# Patient Record
Sex: FEMALE | Race: WHITE | NOT HISPANIC OR LATINO | URBAN - METROPOLITAN AREA
[De-identification: names, ages, dates, MRNs, and addresses within clinical notes are randomized per-mention and may not be internally consistent; named-entity substitution may affect disease eponyms.]

---

## 2019-02-27 ENCOUNTER — APPOINTMENT (OUTPATIENT)
Dept: PREADMISSION TESTING | Facility: HOSPITAL | Age: 29
End: 2019-02-27
Attending: ORTHOPAEDIC SURGERY
Payer: OTHER MISCELLANEOUS

## 2019-02-27 ENCOUNTER — APPOINTMENT (OUTPATIENT)
Dept: LAB | Facility: HOSPITAL | Age: 29
End: 2019-02-27
Attending: ORTHOPAEDIC SURGERY
Payer: OTHER MISCELLANEOUS

## 2019-02-27 ENCOUNTER — TRANSCRIBE ORDERS (OUTPATIENT)
Dept: LAB | Facility: HOSPITAL | Age: 29
End: 2019-02-27

## 2019-02-27 VITALS
SYSTOLIC BLOOD PRESSURE: 99 MMHG | RESPIRATION RATE: 16 BRPM | HEART RATE: 60 BPM | DIASTOLIC BLOOD PRESSURE: 65 MMHG | WEIGHT: 174.7 LBS | HEIGHT: 65 IN | OXYGEN SATURATION: 98 % | BODY MASS INDEX: 29.11 KG/M2 | TEMPERATURE: 97.5 F

## 2019-02-27 DIAGNOSIS — M67.52 PLICA SYNDROME OF LEFT KNEE: ICD-10-CM

## 2019-02-27 DIAGNOSIS — M65.162 OTHER INFECTIVE (TENO)SYNOVITIS, LEFT KNEE: Primary | ICD-10-CM

## 2019-02-27 DIAGNOSIS — M65.162 OTHER INFECTIVE (TENO)SYNOVITIS, LEFT KNEE: ICD-10-CM

## 2019-02-27 DIAGNOSIS — Z01.818 ENCOUNTER FOR PREADMISSION TESTING: Primary | ICD-10-CM

## 2019-02-27 PROBLEM — I26.99 PULMONARY EMBOLISM (CMS/HCC): Status: ACTIVE | Noted: 2017-01-01

## 2019-02-27 LAB
ANION GAP SERPL CALC-SCNC: 10 MEQ/L (ref 3–15)
ATRIAL RATE: 62
BUN SERPL-MCNC: 5 MG/DL (ref 8–20)
CALCIUM SERPL-MCNC: 9.9 MG/DL (ref 8.9–10.3)
CHLORIDE SERPL-SCNC: 107 MEQ/L (ref 98–109)
CO2 SERPL-SCNC: 24 MEQ/L (ref 22–32)
CREAT SERPL-MCNC: 0.8 MG/DL
ERYTHROCYTE [DISTWIDTH] IN BLOOD BY AUTOMATED COUNT: 12.2 % (ref 11.7–14.4)
GFR SERPL CREATININE-BSD FRML MDRD: >60 ML/MIN/1.73M*2
GLUCOSE SERPL-MCNC: 84 MG/DL (ref 70–99)
HCG UR QL: NEGATIVE
HCT VFR BLDCO AUTO: 40.2 %
HGB BLD-MCNC: 13.2 G/DL
MCH RBC QN AUTO: 29.1 PG (ref 28–33.2)
MCHC RBC AUTO-ENTMCNC: 32.8 G/DL (ref 32.2–35.5)
MCV RBC AUTO: 88.7 FL (ref 83–98)
P AXIS: 75
PDW BLD AUTO: 10.3 FL (ref 9.4–12.3)
PLATELET # BLD AUTO: 258 K/UL
POTASSIUM SERPL-SCNC: 4 MEQ/L (ref 3.6–5.1)
PR INTERVAL: 140
QRS DURATION: 88
QT INTERVAL: 410
QTC CALCULATION(BAZETT): 416
R AXIS: 73
RBC # BLD AUTO: 4.53 M/UL (ref 3.93–5.22)
SODIUM SERPL-SCNC: 141 MEQ/L (ref 136–144)
T WAVE AXIS: 40
VENTRICULAR RATE: 62
WBC # BLD AUTO: 6.55 K/UL

## 2019-02-27 PROCEDURE — 93005 ELECTROCARDIOGRAM TRACING: CPT

## 2019-02-27 PROCEDURE — 85027 COMPLETE CBC AUTOMATED: CPT

## 2019-02-27 PROCEDURE — 36415 COLL VENOUS BLD VENIPUNCTURE: CPT

## 2019-02-27 PROCEDURE — 84703 CHORIONIC GONADOTROPIN ASSAY: CPT

## 2019-02-27 PROCEDURE — 80048 BASIC METABOLIC PNL TOTAL CA: CPT

## 2019-02-27 PROCEDURE — 93010 ELECTROCARDIOGRAM REPORT: CPT | Performed by: INTERNAL MEDICINE

## 2019-02-27 RX ORDER — PAROXETINE 10 MG/1
TABLET, FILM COATED ORAL
Refills: 2 | COMMUNITY
Start: 2019-01-29

## 2019-02-27 RX ORDER — ASPIRIN 81 MG/1
81 TABLET ORAL DAILY
COMMUNITY

## 2019-02-27 ASSESSMENT — ENCOUNTER SYMPTOMS
JOINT SWELLING: 1
CARDIOVASCULAR NEGATIVE: 1
NERVOUS/ANXIOUS: 1
RESPIRATORY NEGATIVE: 1
ARTHRALGIAS: 1

## 2019-02-27 NOTE — PRE-PROCEDURE INSTRUCTIONS
1. We will call you between 3 pm and 7 pm on March 5, 2019 to determine that arrival time for your procedure. If you do not hear by 4:30 PM. Please call [phone] for arrival time.    2. Please report to [place] in [place] / good, walk into [place] and report to the admission desk on first floor on the day of your procedure.   3. Please follow the following fasting guidelines:   Nothing to eat or drink after midnight unless otherwise instructed by  your physician. No gum mints candy. Brush teeth/Rinse Mouth   4.    5. Other Instructions: No aspirin aleve ibuprofen-stop supplements. Tylenol OK   6. If you develop a cold, cough, fever, rash, or other symptom prior to the data of the procedure, please report it to your physician immediately.   7. If you need to cancel the procedure for any reason, please contact your physician or call the unit listed above.   8. Make arrangements to have someone drive you home from the procedure. If you have not arranged for transportation home, your surgery may be cancelled.    9. You may not take public transportation unless accompanied by a responsible person.   10. You may not drive a car or operate complex or potentially dangerous machinery for 24 hours following anesthesia and/or sedation.   11. If it is medically necessary for you to have a longer stay, you will be informed as soon as the decision is made.   12. Do not wear or bring anything of value to the hospital including jewelry of any kind. Do not wear make-up or contact lenses. DO bring your glasses and hearing aid.   13. No lotion, creams, powders, or oils on skin the morning of procedure    14. Dress in comfortable clothes.   15.  If instructed, please bring a copy of your Advanced Directive (Living Will/Durable Power of ) on the day of your procedure.      Pre operative instructions given as per protocol.  Form explained by: ANDRA Martin     I have read and understand the above information. I have  had sufficient opportunity to ask questions I might have and they have been answered to my satisfaction. I agree to comply with the Patient Responsibilities listed above and have received a copy of this form.

## 2019-02-27 NOTE — H&P
History and Physical  Pre-admission testing         HISTORY OF PRESENT ILLNESS      Nikki Winston is an 29 y.o. female who presents with complaints or worsening left knee pain with some swelling over the past 2 years, despite medical therapy and PT. She presented in 2017 with a left leg DVT and subsequent pulmonary embolism which was treated in 2017 with Eliquis x 6 months, currently on aspirin. She is scheduled for  Left Knee Arthroscopic [65590 (CPT®)]  PAST MEDICAL AND SURGICAL HISTORY      Past Medical History:   Diagnosis Date   • Abnormal Pap smear of cervix    • Anxiety    • Asthma     exercise induced   • Carpal tunnel syndrome, bilateral    • Deep vein thrombosis (CMS/HCC) ([facility]) 2017    left leg   • Depression    • Eczema    • Lung bullae (CMS/HCC) (HCC)    • Neuropathy     left leg   • Pulmonary embolism (CMS/HCC) (HCC) 2017    derived from left leg DVT-Eliquis x 6months   • Spasm of thoracic back muscle     occas   • Synovitis of knee        Past Surgical History:   Procedure Laterality Date   • THYROIDECTOMY, PARTIAL     • WISDOM TOOTH EXTRACTION         PROBLEM LIST     Patient Active Problem List   Diagnosis   • Pulmonary embolism (CMS/HCC) ([facility])   • Synovitis of knee       MEDICATIONS        Current Outpatient Prescriptions:   •  aspirin 81 mg enteric coated tablet, Take 81 mg by mouth daily., Disp: , Rfl:   •  cannabidiol (CBD) oil, 1 each See admin instr., Disp: , Rfl:   •  PARoxetine (PAXIL) 10 mg tablet, TAKE 1 TABLET EVERY DAY IN THE MORNING, Disp: , Rfl: 2    ALLERGIES      No Known Allergies    FAMILY HISTORY      No family history on file.    SOCIAL HISTORY      Social History     Social History   • Marital status: Single     Spouse name: N/A   • Number of children: N/A   • Years of education: N/A     Occupational History   • Not on file.     Social History Main Topics   • Smoking status: Former Smoker     Types: Cigarettes     Quit date: 2015   • Smokeless tobacco: Never Used   • Alcohol  "use Yes      Comment: occas wine   • Drug use: No   • Sexual activity: Not on file     Other Topics Concern   • Not on file     Social History Narrative   • No narrative on file       REVIEW OF SYSTEMS      Review of Systems   Respiratory: Negative.    Cardiovascular: Negative.    Musculoskeletal: Positive for arthralgias and joint swelling.        Left knee   Psychiatric/Behavioral: The patient is nervous/anxious.        PHYSICAL EXAMINATION      BP 99/65 (BP Location: Left upper arm, Patient Position: Sitting)   Pulse 60   Temp 36.4 °C (97.5 °F)   Resp 16   Ht 1.651 m (5' 5\")   Wt 79.2 kg (174 lb 11.2 oz)   LMP 02/23/2019 (Approximate)   SpO2 98%   Breastfeeding? No   BMI 29.07 kg/m²   Body mass index is 29.07 kg/m².    Physical Exam   Constitutional: She is oriented to person, place, and time. She appears well-developed and well-nourished.   HENT:   Head: Normocephalic and atraumatic.   Right Ear: External ear normal.   Left Ear: External ear normal.   Mouth/Throat: Oropharynx is clear and moist.   Eyes: Conjunctivae and EOM are normal. Pupils are equal, round, and reactive to light.   Neck: Normal range of motion. Neck supple.   Cardiovascular: Normal rate, regular rhythm, normal heart sounds and intact distal pulses.    Pulses:       Dorsalis pedis pulses are 1+ on the right side, and 1+ on the left side.        Posterior tibial pulses are 1+ on the right side, and 1+ on the left side.   Pulmonary/Chest: Effort normal and breath sounds normal.   Abdominal: Soft. Bowel sounds are normal.   Genitourinary:   Genitourinary Comments: deferred   Musculoskeletal: She exhibits tenderness.   Left knee   Neurological: She is alert and oriented to person, place, and time.   Skin: Skin is warm and dry.   Psychiatric: Her speech is normal and behavior is normal. Judgment and thought content normal. Her mood appears anxious.   Nursing note and vitals reviewed.         ANDRA Martin  2/27/2019     "

## 2019-03-04 RX ORDER — OXYCODONE HYDROCHLORIDE 5 MG/1
5-10 TABLET ORAL
Status: CANCELLED | OUTPATIENT
Start: 2019-03-04

## 2019-03-04 RX ORDER — OXYCODONE AND ACETAMINOPHEN 5; 325 MG/1; MG/1
1 TABLET ORAL EVERY 4 HOURS PRN
Qty: 30 TABLET | Refills: 0 | Status: CANCELLED | OUTPATIENT
Start: 2019-03-04 | End: 2019-03-09

## 2019-03-04 RX ORDER — IBUPROFEN 200 MG
16-32 TABLET ORAL AS NEEDED
Status: CANCELLED | OUTPATIENT
Start: 2019-03-04

## 2019-03-04 RX ORDER — ONDANSETRON 4 MG/1
4 TABLET, ORALLY DISINTEGRATING ORAL EVERY 8 HOURS PRN
Status: CANCELLED | OUTPATIENT
Start: 2019-03-04

## 2019-03-04 RX ORDER — DEXTROSE 40 %
15-30 GEL (GRAM) ORAL AS NEEDED
Status: CANCELLED | OUTPATIENT
Start: 2019-03-04

## 2019-03-04 RX ORDER — DEXTROSE 50 % IN WATER (D50W) INTRAVENOUS SYRINGE
25 AS NEEDED
Status: CANCELLED | OUTPATIENT
Start: 2019-03-04

## 2019-03-04 RX ORDER — ACETAMINOPHEN 325 MG/1
650 TABLET ORAL EVERY 4 HOURS PRN
Status: CANCELLED | OUTPATIENT
Start: 2019-03-04

## 2019-03-04 NOTE — DISCHARGE INSTRUCTIONS
[facility] Post Operative Surgical Instructions    Surgeon: Perez Feng, DO          You had the following procedure performed at [facility] on 3/6/19    Procedure: Left Knee Arthroscopy    Weight Bearing:  -If you had surgery on your leg, use crutches as needed.    Dressing/Wound Care  -Change dressings in 24-48 hours after surgery. Place band-aids over incisions.  You may shower after 48 hours.  -Keep dressings clean and dry.  -Once dressing is removed, DO NOT soak incision, NO baths. After you shower, pat incision dry lightly.  -Some drainage from incisions is to be expected.  However, if significant drainage occurs, call the office or go to emergency room immediately.  -If you experience fevers and chills with increase in pain, redness and/or drainage from incisions, call the office or go to emergency room immediately.  -   If provided, please wear your brace/splint until seen by your surgeon.    Office Follow Up  -If you do not already have a follow up appointment, please call the following office as soon as you get home to schedule an appointment.  -Your surgeon would like to see you in the office next week       [place] Office        [phone]     [facility] Office        [phone]     [facility] Office            [phone]     [place] Office            [phone]    Medications  Please take your pain medication with food.  You may use Tylenol or Motrin for pain instead of the narcotics if your pain is not significant.  Narcotic pain medication can be constipating.  If you experience constipation, try an over the counter stool softener.  -  Percocet 5/325.  Take 1 to 2 tablets by mouth every 4 to 6 hours as needed for pain.  -  Ambien 10mg.  Take 1 tablet by mouth at night as needed to sleep.  Only use this medication if you are having difficulty sleeping.      Information  DO NOT drive until seen by your surgeon.  You may resume your previous diet.  Always  keep your dressings clean and dry.  DO NOT return to work until seen by your surgeon.  If you have any questions or concerns, please do not hesitate to call the office.        [place] [facility]  [address]  [address]  [place] [address]  [phone]  Fax: [phone]    [facility] Office  [address]  [address] [place] [address]  [phone]  Fax: [phone]    [facility] Office  [facility], Ground Floor  [address] [place] [address]  [phone]  Fax: [phone]    [place] Office  [facility]  [address]  [place] [address]  [phone]  Fax: [phone]

## 2019-03-05 ENCOUNTER — ANESTHESIA EVENT (OUTPATIENT)
Dept: SURGERY | Facility: HOSPITAL | Age: 29
Setting detail: HOSPITAL OUTPATIENT SURGERY
End: 2019-03-05
Payer: OTHER MISCELLANEOUS

## 2019-03-06 ENCOUNTER — HOSPITAL ENCOUNTER (OUTPATIENT)
Facility: HOSPITAL | Age: 29
Setting detail: HOSPITAL OUTPATIENT SURGERY
Discharge: HOME | End: 2019-03-06
Attending: ORTHOPAEDIC SURGERY | Admitting: ORTHOPAEDIC SURGERY
Payer: OTHER MISCELLANEOUS

## 2019-03-06 ENCOUNTER — ANESTHESIA (OUTPATIENT)
Dept: SURGERY | Facility: HOSPITAL | Age: 29
Setting detail: HOSPITAL OUTPATIENT SURGERY
End: 2019-03-06
Payer: OTHER MISCELLANEOUS

## 2019-03-06 VITALS
WEIGHT: 174 LBS | RESPIRATION RATE: 16 BRPM | DIASTOLIC BLOOD PRESSURE: 63 MMHG | SYSTOLIC BLOOD PRESSURE: 112 MMHG | TEMPERATURE: 97.7 F | OXYGEN SATURATION: 100 % | HEART RATE: 78 BPM | BODY MASS INDEX: 28.99 KG/M2 | HEIGHT: 65 IN

## 2019-03-06 PROCEDURE — 71000002 HC PACU PHASE 2 INITIAL 30MIN: Performed by: ORTHOPAEDIC SURGERY

## 2019-03-06 PROCEDURE — 63700000 HC SELF-ADMINISTRABLE DRUG: Performed by: STUDENT IN AN ORGANIZED HEALTH CARE EDUCATION/TRAINING PROGRAM

## 2019-03-06 PROCEDURE — 97161 PT EVAL LOW COMPLEX 20 MIN: CPT | Mod: GP

## 2019-03-06 PROCEDURE — 36000014 HC OR LEVEL 4 EA ADDL MIN: Performed by: ORTHOPAEDIC SURGERY

## 2019-03-06 PROCEDURE — 25800000 HC PHARMACY IV SOLUTIONS: Performed by: ORTHOPAEDIC SURGERY

## 2019-03-06 PROCEDURE — 36000004 HC OR LEVEL 4 INITIAL 30MIN: Performed by: ORTHOPAEDIC SURGERY

## 2019-03-06 PROCEDURE — 0SBD4ZZ EXCISION OF LEFT KNEE JOINT, PERCUTANEOUS ENDOSCOPIC APPROACH: ICD-10-PCS | Performed by: ORTHOPAEDIC SURGERY

## 2019-03-06 PROCEDURE — 27200000 HC STERILE SUPPLY: Performed by: ORTHOPAEDIC SURGERY

## 2019-03-06 PROCEDURE — 71000011 HC PACU PHASE 1 EA ADDL MIN: Performed by: ORTHOPAEDIC SURGERY

## 2019-03-06 PROCEDURE — 71000001 HC PACU PHASE 1 INITIAL 30MIN: Performed by: ORTHOPAEDIC SURGERY

## 2019-03-06 PROCEDURE — 25000000 HC PHARMACY GENERAL: Performed by: STUDENT IN AN ORGANIZED HEALTH CARE EDUCATION/TRAINING PROGRAM

## 2019-03-06 PROCEDURE — 63600000 HC DRUGS/DETAIL CODE: Performed by: NURSE ANESTHETIST, CERTIFIED REGISTERED

## 2019-03-06 PROCEDURE — 25000000 HC PHARMACY GENERAL: Performed by: NURSE ANESTHETIST, CERTIFIED REGISTERED

## 2019-03-06 PROCEDURE — 37000001 HC ANESTHESIA GENERAL: Performed by: ORTHOPAEDIC SURGERY

## 2019-03-06 PROCEDURE — 71000012 HC PACU PHASE 2 EA ADDL MIN: Performed by: ORTHOPAEDIC SURGERY

## 2019-03-06 PROCEDURE — 63600000 HC DRUGS/DETAIL CODE: Performed by: ANESTHESIOLOGY

## 2019-03-06 RX ORDER — CEFAZOLIN SODIUM 1 G/50ML
SOLUTION INTRAVENOUS AS NEEDED
Status: DISCONTINUED | OUTPATIENT
Start: 2019-03-06 | End: 2019-03-06 | Stop reason: SURG

## 2019-03-06 RX ORDER — DEXAMETHASONE SODIUM PHOSPHATE 4 MG/ML
INJECTION, SOLUTION INTRA-ARTICULAR; INTRALESIONAL; INTRAMUSCULAR; INTRAVENOUS; SOFT TISSUE AS NEEDED
Status: DISCONTINUED | OUTPATIENT
Start: 2019-03-06 | End: 2019-03-06 | Stop reason: SURG

## 2019-03-06 RX ORDER — DEXTROSE 50 % IN WATER (D50W) INTRAVENOUS SYRINGE
25 AS NEEDED
Status: DISCONTINUED | OUTPATIENT
Start: 2019-03-06 | End: 2019-03-06 | Stop reason: HOSPADM

## 2019-03-06 RX ORDER — ACETAMINOPHEN AND CODEINE PHOSPHATE 300; 30 MG/1; MG/1
TABLET ORAL
Qty: 1 TABLET | Refills: 0 | Status: SHIPPED | OUTPATIENT
Start: 2019-03-06

## 2019-03-06 RX ORDER — ZOLPIDEM TARTRATE 10 MG/1
10 TABLET ORAL NIGHTLY PRN
Qty: 7 TABLET | Refills: 0 | Status: SHIPPED | OUTPATIENT
Start: 2019-03-06 | End: 2019-03-11

## 2019-03-06 RX ORDER — ONDANSETRON HYDROCHLORIDE 2 MG/ML
4 INJECTION, SOLUTION INTRAVENOUS
Status: DISCONTINUED | OUTPATIENT
Start: 2019-03-06 | End: 2019-03-06

## 2019-03-06 RX ORDER — HYDROMORPHONE HYDROCHLORIDE 2 MG/ML
0.5 INJECTION, SOLUTION INTRAMUSCULAR; INTRAVENOUS; SUBCUTANEOUS
Status: DISCONTINUED | OUTPATIENT
Start: 2019-03-06 | End: 2019-03-06 | Stop reason: HOSPADM

## 2019-03-06 RX ORDER — DEXTROSE 40 %
15-30 GEL (GRAM) ORAL AS NEEDED
Status: DISCONTINUED | OUTPATIENT
Start: 2019-03-06 | End: 2019-03-06 | Stop reason: HOSPADM

## 2019-03-06 RX ORDER — PHENYLEPHRINE HCL IN 0.9% NACL 1 MG/10 ML
SYRINGE (ML) INTRAVENOUS AS NEEDED
Status: DISCONTINUED | OUTPATIENT
Start: 2019-03-06 | End: 2019-03-06 | Stop reason: SURG

## 2019-03-06 RX ORDER — ACETAMINOPHEN 325 MG/1
650 TABLET ORAL EVERY 4 HOURS PRN
Status: DISCONTINUED | OUTPATIENT
Start: 2019-03-06 | End: 2019-03-06

## 2019-03-06 RX ORDER — FENTANYL CITRATE 50 UG/ML
50 INJECTION, SOLUTION INTRAMUSCULAR; INTRAVENOUS
Status: DISCONTINUED | OUTPATIENT
Start: 2019-03-06 | End: 2019-03-06 | Stop reason: HOSPADM

## 2019-03-06 RX ORDER — IBUPROFEN 200 MG
16-32 TABLET ORAL AS NEEDED
Status: DISCONTINUED | OUTPATIENT
Start: 2019-03-06 | End: 2019-03-06 | Stop reason: HOSPADM

## 2019-03-06 RX ORDER — MIDAZOLAM HYDROCHLORIDE 2 MG/2ML
INJECTION, SOLUTION INTRAMUSCULAR; INTRAVENOUS AS NEEDED
Status: DISCONTINUED | OUTPATIENT
Start: 2019-03-06 | End: 2019-03-06 | Stop reason: SURG

## 2019-03-06 RX ORDER — ACETAMINOPHEN AND CODEINE PHOSPHATE 120; 12 MG/5ML; MG/5ML
5 SOLUTION ORAL EVERY 6 HOURS PRN
Status: DISCONTINUED | OUTPATIENT
Start: 2019-03-06 | End: 2019-03-06 | Stop reason: HOSPADM

## 2019-03-06 RX ORDER — ACETAMINOPHEN AND CODEINE PHOSPHATE 120; 12 MG/5ML; MG/5ML
5 SOLUTION ORAL EVERY 6 HOURS PRN
Status: DISCONTINUED | OUTPATIENT
Start: 2019-03-06 | End: 2019-03-06

## 2019-03-06 RX ORDER — OXYCODONE HYDROCHLORIDE 5 MG/1
5-10 TABLET ORAL
Status: DISCONTINUED | OUTPATIENT
Start: 2019-03-06 | End: 2019-03-06 | Stop reason: HOSPADM

## 2019-03-06 RX ORDER — KETOROLAC TROMETHAMINE 30 MG/ML
INJECTION, SOLUTION INTRAMUSCULAR; INTRAVENOUS AS NEEDED
Status: DISCONTINUED | OUTPATIENT
Start: 2019-03-06 | End: 2019-03-06 | Stop reason: SURG

## 2019-03-06 RX ORDER — BUPIVACAINE HYDROCHLORIDE AND EPINEPHRINE 5; 5 MG/ML; UG/ML
INJECTION, SOLUTION EPIDURAL; INTRACAUDAL; PERINEURAL AS NEEDED
Status: DISCONTINUED | OUTPATIENT
Start: 2019-03-06 | End: 2019-03-06 | Stop reason: HOSPADM

## 2019-03-06 RX ORDER — LIDOCAINE HYDROCHLORIDE 10 MG/ML
INJECTION, SOLUTION EPIDURAL; INFILTRATION; INTRACAUDAL; PERINEURAL AS NEEDED
Status: DISCONTINUED | OUTPATIENT
Start: 2019-03-06 | End: 2019-03-06 | Stop reason: SURG

## 2019-03-06 RX ORDER — SODIUM CHLORIDE 9 MG/ML
INJECTION, SOLUTION INTRAVENOUS CONTINUOUS
Status: DISCONTINUED | OUTPATIENT
Start: 2019-03-06 | End: 2019-03-06 | Stop reason: HOSPADM

## 2019-03-06 RX ORDER — ONDANSETRON 4 MG/1
4 TABLET, ORALLY DISINTEGRATING ORAL EVERY 8 HOURS PRN
Status: DISCONTINUED | OUTPATIENT
Start: 2019-03-06 | End: 2019-03-06 | Stop reason: HOSPADM

## 2019-03-06 RX ORDER — PROPOFOL 10 MG/ML
INJECTION, EMULSION INTRAVENOUS AS NEEDED
Status: DISCONTINUED | OUTPATIENT
Start: 2019-03-06 | End: 2019-03-06 | Stop reason: SURG

## 2019-03-06 RX ORDER — MIDAZOLAM HYDROCHLORIDE 2 MG/2ML
2 INJECTION, SOLUTION INTRAMUSCULAR; INTRAVENOUS ONCE
Status: DISCONTINUED | OUTPATIENT
Start: 2019-03-06 | End: 2019-03-06 | Stop reason: HOSPADM

## 2019-03-06 RX ORDER — ONDANSETRON HYDROCHLORIDE 2 MG/ML
INJECTION, SOLUTION INTRAVENOUS AS NEEDED
Status: DISCONTINUED | OUTPATIENT
Start: 2019-03-06 | End: 2019-03-06 | Stop reason: SURG

## 2019-03-06 RX ORDER — FENTANYL CITRATE 50 UG/ML
INJECTION, SOLUTION INTRAMUSCULAR; INTRAVENOUS AS NEEDED
Status: DISCONTINUED | OUTPATIENT
Start: 2019-03-06 | End: 2019-03-06 | Stop reason: SURG

## 2019-03-06 RX ADMIN — LIDOCAINE HYDROCHLORIDE 5 ML: 10 INJECTION, SOLUTION EPIDURAL; INFILTRATION; INTRACAUDAL; PERINEURAL at 09:46

## 2019-03-06 RX ADMIN — Medication 50 MCG: at 09:53

## 2019-03-06 RX ADMIN — Medication 25 MCG: at 10:15

## 2019-03-06 RX ADMIN — Medication 25 MCG: at 10:20

## 2019-03-06 RX ADMIN — MIDAZOLAM HYDROCHLORIDE 2 MG: 1 INJECTION, SOLUTION INTRAMUSCULAR; INTRAVENOUS at 09:43

## 2019-03-06 RX ADMIN — CEFAZOLIN SODIUM 2 G: 1 SOLUTION INTRAVENOUS at 09:43

## 2019-03-06 RX ADMIN — FENTANYL CITRATE 50 MCG: 50 INJECTION, SOLUTION INTRAMUSCULAR; INTRAVENOUS at 09:43

## 2019-03-06 RX ADMIN — DEXAMETHASONE SODIUM PHOSPHATE 4 MG: 4 INJECTION, SOLUTION INTRA-ARTICULAR; INTRALESIONAL; INTRAMUSCULAR; INTRAVENOUS; SOFT TISSUE at 09:53

## 2019-03-06 RX ADMIN — PROPOFOL 200 MG: 10 INJECTION, EMULSION INTRAVENOUS at 09:46

## 2019-03-06 RX ADMIN — Medication 50 MCG: at 10:02

## 2019-03-06 RX ADMIN — FENTANYL CITRATE 50 MCG: 50 INJECTION, SOLUTION INTRAMUSCULAR; INTRAVENOUS at 11:11

## 2019-03-06 RX ADMIN — FENTANYL CITRATE 50 MCG: 50 INJECTION, SOLUTION INTRAMUSCULAR; INTRAVENOUS at 09:46

## 2019-03-06 RX ADMIN — ACETAMINOPHEN AND CODEINE PHOSPHATE 5 ML: 120; 12 SOLUTION ORAL at 13:19

## 2019-03-06 RX ADMIN — ONDANSETRON 4 MG: 2 INJECTION INTRAMUSCULAR; INTRAVENOUS at 09:53

## 2019-03-06 RX ADMIN — FENTANYL CITRATE 50 MCG: 50 INJECTION, SOLUTION INTRAMUSCULAR; INTRAVENOUS at 11:31

## 2019-03-06 RX ADMIN — KETOROLAC TROMETHAMINE 30 MG: 30 INJECTION, SOLUTION INTRAMUSCULAR at 10:19

## 2019-03-06 RX ADMIN — SODIUM CHLORIDE: 9 INJECTION, SOLUTION INTRAVENOUS at 08:58

## 2019-03-06 ASSESSMENT — PAIN - FUNCTIONAL ASSESSMENT: PAIN_FUNCTIONAL_ASSESSMENT: 0-10

## 2019-03-06 NOTE — PROGRESS NOTES
Patient: Nikki Winston  Location: [facility] PACU [facility]  MRN: [id number]  Today's date: 3/6/2019    Pt. Seated in chair.  Nurse at bedside.  Instruction in HEP and issued crutches for safe d/c home.      Prior Living Environment      Most Recent Value   Living Environment Comment  lives in multi-level home w/ LAMIN   Equipment Currently Used at Home  none          Prior Level of Function      Most Recent Value   Ambulation  independent   Transferring  independent   Toileting  independent   Bathing  independent   Dressing  independent   Eating  independent   Equipment Currently Used at Home  none                PT Evaluation - 03/06/19 1235        Session Details    Document Type initial evaluation    Mode of Treatment physical therapy       Time Calculation    Start Time 1234    Stop Time 1248    Time Calculation (min) 14 min       General Information    Patient Profile Reviewed? yes    Pertinent History of Current Functional Problem s/p L knee arthroscopy    Existing Precautions/Restrictions weight bearing       Weight Bearing Status    Left LE Weight Bearing Status weight bearing as tolerated       Orientation Log    Comment AAO x 3       Cognition/Psychosocial    Safety Awareness intact       Sensory    Sensory General Assessment no sensation deficits identified       Range of Motion (ROM)    General Range of Motion no range of motion deficits identified       Manual Muscle Testing (MMT)    General MMT Assessment no strength deficits identified       Sit to Stand Transfer    [place], Sit to Stand Transfer independent       Stand to Sit Transfer    [place], Stand to Sit Transfer independent       Gait Training    [place], Gait modified independence    Assistive Device crutches, axillary    Distance in Feet 35 feet   amb. additional 35 ft. w/ crutches mod I    Gait Pattern Utilized step-through    Gait Deviations Identified decreased meagan    Maintains Weight Bearing  Status able to maintain weight bearing status    Comment demonstrates safe gait pattern with crutches        Stairs Training    [place], Stairs not tested    Comment verbal and written instruction        PT Clinical Impression    Patient's Goals For Discharge return home;return to all previous roles/activities    Anticipated Equipment Needs at Discharge crutches   issued crutches for home                        Education provided this session. See the Patient Education summary report for full details.

## 2019-03-06 NOTE — BRIEF OP NOTE
Left Knee Arthroscopy, partial synovectomy, resection of plica. (L) Procedure Note    Procedure:    Left Knee Arthroscopy, partial synovectomy, resection of plica.  CPT(R) Code:  68931 - [place] KNEE SCOPE,PART SYNOVECT      Pre-op Diagnosis     * Other infective (teno)synovitis, left knee [M65.162]     * Plica of knee, left [M67.52]       Post-op Diagnosis     * Other infective (teno)synovitis, left knee [M65.162]     * Plica of knee, left [M67.52]    Surgeon(s) and Role:     * Perez Feng,  - Primary     * Dominik Perdue DO - Resident - Assisting     * Momo Echevarria DO - Resident - Observing     * Haroon Paula DO - Resident - Observing    Anesthesia: Choice    Staff:   Circulator: Josefa Stout RN; Meg Astorga RN  Scrub Person: Lesli Hancock RN    Procedure Details   See operative dictation    Estimated Blood Loss: No blood loss documented.    Specimens:                No specimens collected during this procedure.      Drains:      Implants: * No implants in log *           Complications:  None; patient tolerated the procedure well.           Disposition: PACU - hemodynamically stable.           Condition: stable    Perez Feng DO  Phone Number: [phone]

## 2019-03-06 NOTE — OP NOTE
REPORT TYPE:  Operative Note    DATE OF OPERATION:  03/06/2019      SURGEON:  Perez Feng DO    ASSISTANT:  Dr. Dominik Perdue and Dr. John Curiel.    PREOPERATIVE DIAGNOSES:  Synovitis and synovial plica, left knee.    POSTOPERATIVE DIAGNOSES:  Synovitis and synovial plica, left knee.    OPERATION:  1.  Diagnostic arthroscopy, left knee.  2.  Arthroscopic partial synovectomy with resection of plica.  3.  Postoperative injection Marcaine, left knee.    ANESTHESIA:  General.    ESTIMATED BLOOD LOSS:  Minimal.    DESCRIPTION OF PROCEDURE:  The patient brought to the operating room, administered general anesthesia and tourniquet was placed in the upper right thigh, but not inflated.  The right leg was prepped with ChloraPrep and double layer draping above and   below with a stockinette was accomplished sterilely.    A timeout was taken verifying left knee for arthroscopic surgery and confirmed with the anesthesia, nursing, and surgical personnel.    After a successful timeout involving the left knee, diagnostic arthroscopy was carried out through anteromedial and anterolateral portals with the following findings.  The patellofemoral joint showed hypertrophic synovitis with synovial impinging on the   medial patellofemoral joint with a plica.  Partial synovectomy was completed with resection of plica with the VAPR ablation unit.  There was no malrotation or subluxing of the patella after resection of the plica.  There was no chondral defects of the   patellar or trochlea seen.  There were no loose or foreign bodies.    The medial joint was visualized.  There was no tear of the medial meniscus or chondral defect or degenerative disease of the medial, tibial or femoral condyles.    The ACL was seen and appeared to be intact in the notch.    The lateral joint was visualized.  There was no tear of the lateral meniscus and there was no chondral lesion of the lateral femoral or tibial condyles.    The knee was thoroughly  irrigated with 3000 mL of normal saline.  Two single arthroscopic portals were closed using single sutures of 3-0 nylon in a vertical mattress type manner.  The knee was then injected with 15 mL of 0.25% Marcaine with epinephrine.    A sterile compression dressing was applied.  The patient was awoken from anesthesia and taken from the operating room to the recovery room with stable vital signs.      KAREN CASTRO DO        CC: NICHO MALDONADO DOPhilip SANTIAGO DO    DD: 03/06/2019 10:22  DT: 03/06/2019 10:48  Voice ID: [id number]/Report ID: [id number]  [email or web address]

## 2019-03-06 NOTE — BRIEF OP NOTE
Left Knee Arthroscopy, partial synovectomy, resection of plica. (L) Procedure Note    Procedure:    Left Knee Arthroscopy, partial synovectomy, resection of plica.  CPT(R) Code:  15382 - [place] KNEE SCOPE,PART SYNOVECT      Pre-op Diagnosis     * Other infective (teno)synovitis, left knee [M65.162]     * Plica of knee, left [M67.52]       Post-op Diagnosis     * Other infective (teno)synovitis, left knee [M65.162]     * Plica of knee, left [M67.52]    Surgeon(s) and Role:     * Perez Feng,  - Primary     * Dominik Perdue DO - Resident - Assisting     * Momo Echevarria DO - Resident - Observing     * Haroon Paula DO - Resident - Observing    Anesthesia: Choice    Staff:   Circulator: Josefa Stout RN; Meg Astorga RN  Scrub Person: Lesli Hancock RN    Procedure Details   Left knee arthroscopy, partial synovectomy, resection of plica.    Estimated Blood Loss: No blood loss documented.    Specimens:                No specimens collected during this procedure.      Drains:      Implants: * No implants in log *           Complications:  None; patient tolerated the procedure well.           Disposition: PACU - hemodynamically stable.           Condition: stable    Perez Feng DO  Phone Number: [phone]

## 2019-03-06 NOTE — ANESTHESIA PROCEDURE NOTES
Airway  Urgency: elective    Start Time: 3/6/2019 9:50 AM  Airway not difficult    General Information and Staff    Patient location during procedure: OR  Resident/CRNA: RENE PARKER    Indications and Patient Condition  Indications for airway management: anesthesia  Sedation level: general  Preoxygenated: yes  Patient position: sniffing  MILS maintained throughout  Mask difficulty assessment: 1 - vent by mask    Final Airway Details  Final airway type: supraglottic airway (LMA)      Successful airway: iGel  Size 4    Number of attempts at approach: 1  Number of other approaches attempted: 0  Atraumatic airway insertion

## 2019-03-06 NOTE — ANESTHESIA PREPROCEDURE EVALUATION
Anesthesia ROS/MED HX    Anesthesia History - neg  Pulmonary    asthma (stable)  Cardiovascular- neg  GI/Hepatic- neg  Musculoskeletal- neg  Renal Disease- neg  Endo/Other  Body Habitus: Normal  ROS/MED HX Comments:    Endo: Thyroid nodules s/p partial thyroidectomy      Past Surgical History:   Procedure Laterality Date   • THYROIDECTOMY, PARTIAL     • WISDOM TOOTH EXTRACTION         Physical Exam    Airway   Mallampati: II   TM distance: >3 FB   Neck ROM: full  Cardiovascular - normal   Rhythm: regular   Rate: normal  Pulmonary - normal   clear to auscultation  Dental - normal        Anesthesia Plan    Plan: general    Technique: general LMA   ASA 2  Anesthetic plan and risks discussed with: patient

## 2019-03-06 NOTE — ANESTHESIA POSTPROCEDURE EVALUATION
Patient: Nikki Winston    Procedure Summary     Date:  03/06/19 Room / Location:  [facility]    Anesthesia Start:  0942 Anesthesia Stop:  1052    Procedure:  Left Knee Arthroscopy, partial synovectomy, resection of plica. (Left ) Diagnosis:       Other infective (teno)synovitis, left knee      Plica of knee, left      (M65.162   M67.52)    Surgeon:  Perez Feng DO Responsible Provider:  Theresa Jay MD    Anesthesia Type:  general ASA Status:  2          Anesthesia Type: general  PACU Vitals     No data found.            Anesthesia Post Evaluation    Pain management: satisfactory to patient  Mode of pain management: IV medication  Patient location during evaluation: PACU  Patient participation: complete - patient participated  Level of consciousness: awake and alert  Cardiovascular status: acceptable  Airway Patency: adequate  Respiratory status: acceptable  Hydration status: stable  Anesthetic complications: no

## 2019-03-06 NOTE — H&P (VIEW-ONLY)
History and Physical  Pre-admission testing         HISTORY OF PRESENT ILLNESS      Nikki Winston is an 29 y.o. female who presents with complaints or worsening left knee pain with some swelling over the past 2 years, despite medical therapy and PT. She presented in 2017 with a left leg DVT and subsequent pulmonary embolism which was treated in 2017 with Eliquis x 6 months, currently on aspirin. She is scheduled for  Left Knee Arthroscopic [73962 (CPT®)]  PAST MEDICAL AND SURGICAL HISTORY      Past Medical History:   Diagnosis Date   • Abnormal Pap smear of cervix    • Anxiety    • Asthma     exercise induced   • Carpal tunnel syndrome, bilateral    • Deep vein thrombosis (CMS/HCC) ([facility]) 2017    left leg   • Depression    • Eczema    • Lung bullae (CMS/HCC) (HCC)    • Neuropathy     left leg   • Pulmonary embolism (CMS/HCC) (HCC) 2017    derived from left leg DVT-Eliquis x 6months   • Spasm of thoracic back muscle     occas   • Synovitis of knee        Past Surgical History:   Procedure Laterality Date   • THYROIDECTOMY, PARTIAL     • WISDOM TOOTH EXTRACTION         PROBLEM LIST     Patient Active Problem List   Diagnosis   • Pulmonary embolism (CMS/HCC) ([facility])   • Synovitis of knee       MEDICATIONS        Current Outpatient Prescriptions:   •  aspirin 81 mg enteric coated tablet, Take 81 mg by mouth daily., Disp: , Rfl:   •  cannabidiol (CBD) oil, 1 each See admin instr., Disp: , Rfl:   •  PARoxetine (PAXIL) 10 mg tablet, TAKE 1 TABLET EVERY DAY IN THE MORNING, Disp: , Rfl: 2    ALLERGIES      No Known Allergies    FAMILY HISTORY      No family history on file.    SOCIAL HISTORY      Social History     Social History   • Marital status: Single     Spouse name: N/A   • Number of children: N/A   • Years of education: N/A     Occupational History   • Not on file.     Social History Main Topics   • Smoking status: Former Smoker     Types: Cigarettes     Quit date: 2015   • Smokeless tobacco: Never Used   • Alcohol  "use Yes      Comment: occas wine   • Drug use: No   • Sexual activity: Not on file     Other Topics Concern   • Not on file     Social History Narrative   • No narrative on file       REVIEW OF SYSTEMS      Review of Systems   Respiratory: Negative.    Cardiovascular: Negative.    Musculoskeletal: Positive for arthralgias and joint swelling.        Left knee   Psychiatric/Behavioral: The patient is nervous/anxious.        PHYSICAL EXAMINATION      BP 99/65 (BP Location: Left upper arm, Patient Position: Sitting)   Pulse 60   Temp 36.4 °C (97.5 °F)   Resp 16   Ht 1.651 m (5' 5\")   Wt 79.2 kg (174 lb 11.2 oz)   LMP 02/23/2019 (Approximate)   SpO2 98%   Breastfeeding? No   BMI 29.07 kg/m²   Body mass index is 29.07 kg/m².    Physical Exam   Constitutional: She is oriented to person, place, and time. She appears well-developed and well-nourished.   HENT:   Head: Normocephalic and atraumatic.   Right Ear: External ear normal.   Left Ear: External ear normal.   Mouth/Throat: Oropharynx is clear and moist.   Eyes: Conjunctivae and EOM are normal. Pupils are equal, round, and reactive to light.   Neck: Normal range of motion. Neck supple.   Cardiovascular: Normal rate, regular rhythm, normal heart sounds and intact distal pulses.    Pulses:       Dorsalis pedis pulses are 1+ on the right side, and 1+ on the left side.        Posterior tibial pulses are 1+ on the right side, and 1+ on the left side.   Pulmonary/Chest: Effort normal and breath sounds normal.   Abdominal: Soft. Bowel sounds are normal.   Genitourinary:   Genitourinary Comments: deferred   Musculoskeletal: She exhibits tenderness.   Left knee   Neurological: She is alert and oriented to person, place, and time.   Skin: Skin is warm and dry.   Psychiatric: Her speech is normal and behavior is normal. Judgment and thought content normal. Her mood appears anxious.   Nursing note and vitals reviewed.         ANDRA Martin  2/27/2019     "

## 2019-08-12 ENCOUNTER — HOSPITAL ENCOUNTER (EMERGENCY)
Facility: HOSPITAL | Age: 29
Discharge: HOME | End: 2019-08-12
Attending: EMERGENCY MEDICINE | Admitting: EMERGENCY MEDICINE
Payer: COMMERCIAL

## 2019-08-12 VITALS
BODY MASS INDEX: 28.32 KG/M2 | RESPIRATION RATE: 20 BRPM | WEIGHT: 170 LBS | TEMPERATURE: 97 F | HEIGHT: 65 IN | OXYGEN SATURATION: 98 % | DIASTOLIC BLOOD PRESSURE: 68 MMHG | HEART RATE: 68 BPM | SYSTOLIC BLOOD PRESSURE: 114 MMHG

## 2019-08-12 DIAGNOSIS — R49.0 HOARSENESS: Primary | ICD-10-CM

## 2019-08-12 LAB — DEPRECATED D DIMER PPP: 317 NG/ML (FEU)

## 2019-08-12 PROCEDURE — 85379 FIBRIN DEGRADATION QUANT: CPT | Performed by: PHYSICIAN ASSISTANT

## 2019-08-12 PROCEDURE — 36415 COLL VENOUS BLD VENIPUNCTURE: CPT | Performed by: PHYSICIAN ASSISTANT

## 2019-08-12 PROCEDURE — 99283 EMERGENCY DEPT VISIT LOW MDM: CPT

## 2019-08-12 PROCEDURE — 99282 EMERGENCY DEPT VISIT SF MDM: CPT | Performed by: PHYSICIAN ASSISTANT

## 2019-08-12 NOTE — ED PROVIDER NOTES
History  Chief Complaint   Patient presents with    Sore Throat     Patient states she has had a sore throat, hoarse voice and cough for a  couplf of days  Patient seen by Urgent care yesterday and exam was normal        33 yo female w/ hx of provoked PE and anxiety presents to the Emergency Department for evaluation of hoarseness and dry cough x 4 days  She also notes having "severe shortness of breath" yesterday but this has resolved  She is highly concerned for the possibility of PE given her history  States that in 2017 she had a LLE brace in place for several weeks, then developed chest pain and was found to have a PE  Was on [facility] x 6 months, now baby aspirin  Denies surgery in the past 6 weeks, however did just return from a 6+ hour car trip to [place]  No leg swelling or pain  She does note that she has a hx of thyroid nodules, recent US revealed numerous enlarging nodules  Prior to Admission Medications   Prescriptions Last Dose Informant Patient Reported? Taking? DULOXETINE HCL PO   Yes Yes   Sig: Take by mouth   aspirin 81 MG tablet   Yes No   Sig: Take 81 mg by mouth      Facility-Administered Medications: None       Past Medical History:   Diagnosis Date    Disease of thyroid gland     Psychiatric disorder     Pulmonary emboli (HCC)        Past Surgical History:   Procedure Laterality Date    THYROIDECTOMY, PARTIAL         History reviewed  No pertinent family history  I have reviewed and agree with the history as documented  Social History     Tobacco Use    Smoking status: Current Every Day Smoker    Smokeless tobacco: Never Used   Substance Use Topics    Alcohol use: Yes    Drug use: Yes     Types: Marijuana        Review of Systems   Constitutional: Negative for chills, diaphoresis and fever  HENT: Positive for sore throat and voice change  Eyes: Negative for visual disturbance  Respiratory: Positive for cough and shortness of breath (yesterday, now resolved)  Cardiovascular: Negative for chest pain and palpitations  Gastrointestinal: Negative for abdominal pain, diarrhea, nausea and vomiting  Genitourinary: Negative for dysuria, flank pain and frequency  Musculoskeletal: Negative for arthralgias and myalgias  Skin: Negative for color change, rash and wound  Allergic/Immunologic: Negative for immunocompromised state  Neurological: Negative for dizziness and light-headedness  Hematological: Does not bruise/bleed easily  Psychiatric/Behavioral: Negative for confusion  The patient is not nervous/anxious  Physical Exam  Physical Exam   Constitutional: She is oriented to person, place, and time  She appears well-developed and well-nourished  No distress  HENT:   Head: Normocephalic and atraumatic  Mouth/Throat: Oropharynx is clear and moist and mucous membranes are normal    Eyes: Pupils are equal, round, and reactive to light  No scleral icterus  Neck: No JVD present  Thyromegaly present  Cardiovascular: Normal rate and regular rhythm  Exam reveals no gallop and no friction rub  No murmur heard  Pulmonary/Chest: No respiratory distress  She has no wheezes  She has no rales  Abdominal: Soft  Bowel sounds are normal  She exhibits no distension and no mass  There is no tenderness  There is no rebound and no guarding  Musculoskeletal: She exhibits no edema  Neurological: She is alert and oriented to person, place, and time  Skin: Skin is warm and dry  Capillary refill takes less than 2 seconds  She is not diaphoretic  No pallor  Psychiatric: She has a normal mood and affect  Her behavior is normal    Vitals reviewed        Vital Signs  ED Triage Vitals [08/12/19 1019]   Temperature Pulse Respirations Blood Pressure SpO2   (!) 97 °F (36 1 °C) 68 20 114/68 98 %      Temp src Heart Rate Source Patient Position - Orthostatic VS BP Location FiO2 (%)   -- -- -- -- --      Pain Score       4           Vitals:    08/12/19 1019   BP: 114/68 Pulse: 68         Visual Acuity  Visual Acuity      Most Recent Value   L Pupil Size (mm)  3   R Pupil Size (mm)  3          ED Medications  Medications - No data to display    Diagnostic Studies  Results Reviewed     Procedure Component Value Units Date/Time    D-Dimer [[id number]]  (Normal) Collected:  08/12/19 1117    Lab Status:  Final result Specimen:  Blood from Arm, Right Updated:  08/12/19 1205     D-Dimer, Quant 317 ng/ml (FEU)                  No orders to display              Procedures  Procedures       ED Course               PERC Rule for PE      Most Recent Value   PERC Rule for PE   Age >=50  0 Filed at: 08/12/2019 1100   HR >=100  0 Filed at: 08/12/2019 1100   O2 Sat on room air < 95%  0 Filed at: 08/12/2019 1100   History of PE or DVT  1 Filed at: 08/12/2019 1100   Recent trauma or surgery  0 Filed at: 08/12/2019 1100   Hemoptysis  0 Filed at: 08/12/2019 1100   Exogenous estrogen  0 Filed at: 08/12/2019 1100   Unilateral leg swelling  0 Filed at: 08/12/2019 1100   PERC Rule for PE Results  1 Filed at: 08/12/2019 1100                Wells' Criteria for PE      Most Recent Value   Wells' Criteria for PE   Clinical signs and symptoms of DVT  0 Filed at: 08/12/2019 1101   PE is primary diagnosis or equally likely  0 Filed at: 08/12/2019 1101   HR >100  0 Filed at: 08/12/2019 1101   Immobilization at least 3 days or Surgery in the previous 4 weeks  0 Filed at: 08/12/2019 1101   Previous, objectively diagnosed PE or DVT  0 Filed at: 08/12/2019 1101   Hemoptysis  0 Filed at: 08/12/2019 1101   Malignancy with treatment within 6 months or palliative  0 Filed at: 08/12/2019 1101   Wells' Criteria Total  0 Filed at: 08/12/2019 1101            MDM  Number of Diagnoses or Management Options  Hoarseness: new and requires workup  Diagnosis management comments: D Dimer obtained due to pt concern for PE, particularly in the presence of prior PE  Fortunately this was negative   I suspect her symptoms are secondary to thyroid nodular enlargement  Has ENT f/u later this month       Amount and/or Complexity of Data Reviewed  Clinical lab tests: ordered and reviewed  Tests in the medicine section of CPT®: ordered and reviewed  Review and summarize past medical records: yes        Disposition  Final diagnoses:   Hoarseness     Time reflects when diagnosis was documented in both MDM as applicable and the Disposition within this note     Time User Action Codes Description Comment    8/12/2019 12:11 PM Pippa Ramos Add [R49 0] Hoarseness       ED Disposition     ED Disposition Condition Date/Time Comment    Discharge Stable Mon Aug 12, 2019 12:11 PM Sherry Cervantes discharge to home/self care  Follow-up Information     Follow up With Specialties Details Why Contact Info    Lori Joe, DO Family Medicine  If symptoms worsen [address] [facility]  [address] [address]  [phone]            Discharge Medication List as of 8/12/2019 12:12 PM      CONTINUE these medications which have NOT CHANGED    Details   DULOXETINE HCL PO Take by mouth, Historical Med      aspirin 81 MG tablet Take 81 mg by mouth, Historical Med           No discharge procedures on file      ED Provider  Electronically Signed by           Zoltan Alva PA-C  08/12/19 7673

## 2019-08-30 PROBLEM — R49.0 CHRONIC HOARSENESS: Chronic | Status: ACTIVE | Noted: 2019-08-30

## 2019-08-30 PROBLEM — E04.2 MULTIPLE THYROID NODULES: Chronic | Status: ACTIVE | Noted: 2019-08-30

## 2019-08-30 PROBLEM — J38.01 PARALYSIS OF LEFT VOCAL CORD: Chronic | Status: ACTIVE | Noted: 2019-08-30

## 2020-02-19 ENCOUNTER — HOSPITAL ENCOUNTER (OUTPATIENT)
Dept: ULTRASOUND IMAGING | Facility: HOSPITAL | Age: 30
Discharge: HOME | End: 2020-02-19
Payer: COMMERCIAL

## 2020-02-19 DIAGNOSIS — E04.2 MULTIPLE THYROID NODULES: ICD-10-CM

## 2020-02-19 PROCEDURE — 76536 US EXAM OF HEAD AND NECK: CPT

## 2020-02-20 RX ORDER — CRISABOROLE 20 MG/G
1 OINTMENT TOPICAL 2 TIMES DAILY PRN
COMMUNITY
Start: 2019-11-14

## 2020-02-20 RX ORDER — SULFAMETHOXAZOLE AND TRIMETHOPRIM 200; 40 MG/5ML; MG/5ML
SUSPENSION ORAL
COMMUNITY
Start: 2020-01-02 | End: 2020-02-21 | Stop reason: ALTCHOICE

## 2020-02-20 RX ORDER — CLOBETASOL PROPIONATE 0.5 MG/G
CREAM TOPICAL AS NEEDED
COMMUNITY
Start: 2019-11-14

## 2020-02-21 ENCOUNTER — OFFICE VISIT (OUTPATIENT)
Dept: FAMILY MEDICINE CLINIC | Facility: HOSPITAL | Age: 30
End: 2020-02-21
Payer: COMMERCIAL

## 2020-02-21 VITALS
SYSTOLIC BLOOD PRESSURE: 106 MMHG | HEART RATE: 64 BPM | BODY MASS INDEX: 30.26 KG/M2 | WEIGHT: 181.6 LBS | HEIGHT: 65 IN | DIASTOLIC BLOOD PRESSURE: 70 MMHG

## 2020-02-21 DIAGNOSIS — J38.01 PARALYSIS OF LEFT VOCAL CORD: Chronic | ICD-10-CM

## 2020-02-21 DIAGNOSIS — E04.2 MULTIPLE THYROID NODULES: Chronic | ICD-10-CM

## 2020-02-21 DIAGNOSIS — R79.89 LOW VITAMIN D LEVEL: ICD-10-CM

## 2020-02-21 DIAGNOSIS — R10.12 LEFT UPPER QUADRANT PAIN: ICD-10-CM

## 2020-02-21 DIAGNOSIS — R13.10 DYSPHAGIA, UNSPECIFIED TYPE: ICD-10-CM

## 2020-02-21 DIAGNOSIS — G89.29 CHRONIC MIDLINE THORACIC BACK PAIN: ICD-10-CM

## 2020-02-21 DIAGNOSIS — E03.9 HYPOTHYROIDISM, UNSPECIFIED TYPE: ICD-10-CM

## 2020-02-21 DIAGNOSIS — R49.0 CHRONIC HOARSENESS: Chronic | ICD-10-CM

## 2020-02-21 DIAGNOSIS — F33.9 RECURRENT DEPRESSION (HCC): Primary | ICD-10-CM

## 2020-02-21 DIAGNOSIS — M54.6 CHRONIC MIDLINE THORACIC BACK PAIN: ICD-10-CM

## 2020-02-21 PROCEDURE — 99203 OFFICE O/P NEW LOW 30 MIN: CPT | Performed by: PHYSICIAN ASSISTANT

## 2020-02-21 PROCEDURE — 3008F BODY MASS INDEX DOCD: CPT | Performed by: PHYSICIAN ASSISTANT

## 2020-02-21 RX ORDER — CITALOPRAM 10 MG/1
10 TABLET ORAL DAILY
Qty: 30 TABLET | Refills: 5 | Status: SHIPPED | OUTPATIENT
Start: 2020-02-21 | End: 2020-08-21

## 2020-02-21 NOTE — PATIENT INSTRUCTIONS
Recommend getting complete blood test, and taking vitamin D 4000 IU daily    Recommend following up with GI

## 2020-02-21 NOTE — PROGRESS NOTES
Assessment/Plan:         Diagnoses and all orders for this visit:    Recurrent depression ([facility] [address] )  -     Ambulatory referral to Psychiatry; Future  -     citalopram (CeleXA) 10 mg tablet; Take 1 tablet (10 mg total) by mouth daily    Multiple thyroid nodules  -     CBC and differential  -     Comprehensive metabolic panel  -     Lipid panel  -     Vitamin D 25 hydroxy      Chronic hoarseness  -   -     CBC and differential  -     Comprehensive metabolic panel    Chronic midline thoracic back pain  -     CBC and differential  -     Comprehensive metabolic panel  -     Lipid panel    Hypothyroidism, unspecified type  -    CBC and differential  -     Comprehensive metabolic panel  -     Lipid panel    Low vitamin D level  -     Vitamin D 25 hydroxy; Future  -     Vitamin D 25 hydroxy    Left upper quadrant pain  -     Ambulatory referral to Gastroenterology; Future    Dysphagia, unspecified type  -     Ambulatory referral to Gastroenterology; Future          Subjective:      Patient ID: Lizett Braun is a 27 y o  female  27year old white female presents to get established  Has trouble swallowing, has enlarged thyroid, and lymph nodes  Sees ENT, Dr Lucas Alexander, had biopsy of lymph nodes, and had partial thyroidectomy  Had [address] recently of thyroid, and within past 6 months of lymph nodes  Currently on work  Comp-  2 years in April 2020;  Plans to go to court, unable to do current job, due to ongoing knee pains  Still having severe left knee pain  Has hx  Of depression and anxiety  Has copper IUD, menses heavy-  May last 9 days;  LNMP -  2/15/2020  Plans to get endoscopy  Breakfast- keto coffee; may eat around 12 -  Protein smoothy;  Dinner- chicken and veggies; Snacks-  Not really; drinks water- 3 glasses daily  Does network sales, internet sales  Review of Systems   Constitutional: Positive for appetite change and fatigue  Negative for chills, diaphoresis and fever     HENT: Positive for trouble swallowing  Negative for congestion, ear pain, rhinorrhea and sore throat  Eyes: Positive for visual disturbance  Negative for pain and discharge  Wears glasses-  Last eye exam- 2 years ago  Respiratory: Negative for cough, chest tightness and shortness of breath  Gastrointestinal: Positive for abdominal pain  Negative for constipation, diarrhea, nausea and vomiting  Musculoskeletal: Positive for arthralgias, back pain, myalgias, neck pain and neck stiffness  Has hx  Of scoliosis  Neurological: Positive for weakness  Negative for dizziness, tremors, light-headedness, numbness and headaches  Psychiatric/Behavioral: Positive for agitation, decreased concentration, dysphoric mood, sleep disturbance and suicidal ideas  Negative for hallucinations and self-injury  The patient is nervous/anxious and is hyperactive  Use to be on Paxil, was off for a while, then placed on Cymbalta  Objective:      /70   Pulse 64   Ht 5' 4 75" (1 645 m)   Wt 82 4 kg (181 lb 9 6 oz)   BMI 30 45 kg/m²          Physical Exam   Constitutional: She is oriented to person, place, and time  She appears well-developed and well-nourished  No distress  HENT:   Head: Normocephalic and atraumatic  Eyes: Conjunctivae and EOM are normal  Right eye exhibits no discharge  Left eye exhibits no discharge  No scleral icterus  Neck: Neck supple  Cardiovascular: Normal rate, regular rhythm and normal heart sounds  Pulmonary/Chest: Effort normal and breath sounds normal  No stridor  No respiratory distress  She has no wheezes  She has no rales  Musculoskeletal: Normal range of motion  She exhibits no edema, tenderness or deformity  Neurological: She is alert and oriented to person, place, and time  No cranial nerve deficit  Coordination normal    Skin: She is not diaphoretic  Psychiatric: She has a normal mood and affect   Her behavior is normal  Judgment and thought content normal  Nursing note and vitals reviewed

## 2020-02-22 LAB
25(OH)D3+25(OH)D2 SERPL-MCNC: 26.9 NG/ML (ref 30–100)
ALBUMIN SERPL-MCNC: 4.5 G/DL (ref 3.9–5)
ALBUMIN/GLOB SERPL: 1.4 {RATIO} (ref 1.2–2.2)
ALP SERPL-CCNC: 51 IU/L (ref 39–117)
ALT SERPL-CCNC: 16 IU/L (ref 0–32)
AST SERPL-CCNC: 15 IU/L (ref 0–40)
BASOPHILS # BLD AUTO: 0 X10E3/UL (ref 0–0.2)
BASOPHILS NFR BLD AUTO: 0 %
BILIRUB SERPL-MCNC: 0.4 MG/DL (ref 0–1.2)
BUN SERPL-MCNC: 15 MG/DL (ref 6–20)
BUN/CREAT SERPL: 18 (ref 9–23)
CALCIUM SERPL-MCNC: 9.7 MG/DL (ref 8.7–10.2)
CHLORIDE SERPL-SCNC: 100 MMOL/L (ref 96–106)
CHOLEST SERPL-MCNC: 160 MG/DL (ref 100–199)
CHOLEST/HDLC SERPL: 2.4 RATIO (ref 0–4.4)
CO2 SERPL-SCNC: 24 MMOL/L (ref 20–29)
CREAT SERPL-MCNC: 0.82 MG/DL (ref 0.57–1)
EOSINOPHIL # BLD AUTO: 0.2 X10E3/UL (ref 0–0.4)
EOSINOPHIL NFR BLD AUTO: 4 %
ERYTHROCYTE [DISTWIDTH] IN BLOOD BY AUTOMATED COUNT: 13.5 % (ref 11.7–15.4)
GLOBULIN SER-MCNC: 3.2 G/DL (ref 1.5–4.5)
GLUCOSE SERPL-MCNC: 90 MG/DL (ref 65–99)
HCT VFR BLD AUTO: 38 % (ref 34–46.6)
HDLC SERPL-MCNC: 68 MG/DL
HGB BLD-MCNC: 12.8 G/DL (ref 11.1–15.9)
IMM GRANULOCYTES # BLD: 0 X10E3/UL (ref 0–0.1)
IMM GRANULOCYTES NFR BLD: 0 %
LDLC SERPL CALC-MCNC: 82 MG/DL (ref 0–99)
LYMPHOCYTES # BLD AUTO: 2.5 X10E3/UL (ref 0.7–3.1)
LYMPHOCYTES NFR BLD AUTO: 46 %
MCH RBC QN AUTO: 29.1 PG (ref 26.6–33)
MCHC RBC AUTO-ENTMCNC: 33.7 G/DL (ref 31.5–35.7)
MCV RBC AUTO: 86 FL (ref 79–97)
MONOCYTES # BLD AUTO: 0.5 X10E3/UL (ref 0.1–0.9)
MONOCYTES NFR BLD AUTO: 10 %
NEUTROPHILS # BLD AUTO: 2.2 X10E3/UL (ref 1.4–7)
NEUTROPHILS NFR BLD AUTO: 40 %
PLATELET # BLD AUTO: 275 X10E3/UL (ref 150–450)
POTASSIUM SERPL-SCNC: 4.5 MMOL/L (ref 3.5–5.2)
PROT SERPL-MCNC: 7.7 G/DL (ref 6–8.5)
RBC # BLD AUTO: 4.4 X10E6/UL (ref 3.77–5.28)
SL AMB EGFR AFRICAN AMERICAN: 111 ML/MIN/1.73
SL AMB EGFR NON AFRICAN AMERICAN: 96 ML/MIN/1.73
SL AMB VLDL CHOLESTEROL CALC: 10 MG/DL (ref 5–40)
SODIUM SERPL-SCNC: 139 MMOL/L (ref 134–144)
TRIGL SERPL-MCNC: 51 MG/DL (ref 0–149)
WBC # BLD AUTO: 5.5 X10E3/UL (ref 3.4–10.8)

## 2020-03-02 ENCOUNTER — OFFICE VISIT (OUTPATIENT)
Dept: GASTROENTEROLOGY | Facility: CLINIC | Age: 30
End: 2020-03-02
Payer: COMMERCIAL

## 2020-03-02 VITALS
SYSTOLIC BLOOD PRESSURE: 112 MMHG | BODY MASS INDEX: 29.99 KG/M2 | HEART RATE: 78 BPM | DIASTOLIC BLOOD PRESSURE: 68 MMHG | HEIGHT: 65 IN | WEIGHT: 180 LBS

## 2020-03-02 DIAGNOSIS — M25.562 CHRONIC PAIN OF LEFT KNEE: ICD-10-CM

## 2020-03-02 DIAGNOSIS — E04.2 MULTIPLE THYROID NODULES: Chronic | ICD-10-CM

## 2020-03-02 DIAGNOSIS — R49.0 CHRONIC HOARSENESS: Chronic | ICD-10-CM

## 2020-03-02 DIAGNOSIS — K21.9 GASTROESOPHAGEAL REFLUX DISEASE, ESOPHAGITIS PRESENCE NOT SPECIFIED: Primary | ICD-10-CM

## 2020-03-02 DIAGNOSIS — G89.29 CHRONIC PAIN OF LEFT KNEE: ICD-10-CM

## 2020-03-02 DIAGNOSIS — R13.10 DYSPHAGIA, UNSPECIFIED TYPE: ICD-10-CM

## 2020-03-02 DIAGNOSIS — R10.12 LEFT UPPER QUADRANT PAIN: ICD-10-CM

## 2020-03-02 DIAGNOSIS — J38.01 PARALYSIS OF LEFT VOCAL CORD: Chronic | ICD-10-CM

## 2020-03-02 PROCEDURE — 99244 OFF/OP CNSLTJ NEW/EST MOD 40: CPT | Performed by: NURSE PRACTITIONER

## 2020-03-02 RX ORDER — PANTOPRAZOLE SODIUM 40 MG/1
40 TABLET, DELAYED RELEASE ORAL DAILY
Qty: 30 TABLET | Refills: 2 | Status: SHIPPED | OUTPATIENT
Start: 2020-03-02 | End: 2020-07-23 | Stop reason: ALTCHOICE

## 2020-03-02 RX ORDER — TRAMADOL HYDROCHLORIDE 50 MG/1
50 TABLET ORAL EVERY 6 HOURS PRN
COMMUNITY
End: 2021-10-27

## 2020-03-02 NOTE — PROGRESS NOTES
[facility] - Outpatient Consultation  Radha Conley 27 y o  female MRN: [id number]  Encounter: [id number]    ASSESSMENT AND PLAN:      1  Gastroesophageal reflux disease, esophagitis presence not specified  2  Left upper quadrant pain  Reports increasing heartburn symptoms with LUQ discomfort over the past 2 years described as pressure in her chest if she eats anything    LUQ pain is worse after meals  Recent LFTs were normal making biliary disease less likely  Considerations include PUD, gastritis, H pylori, esophagitis, Springer's, malabsorption with FH celiac in maternal grandmother     -reviewed reflux diet and lifestyle modifications  GERD information was provided to the patient   -will start pantoprazole 40 mg daily  -schedule EGD-BM EC  -will check celiac panel  - pantoprazole (PROTONIX) 40 mg tablet; Take 1 tablet (40 mg total) by mouth daily  Dispense: 30 tablet; Refill: 2  - Celiac Disease Antibody Profile; Future    3  Dysphagia, unspecified type  Patient actually describes more of a globus sensation which may be external compression from thyroid nodules  Differential consideration includes esophagitis, less likely stricture, ring     -schedule EGD-BM EC  - pantoprazole (PROTONIX) 40 mg tablet; Take 1 tablet (40 mg total) by mouth daily  Dispense: 30 tablet; Refill: 2    4  Chronic hoarseness  5  Paralysis of left vocal cord  6  Multiple thyroid nodules  Partial thyroidectomy resultant with partial vocal cord paralysis  Continues with multiple thyroid nodules  Most recent imaging 2/19 showed 2 additional nodules (making total of 6 per patient)  7  Chronic pain of left knee  Follows with PCP  Just started on Celexa  Uses tramadol rarely        Followup Appointment:  4 weeks  ______________________________________________________________________    Chief Complaint   Patient presents with    LUQ pain right under ribs       HPI:   Radha Cnoley is a 27 y o   female who presents with heartburn LUQ pain  The LUQ discomfort is described as dull, continuous ache which worsens postprandial   Heartburn is caused by anything whether she eats or not  She cannot describe specific food triggers except spicy, coffee  She describes a pressure feeling in her chest relieved with Tums about 3 days per week  She does not use any NSAIDs, only Tylenol  She does drink 1 super caffeinated cup of coffee daily with 225 mg of caffeine but no other  She does binge drink about once per month 6+ EtOH beverages  Denies any other EtOH use  Has chronic knee pain, using tramadol rarely  She also describes a persistent globus sensation but denies any trouble swallowing foods for liquids  She has always attributed this sensation to her thyroid nodules  She has a persistently hoarse voice owing to prior vocal cord paralysis post partial thyroidectomy  There has been no change with this  Denies cough, nausea or vomiting  No fevers or chills  Reports 1 soft formed stool daily  She does get diarrhea after eating rice at times  Denies melena, hematochezia  She reports fatigue but denies chest pain, early satiety  Appetite is normal   Weight is stable      Historical Information   Past Medical History:   Diagnosis Date    Asthma     Chronic knee pain     Depression     Disease of thyroid gland     Menorrhagia     Multiple thyroid nodules     Psychiatric disorder     Pulmonary emboli (HCC)     Saphenous nerve neuropathy, 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    Vitamin D deficiency     Vocal cord paralysis     Following partial thyroidectomy     Past Surgical History:   Procedure Laterality Date    DILATION AND CURETTAGE OF UTERUS      THYROIDECTOMY, PARTIAL      UPPER GASTROINTESTINAL ENDOSCOPY      WISDOM TOOTH EXTRACTION       Social History     Substance and Sexual Activity   Alcohol Use Yes    Frequency: 2-4 times a month    Drinks per session: 5 or 6    Binge frequency: Monthly    Comment: socially     Social History     Substance and Sexual Activity   Drug Use Not Currently    Types: Marijuana    Comment:  no longer using marijuana     Social History     Tobacco Use   Smoking Status Current Some Day Smoker    Types: Cigarettes   Smokeless Tobacco Never Used   Tobacco Comment     seasonal, only when it's warm out, social     Family History   Problem Relation Age of Onset    Thyroid disease Mother     Ovarian cancer Sister     Thyroid disease Sister     No Known Problems Father     No Known Problems Brother     Anorexia nervosa Sister     Mental illness Sister     No Known Problems Sister     Celiac disease Maternal Grandmother     Colon cancer Neg Hx     Colon polyps Neg Hx        Meds/Allergies     Current Outpatient Medications:     aspirin 81 MG tablet    citalopram (CeleXA) 10 mg tablet    clobetasol (TEMOVATE) 0 05 % cream    EUCRISA 2 % OINT    traMADol (ULTRAM) 50 mg tablet    pantoprazole (PROTONIX) 40 mg tablet    No Known Allergies    PHYSICAL EXAM:    Blood pressure 112/68, pulse 78, height 5' 4 75" (1 645 m), weight 81 6 kg (180 lb)  Body mass index is 30 19 kg/m²  General Appearance: NAD, cooperative, alert  Head:  Normocephalic, atraumatic  Eyes: Anicteric, PERRLA, EOMI  ENT:  Normal external appearance, normal mucosa  Neck:  Supple, symmetrical, trachea midline,   Resp:  Clear to auscultation bilaterally; no rales, rhonchi or wheezing; respirations unlabored   CV:  S1 S2, Regular rate and rhythm; no murmur, rub, or gallop  GI:  Soft, epigastric and LUQ tenderness with palpation but without rebound or guarding, abdomen otherwise nontender, non-distended; normal bowel sounds; no masses, no organomegaly   Rectal: Deferred  Musculoskeletal: No cyanosis, clubbing or edema  Normal ROM    Skin:  No jaundice, rashes, or lesions   Heme/Lymph: No palpable cervical lymphadenopathy  Psych: Normal affect, good eye contact  Neuro: No gross deficits, AAOx3    Lab Results:  Reviewed recent lab results from February, 2020  Lab Results   Component Value Date    WBC 5 5 02/21/2020    HGB 12 8 02/21/2020    HCT 38 0 02/21/2020    MCV 86 02/21/2020     02/21/2020     Lab Results   Component Value Date    K 4 5 02/21/2020     02/21/2020    CO2 24 02/21/2020    BUN 15 02/21/2020    CREATININE 0 82 02/21/2020    AST 15 02/21/2020    ALT 16 02/21/2020     No results found for: IRON, TIBC, FERRITIN  No results found for: LIPASE    Radiology Results:   Us Thyroid    Result Date: 2/25/2020  Narrative: THYROID ULTRASOUND INDICATION:    E04 2: Nontoxic multinodular goiter  COMPARISON:  Thyroid ultrasound 7/29/2005 TECHNIQUE:   Ultrasound of the thyroid was performed with a high frequency linear transducer in transverse and sagittal planes including volumetric imaging sweeps as well as traditional still imaging technique  FINDINGS:  Thyroid parenchyma is diffusely heterogeneous in echotexture  Right lobe:  5 4 x 2 1 x 1 8 cm  Left lobe:  2 9 x 0 8 x 0 6 cm  Post partial surgical resection  Isthmus:  Postsurgical resection  Nodule #1  Image 30  RIGHT upper pole nodule measuring 1 2 x 1 x 1 cm  This nodule is new  COMPOSITION:  2 points, solid or almost completely solid   ECHOGENICITY:  2 points, hypoechoic  SHAPE:  0 points, wider-than-tall  MARGIN: 0 points, smooth  ECHOGENIC FOCI:  0 points, none or large comet-tail artifacts  TI-RADS Classification: TR 4 (4-6 points), Moderately suspicious  FNA if > 1 5 cm  Follow if > 1cm  Nodule #2  Image 35  RIGHT upper pole nodule measuring 1 x 0 8 x 1 cm  This nodule is new  COMPOSITION:  2 points, solid or almost completely solid   ECHOGENICITY:  2 points, hypoechoic  SHAPE:  0 points, wider-than-tall  MARGIN: 0 points, smooth  ECHOGENIC FOCI:  3 points, punctate echogenic foci  TI-RADS Classification: TR 5 (7 or > points), Highly suspicious  FNA if > 1 cm  Follow if > 0 5 cm   There are additional nodules of lesser size and/or TI-RADS score  These do not necessitate additional evaluation based on ACR criteria  Impression: No nodule meets current ACR criteria for requiring biopsy but followup ultrasound is recommended in 1 year  However, nodule #2 is at the upper limits for requiring biopsy and as such follow-up with biopsy would also be a reasonable approach Reference: ACR Thyroid Imaging, Reporting and Data System (TI-RADS): White Paper of the [employer]  J AM Kenia Radiol [id number];93:[id number]  (additional recommendations based on American Thyroid Association 2015 guidelines ) This study demonstrates a significant  finding and was documented as such in [facility] for liaison and referring practitioner notification  This study demonstrates a finding requiring imaging follow-up and was logged as such in [address] [place] [address] Rd  Workstation performed: [id number]       REVIEW OF SYSTEMS:    CONSTITUTIONAL: Denies any fever, chills, rigors, and weight loss  Reports fatigue  HEENT: No earache or tinnitus  Denies hearing loss or visual disturbances  Reports hoarseness  CARDIOVASCULAR: No chest pain or palpitations  RESPIRATORY: Denies any cough, hemoptysis, shortness of breath or dyspnea on exertion  GASTROINTESTINAL: As noted in the History of Present Illness  Reports heartburn and difficulty swallowing  GENITOURINARY: No problems with urination  Denies any hematuria or dysuria  NEUROLOGIC: No dizziness or vertigo, denies headaches  MUSCULOSKELETAL: Denies any muscle or joint pain  Reports chronic pain  Reports involuntary movements  SKIN: Denies skin rashes but reports itching  ENDOCRINE: Denies excessive thirst  Denies intolerance to heat or cold  PSYCHOSOCIAL: Denies anxiety but reports depression  Denies any recent memory loss

## 2020-03-02 NOTE — PATIENT INSTRUCTIONS
Consider elevating the head of your bed at night with blocks or a mattress wedge  Recommend smaller more frequent meals and avoid late-night eating ( within 2-3 hours of bed)  Continue to minimize fatty/fried foods, chocolate, caffeine, alcohol, NSAIDs ( ibuprofen /Motrin / Advil, Aleve /naproxen, full-dose aspirin)  You will be scheduled for upper scope here at our endoscopy center    Gastroesophageal Reflux Disease   WHAT YOU NEED TO KNOW:   What is gastroesophageal reflux disease? Gastroesophageal reflux occurs when acid and food in the stomach back up into the esophagus  Gastroesophageal reflux disease (GERD) is reflux that occurs more than twice a week for a few weeks  It usually causes heartburn and other symptoms  GERD can cause other health problems over time if it is not treated  What causes GERD? GERD often occurs when the lower muscle (sphincter) of the esophagus does not close properly  The sphincter normally opens to let food into the stomach  It then closes to keep food and stomach acid in the stomach  If the sphincter does not close properly, stomach acid and food back up (reflux) into the esophagus  The following may increase your risk for GERD:  · Certain foods such as spicy foods, chocolate, foods that contain caffeine, peppermint, and fried foods    · Hiatal hernia    · Certain medicines such as calcium channel blockers (used to treat high blood pressure), allergy medicines, sedatives, or antidepressants    · Pregnancy or obesity    · Lying down after a meal    · Drinking alcohol or smoking  What are the signs and symptoms of GERD? Heartburn is the most common symptom of GERD  You may feel burning pain in your chest or below the breast bone  This usually occurs after meals and spreads to your neck, jaw, or shoulder  The pain gets better when you change positions   You may also have any of the following:  · Bitter or acid taste in your mouth    · Dry cough    · Trouble swallowing or pain with swallowing    · Hoarseness or sore throat    · Frequent burping or hiccups    · Feeling of fullness soon after you start eating  How is GERD diagnosed? Your healthcare provider will ask about your symptoms and when they started  Tell him or her about other medical conditions you have, your eating habits, and your activities  You may also need any of the following:  · Esophageal pH monitoring  is used to place a small probe inside your esophagus and stomach to check the amount of acid  · An endoscopy  is a procedure used to look at the inside of your esophagus and stomach  An endoscope is a bendable tube with a light and camera on the end  Your healthcare provider may remove a small sample of tissue and send it to a lab for tests  · Upper GI x-rays  are done to take pictures of your stomach and intestines (bowel)  You may be given a chalky liquid to drink before the pictures are taken  This liquid helps your stomach and intestines show up better on the x-rays  · Esophageal manometry  is a test that shows how your esophagus pushes food and fluid to your stomach  It also shows the pressures in your esophagus and stomach  It may show a hiatal hernia  How is GERD treated? · Medicines  are used to decrease stomach acid  Medicine may also be used to help your lower esophageal sphincter and stomach contract (tighten) more  · Surgery  is done to wrap the upper part of the stomach around the esophageal sphincter  This will strengthen the sphincter and prevent reflux  How can I help manage GERD? · Do not have foods or drinks that may increase heartburn  These include chocolate, peppermint, fried or fatty foods, drinks that contain caffeine, or carbonated drinks (soda)  Other foods include spicy foods, onions, tomatoes, and tomato-based foods  Do not have foods or drinks that can irritate your esophagus, such as citrus fruits, juices, and alcohol  · Do not eat large meals    When you eat a lot of food at one time, your stomach needs more acid to digest it  Eat 6 small meals each day instead of 3 large ones, and eat slowly  Do not eat meals 2 to 3 hours before bedtime  · Elevate the head of your bed  Place 6-inch blocks under the head of your bed frame  You may also use more than one pillow under your head and shoulders while you sleep  · Maintain a healthy weight  If you are overweight, weight loss may help relieve symptoms of GERD  · Do not smoke  Smoking weakens the lower esophageal sphincter and increases the risk of GERD  Ask your healthcare provider for information if you currently smoke and need help to quit  E-cigarettes or smokeless tobacco still contain nicotine  Talk to your healthcare provider before you use these products  · Do not wear clothing that is tight around your waist   Tight clothing can put pressure on your stomach and cause or worsen GERD symptoms  When should I seek immediate care? · You feel full and cannot burp or vomit  · You have severe chest pain and sudden trouble breathing  · Your bowel movements are black, bloody, or tarry-looking  · Your vomit looks like coffee grounds or has blood in it  When should I contact my healthcare provider? · You vomit large amounts, or you vomit often  · You have trouble breathing after you vomit  · You have trouble swallowing, or pain with swallowing  · You are losing weight without trying  · Your symptoms get worse or do not improve with treatment  · You have questions or concerns about your condition or care  CARE AGREEMENT:   You have the right to help plan your care  Learn about your health condition and how it may be treated  Discuss treatment options with your caregivers to decide what care you want to receive  You always have the right to refuse treatment  The above information is an  only  It is not intended as medical advice for individual conditions or treatments   Talk to your doctor, nurse or pharmacist before following any medical regimen to see if it is safe and effective for you  © 2017 [employer] Information is for End User's use only and may not be sold, redistributed or otherwise used for commercial purposes  All illustrations and images included in CareNotes® are the copyrighted property of [employer]  or [employer]

## 2020-03-11 ENCOUNTER — HOSPITAL ENCOUNTER (EMERGENCY)
Facility: HOSPITAL | Age: 30
Discharge: HOME | End: 2020-03-11
Attending: EMERGENCY MEDICINE | Admitting: EMERGENCY MEDICINE
Payer: COMMERCIAL

## 2020-03-11 ENCOUNTER — APPOINTMENT (EMERGENCY)
Dept: RADIOLOGY | Facility: HOSPITAL | Age: 30
End: 2020-03-11
Payer: COMMERCIAL

## 2020-03-11 VITALS
HEIGHT: 64 IN | RESPIRATION RATE: 18 BRPM | HEART RATE: 75 BPM | WEIGHT: 178 LBS | OXYGEN SATURATION: 98 % | DIASTOLIC BLOOD PRESSURE: 74 MMHG | SYSTOLIC BLOOD PRESSURE: 131 MMHG | BODY MASS INDEX: 30.39 KG/M2

## 2020-03-11 DIAGNOSIS — R07.89 ATYPICAL CHEST PAIN: Primary | ICD-10-CM

## 2020-03-11 LAB
ALBUMIN SERPL BCP-MCNC: 3.7 G/DL (ref 3.5–5)
ALP SERPL-CCNC: 41 U/L (ref 46–116)
ALT SERPL W P-5'-P-CCNC: 24 U/L (ref 12–78)
ANION GAP SERPL CALCULATED.3IONS-SCNC: 9 MMOL/L (ref 4–13)
AST SERPL W P-5'-P-CCNC: 19 U/L (ref 5–45)
BASOPHILS # BLD AUTO: 0.06 THOUSANDS/ΜL (ref 0–0.1)
BASOPHILS NFR BLD AUTO: 1 % (ref 0–1)
BILIRUB SERPL-MCNC: 0.2 MG/DL (ref 0.2–1)
BUN SERPL-MCNC: 17 MG/DL (ref 5–25)
CALCIUM SERPL-MCNC: 8.9 MG/DL (ref 8.3–10.1)
CHLORIDE SERPL-SCNC: 101 MMOL/L (ref 100–108)
CO2 SERPL-SCNC: 27 MMOL/L (ref 21–32)
CREAT SERPL-MCNC: 1.04 MG/DL (ref 0.6–1.3)
D DIMER PPP FEU-MCNC: 0.45 UG/ML FEU
EOSINOPHIL # BLD AUTO: 0.43 THOUSAND/ΜL (ref 0–0.61)
EOSINOPHIL NFR BLD AUTO: 4 % (ref 0–6)
ERYTHROCYTE [DISTWIDTH] IN BLOOD BY AUTOMATED COUNT: 12.6 % (ref 11.6–15.1)
EXT PREG TEST URINE: NEGATIVE
EXT. CONTROL ED NAV: NORMAL
GFR SERPL CREATININE-BSD FRML MDRD: 72 ML/MIN/1.73SQ M
GLUCOSE SERPL-MCNC: 86 MG/DL (ref 65–140)
HCT VFR BLD AUTO: 38.6 % (ref 34.8–46.1)
HGB BLD-MCNC: 12.7 G/DL (ref 11.5–15.4)
IMM GRANULOCYTES # BLD AUTO: 0.03 THOUSAND/UL (ref 0–0.2)
IMM GRANULOCYTES NFR BLD AUTO: 0 % (ref 0–2)
LYMPHOCYTES # BLD AUTO: 4.9 THOUSANDS/ΜL (ref 0.6–4.47)
LYMPHOCYTES NFR BLD AUTO: 48 % (ref 14–44)
MCH RBC QN AUTO: 29.1 PG (ref 26.8–34.3)
MCHC RBC AUTO-ENTMCNC: 32.9 G/DL (ref 31.4–37.4)
MCV RBC AUTO: 89 FL (ref 82–98)
MONOCYTES # BLD AUTO: 0.89 THOUSAND/ΜL (ref 0.17–1.22)
MONOCYTES NFR BLD AUTO: 9 % (ref 4–12)
NEUTROPHILS # BLD AUTO: 3.87 THOUSANDS/ΜL (ref 1.85–7.62)
NEUTS SEG NFR BLD AUTO: 38 % (ref 43–75)
NRBC BLD AUTO-RTO: 0 /100 WBCS
PLATELET # BLD AUTO: 238 THOUSANDS/UL (ref 149–390)
PMV BLD AUTO: 9.9 FL (ref 8.9–12.7)
POTASSIUM SERPL-SCNC: 3.9 MMOL/L (ref 3.5–5.3)
PROT SERPL-MCNC: 7.7 G/DL (ref 6.4–8.2)
RBC # BLD AUTO: 4.36 MILLION/UL (ref 3.81–5.12)
SODIUM SERPL-SCNC: 137 MMOL/L (ref 136–145)
TROPONIN I SERPL-MCNC: <0.02 NG/ML
WBC # BLD AUTO: 10.18 THOUSAND/UL (ref 4.31–10.16)

## 2020-03-11 PROCEDURE — 99285 EMERGENCY DEPT VISIT HI MDM: CPT

## 2020-03-11 PROCEDURE — 80053 COMPREHEN METABOLIC PANEL: CPT | Performed by: EMERGENCY MEDICINE

## 2020-03-11 PROCEDURE — 84484 ASSAY OF TROPONIN QUANT: CPT | Performed by: EMERGENCY MEDICINE

## 2020-03-11 PROCEDURE — 85379 FIBRIN DEGRADATION QUANT: CPT | Performed by: EMERGENCY MEDICINE

## 2020-03-11 PROCEDURE — 93005 ELECTROCARDIOGRAM TRACING: CPT

## 2020-03-11 PROCEDURE — 81025 URINE PREGNANCY TEST: CPT | Performed by: EMERGENCY MEDICINE

## 2020-03-11 PROCEDURE — 85025 COMPLETE CBC W/AUTO DIFF WBC: CPT | Performed by: EMERGENCY MEDICINE

## 2020-03-11 PROCEDURE — 99285 EMERGENCY DEPT VISIT HI MDM: CPT | Performed by: EMERGENCY MEDICINE

## 2020-03-11 PROCEDURE — 71046 X-RAY EXAM CHEST 2 VIEWS: CPT

## 2020-03-11 PROCEDURE — 36415 COLL VENOUS BLD VENIPUNCTURE: CPT | Performed by: EMERGENCY MEDICINE

## 2020-03-11 RX ORDER — ACETAMINOPHEN 325 MG/1
975 TABLET ORAL ONCE
Status: DISCONTINUED | OUTPATIENT
Start: 2020-03-11 | End: 2020-03-12 | Stop reason: HOSPADM

## 2020-03-12 LAB
ATRIAL RATE: 76 BPM
P AXIS: 79 DEGREES
PR INTERVAL: 140 MS
QRS AXIS: 77 DEGREES
QRSD INTERVAL: 84 MS
QT INTERVAL: 398 MS
QTC INTERVAL: 447 MS
T WAVE AXIS: 49 DEGREES
VENTRICULAR RATE: 76 BPM

## 2020-03-12 PROCEDURE — 93010 ELECTROCARDIOGRAM REPORT: CPT | Performed by: INTERNAL MEDICINE

## 2020-03-12 NOTE — DISCHARGE INSTRUCTIONS
Chest Pain   WHAT YOU NEED TO KNOW:   Chest pain can be caused by a range of conditions, from not serious to life-threatening  Chest pain can be a symptom of a digestive problem, such as acid reflux or a stomach ulcer  An anxiety attack or a strong emotion, such as anger, can also cause chest pain  Infection, inflammation, or a fracture in the bones or cartilage in your chest can cause pain or discomfort  Sometimes chest pain or pressure is caused by poor blood flow to your heart (angina)  Chest pain may also be caused by life-threatening conditions such as a heart attack or blood clot in your lungs  DISCHARGE INSTRUCTIONS:   Call 911 if:   · You have any of the following signs of a heart attack:      ¨ Squeezing, pressure, or pain in your chest that lasts longer than 5 minutes or returns    ¨ Discomfort or pain in your back, neck, jaw, stomach, or arm     ¨ Trouble breathing    ¨ Nausea or vomiting    ¨ Lightheadedness or a sudden cold sweat, especially with chest pain or trouble breathing    Return to the emergency department if:   · You have chest discomfort that gets worse, even with medicine  · You cough or vomit blood  · Your bowel movements are black or bloody  · You cannot stop vomiting, or it hurts to swallow  Contact your healthcare provider if:   · You have questions or concerns about your condition or care  Medicines:   · Medicines  may be given to treat the cause of your chest pain  Examples include pain medicine, anxiety medicine, or medicines to increase blood flow to your heart  · Do not take certain medicines without asking your healthcare provider first   These include NSAIDs, herbal or vitamin supplements, or hormones (estrogen or progestin)  · Take your medicine as directed  Contact your healthcare provider if you think your medicine is not helping or if you have side effects  Tell him or her if you are allergic to any medicine   Keep a list of the medicines, vitamins, and herbs you take  Include the amounts, and when and why you take them  Bring the list or the pill bottles to follow-up visits  Carry your medicine list with you in case of an emergency  Follow up with your healthcare provider within 72 hours, or as directed: You may need to return for more tests to find the cause of your chest pain  You may be referred to a specialist, such as a cardiologist or gastroenterologist  Write down your questions so you remember to ask them during your visits  Healthy living tips: The following are general healthy guidelines  If your chest pain is caused by a heart problem, your healthcare provider will give you specific guidelines to follow  · Do not smoke  Nicotine and other chemicals in cigarettes and cigars can cause lung and heart damage  Ask your healthcare provider for information if you currently smoke and need help to quit  E-cigarettes or smokeless tobacco still contain nicotine  Talk to your healthcare provider before you use these products  · Eat a variety of healthy, low-fat foods  Healthy foods include fruits, vegetables, whole-grain breads, low-fat dairy products, beans, lean meats, and fish  Ask for more information about a heart healthy diet  · Ask about activity  Your healthcare provider will tell you which activities to limit or avoid  Ask when you can drive, return to work, and have sex  Ask about the best exercise plan for you  · Maintain a healthy weight  Ask your healthcare provider how much you should weigh  Ask him or her to help you create a weight loss plan if you are overweight  · Get the flu and pneumonia vaccines  All adults should get the influenza (flu) vaccine  Get it every year as soon as it becomes available  The pneumococcal vaccine is given to adults aged 72 years or older  The vaccine is given every 5 years to prevent pneumococcal disease, such as pneumonia    © 2017 [employer] Information is for End User's use only and may not be sold, redistributed or otherwise used for commercial purposes  All illustrations and images included in CareNotes® are the copyrighted property of [employer]  or [employer]  The above information is an  only  It is not intended as medical advice for individual conditions or treatments  Talk to your doctor, nurse or pharmacist before following any medical regimen to see if it is safe and effective for you

## 2020-03-12 NOTE — ED PROVIDER NOTES
History  Chief Complaint   Patient presents with    Chest Pain     Pt c/o of chest pain since monday  27year old female presents for evaluation of left-sided chest pain described as a tightness with intermittent stabbing pains for the past 3 days  Symptoms have waxed an waned in intensity since onset and seem worse in the morning and in the evenings  She does not note any change with position  No cough, congestion, fevers or chills  Mild nausea  No episodes of emesis  Patient traveled to [place] last month by air  She did not get up to walk during the flight  She has chronic pain of the anterior left thigh radiating to the medial left knee which has worsened since yesterday  No swelling noted  Patient has a copper IUD for contraception  Patient started taking Celexa 2 weeks ago  No other medication changes  She has not taken anything for symptoms prior to arrival       History provided by:  Patient  Chest Pain   Pain location:  L chest  Pain quality: stabbing and tightness    Pain radiates to:  Does not radiate  Pain radiates to the back: no    Pain severity:  Moderate  Onset quality:  Gradual  Duration:  3 days  Timing:  Constant  Progression:  Waxing and waning  Relieved by:  Nothing  Worsened by:  Deep breathing  Ineffective treatments:  None tried  Associated symptoms: no abdominal pain, no cough, no fever, no headache, no nausea, no palpitations, no shortness of breath, not vomiting and no weakness    Risk factors: 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 Taking?    EUCRISA 2 % OINT  Self Yes No   Sig: Apply 1 application topically 2 (two) times a day as needed    aspirin 81 MG tablet  Self Yes No   Sig: Take 81 mg by mouth daily    citalopram (CeleXA) 10 mg tablet  Self No No   Sig: Take 1 tablet (10 mg total) by mouth daily   clobetasol (TEMOVATE) 0 05 % cream  Self Yes No   Sig: as needed    pantoprazole (PROTONIX) 40 mg tablet   No No   Sig: Take 1 tablet (40 mg total) by mouth daily   traMADol (ULTRAM) 50 mg tablet  Self Yes No   Sig: Take 50 mg by mouth every 6 (six) hours as needed for moderate pain      Facility-Administered Medications: None       Past Medical History:   Diagnosis Date    Asthma     Chronic knee pain     Depression     Disease of thyroid gland     Menorrhagia     Multiple thyroid nodules     Psychiatric disorder     Pulmonary emboli (HCC)     Saphenous nerve neuropathy, 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    Vitamin D deficiency     Vocal cord paralysis     Following partial thyroidectomy       Past Surgical History:   Procedure Laterality Date    DILATION AND CURETTAGE OF UTERUS      THYROIDECTOMY, PARTIAL      UPPER GASTROINTESTINAL ENDOSCOPY      WISDOM TOOTH EXTRACTION         Family History   Problem Relation Age of Onset    Thyroid disease Mother     Ovarian cancer Sister     Thyroid disease Sister     No Known Problems Father     No Known Problems Brother     Anorexia nervosa Sister     Mental illness Sister     No Known Problems Sister     Celiac disease Maternal Grandmother     Colon cancer Neg Hx     Colon polyps Neg Hx      I have reviewed and agree with the history as documented      E-Cigarette/Vaping    E-Cigarette Use Never User      E-Cigarette/Vaping Substances    Nicotine No     THC No     CBD No     Flavoring No     Other No     Unknown No      Social History     Tobacco Use    Smoking status: Current Some Day Smoker     Types: Cigarettes    Smokeless tobacco: Never Used    Tobacco comment:  seasonal, only when it's warm out, social   Substance Use Topics    Alcohol use: Yes     Frequency: 2-4 times a month     Drinks per session: 5 or 6     Binge frequency: Monthly     Comment: socially    Drug use: Not Currently     Types: Marijuana     Comment:  no longer using marijuana       Review of Systems   Constitutional: Negative for appetite change, chills and fever  HENT: Negative for congestion, rhinorrhea and sore throat  Respiratory: Positive for chest tightness  Negative for cough and shortness of breath  Cardiovascular: Positive for chest pain  Negative for palpitations and leg swelling  Gastrointestinal: Negative for abdominal pain, constipation, diarrhea, nausea and vomiting  Genitourinary: Negative for dysuria, frequency and hematuria  Musculoskeletal: Negative for myalgias, neck pain and neck stiffness  Skin: Negative for pallor  Neurological: Negative for syncope, weakness and headaches  All other systems reviewed and are negative  Physical Exam  Physical Exam   Constitutional: She is oriented to person, place, and time  She appears well-developed and well-nourished  Non-toxic appearance  No distress  HENT:   Head: Normocephalic and atraumatic  Eyes: Pupils are equal, round, and reactive to light  Conjunctivae and EOM are normal    Neck: Normal range of motion  Neck supple  No tracheal deviation present  No thyromegaly present  Cardiovascular: Normal rate, regular rhythm, normal heart sounds and intact distal pulses  Pulmonary/Chest: Effort normal and breath sounds normal    Abdominal: Soft  Bowel sounds are normal  She exhibits no distension  There is no tenderness  Lymphadenopathy:     She has no cervical adenopathy  Neurological: She is alert and oriented to person, place, and time  Skin: Skin is warm and dry  She is not diaphoretic  Nursing note and vitals reviewed        Vital Signs  ED Triage Vitals [03/11/20 2103]   Temp Pulse Respirations Blood Pressure SpO2   -- 75 18 131/74 98 %      Temp src Heart Rate Source Patient Position - Orthostatic VS BP Location FiO2 (%)   -- -- Lying Right arm --      Pain Score       --           Vitals:    03/11/20 2103   BP: 131/74   Pulse: 75   Patient Position - Orthostatic VS: Lying         Visual Acuity      ED Medications  Medications   acetaminophen (TYLENOL) tablet 975 mg (975 mg Oral Not Given 3/11/20 2136)       Diagnostic Studies  Results Reviewed     Procedure Component Value Units Date/Time    POCT pregnancy, urine [[id number]]  (Normal) Resulted:  03/11/20 2233    Lab Status:  Final result Updated:  03/11/20 2233     EXT PREG TEST UR (Ref: Negative) negative     Control valid    Troponin I [[id number]]  (Normal) Collected:  03/11/20 2116    Lab Status:  Final result Specimen:  Blood from Arm, Right Updated:  03/11/20 2145     Troponin I <0 02 ng/mL     Comprehensive metabolic panel [[id number]]  (Abnormal) Collected:  03/11/20 2116    Lab Status:  Final result Specimen:  Blood from Arm, Right Updated:  03/11/20 2144     Sodium 137 mmol/L      Potassium 3 9 mmol/L      Chloride 101 mmol/L      CO2 27 mmol/L      ANION GAP 9 mmol/L      BUN 17 mg/dL      Creatinine 1 04 mg/dL      Glucose 86 mg/dL      Calcium 8 9 mg/dL      AST 19 U/L      ALT 24 U/L      Alkaline Phosphatase 41 U/L      Total Protein 7 7 g/dL      Albumin 3 7 g/dL      Total Bilirubin 0 20 mg/dL      eGFR 72 ml/min/1 73sq m     Narrative:       Aric guidelines for Chronic Kidney Disease (CKD):     Stage 1 with normal or high GFR (GFR > 90 mL/min/1 73 square meters)    Stage 2 Mild CKD (GFR = 60-89 mL/min/1 73 square meters)    Stage 3A Moderate CKD (GFR = 45-59 mL/min/1 73 square meters)    Stage 3B Moderate CKD (GFR = 30-44 mL/min/1 73 square meters)    Stage 4 Severe CKD (GFR = 15-29 mL/min/1 73 square meters)    Stage 5 End Stage CKD (GFR <15 mL/min/1 73 square meters)  Note: GFR calculation is accurate only with a steady state creatinine    D-Dimer [[id number]]  (Normal) Collected:  03/11/20 2116    Lab Status:  Final result Specimen:  Blood from Arm, Right Updated:  03/11/20 2140     D-Dimer, Quant 0 45 ug/ml FEU     CBC and differential [[id number]]  (Abnormal) Collected: 03/11/20 2116    Lab Status:  Final result Specimen:  Blood from Arm, Right Updated:  03/11/20 2125     WBC 10 18 Thousand/uL      RBC 4 36 Million/uL      Hemoglobin 12 7 g/dL      Hematocrit 38 6 %      MCV 89 fL      MCH 29 1 pg      MCHC 32 9 g/dL      RDW 12 6 %      MPV 9 9 fL      Platelets 264 Thousands/uL      nRBC 0 /100 WBCs      Neutrophils Relative 38 %      Immat GRANS % 0 %      Lymphocytes Relative 48 %      Monocytes Relative 9 %      Eosinophils Relative 4 %      Basophils Relative 1 %      Neutrophils Absolute 3 87 Thousands/µL      Immature Grans Absolute 0 03 Thousand/uL      Lymphocytes Absolute 4 90 Thousands/µL      Monocytes Absolute 0 89 Thousand/µL      Eosinophils Absolute 0 43 Thousand/µL      Basophils Absolute 0 06 Thousands/µL                  XR chest 2 views   ED Interpretation by Vanessa Wesley MD (03/11 2229)   No acute pulmonary pathology                 Procedures  ECG 12 Lead Documentation Only  Date/Time: 3/11/2020 9:14 PM  Performed by: Vanessa Wesley MD  Authorized by: Vanessa Wesley MD     Indications / Diagnosis:  Chest tightness  ECG reviewed by me, the ED Provider: yes    Patient location:  ED  Previous ECG:     Previous ECG:  Unavailable  Interpretation:     Interpretation: normal    Rate:     ECG rate:  76    ECG rate assessment: normal    Rhythm:     Rhythm: sinus rhythm    Ectopy:     Ectopy: none    QRS:     QRS axis:  Normal    QRS intervals:  Normal  Conduction:     Conduction: normal    ST segments:     ST segments:  Normal  T waves:     T waves: normal               ED Course         HEART Risk Score      Most Recent Value   Heart Score Risk Calculator   History  0 Filed at: 03/11/2020 2159   ECG  0 Filed at: 03/11/2020 2159   Age  0 Filed at: 03/11/2020 2159   Risk Factors  0 Filed at: 03/11/2020 2159   Troponin  0 Filed at: 03/11/2020 2159   HEART Score  0 Filed at: 03/11/2020 2159              PERC Rule for PE      Most Recent Value   PERC Rule for PE Age >=50  0 Filed at: 03/11/2020 2201   HR >=100  0 Filed at: 03/11/2020 2201   O2 Sat on room air < 95%  0 Filed at: 03/11/2020 2201   History of PE or DVT  1 Filed at: 03/11/2020 2201   Recent trauma or surgery  0 Filed at: 03/11/2020 2201   Hemoptysis  0 Filed at: 03/11/2020 2201   Exogenous estrogen  0 Filed at: 03/11/2020 2201   Unilateral leg swelling  0 Filed at: 03/11/2020 2201   PERC Rule for PE Results  1 Filed at: 03/11/2020 2201                Wells' Criteria for PE      Most Recent Value   Wells' Criteria for PE   Clinical signs and symptoms of DVT  0 Filed at: 03/11/2020 2201   PE is primary diagnosis or equally likely  0 Filed at: 03/11/2020 2201   HR >100  0 Filed at: 03/11/2020 2201   Immobilization at least 3 days or Surgery in the previous 4 weeks  0 Filed at: 03/11/2020 2201   Previous, objectively diagnosed PE or DVT  1 5 Filed at: 03/11/2020 2201   Hemoptysis  0 Filed at: 03/11/2020 2201   Malignancy with treatment within 6 months or palliative  0 Filed at: 03/11/2020 2201   Wells' Criteria Total  1 5 Filed at: 03/11/2020 2201            MDM  Number of Diagnoses or Management Options  Atypical chest pain: new and requires workup  Diagnosis management comments: 27year old female with history of prior DVT and recent travel presents for chest pain/tightness  HEART score 0  Pain has been present for 3 days  D-dimer negative  Labs unremarkable  CXR unremarkable  PCP follow up  Discussed return precautions with patient         Amount and/or Complexity of Data Reviewed  Clinical lab tests: reviewed and ordered  Tests in the radiology section of CPT®: ordered  Independent visualization of images, tracings, or specimens: yes    Patient Progress  Patient progress: stable        Disposition  Final diagnoses:   Atypical chest pain     Time reflects when diagnosis was documented in both MDM as applicable and the Disposition within this note     Time User Action Codes Description Comment    3/11/2020 10:29 PM Maggie Clayton Add [R07 [id number]] Atypical chest pain       ED Disposition     ED Disposition Condition Date/Time Comment    Discharge Stable Wed Mar 11, 2020 10:29 PM Sherry Cervantes discharge to home/self care  Follow-up Information     Follow up With Specialties Details Why Contact Info Additional Information    Jac More PA-C Internal Medicine, Physician Assistant Schedule an appointment as soon as possible for a visit in 2 days for re-evaluation [facility] [address] Emergency Department Emergency Medicine Go to  If symptoms worsen [address] [place], [address] [phone]  ED, [address] [place] [address], [facility] [facility]          Patient's Medications   Discharge Prescriptions    No medications on file     No discharge procedures on file      PDMP Review     None          ED Provider  Electronically Signed by           Malik Red MD  03/11/20 9963

## 2020-03-16 ENCOUNTER — OFFICE VISIT (OUTPATIENT)
Dept: FAMILY MEDICINE CLINIC | Facility: HOSPITAL | Age: 30
End: 2020-03-16
Payer: COMMERCIAL

## 2020-03-16 VITALS
HEART RATE: 73 BPM | SYSTOLIC BLOOD PRESSURE: 112 MMHG | DIASTOLIC BLOOD PRESSURE: 64 MMHG | WEIGHT: 184.6 LBS | TEMPERATURE: 98.9 F | OXYGEN SATURATION: 99 % | BODY MASS INDEX: 31.51 KG/M2 | HEIGHT: 64 IN

## 2020-03-16 DIAGNOSIS — G89.29 CHRONIC PAIN OF LEFT KNEE: Primary | ICD-10-CM

## 2020-03-16 DIAGNOSIS — S89.92XS KNEE INJURY, LEFT, SEQUELA: ICD-10-CM

## 2020-03-16 DIAGNOSIS — F41.9 ANXIETY: ICD-10-CM

## 2020-03-16 DIAGNOSIS — M25.562 CHRONIC PAIN OF LEFT KNEE: Primary | ICD-10-CM

## 2020-03-16 PROCEDURE — 99214 OFFICE O/P EST MOD 30 MIN: CPT | Performed by: PHYSICIAN ASSISTANT

## 2020-03-16 PROCEDURE — 3008F BODY MASS INDEX DOCD: CPT | Performed by: PHYSICIAN ASSISTANT

## 2020-03-16 RX ORDER — CLONAZEPAM 0.5 MG/1
0.5 TABLET ORAL 2 TIMES DAILY PRN
Qty: 60 TABLET | Refills: 2 | Status: SHIPPED | OUTPATIENT
Start: 2020-03-16 | End: 2021-08-02 | Stop reason: SDUPTHER

## 2020-03-16 NOTE — PROGRESS NOTES
Assessment/Plan:       Diagnoses and all orders for this visit:    Chronic pain of left knee  -     Ambulatory referral to Physical Medicine Rehab; Future    Knee injury, left, sequela  -     Ambulatory referral to Physical Medicine Rehab; Future    Anxiety  -     clonazePAM (KlonoPIN) 0 5 mg tablet; Take 1 tablet (0 5 mg total) by mouth 2 (two) times a day as needed for anxiety        Subjective:      Patient ID: Gaby Kaufman is a 27 y o  female  27year old white female presents for  follow up from ER, had chest pain  Has ongoing left knee pain, with numbness  Has pain when moving, standing, walking, moving in bed  Saw several specialists, and had several tests  Admits to having anxiety and some pain meds  Has side effects taking tramadol, and celexa  Had nerve block injection, and was recommended to have another nerve block  Seeing ortho specialist:    Dr Devora Stewart, from [facility]  Has court case 4/16/2020 to determine work comp case and chronic knee pain, then they can settle case  Has sharp chest pain, mostly left side, behind breast, sometimes in right chest area, feels like a squeezing sensation  Lasts all day, every day, since past Monday; keeping patient up at night, not radiating  Saw GI specialist, and scheduled to have upper endoscopy on 3/23/2020  Admits to having panic attacks  Has hx  Of PE, in the past, had a knee brace which made patient at risk  Taking melatonin which helps with sleep  Was seeing counselor/therapist, but stopped getting paid, due to work injury  Review of Systems   Respiratory: Positive for chest tightness and shortness of breath  Negative for cough  Cardiovascular: Positive for chest pain  Sometimes has nausea, and has GERD sx  Gastrointestinal: Positive for abdominal pain and nausea  Negative for constipation, diarrhea and vomiting     Musculoskeletal: Positive for arthralgias, back pain, myalgias, neck pain and neck stiffness  Has left knee pain, s/p injury left knee 8/2016  Neurological: Positive for numbness  Psychiatric/Behavioral: Positive for agitation, decreased concentration, dysphoric mood and sleep disturbance  Negative for self-injury and suicidal ideas  The patient is nervous/anxious  Objective:      /64   Pulse 73   Temp 98 9 °F (37 2 °C) (Tympanic)   Ht 5' 4" (1 626 m)   Wt 83 7 kg (184 lb 9 6 oz)   LMP 02/15/2020 (Exact Date)   SpO2 99%   BMI 31 69 kg/m²          Physical Exam   Constitutional: She is oriented to person, place, and time  She appears well-developed and well-nourished  No distress  HENT:   Head: Normocephalic and atraumatic  Eyes: EOM are normal    Cardiovascular: Normal rate, regular rhythm and normal heart sounds  Pulmonary/Chest: Effort normal and breath sounds normal  No stridor  No respiratory distress  She has no wheezes  She has no rales  Musculoskeletal: Normal range of motion  She exhibits no edema, tenderness or deformity  Neurological: She is alert and oriented to person, place, and time  No cranial nerve deficit  Coordination normal    Skin: She is not diaphoretic  Psychiatric: She has a normal mood and affect  Her behavior is normal  Judgment and thought content normal    Nursing note and vitals reviewed

## 2020-03-16 NOTE — PATIENT INSTRUCTIONS
Patient to start Klonopin as needed for sleep and stress  Recommend heating pad or pain patches in area of pain on chest   Recommend continued therapy/counselor

## 2020-03-31 ENCOUNTER — TELEPHONE (OUTPATIENT)
Dept: GASTROENTEROLOGY | Facility: CLINIC | Age: 30
End: 2020-03-31

## 2020-03-31 DIAGNOSIS — K21.9 GASTROESOPHAGEAL REFLUX DISEASE, ESOPHAGITIS PRESENCE NOT SPECIFIED: Primary | ICD-10-CM

## 2020-03-31 NOTE — TELEPHONE ENCOUNTER
Pt states she cannot swallow the Pantoprazole; read on bottle it cannot be crushed or chewed  She is practicing/learning how to swallow pills - her Celexa is 1/4 the size and she can't get it down all the time   CB# [phone]

## 2020-04-01 ENCOUNTER — TELEPHONE (OUTPATIENT)
Dept: GASTROENTEROLOGY | Facility: CLINIC | Age: 30
End: 2020-04-01

## 2020-04-29 ENCOUNTER — TELEPHONE (OUTPATIENT)
Dept: GASTROENTEROLOGY | Facility: CLINIC | Age: 30
End: 2020-04-29

## 2020-07-23 ENCOUNTER — OFFICE VISIT (OUTPATIENT)
Dept: FAMILY MEDICINE CLINIC | Facility: HOSPITAL | Age: 30
End: 2020-07-23
Payer: COMMERCIAL

## 2020-07-23 VITALS
RESPIRATION RATE: 16 BRPM | SYSTOLIC BLOOD PRESSURE: 116 MMHG | HEART RATE: 60 BPM | WEIGHT: 183 LBS | DIASTOLIC BLOOD PRESSURE: 64 MMHG | HEIGHT: 64 IN | BODY MASS INDEX: 31.24 KG/M2 | TEMPERATURE: 97.4 F

## 2020-07-23 DIAGNOSIS — M25.532 LEFT WRIST PAIN: Primary | ICD-10-CM

## 2020-07-23 PROCEDURE — 99214 OFFICE O/P EST MOD 30 MIN: CPT | Performed by: INTERNAL MEDICINE

## 2020-07-23 PROCEDURE — 3008F BODY MASS INDEX DOCD: CPT | Performed by: INTERNAL MEDICINE

## 2020-07-23 NOTE — PROGRESS NOTES
Assessment/Plan:    No problem-specific Assessment & Plan notes found for this encounter  Diagnoses and all orders for this visit:    Left wrist pain  -     Ambulatory referral to Orthopedic Surgery; Future  -     Cock Up Wrist Splint          Subjective:      Patient ID: Mt Monae is a 27 y o  female  Wrist Pain    The pain is present in the left hand and left wrist  This is a recurrent problem  Episode onset: 2 wks ago  The problem occurs daily  The problem has been unchanged  The quality of the pain is described as aching  The pain is moderate  Pertinent negatives include no fever, joint swelling or stiffness  Exacerbated by: pt uses cell phone for work, using it makes it worse  She has tried rest (wrist brace) for the symptoms  The treatment provided no relief  The following portions of the patient's history were reviewed and updated as appropriate: current medications, past family history, past medical history, past social history, past surgical history and problem list     Review of Systems   Constitutional: Negative for fever  Musculoskeletal: Negative for stiffness  Objective:    /64   Pulse 60   Temp (!) 97 4 °F (36 3 °C) (Tympanic)   Resp 16   Ht 5' 4" (1 626 m)   Wt 83 kg (183 lb)   BMI 31 41 kg/m²      Physical Exam   Constitutional: She is oriented to person, place, and time  She appears well-developed  HENT:   Head: Normocephalic  Pulmonary/Chest: Effort normal  No respiratory distress  Musculoskeletal: She exhibits tenderness (left wrist tenderness, no erythema or warmth)  Neurological: She is alert and oriented to person, place, and time  I saw no atrophy, pt's sensation to light touch was normal   Skin: Skin is warm and dry  No erythema  Heather Gurrola MD  BMI Counseling: Body mass index is 31 41 kg/m²  The BMI is above normal  Patient referred to nutritionist due to patient being obese   Patient referred to weight management due to patient being obese

## 2020-08-21 DIAGNOSIS — F33.9 RECURRENT DEPRESSION (HCC): ICD-10-CM

## 2020-08-21 RX ORDER — CITALOPRAM 10 MG/1
TABLET ORAL
Qty: 30 TABLET | Refills: 5 | Status: SHIPPED | OUTPATIENT
Start: 2020-08-21 | End: 2021-03-29 | Stop reason: ALTCHOICE

## 2020-08-24 ENCOUNTER — OFFICE VISIT (OUTPATIENT)
Dept: OBGYN CLINIC | Facility: CLINIC | Age: 30
End: 2020-08-24
Payer: COMMERCIAL

## 2020-08-24 VITALS
DIASTOLIC BLOOD PRESSURE: 72 MMHG | BODY MASS INDEX: 31.92 KG/M2 | SYSTOLIC BLOOD PRESSURE: 108 MMHG | WEIGHT: 187 LBS | HEIGHT: 64 IN

## 2020-08-24 DIAGNOSIS — M25.532 LEFT WRIST PAIN: ICD-10-CM

## 2020-08-24 DIAGNOSIS — G56.22 CUBITAL TUNNEL SYNDROME ON LEFT: ICD-10-CM

## 2020-08-24 DIAGNOSIS — M77.12 LATERAL EPICONDYLITIS OF LEFT ELBOW: Primary | ICD-10-CM

## 2020-08-24 PROCEDURE — 99243 OFF/OP CNSLTJ NEW/EST LOW 30: CPT | Performed by: ORTHOPAEDIC SURGERY

## 2020-08-24 PROCEDURE — 3008F BODY MASS INDEX DOCD: CPT | Performed by: ORTHOPAEDIC SURGERY

## 2020-08-24 NOTE — PROGRESS NOTES
ASSESSMENT/PLAN:    Assessment:   Left cubital tunnel syndrome   Left lateral epicondylitis     Plan:   Begin towel splinting and wrist bracing at night  Begin hand therapy for ulnar nerve glides, and stretching exercises for her lateral epicondylitis as well as ergonomic adjustments  Follow Up:  6  week(s)    To Do Next Visit:    re-evaluation of current condition  General Discussions:     Cubital Tunnel Syndrome: The anatomy and physiology of cubital tunnel syndrome were discussed with the patient today in the office  Typically, increased elbow flexion activities decrease blood flow within the intraneural spaces, resulting in a feeling of numbness, tingling, weakness, or clumsiness within the hand and fingers  Occasionally, anatomic structures such as medial elbow osteophytes, the medial head of the triceps, were subluxing ulnar nerve may result in increased pressure or aggravation at the cubital tunnel  Typical signs and symptoms usually include numbness and tingling within the ring and small finger, weakness with , and weakness with pinch  Conservative treatment and includes nocturnal bracing to keep the elbow in a semi-extended position, activity modification, therapy, and avoiding excessive elbow flexion activities  A majority of patients typically respond to conservative treatment over a period of approximately 3-6 months  EMG/NCV testing of the ulnar nerve at the elbow is not as reliable as carpal tunnel syndrome  Surgical intervention in the form of in situ release of the ulnar nerve at the elbow or ulnar nerve transposition may be required in up to 20% of patients  Lateral Epicondylitis: The anatomy and physiology of lateral epicondylitis was discussed with the patient today  Typically, a traumatic injury or repetitive use may cause a partial or complete tear of the extensor carpi radialis brevis muscle  This creates pain over the lateral epicondyle    This pain typically is made worse with palm down lifting activities as well as anything that involves strength and stability of the wrist   The pain may radiate from the wrist up to the elbow  At times, the shoulder may be weak as well which can predispose or cause continuation of the problem  Conservative treatment usually cures a majority of patients; however, this may take up to 6-9 months  Conservative treatment options typically include activity modification, therapy for strengthening of the shoulder and elbow, nocturnal wrist support splints, tennis elbow straps, and possible corticosteroid injections  Corticosteroid injections do not change the natural history of this process, may decrease the pain temporarily, and may increase the risk of recurrence  Surgery is required in fewer than 10% of patients  Operative Discussions:       _____________________________________________________  CHIEF COMPLAINT:  Chief Complaint   Patient presents with    Left Wrist - Pain         SUBJECTIVE:  Eddie Alvarez is a left-hand dominant 27 y o  female who presents with left wrist and elbow pain  I am seeing her in consultation at the request of Zander Vasquez MD   Patient states she started to have pain in her left elbow and wrist about 1 month ago  She denies any incident of injury that time  Patient notes pain to the lateral side of her elbow after typing for prolonged periods of time  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  She also occasionally notes intermittent numbness to her small finger especially when she is leaning on the medial aspect of the elbow  She has tried wrist bracing at night for a few weeks now with mild relief  She does occasionally take Children's Tylenol as she is unable to swallow pills  She also occasionally takes tramadol which she has for knee pain    Previous Treatments: bracing with only partial relief  Associated symptoms: Numbness  Handedness: left  Work status: social media     PAST MEDICAL HISTORY:  Past Medical History:   Diagnosis Date    Asthma     Chronic knee pain     Depression     Disease of thyroid gland     Menorrhagia     Multiple thyroid nodules     Psychiatric disorder     Pulmonary emboli (HCC)     Saphenous nerve neuropathy, 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    Vitamin D deficiency     Vocal cord paralysis     Following partial thyroidectomy       PAST SURGICAL HISTORY:  Past Surgical History:   Procedure Laterality Date    DILATION AND CURETTAGE OF UTERUS      THYROIDECTOMY, PARTIAL      UPPER GASTROINTESTINAL ENDOSCOPY      WISDOM TOOTH EXTRACTION         FAMILY HISTORY:  Family History   Problem Relation Age of Onset    Thyroid disease Mother     Ovarian cancer Sister     Thyroid disease Sister     No Known Problems Father     No Known Problems Brother     Anorexia nervosa Sister     Mental illness Sister     No Known Problems Sister     Celiac disease Maternal Grandmother     Colon cancer Neg Hx     Colon polyps Neg Hx     Substance Abuse Neg Hx        SOCIAL HISTORY:  Social History     Tobacco Use    Smoking status: Current Some Day Smoker     Types: Cigarettes    Smokeless tobacco: Never Used    Tobacco comment:  seasonal, only when it's warm out, social   Substance Use Topics    Alcohol use: Yes     Frequency: 2-4 times a month     Drinks per session: 5 or 6     Binge frequency: Monthly     Comment: socially    Drug use: Not Currently     Types: Marijuana     Comment:  no longer using marijuana       MEDICATIONS:    Current Outpatient Medications:     aspirin 81 MG tablet, Take 81 mg by mouth daily , Disp: , Rfl:     citalopram (CeleXA) 10 mg tablet, TAKE 1 TABLET BY MOUTH EVERY DAY, Disp: 30 tablet, Rfl: 5    clobetasol (TEMOVATE) 0 05 % cream, as needed , Disp: , Rfl:     clonazePAM (KlonoPIN) 0 5 mg tablet, Take 1 tablet (0 5 mg total) by mouth 2 (two) times a day as needed for anxiety, Disp: 60 tablet, Rfl: 2   EUCRISA 2 % OINT, Apply 1 application topically 2 (two) times a day as needed , Disp: , Rfl:     traMADol (ULTRAM) 50 mg tablet, Take 50 mg by mouth every 6 (six) hours as needed for moderate pain, Disp: , Rfl:     ALLERGIES:  No Known Allergies    REVIEW OF SYSTEMS:  Pertinent items are noted in HPI  LABS:  HgA1c: No results found for: HGBA1C  BMP:   Lab Results   Component Value Date    CALCIUM 8 9 03/11/2020    K 3 9 03/11/2020    CO2 27 03/11/2020     03/11/2020    BUN 17 03/11/2020    CREATININE 1 04 03/11/2020         _____________________________________________________  PHYSICAL EXAMINATION:  Vital signs: /72   Ht 5' 4" (1 626 m)   Wt 84 8 kg (187 lb)   BMI 32 10 kg/m²   General: well developed and well nourished, alert, oriented times 3 and appears comfortable  Psychiatric: Normal  HEENT: Trachea Midline, No torticollis  Cardiovascular: No discernable arrhythmia  Pulmonary: No wheezing or stridor  Skin: No masses, erythema, lacerations, fluctation, ulcerations  Neurovascular: Sensation Intact to the Median, Ulnar, Radial Nerve, Motor Intact to the Median, Ulnar, Radial Nerve and Pulses Intact    MUSCULOSKELETAL EXAMINATION:    Left upper extremity:  Skin intact  No erythema or ecchymosis noted  Full range of motion of elbow, wrist, and hand in all planes  DPC 0  Tender to palpate lateral epicondyle  Mild tenderness at radial tunnel and supinator  Pain with resisted wrist extension  Ulnar nerve does not subluxate  Positive Tinel's at cubital tunnel  Positive elbow flexion compression test   Negative Tinel's at carpal tunnel and Guyon's canal   Intrinsic strength 4+/5  AIN 5/5, FDP 5/5    Brisk capillary refill noted to all digits        _____________________________________________________  STUDIES REVIEWED:  No Studies to review      PROCEDURES PERFORMED:  Procedures  No Procedures performed today   Scribe Attestation    I,:   Socoby Ramos MA am acting as a scribe while in the presence of the attending physician :        I,:   Jesse Thacker MD personally performed the services described in this documentation    as scribed in my presence :

## 2020-08-24 NOTE — PATIENT INSTRUCTIONS
Cubital Tunnel Syndrome  What many people call the funny bone really is a nerve  This ulnar nerve runs behind a bone in the elbow through a space called the cubital tunnel (Figure 1)  Although banging the funny bone usually causes temporary symptoms, chronic pressure on or stretching of the nerve can affect the blood supply to the ulnar nerve, causing numbness or tingling in the ring and small fingers, pain in the forearm, and/or weakness in the hand  This is called cubital tunnel syndrome      Causes  There are a few causes of this ulnar nerve problem  These include:  Pressure  Because the nerve runs through that funny bone groove and has little padding over it, direct pressure (like leaning your arm on an arm rest) can compress the nerve, causing your arm and hand--especially the ring and small fingers--to fall asleep      Stretch  Keeping the elbow bent for a long time can stretchthe nerve behind the elbow  This usually happens during sleep  Anatomy  Sometimes, the ulnar nerve does not stay in its place and snaps back and forth over a bony bump as the elbow is moved  Repetitive snapping can irritate the nerve  Sometimes, the soft tissues over the nerve become thicker or there is an extra muscle over the nerve that can keep the nerve from working correctly  Signs and Symptoms  Cubital tunnel syndrome can cause pain, loss of sensation, and/or tingling  Pins and needles usually are felt in the ring and small fingers  These symptoms are often felt when the elbow is kept bent for a long time, such as while holding a phone or while sleeping  Some people feel weak or clumsy  Loss of sensation and loss of strength or muscle in the hand is serious  Diagnosis  Your doctor will be able to tell a lot by asking you about your symptoms and examining you  S/he might test you for other medical problems like diabetes or thyroid disease   A test called electromyography (EMG) and/or nerve conduction study (NCS) might be needed to see how much the nerve and muscle are being affected  This test also checks for other problems like a pinched nerve in the neck, which can cause similar symptoms  Treatment  The first treatment is to avoid actions that cause symptoms  Wrapping a pillow or towel around the elbow or wearing a splint at night to keep the elbow from bending during sleep can help  Avoiding leaning on the funny bone part of the elbow can help also  A hand therapist can help you learn ways to avoid pressure on the nerve  When symptoms are severe or not getting better, surgery may be needed to relieve the pressure on the nerve  This can involve releasing the nerve, moving the nerve to the front of the elbow, and/or removing a part of the bone  Your surgeon will talk to you about what is the right option for you and guide your care  Therapy sometimes is needed after surgery, and the time it takes to recover varies  Numbness and tingling may improve quickly or slowly, and it may take many months for the strength in your hand to improve  Cubital tunnel symptoms may not totally go away after surgery, especially if symptoms are severe  © 2012 [employer]  www handcare  org

## 2020-08-24 NOTE — LETTER
August 25, 2020     Alexis Zhou, [address] [address]    Patient: Daisha Wright   YOB: 1990   Date of Visit: 8/24/2020       Dear Dr Warden Sinha: Thank you for referring Daisha Wright to me for evaluation  Below are my notes for this consultation  If you have questions, please do not hesitate to call me  I look forward to following your patient along with you  Sincerely,        Isa Pinzon MD        CC: No Recipients  Isa Pinzon MD  8/24/2020  3:01 PM  Signed  ASSESSMENT/PLAN:    Assessment:   Left cubital tunnel syndrome   Left lateral epicondylitis     Plan:   Begin towel splinting and wrist bracing at night  Begin hand therapy for ulnar nerve glides, and stretching exercises for her lateral epicondylitis as well as ergonomic adjustments  Follow Up:  6  week(s)    To Do Next Visit:    re-evaluation of current condition  General Discussions:     Cubital Tunnel Syndrome: The anatomy and physiology of cubital tunnel syndrome were discussed with the patient today in the office  Typically, increased elbow flexion activities decrease blood flow within the intraneural spaces, resulting in a feeling of numbness, tingling, weakness, or clumsiness within the hand and fingers  Occasionally, anatomic structures such as medial elbow osteophytes, the medial head of the triceps, were subluxing ulnar nerve may result in increased pressure or aggravation at the cubital tunnel  Typical signs and symptoms usually include numbness and tingling within the ring and small finger, weakness with , and weakness with pinch  Conservative treatment and includes nocturnal bracing to keep the elbow in a semi-extended position, activity modification, therapy, and avoiding excessive elbow flexion activities  A majority of patients typically respond to conservative treatment over a period of approximately 3-6 months    EMG/NCV testing of the ulnar nerve at the elbow is not as reliable as carpal tunnel syndrome  Surgical intervention in the form of in situ release of the ulnar nerve at the elbow or ulnar nerve transposition may be required in up to 20% of patients  Lateral Epicondylitis: The anatomy and physiology of lateral epicondylitis was discussed with the patient today  Typically, a traumatic injury or repetitive use may cause a partial or complete tear of the extensor carpi radialis brevis muscle  This creates pain over the lateral epicondyle  This pain typically is made worse with palm down lifting activities as well as anything that involves strength and stability of the wrist   The pain may radiate from the wrist up to the elbow  At times, the shoulder may be weak as well which can predispose or cause continuation of the problem  Conservative treatment usually cures a majority of patients; however, this may take up to 6-9 months  Conservative treatment options typically include activity modification, therapy for strengthening of the shoulder and elbow, nocturnal wrist support splints, tennis elbow straps, and possible corticosteroid injections  Corticosteroid injections do not change the natural history of this process, may decrease the pain temporarily, and may increase the risk of recurrence  Surgery is required in fewer than 10% of patients  Operative Discussions:       _____________________________________________________  CHIEF COMPLAINT:  Chief Complaint   Patient presents with    Left Wrist - Pain         SUBJECTIVE:  Cristy Juárez is a left-hand dominant 27 y o  female who presents with left wrist and elbow pain  I am seeing her in consultation at the request of Eliana Israel MD   Patient states she started to have pain in her left elbow and wrist about 1 month ago  She denies any incident of injury that time  Patient notes pain to the lateral side of her elbow after typing for prolonged periods of time    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  She also occasionally notes intermittent numbness to her small finger especially when she is leaning on the medial aspect of the elbow  She has tried wrist bracing at night for a few weeks now with mild relief  She does occasionally take Children's Tylenol as she is unable to swallow pills  She also occasionally takes tramadol which she has for knee pain    Previous Treatments: bracing with only partial relief  Associated symptoms: Numbness  Handedness: left  Work status: social media     PAST MEDICAL HISTORY:  Past Medical History:   Diagnosis Date    Asthma     Chronic knee pain     Depression     Disease of thyroid gland     Menorrhagia     Multiple thyroid nodules     Psychiatric disorder     Pulmonary emboli (HCC)     Saphenous nerve neuropathy, 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    Vitamin D deficiency     Vocal cord paralysis     Following partial thyroidectomy       PAST SURGICAL HISTORY:  Past Surgical History:   Procedure Laterality Date    DILATION AND CURETTAGE OF UTERUS      THYROIDECTOMY, PARTIAL      UPPER GASTROINTESTINAL ENDOSCOPY      WISDOM TOOTH EXTRACTION         FAMILY HISTORY:  Family History   Problem Relation Age of Onset    Thyroid disease Mother     Ovarian cancer Sister     Thyroid disease Sister     No Known Problems Father     No Known Problems Brother     Anorexia nervosa Sister     Mental illness Sister     No Known Problems Sister     Celiac disease Maternal Grandmother     Colon cancer Neg Hx     Colon polyps Neg Hx     Substance Abuse Neg Hx        SOCIAL HISTORY:  Social History     Tobacco Use    Smoking status: Current Some Day Smoker     Types: Cigarettes    Smokeless tobacco: Never Used    Tobacco comment:  seasonal, only when it's warm out, social   Substance Use Topics    Alcohol use: Yes     Frequency: 2-4 times a month     Drinks per session: 5 or 6     Binge frequency: Monthly Comment: socially    Drug use: Not Currently     Types: Marijuana     Comment:  no longer using marijuana       MEDICATIONS:    Current Outpatient Medications:     aspirin 81 MG tablet, Take 81 mg by mouth daily , Disp: , Rfl:     citalopram (CeleXA) 10 mg tablet, TAKE 1 TABLET BY MOUTH EVERY DAY, Disp: 30 tablet, Rfl: 5    clobetasol (TEMOVATE) 0 05 % cream, as needed , Disp: , Rfl:     clonazePAM (KlonoPIN) 0 5 mg tablet, Take 1 tablet (0 5 mg total) by mouth 2 (two) times a day as needed for anxiety, Disp: 60 tablet, Rfl: 2    EUCRISA 2 % OINT, Apply 1 application topically 2 (two) times a day as needed , Disp: , Rfl:     traMADol (ULTRAM) 50 mg tablet, Take 50 mg by mouth every 6 (six) hours as needed for moderate pain, Disp: , Rfl:     ALLERGIES:  No Known Allergies    REVIEW OF SYSTEMS:  Pertinent items are noted in HPI  LABS:  HgA1c: No results found for: HGBA1C  BMP:   Lab Results   Component Value Date    CALCIUM 8 9 03/11/2020    K 3 9 03/11/2020    CO2 27 03/11/2020     03/11/2020    BUN 17 03/11/2020    CREATININE 1 04 03/11/2020         _____________________________________________________  PHYSICAL EXAMINATION:  Vital signs: /72   Ht 5' 4" (1 626 m)   Wt 84 8 kg (187 lb)   BMI 32 10 kg/m²   General: well developed and well nourished, alert, oriented times 3 and appears comfortable  Psychiatric: Normal  HEENT: Trachea Midline, No torticollis  Cardiovascular: No discernable arrhythmia  Pulmonary: No wheezing or stridor  Skin: No masses, erythema, lacerations, fluctation, ulcerations  Neurovascular: Sensation Intact to the Median, Ulnar, Radial Nerve, Motor Intact to the Median, Ulnar, Radial Nerve and Pulses Intact    MUSCULOSKELETAL EXAMINATION:    Left upper extremity:  Skin intact  No erythema or ecchymosis noted  Full range of motion of elbow, wrist, and hand in all planes  DPC 0  Tender to palpate lateral epicondyle  Mild tenderness at radial tunnel and supinator    Pain with resisted wrist extension  Ulnar nerve does not subluxate  Positive Tinel's at cubital tunnel  Positive elbow flexion compression test   Negative Tinel's at carpal tunnel and Guyon's canal   Intrinsic strength 4+/5  AIN 5/5, FDP 5/5    Brisk capillary refill noted to all digits        _____________________________________________________  STUDIES REVIEWED:  No Studies to review      PROCEDURES PERFORMED:  Procedures  No Procedures performed today   Scribe Attestation    I,:   Ángel Monreal MA am acting as a scribe while in the presence of the attending physician :        I,:   Jess Jj MD personally performed the services described in this documentation    as scribed in my presence :

## 2020-08-24 NOTE — LETTER
August 25, 2020     Chuckie Taylor, [address] Yoel Trotter  [address] [place] [id number]    Patient: Gurpreet Tolliver   YOB: 1990   Date of Visit: 8/24/2020       Dear Dr Nikita Son: Thank you for referring Gurpreet Tolliver to me for evaluation  Below are my notes for this consultation  If you have questions, please do not hesitate to call me  I look forward to following your patient along with you  Sincerely,        Rosa M Plascencia MD        CC: No Recipients  Rosa M Plascencia MD  8/24/2020  3:01 PM  Signed  ASSESSMENT/PLAN:    Assessment:   Left cubital tunnel syndrome   Left lateral epicondylitis     Plan:   Begin towel splinting and wrist bracing at night  Begin hand therapy for ulnar nerve glides, and stretching exercises for her lateral epicondylitis as well as ergonomic adjustments  Follow Up:  6  week(s)    To Do Next Visit:    re-evaluation of current condition  General Discussions:     Cubital Tunnel Syndrome: The anatomy and physiology of cubital tunnel syndrome were discussed with the patient today in the office  Typically, increased elbow flexion activities decrease blood flow within the intraneural spaces, resulting in a feeling of numbness, tingling, weakness, or clumsiness within the hand and fingers  Occasionally, anatomic structures such as medial elbow osteophytes, the medial head of the triceps, were subluxing ulnar nerve may result in increased pressure or aggravation at the cubital tunnel  Typical signs and symptoms usually include numbness and tingling within the ring and small finger, weakness with , and weakness with pinch  Conservative treatment and includes nocturnal bracing to keep the elbow in a semi-extended position, activity modification, therapy, and avoiding excessive elbow flexion activities  A majority of patients typically respond to conservative treatment over a period of approximately 3-6 months    EMG/NCV testing of the ulnar nerve at the elbow is not as reliable as carpal tunnel syndrome  Surgical intervention in the form of in situ release of the ulnar nerve at the elbow or ulnar nerve transposition may be required in up to 20% of patients  Lateral Epicondylitis: The anatomy and physiology of lateral epicondylitis was discussed with the patient today  Typically, a traumatic injury or repetitive use may cause a partial or complete tear of the extensor carpi radialis brevis muscle  This creates pain over the lateral epicondyle  This pain typically is made worse with palm down lifting activities as well as anything that involves strength and stability of the wrist   The pain may radiate from the wrist up to the elbow  At times, the shoulder may be weak as well which can predispose or cause continuation of the problem  Conservative treatment usually cures a majority of patients; however, this may take up to 6-9 months  Conservative treatment options typically include activity modification, therapy for strengthening of the shoulder and elbow, nocturnal wrist support splints, tennis elbow straps, and possible corticosteroid injections  Corticosteroid injections do not change the natural history of this process, may decrease the pain temporarily, and may increase the risk of recurrence  Surgery is required in fewer than 10% of patients  Operative Discussions:       _____________________________________________________  CHIEF COMPLAINT:  Chief Complaint   Patient presents with    Left Wrist - Pain         SUBJECTIVE:  Jonas Little is a left-hand dominant 27 y o  female who presents with left wrist and elbow pain  I am seeing her in consultation at the request of Heidi Agarwal MD   Patient states she started to have pain in her left elbow and wrist about 1 month ago  She denies any incident of injury that time  Patient notes pain to the lateral side of her elbow after typing for prolonged periods of time    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  She also occasionally notes intermittent numbness to her small finger especially when she is leaning on the medial aspect of the elbow  She has tried wrist bracing at night for a few weeks now with mild relief  She does occasionally take Children's Tylenol as she is unable to swallow pills  She also occasionally takes tramadol which she has for knee pain    Previous Treatments: bracing with only partial relief  Associated symptoms: Numbness  Handedness: left  Work status: social media     PAST MEDICAL HISTORY:  Past Medical History:   Diagnosis Date    Asthma     Chronic knee pain     Depression     Disease of thyroid gland     Menorrhagia     Multiple thyroid nodules     Psychiatric disorder     Pulmonary emboli (HCC)     Saphenous nerve neuropathy, 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    Vitamin D deficiency     Vocal cord paralysis     Following partial thyroidectomy       PAST SURGICAL HISTORY:  Past Surgical History:   Procedure Laterality Date    DILATION AND CURETTAGE OF UTERUS      THYROIDECTOMY, PARTIAL      UPPER GASTROINTESTINAL ENDOSCOPY      WISDOM TOOTH EXTRACTION         FAMILY HISTORY:  Family History   Problem Relation Age of Onset    Thyroid disease Mother     Ovarian cancer Sister     Thyroid disease Sister     No Known Problems Father     No Known Problems Brother     Anorexia nervosa Sister     Mental illness Sister     No Known Problems Sister     Celiac disease Maternal Grandmother     Colon cancer Neg Hx     Colon polyps Neg Hx     Substance Abuse Neg Hx        SOCIAL HISTORY:  Social History     Tobacco Use    Smoking status: Current Some Day Smoker     Types: Cigarettes    Smokeless tobacco: Never Used    Tobacco comment:  seasonal, only when it's warm out, social   Substance Use Topics    Alcohol use: Yes     Frequency: 2-4 times a month     Drinks per session: 5 or 6     Binge frequency: Monthly Comment: socially    Drug use: Not Currently     Types: Marijuana     Comment:  no longer using marijuana       MEDICATIONS:    Current Outpatient Medications:     aspirin 81 MG tablet, Take 81 mg by mouth daily , Disp: , Rfl:     citalopram (CeleXA) 10 mg tablet, TAKE 1 TABLET BY MOUTH EVERY DAY, Disp: 30 tablet, Rfl: 5    clobetasol (TEMOVATE) 0 05 % cream, as needed , Disp: , Rfl:     clonazePAM (KlonoPIN) 0 5 mg tablet, Take 1 tablet (0 5 mg total) by mouth 2 (two) times a day as needed for anxiety, Disp: 60 tablet, Rfl: 2    EUCRISA 2 % OINT, Apply 1 application topically 2 (two) times a day as needed , Disp: , Rfl:     traMADol (ULTRAM) 50 mg tablet, Take 50 mg by mouth every 6 (six) hours as needed for moderate pain, Disp: , Rfl:     ALLERGIES:  No Known Allergies    REVIEW OF SYSTEMS:  Pertinent items are noted in HPI  LABS:  HgA1c: No results found for: HGBA1C  BMP:   Lab Results   Component Value Date    CALCIUM 8 9 03/11/2020    K 3 9 03/11/2020    CO2 27 03/11/2020     03/11/2020    BUN 17 03/11/2020    CREATININE 1 04 03/11/2020         _____________________________________________________  PHYSICAL EXAMINATION:  Vital signs: /72   Ht 5' 4" (1 626 m)   Wt 84 8 kg (187 lb)   BMI 32 10 kg/m²   General: well developed and well nourished, alert, oriented times 3 and appears comfortable  Psychiatric: Normal  HEENT: Trachea Midline, No torticollis  Cardiovascular: No discernable arrhythmia  Pulmonary: No wheezing or stridor  Skin: No masses, erythema, lacerations, fluctation, ulcerations  Neurovascular: Sensation Intact to the Median, Ulnar, Radial Nerve, Motor Intact to the Median, Ulnar, Radial Nerve and Pulses Intact    MUSCULOSKELETAL EXAMINATION:    Left upper extremity:  Skin intact  No erythema or ecchymosis noted  Full range of motion of elbow, wrist, and hand in all planes  DPC 0  Tender to palpate lateral epicondyle  Mild tenderness at radial tunnel and supinator    Pain with resisted wrist extension  Ulnar nerve does not subluxate  Positive Tinel's at cubital tunnel  Positive elbow flexion compression test   Negative Tinel's at carpal tunnel and Guyon's canal   Intrinsic strength 4+/5  AIN 5/5, FDP 5/5    Brisk capillary refill noted to all digits        _____________________________________________________  STUDIES REVIEWED:  No Studies to review      PROCEDURES PERFORMED:  Procedures  No Procedures performed today   Scribe Attestation    I,:   Neville Soria MA am acting as a scribe while in the presence of the attending physician :        I,:   Leopold Mitts, MD personally performed the services described in this documentation    as scribed in my presence :

## 2020-08-28 ENCOUNTER — TELEPHONE (OUTPATIENT)
Dept: OBGYN CLINIC | Facility: CLINIC | Age: 30
End: 2020-08-28

## 2020-08-28 NOTE — TELEPHONE ENCOUNTER
Patient stopped by office, said that she was scheduled for physical therapy today, to find out that it is $60 everytime she would go, patient said that she couldn't afford it, wanted to know if there was anything else that she could do

## 2020-08-28 NOTE — TELEPHONE ENCOUNTER
She could try to do a home exercise program but it likely will not be as effective as quickly as formal therapy  She would still need to meet up with a therapist to learn what to do  However, we have had some patients who maybe go for 1-3 sessions to learn what to do and then find a way to do it at home  I've also had patients who go, learn what to do and then go again in 3-4 weeks just to update and go over more exercises

## 2020-08-28 NOTE — TELEPHONE ENCOUNTER
Pt  Advised and stated she is not going to pay $60 for a session just to getkiknstructions on exercises she has to do at home  Pt  Sated her work comp ortho Dr  She sees for her knees specializes in other body parts as well and he is willing to show her the exercises she needs to do   Pt  Stated she will just the information from him

## 2020-10-26 ENCOUNTER — TELEMEDICINE (OUTPATIENT)
Dept: GASTROENTEROLOGY | Facility: CLINIC | Age: 30
End: 2020-10-26

## 2020-10-26 VITALS — BODY MASS INDEX: 31.41 KG/M2 | WEIGHT: 184 LBS | HEIGHT: 64 IN

## 2020-10-26 DIAGNOSIS — K21.9 GASTROESOPHAGEAL REFLUX DISEASE, UNSPECIFIED WHETHER ESOPHAGITIS PRESENT: Primary | ICD-10-CM

## 2020-10-26 RX ORDER — GABAPENTIN 250 MG/5ML
2 SOLUTION ORAL 2 TIMES DAILY
COMMUNITY
End: 2021-03-29 | Stop reason: SDUPTHER

## 2020-10-26 RX ORDER — COPPER 313.4 MG/1
1 INTRAUTERINE DEVICE INTRAUTERINE ONCE
COMMUNITY
End: 2022-02-14

## 2020-11-02 ENCOUNTER — ANESTHESIA (OUTPATIENT)
Dept: GASTROENTEROLOGY | Facility: AMBULATORY SURGERY CENTER | Age: 30
End: 2020-11-02

## 2020-11-02 ENCOUNTER — HOSPITAL ENCOUNTER (OUTPATIENT)
Dept: GASTROENTEROLOGY | Facility: AMBULATORY SURGERY CENTER | Age: 30
Discharge: HOME | End: 2020-11-02
Payer: COMMERCIAL

## 2020-11-02 ENCOUNTER — ANESTHESIA EVENT (OUTPATIENT)
Dept: GASTROENTEROLOGY | Facility: AMBULATORY SURGERY CENTER | Age: 30
End: 2020-11-02

## 2020-11-02 VITALS
DIASTOLIC BLOOD PRESSURE: 59 MMHG | SYSTOLIC BLOOD PRESSURE: 98 MMHG | OXYGEN SATURATION: 100 % | HEART RATE: 78 BPM | TEMPERATURE: 98 F | RESPIRATION RATE: 22 BRPM

## 2020-11-02 VITALS — HEART RATE: 73 BPM

## 2020-11-02 DIAGNOSIS — R49.0 CHRONIC HOARSENESS: ICD-10-CM

## 2020-11-02 DIAGNOSIS — R13.10 DYSPHAGIA, UNSPECIFIED TYPE: ICD-10-CM

## 2020-11-02 DIAGNOSIS — R10.12 LEFT UPPER QUADRANT PAIN: ICD-10-CM

## 2020-11-02 DIAGNOSIS — K21.9 GASTROESOPHAGEAL REFLUX DISEASE: ICD-10-CM

## 2020-11-02 PROCEDURE — 43239 EGD BIOPSY SINGLE/MULTIPLE: CPT | Performed by: INTERNAL MEDICINE

## 2020-11-02 RX ORDER — PROPOFOL 10 MG/ML
INJECTION, EMULSION INTRAVENOUS AS NEEDED
Status: DISCONTINUED | OUTPATIENT
Start: 2020-11-02 | End: 2020-11-02

## 2020-11-02 RX ORDER — SODIUM CHLORIDE 9 MG/ML
50 INJECTION, SOLUTION INTRAVENOUS CONTINUOUS
Status: DISCONTINUED | OUTPATIENT
Start: 2020-11-02 | End: 2020-11-06 | Stop reason: HOSPADM

## 2020-11-02 RX ADMIN — PROPOFOL 300 MG: 10 INJECTION, EMULSION INTRAVENOUS at 14:08

## 2020-11-02 RX ADMIN — SODIUM CHLORIDE 50 ML/HR: 9 INJECTION, SOLUTION INTRAVENOUS at 13:31

## 2020-11-02 RX ADMIN — SODIUM CHLORIDE: 9 INJECTION, SOLUTION INTRAVENOUS at 13:55

## 2020-11-03 DIAGNOSIS — K21.9 GASTROESOPHAGEAL REFLUX DISEASE, UNSPECIFIED WHETHER ESOPHAGITIS PRESENT: ICD-10-CM

## 2020-11-03 DIAGNOSIS — R13.10 DYSPHAGIA, UNSPECIFIED TYPE: Primary | ICD-10-CM

## 2020-11-04 RX ORDER — LANSOPRAZOLE 30 MG/1
30 CAPSULE, DELAYED RELEASE ORAL DAILY
Qty: 30 CAPSULE | Refills: 3 | OUTPATIENT
Start: 2020-11-04 | End: 2020-11-12 | Stop reason: SDUPTHER

## 2020-11-12 DIAGNOSIS — R13.10 DYSPHAGIA, UNSPECIFIED TYPE: ICD-10-CM

## 2020-11-12 DIAGNOSIS — K21.9 GASTROESOPHAGEAL REFLUX DISEASE, UNSPECIFIED WHETHER ESOPHAGITIS PRESENT: ICD-10-CM

## 2020-11-12 RX ORDER — LANSOPRAZOLE 30 MG/1
30 CAPSULE, DELAYED RELEASE ORAL DAILY
Qty: 30 CAPSULE | Refills: 3 | Status: SHIPPED | OUTPATIENT
Start: 2020-11-12 | End: 2021-08-06

## 2020-11-19 ENCOUNTER — TELEPHONE (OUTPATIENT)
Dept: GASTROENTEROLOGY | Facility: CLINIC | Age: 30
End: 2020-11-19

## 2020-11-28 ENCOUNTER — TELEPHONE (OUTPATIENT)
Dept: OTHER | Facility: OTHER | Age: 30
End: 2020-11-28

## 2020-11-28 ENCOUNTER — DOCUMENTATION (OUTPATIENT)
Dept: FAMILY MEDICINE CLINIC | Facility: HOSPITAL | Age: 30
End: 2020-11-28

## 2020-11-28 DIAGNOSIS — J06.9 VIRAL UPPER RESPIRATORY TRACT INFECTION: Primary | ICD-10-CM

## 2021-02-11 DIAGNOSIS — E04.2 MULTIPLE THYROID NODULES: Primary | Chronic | ICD-10-CM

## 2021-03-29 ENCOUNTER — OFFICE VISIT (OUTPATIENT)
Dept: FAMILY MEDICINE CLINIC | Facility: HOSPITAL | Age: 31
End: 2021-03-29
Payer: COMMERCIAL

## 2021-03-29 VITALS
HEIGHT: 64 IN | SYSTOLIC BLOOD PRESSURE: 116 MMHG | WEIGHT: 189 LBS | DIASTOLIC BLOOD PRESSURE: 78 MMHG | HEART RATE: 78 BPM | TEMPERATURE: 98.2 F | BODY MASS INDEX: 32.27 KG/M2 | RESPIRATION RATE: 16 BRPM

## 2021-03-29 DIAGNOSIS — R63.5 WEIGHT GAIN: ICD-10-CM

## 2021-03-29 DIAGNOSIS — G89.29 CHRONIC PAIN OF LEFT KNEE: ICD-10-CM

## 2021-03-29 DIAGNOSIS — M25.562 CHRONIC PAIN OF LEFT KNEE: ICD-10-CM

## 2021-03-29 DIAGNOSIS — E04.2 MULTIPLE THYROID NODULES: Chronic | ICD-10-CM

## 2021-03-29 DIAGNOSIS — N76.1 CHRONIC VAGINITIS: ICD-10-CM

## 2021-03-29 DIAGNOSIS — B35.4 TINEA CORPORIS: Primary | ICD-10-CM

## 2021-03-29 DIAGNOSIS — F33.9 RECURRENT DEPRESSION (HCC): ICD-10-CM

## 2021-03-29 DIAGNOSIS — S89.92XS KNEE INJURY, LEFT, SEQUELA: ICD-10-CM

## 2021-03-29 DIAGNOSIS — F41.9 ANXIETY: ICD-10-CM

## 2021-03-29 PROCEDURE — 3725F SCREEN DEPRESSION PERFORMED: CPT | Performed by: PHYSICIAN ASSISTANT

## 2021-03-29 PROCEDURE — 99214 OFFICE O/P EST MOD 30 MIN: CPT | Performed by: PHYSICIAN ASSISTANT

## 2021-03-29 RX ORDER — KETOCONAZOLE 200 MG/1
200 TABLET ORAL DAILY
Qty: 30 TABLET | Refills: 1 | Status: SHIPPED | OUTPATIENT
Start: 2021-03-29 | End: 2021-04-28

## 2021-03-29 RX ORDER — GABAPENTIN 250 MG/5ML
2 SOLUTION ORAL 2 TIMES DAILY
Qty: 140 ML | Refills: 3 | Status: SHIPPED | OUTPATIENT
Start: 2021-03-29 | End: 2022-02-14

## 2021-03-29 RX ORDER — FLUTICASONE PROPIONATE 50 MCG
2 SPRAY, SUSPENSION (ML) NASAL DAILY
COMMUNITY
Start: 2021-03-12 | End: 2021-08-06

## 2021-03-29 RX ORDER — CITALOPRAM 10 MG/1
10 TABLET ORAL DAILY
Qty: 30 TABLET | Refills: 5 | Status: SHIPPED | OUTPATIENT
Start: 2021-03-29 | End: 2021-05-19

## 2021-03-29 NOTE — PROGRESS NOTES
Assessment/Plan:       Problem List Items Addressed This Visit        Endocrine    Vaginitis- suspect candida    Relevant Medications    ketoconazole (NIZORAL) 200 mg tablet    Other Relevant Orders    T4, free    TSH, 3rd generation    CBC and differential    Comprehensive metabolic panel    Lipid panel    UA/M w/rflx Culture, Routine       Other    Chronic pain of left knee- history of right   Knee pain and left knee pain-      Relevant Medications    Gabapentin - liquid- continue    Other Relevant Orders    T4, free    TSH, 3rd generation    CBC and differential    Comprehensive metabolic panel    Lipid panel    UA/M w/rflx Culture, Routine      Other Visit Diagnoses     Weight gain    -  Primary    Relevant Medications    Referred to [employer] weight management  Other Relevant Orders    T4, free    TSH, 3rd generation    CBC and differential    Comprehensive metabolic panel    Lipid panel    UA/M w/rflx Culture, Routine    Ambulatory referral to Weight Management    Recurrent depression (HCC)        Relevant Medications    No suicidal thoughts currently    citalopram (CeleXA) 10 mg tablet    Other Relevant Orders    T4, free    TSH, 3rd generation    CBC and differential    Comprehensive metabolic panel    Lipid panel    UA/M w/rflx Culture, Routine    Anxiety        Relevant Medications        citalopram (CeleXA) 10 mg tablet    Other Relevant Orders    T4, free    TSH, 3rd generation    CBC and differential    Comprehensive metabolic panel    Lipid panel    UA/M w/rflx Culture, Routine    Tinea corporis        Relevant Medications    ketoconazole (NIZORAL) 200 mg tablet    Chronic vaginitis        Relevant Medications    ketoconazole (NIZORAL) 200 mg tablet            Subjective:      Patient ID: Sheree Dejesus is a 32 y o  female  C/o rash past few months  Did rescue cat past December 2020  Rash not pruritic  Rash circular on right thigh  Stopped celexa due to restless leg syndrome  Rash  This is a new problem  The current episode started more than 1 month ago  The problem is unchanged  The affected locations include the right upper leg  The rash is characterized by dryness and redness  Associated symptoms include fatigue and rhinorrhea  Pertinent negatives include no congestion, cough, diarrhea, fever, shortness of breath or vomiting  Review of Systems   Constitutional: Positive for fatigue  Negative for chills, diaphoresis and fever  HENT: Positive for ear pain, rhinorrhea, tinnitus and voice change  Negative for congestion  Sounds hoarse due to vocal cord dysfunction after thyroid surgery  Respiratory: Negative for cough, chest tightness and shortness of breath  Gastrointestinal: Negative for abdominal pain, constipation, diarrhea, nausea and vomiting  Genitourinary: Positive for vaginal discharge  Musculoskeletal: Negative for arthralgias, back pain, myalgias, neck pain and neck stiffness  Skin: Positive for rash  Psychiatric/Behavioral: Positive for agitation and decreased concentration  Negative for dysphoric mood, self-injury, sleep disturbance and suicidal ideas  The patient is nervous/anxious  Objective:      /78   Pulse 78   Temp 98 2 °F (36 8 °C) (Tympanic)   Resp 16   Ht 5' 4" (1 626 m)   Wt 85 7 kg (189 lb)   BMI 32 44 kg/m²          Physical Exam  Vitals signs and nursing note reviewed  Constitutional:       General: She is not in acute distress  Appearance: Normal appearance  She is not ill-appearing, toxic-appearing or diaphoretic  HENT:      Head: Normocephalic and atraumatic  Right Ear: Tympanic membrane, ear canal and external ear normal  There is no impacted cerumen  Left Ear: Tympanic membrane, ear canal and external ear normal  There is no impacted cerumen  Cardiovascular:      Rate and Rhythm: Normal rate and regular rhythm  Pulses: Normal pulses  Heart sounds: Normal heart sounds   No murmur  No friction rub  No gallop  Pulmonary:      Effort: Pulmonary effort is normal  No respiratory distress  Breath sounds: Normal breath sounds  No stridor  No wheezing, rhonchi or rales  Chest:      Chest wall: No tenderness  Musculoskeletal: Normal range of motion  General: No swelling, tenderness, deformity or signs of injury  Right lower leg: No edema  Left lower leg: No edema  Skin:     General: Skin is warm  Coloration: Skin is not jaundiced or pale  Findings: Erythema and rash present  No bruising or lesion  Comments: Circular, erythematous rash noted, elevated, upper right thigh area, diameter, 1-2 inches  Neurological:      General: No focal deficit present  Mental Status: She is alert and oriented to person, place, and time  Psychiatric:         Mood and Affect: Mood normal          Behavior: Behavior normal          Thought Content:  Thought content normal          Judgment: Judgment normal

## 2021-03-30 NOTE — PROGRESS NOTES
Weight Management Medical Nutrition Assessment  [facility] presented for a meal planning session  Today's weight is 191 4#   Per dietary recall patient consumes excess calories from larger portions at dinner meal and snacking on sweets at night  She often skips meals/snacks during her day and then becomes overly hungry for the dinner meal   Her physical activity level has been low  Developed and reviewed a low calorie meal plan  Patient seen by Medical Provider in past 6 months:  no  Requested to schedule appointment with Medical Provider: no - discussed visit      Anthropometric Measurements  Start Weight (#): 191 4#  Current Weight (#): n/a  TBW % Change from start weight:n/a  Ideal Body Weight (#):120#  Goal Weight (#)ST# LT#    Weight Loss History  Previous weight loss attempts: Commercial Programs ([employer]/Favorite Words, Tiffany Urrutia, etc )  Self Created Diets (Portion Control, Healthy Food Choices, etc )    Food and Nutrition Related History      Work from [place] [id number] Coffee   1/2 [place] Toast & grape tomato/ 647 2xpieces   2 eggs / grapes   Snack:skip  Lunch:skip or destiney lettuce with chicken - walnuts or balasimc vinegar   Snack:skip   Dinner:lean protein/ veggie/ carb   Snack:ice cream      Beverages: water/ collagen, lemon water, diet coke   Volume of beverage intake: 40oz    Weekends: Same  Cravings: sweet  Trouble area of day:dinner meal and night snack    Frequency of Eating out: biweekly  Food restrictions:  Cooking: self   Food Shopping: self    Physical Activity Intake  Activity:Pound ( Pilates/ with drumsticks- home)   Walk   Frequency:infrequently  Physical limitations/barriers to exercise: none reported    Estimated Needs  Energy  Bear Jackelin Energy Needs:  BMR : 1557 calories    1-2# loss weekly sedentary:  869-1369 calories              1-2# loss weekly lightly active:0029-8699 calories  Maintenance calories for sedentary activity level: 1869 calories Protein:65-82gm      (1 2-1 5g/kg IBW)  Fluid: 64oz     (35mL/kg IBW)    Nutrition Diagnosis  Yes; Overweight/obesity  related to Excess energy intake as evidenced by  BMI more than normative standard for age and sex (obesity-grade I 26-30  9)       Nutrition Intervention    Nutrition Prescription  Calories:1200 calories and flex to 1400 calories on cardio days  Protein:65-82gm  Fluid:64oz    Meal Plan (To/Pro/Carb)  Breakfast:300/30/30-45  Snack:  Lunch:300/30/30-45  Snack:150  Dinner:300/30/30-45  Snack:150     Nutrition Education:    Calorie controlled menu  Lean protein food choices  Healthy snack options  Food journaling tips      Nutrition Counseling:  Strategies: meal planning, portion sizes, healthy snack choices, hydration, fiber intake, protein intake, exercise, food journal      Monitoring and Evaluation:  Evaluation criteria:  Energy Intake  Meet protein needs  Maintain adequate hydration  Monitor weekly weight  Meal planning/preparation  Food journal   Decreased portions at mealtimes and snacks  Physical activity     Barriers to learning:none  Readiness to change: Preparation:  (Getting ready to change)   Comprehension: good  Expected Compliance: good

## 2021-03-31 ENCOUNTER — OFFICE VISIT (OUTPATIENT)
Dept: BARIATRICS | Facility: CLINIC | Age: 31
End: 2021-03-31

## 2021-03-31 VITALS — BODY MASS INDEX: 32.68 KG/M2 | WEIGHT: 191.4 LBS | HEIGHT: 64 IN

## 2021-03-31 DIAGNOSIS — R63.5 ABNORMAL WEIGHT GAIN: ICD-10-CM

## 2021-03-31 DIAGNOSIS — Z23 ENCOUNTER FOR IMMUNIZATION: ICD-10-CM

## 2021-03-31 DIAGNOSIS — R63.5 WEIGHT GAIN: ICD-10-CM

## 2021-03-31 LAB
ALBUMIN SERPL-MCNC: 4.3 G/DL (ref 3.8–4.8)
ALBUMIN/GLOB SERPL: 1.3 {RATIO} (ref 1.2–2.2)
ALP SERPL-CCNC: 52 IU/L (ref 39–117)
ALT SERPL-CCNC: 30 IU/L (ref 0–32)
AST SERPL-CCNC: 21 IU/L (ref 0–40)
BASOPHILS # BLD AUTO: 0.1 X10E3/UL (ref 0–0.2)
BASOPHILS NFR BLD AUTO: 1 %
BILIRUB SERPL-MCNC: 0.3 MG/DL (ref 0–1.2)
BUN SERPL-MCNC: 13 MG/DL (ref 6–20)
BUN/CREAT SERPL: 16 (ref 9–23)
CALCIUM SERPL-MCNC: 10.1 MG/DL (ref 8.7–10.2)
CHLORIDE SERPL-SCNC: 101 MMOL/L (ref 96–106)
CHOLEST SERPL-MCNC: 162 MG/DL (ref 100–199)
CHOLEST/HDLC SERPL: 2.3 RATIO (ref 0–4.4)
CO2 SERPL-SCNC: 24 MMOL/L (ref 20–29)
CREAT SERPL-MCNC: 0.79 MG/DL (ref 0.57–1)
EOSINOPHIL # BLD AUTO: 0.2 X10E3/UL (ref 0–0.4)
EOSINOPHIL NFR BLD AUTO: 3 %
ERYTHROCYTE [DISTWIDTH] IN BLOOD BY AUTOMATED COUNT: 12.7 % (ref 11.7–15.4)
GLOBULIN SER-MCNC: 3.3 G/DL (ref 1.5–4.5)
GLUCOSE SERPL-MCNC: 91 MG/DL (ref 65–99)
HCT VFR BLD AUTO: 42.1 % (ref 34–46.6)
HDLC SERPL-MCNC: 70 MG/DL
HGB BLD-MCNC: 13.3 G/DL (ref 11.1–15.9)
IMM GRANULOCYTES # BLD: 0 X10E3/UL (ref 0–0.1)
IMM GRANULOCYTES NFR BLD: 0 %
LDLC SERPL CALC-MCNC: 76 MG/DL (ref 0–99)
LYMPHOCYTES # BLD AUTO: 3.6 X10E3/UL (ref 0.7–3.1)
LYMPHOCYTES NFR BLD AUTO: 46 %
MCH RBC QN AUTO: 28 PG (ref 26.6–33)
MCHC RBC AUTO-ENTMCNC: 31.6 G/DL (ref 31.5–35.7)
MCV RBC AUTO: 89 FL (ref 79–97)
MONOCYTES # BLD AUTO: 0.6 X10E3/UL (ref 0.1–0.9)
MONOCYTES NFR BLD AUTO: 8 %
NEUTROPHILS # BLD AUTO: 3.2 X10E3/UL (ref 1.4–7)
NEUTROPHILS NFR BLD AUTO: 42 %
PLATELET # BLD AUTO: 252 X10E3/UL (ref 150–450)
POTASSIUM SERPL-SCNC: 4.5 MMOL/L (ref 3.5–5.2)
PROT SERPL-MCNC: 7.6 G/DL (ref 6–8.5)
RBC # BLD AUTO: 4.75 X10E6/UL (ref 3.77–5.28)
SL AMB EGFR AFRICAN AMERICAN: 115 ML/MIN/1.73
SL AMB EGFR NON AFRICAN AMERICAN: 100 ML/MIN/1.73
SL AMB VLDL CHOLESTEROL CALC: 16 MG/DL (ref 5–40)
SODIUM SERPL-SCNC: 138 MMOL/L (ref 134–144)
T4 FREE SERPL-MCNC: 0.96 NG/DL (ref 0.82–1.77)
TRIGL SERPL-MCNC: 85 MG/DL (ref 0–149)
TSH SERPL DL<=0.005 MIU/L-ACNC: 2.52 UIU/ML (ref 0.45–4.5)
WBC # BLD AUTO: 7.7 X10E3/UL (ref 3.4–10.8)

## 2021-03-31 PROCEDURE — RECHECK: Performed by: DIETITIAN, REGISTERED

## 2021-03-31 PROCEDURE — 3008F BODY MASS INDEX DOCD: CPT | Performed by: PHYSICIAN ASSISTANT

## 2021-03-31 PROCEDURE — WMDI30: Performed by: DIETITIAN, REGISTERED

## 2021-04-12 ENCOUNTER — CLINICAL SUPPORT (OUTPATIENT)
Dept: FAMILY MEDICINE CLINIC | Facility: HOSPITAL | Age: 31
End: 2021-04-12
Payer: COMMERCIAL

## 2021-04-12 ENCOUNTER — OFFICE VISIT (OUTPATIENT)
Dept: FAMILY MEDICINE CLINIC | Facility: HOSPITAL | Age: 31
End: 2021-04-12
Payer: COMMERCIAL

## 2021-04-12 VITALS
SYSTOLIC BLOOD PRESSURE: 98 MMHG | HEART RATE: 61 BPM | OXYGEN SATURATION: 98 % | DIASTOLIC BLOOD PRESSURE: 66 MMHG | WEIGHT: 191 LBS | BODY MASS INDEX: 32.61 KG/M2 | HEIGHT: 64 IN

## 2021-04-12 DIAGNOSIS — Z00.00 HEALTH CARE MAINTENANCE: Primary | ICD-10-CM

## 2021-04-12 DIAGNOSIS — Z11.1 PPD SCREENING TEST: Primary | ICD-10-CM

## 2021-04-12 PROCEDURE — 3008F BODY MASS INDEX DOCD: CPT | Performed by: PHYSICIAN ASSISTANT

## 2021-04-12 PROCEDURE — 99395 PREV VISIT EST AGE 18-39: CPT | Performed by: PHYSICIAN ASSISTANT

## 2021-04-12 PROCEDURE — 86580 TB INTRADERMAL TEST: CPT

## 2021-04-14 ENCOUNTER — CLINICAL SUPPORT (OUTPATIENT)
Dept: FAMILY MEDICINE CLINIC | Facility: HOSPITAL | Age: 31
End: 2021-04-14

## 2021-04-14 DIAGNOSIS — Z11.1 ENCOUNTER FOR PPD SKIN TEST READING: Primary | ICD-10-CM

## 2021-04-14 LAB
INDURATION: 0 MM
TB SKIN TEST: NEGATIVE

## 2021-05-10 ENCOUNTER — OFFICE VISIT (OUTPATIENT)
Dept: BARIATRICS | Facility: CLINIC | Age: 31
End: 2021-05-10

## 2021-05-10 VITALS — BODY MASS INDEX: 33.38 KG/M2 | WEIGHT: 195.5 LBS | HEIGHT: 64 IN

## 2021-05-10 DIAGNOSIS — R63.5 ABNORMAL WEIGHT GAIN: ICD-10-CM

## 2021-05-10 PROCEDURE — RECHECK: Performed by: DIETITIAN, REGISTERED

## 2021-05-10 PROCEDURE — WEIGHT: Performed by: DIETITIAN, REGISTERED

## 2021-05-17 ENCOUNTER — CONSULT (OUTPATIENT)
Dept: BARIATRICS | Facility: CLINIC | Age: 31
End: 2021-05-17
Payer: COMMERCIAL

## 2021-05-17 VITALS
WEIGHT: 195 LBS | TEMPERATURE: 97.3 F | HEIGHT: 66 IN | DIASTOLIC BLOOD PRESSURE: 80 MMHG | HEART RATE: 89 BPM | BODY MASS INDEX: 31.34 KG/M2 | SYSTOLIC BLOOD PRESSURE: 110 MMHG

## 2021-05-17 DIAGNOSIS — K21.9 GASTROESOPHAGEAL REFLUX DISEASE, UNSPECIFIED WHETHER ESOPHAGITIS PRESENT: ICD-10-CM

## 2021-05-17 DIAGNOSIS — E66.9 OBESITY, CLASS I, BMI 30-34.9: Primary | ICD-10-CM

## 2021-05-17 PROBLEM — M65.969 SYNOVITIS OF KNEE: Status: ACTIVE | Noted: 2021-05-17

## 2021-05-17 PROBLEM — E66.811 OBESITY, CLASS I, BMI 30-34.9: Status: ACTIVE | Noted: 2021-05-17

## 2021-05-17 PROBLEM — M65.9 SYNOVITIS OF KNEE: Status: ACTIVE | Noted: 2021-05-17

## 2021-05-17 PROBLEM — I26.99 PULMONARY EMBOLISM (HCC): Status: ACTIVE | Noted: 2017-01-01

## 2021-05-17 PROCEDURE — 99243 OFF/OP CNSLTJ NEW/EST LOW 30: CPT | Performed by: FAMILY MEDICINE

## 2021-05-17 RX ORDER — TOPIRAMATE 50 MG/1
50 TABLET, FILM COATED ORAL
Qty: 30 TABLET | Refills: 1 | Status: SHIPPED | OUTPATIENT
Start: 2021-05-17 | End: 2021-06-09

## 2021-05-17 NOTE — PROGRESS NOTES
Assessment/Plan:        Diagnoses and all orders for this visit:    Obesity, Class I, BMI 30-34 9  -     topiramate (TOPAMAX) 50 MG tablet; Take 1 tablet (50 mg total) by mouth daily at bedtime    Gastroesophageal reflux disease, unspecified whether esophagitis present  -     topiramate (TOPAMAX) 50 MG tablet; Take 1 tablet (50 mg total) by mouth daily at bedtime      -Discussed options of Healthy[facility]-Intensive Lifestyle Intervention Program, Very Low Calorie Diet-VLCD and Conservative Program and the role of weight loss medications   -Initial weight loss goal of 5-10% weight loss for improved health  -Screening labs  -Patient is interested in pursuing Conservative Program  - discussed topamax to help with her cravings and increased eating at dinner and some of her binging behaviors  Contraception- IUD    Goals:  Food log (ie ) www myfitnesspal com,sparkpeople  com,loseit com,calorieking  com,etc  baritastic  No sugary beverages  At least 64oz of water daily  Increase physical activity by 10 minutes daily  Gradually increase physical activity to a goal of 5 days per week for 30 minutes of MODERATE intensity PLUS 2 days per week of FULL BODY resistance training  Nutrition Prescription  Calories:1200 calories and flex to 1400 calories on cardio days  Protein:65-82gm  Fluid:64oz    45 minute visit, >50% face-to-face time spent counseling patient on surgical and nonsurgical interventions for the treatment of excess weight  Discussed the advantages and long-term outcomes with regards to bariatric surgery  Discussed in detail nonsurgical options including intensive lifestyle intervention program, very low-calorie diet program and conservative program   Discussed the role of weight loss medications  Counseled patient on diet behavior and exercise modification for weight loss  Follow up in approximately 2 months with Non-Surgical Physician/Advanced Practitioner      Subjective:   Chief Complaint   Patient presents with    Consult     mwm consult       Patient ID: Peterson Pnea  is a 32 y o  female with excess weight/obesity here to pursue weight management  Past Medical History:   Diagnosis Date    Asthma     Chronic knee pain     Depression     Disease of thyroid gland     Menorrhagia     Multiple thyroid nodules     Psychiatric disorder     Pulmonary emboli (HCC)     Saphenous nerve neuropathy, 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    Vitamin D deficiency     Vocal cord paralysis     Following partial thyroidectomy       HPI:  Obesity/Excess Weight:  Severity: class 1  Onset:  years    Modifiers: Diet and Exercise  Contributing factors: Poor Food Choices and Stress/Emotional Eating  Associated symptoms: fatigue, body image issues, increased shortness of breath and clothes do not fit    Met with our RD for meal planning     Struggles with eating high portion of high density calorie foods with her boyfriend  She states is not hungry but cant stop herself from eating  She also used to binge drink describes 1 bottle of wine a day  She has cut down to drinking only on the weekend and avoids beer  Goals:ST# LT#  Hydration:  Alcohol: 3 times a week - wine or seltzer  Smoking: no, vape 0%  Exercise: not structured   Sleep: 8-9hr  STOP ban/8    Social:  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  Part time-  once a week  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  Lives with boyfriend    The following portions of the patient's history were reviewed and updated as appropriate: allergies, current medications, past family history, past medical history, past social history, past surgical history and problem list     Review of Systems   Constitutional: Negative for activity change and appetite change  Respiratory: Negative  Cardiovascular: Negative  Gastrointestinal: Negative  Genitourinary: Negative  Musculoskeletal: Negative for arthralgias  Skin: Negative for rash  Psychiatric/Behavioral: Negative  Objective:    /80 (BP Location: Left arm, Patient Position: Sitting, Cuff Size: Large)   Pulse 89   Temp (!) 97 3 °F (36 3 °C)   Ht 5' 5 5" (1 664 m)   Wt 88 5 kg (195 lb)   BMI 31 96 kg/m²     Physical Exam     Constitutional   General appearance: Abnormal   well developed and obese  Eyes No conjunctival pallor     Ears, Nose, Mouth, and Throat Oral mucosa moist    Musculoskeletal   Gait and station: Normal     Psychiatric   Orientation to person, place and time: Normal     Affect: appropriate

## 2021-05-18 DIAGNOSIS — F41.9 ANXIETY: ICD-10-CM

## 2021-05-19 RX ORDER — CITALOPRAM 10 MG/1
TABLET ORAL
Qty: 30 TABLET | Refills: 5 | Status: SHIPPED | OUTPATIENT
Start: 2021-05-19 | End: 2021-06-02 | Stop reason: SINTOL

## 2021-06-02 ENCOUNTER — OFFICE VISIT (OUTPATIENT)
Dept: FAMILY MEDICINE CLINIC | Facility: HOSPITAL | Age: 31
End: 2021-06-02
Payer: COMMERCIAL

## 2021-06-02 VITALS
DIASTOLIC BLOOD PRESSURE: 60 MMHG | WEIGHT: 191.2 LBS | SYSTOLIC BLOOD PRESSURE: 100 MMHG | BODY MASS INDEX: 30.73 KG/M2 | HEIGHT: 66 IN | HEART RATE: 62 BPM | OXYGEN SATURATION: 99 %

## 2021-06-02 DIAGNOSIS — E66.9 OBESITY, CLASS I, BMI 30-34.9: Primary | ICD-10-CM

## 2021-06-02 PROCEDURE — 99213 OFFICE O/P EST LOW 20 MIN: CPT | Performed by: PHYSICIAN ASSISTANT

## 2021-06-02 RX ORDER — BUPROPION HYDROCHLORIDE 75 MG/1
75 TABLET ORAL 2 TIMES DAILY
Qty: 60 TABLET | Refills: 3 | Status: SHIPPED | OUTPATIENT
Start: 2021-06-02 | End: 2021-08-02

## 2021-06-02 NOTE — PROGRESS NOTES
Assessment/Plan:    Depression-  Will switch Celexa to Wellbutrin 75 mg  Bid  Recommend Topamax every other day  Problem List Items Addressed This Visit        Other    Obesity, Class I, BMI 30-34 9 - Primary    Relevant Medications    buPROPion (WELLBUTRIN) 75 mg tablet            Subjective:      Patient ID: Johnnie Lauren is a 32 y o  female  Started on Topamax 3 weeks ago,  for help with weight loss, yesterday felt very depressed, and crying more than usual    Trembling inside, and feels numb from knees down  Going to [facility] weight loss program, noticed there is an interaction with celexa and topamax  Review of Systems   Constitutional: Negative for chills, diaphoresis, fatigue and fever  Respiratory: Negative for cough, chest tightness and shortness of breath  Cardiovascular: Positive for palpitations  Negative for chest pain  Gastrointestinal: Negative for abdominal pain, constipation, diarrhea, nausea and vomiting  Endocrine: Positive for polydipsia and polyuria  Negative for polyphagia  Musculoskeletal: Positive for back pain, myalgias, neck pain and neck stiffness  Neurological: Positive for tremors and numbness  Negative for weakness  Psychiatric/Behavioral: Positive for dysphoric mood and sleep disturbance  Negative for self-injury and suicidal ideas  The patient is not nervous/anxious  Has restless legs, from celexa, on occasion  Objective:      /60   Pulse 62   Ht 5' 5 5" (1 664 m)   Wt 86 7 kg (191 lb 3 2 oz)   SpO2 99%   BMI 31 33 kg/m²          Physical Exam  Constitutional:       General: She is not in acute distress  Appearance: Normal appearance  She is not ill-appearing, toxic-appearing or diaphoretic  Cardiovascular:      Rate and Rhythm: Normal rate and regular rhythm  Pulses: Normal pulses  Heart sounds: Normal heart sounds  Pulmonary:      Effort: Pulmonary effort is normal  No respiratory distress  Breath sounds: Normal breath sounds  No stridor  No wheezing, rhonchi or rales  Chest:      Chest wall: No tenderness  Musculoskeletal: Normal range of motion  General: No swelling, tenderness, deformity or signs of injury  Right lower leg: No edema  Left lower leg: No edema  Neurological:      General: No focal deficit present  Mental Status: She is alert and oriented to person, place, and time  Cranial Nerves: No cranial nerve deficit  Sensory: No sensory deficit  Motor: No weakness  Coordination: Coordination normal    Psychiatric:         Behavior: Behavior normal          Thought Content: Thought content normal          Judgment: Judgment normal       Comments: Low mood, flat affect

## 2021-06-07 ENCOUNTER — OFFICE VISIT (OUTPATIENT)
Dept: BARIATRICS | Facility: CLINIC | Age: 31
End: 2021-06-07

## 2021-06-07 VITALS — BODY MASS INDEX: 32.58 KG/M2 | HEIGHT: 64 IN | WEIGHT: 190.81 LBS

## 2021-06-07 DIAGNOSIS — R63.5 ABNORMAL WEIGHT GAIN: ICD-10-CM

## 2021-06-07 PROCEDURE — WEIGHT: Performed by: DIETITIAN, REGISTERED

## 2021-06-07 PROCEDURE — RECHECK: Performed by: DIETITIAN, REGISTERED

## 2021-06-07 PROCEDURE — 3008F BODY MASS INDEX DOCD: CPT | Performed by: PHYSICIAN ASSISTANT

## 2021-06-09 DIAGNOSIS — E66.9 OBESITY, CLASS I, BMI 30-34.9: ICD-10-CM

## 2021-06-09 DIAGNOSIS — K21.9 GASTROESOPHAGEAL REFLUX DISEASE, UNSPECIFIED WHETHER ESOPHAGITIS PRESENT: ICD-10-CM

## 2021-06-09 RX ORDER — TOPIRAMATE 50 MG/1
TABLET, FILM COATED ORAL
Qty: 30 TABLET | Refills: 1 | Status: SHIPPED | OUTPATIENT
Start: 2021-06-09 | End: 2021-06-23

## 2021-06-23 DIAGNOSIS — K21.9 GASTROESOPHAGEAL REFLUX DISEASE, UNSPECIFIED WHETHER ESOPHAGITIS PRESENT: ICD-10-CM

## 2021-06-23 DIAGNOSIS — E66.9 OBESITY, CLASS I, BMI 30-34.9: ICD-10-CM

## 2021-06-23 RX ORDER — TOPIRAMATE 50 MG/1
TABLET, FILM COATED ORAL
Qty: 90 TABLET | Refills: 1 | Status: SHIPPED | OUTPATIENT
Start: 2021-06-23 | End: 2021-08-06 | Stop reason: SDUPTHER

## 2021-06-29 ENCOUNTER — OFFICE VISIT (OUTPATIENT)
Dept: BARIATRICS | Facility: CLINIC | Age: 31
End: 2021-06-29
Payer: COMMERCIAL

## 2021-06-29 VITALS
BODY MASS INDEX: 30.55 KG/M2 | HEIGHT: 66 IN | HEART RATE: 86 BPM | DIASTOLIC BLOOD PRESSURE: 60 MMHG | TEMPERATURE: 98 F | WEIGHT: 190.1 LBS | SYSTOLIC BLOOD PRESSURE: 116 MMHG

## 2021-06-29 DIAGNOSIS — E66.9 OBESITY, CLASS I, BMI 30-34.9: Primary | ICD-10-CM

## 2021-06-29 DIAGNOSIS — K21.9 GASTROESOPHAGEAL REFLUX DISEASE, UNSPECIFIED WHETHER ESOPHAGITIS PRESENT: ICD-10-CM

## 2021-06-29 DIAGNOSIS — F32.A DEPRESSION: ICD-10-CM

## 2021-06-29 PROCEDURE — 99214 OFFICE O/P EST MOD 30 MIN: CPT | Performed by: FAMILY MEDICINE

## 2021-06-29 NOTE — PROGRESS NOTES
Assessment/Plan:    No problem-specific Assessment & Plan notes found for this encounter  Diagnoses and all orders for this visit:    Obesity, Class I, BMI 30-34 9    Gastroesophageal reflux disease, unspecified whether esophagitis present    Depression        -Patient is pursuing Conservative Program  -Initial weight loss goal of 5-10% weight loss for improved health  -Screening labs  -Patient is interested in pursuing Conservative Program  -  Will continue topamax 50 mg daily to help with her cravings and increased eating/ snacking at dinner   - taking wellbutrin 75mg bid for depression, per pt stable  Contraception- IUD     Initial:195 5lbs  Current:190 1lbs  Change: -5 4lbs  Goal: ST# LT#    Goals:  Food log (ie ) www myfitnesspal com,sparkpeople  com,loseit com,calorieking  com,etc  baritastic  No sugary beverages  At least 64oz of water daily  Increase physical activity by 10 minutes daily  Gradually increase physical activity to a goal of 5 days per week for 30 minutes of MODERATE intensity PLUS 2 days per week of FULL BODY resistance training  Nutrition Prescription  Calories:1200 calories and flex to 1400 calories on cardio days  Protein:65-82gm  Fluid:64oz    Follow up in approximately 2 months with Non-Surgical Physician/Advanced Practitioner  Subjective:   Chief Complaint   Patient presents with    Follow-up     mwm 6-8 wk follow up       Patient ID: Eddie Alvarez  is a 32 y o  female with excess weight/obesity here to pursue weight management  Patient is pursuing Conservative Program      HPI     Patient presents for one-month follow-up    last visit she was started on Topamax to see if he would help with her bingeing episodes  She developed tingling while taking Topamax with Celexa, her PCP changed her Celexa to Wellbutrin on 21 and the tingling has resolved     It has helped decrease her alcohol intake,   soda intake,  She still feels like she is eating and snacking a lot, She does admit not being compliant and takes it about every other day,  She forgets to take mainly because she has had many recent work schedule changes  lost 5 4 lb since last visit    nutrition: not login bc she is really bad login  exercise: 8,000-10,000 steps   water: this has decreased to 3 bottles a day  sleep: 8-9hr  Alcohol: 3 times a week - wine or seltzer---> this has decreased to once a week    The following portions of the patient's history were reviewed and updated as appropriate: allergies, current medications, past family history, past medical history, past social history, past surgical history and problem list     Review of Systems   Constitutional: Negative for activity change and appetite change  Respiratory: Negative  Cardiovascular: Negative  Gastrointestinal: Negative  Genitourinary: Negative  Musculoskeletal: Negative for arthralgias  Skin: Negative for rash  Psychiatric/Behavioral: Negative  Objective:    /60 (BP Location: Left arm, Patient Position: Sitting, Cuff Size: Large)   Pulse 86   Temp 98 °F (36 7 °C)   Ht 5' 5 5" (1 664 m)   Wt 86 2 kg (190 lb 1 6 oz)   BMI 31 15 kg/m²      Physical Exam  Constitutional   General appearance: Abnormal   well developed and obese  Eyes No conjunctival pallor     Musculoskeletal   Gait and station: Normal     Psychiatric   Orientation to person, place and time: Normal     Affect:  Flat

## 2021-07-12 ENCOUNTER — HOSPITAL ENCOUNTER (OUTPATIENT)
Dept: ULTRASOUND IMAGING | Facility: HOSPITAL | Age: 31
Discharge: HOME | End: 2021-07-12
Attending: OTOLARYNGOLOGY
Payer: COMMERCIAL

## 2021-07-12 DIAGNOSIS — E04.2 MULTIPLE THYROID NODULES: ICD-10-CM

## 2021-07-12 PROCEDURE — 76536 US EXAM OF HEAD AND NECK: CPT

## 2021-07-19 ENCOUNTER — OFFICE VISIT (OUTPATIENT)
Dept: BARIATRICS | Facility: CLINIC | Age: 31
End: 2021-07-19

## 2021-07-19 VITALS — WEIGHT: 188.27 LBS | HEIGHT: 64 IN | BODY MASS INDEX: 32.14 KG/M2

## 2021-07-19 DIAGNOSIS — R63.5 ABNORMAL WEIGHT GAIN: ICD-10-CM

## 2021-07-19 PROCEDURE — RECHECK

## 2021-07-19 PROCEDURE — WEIGHT

## 2021-08-02 ENCOUNTER — OFFICE VISIT (OUTPATIENT)
Dept: FAMILY MEDICINE CLINIC | Facility: HOSPITAL | Age: 31
End: 2021-08-02
Payer: COMMERCIAL

## 2021-08-02 VITALS
DIASTOLIC BLOOD PRESSURE: 68 MMHG | OXYGEN SATURATION: 98 % | SYSTOLIC BLOOD PRESSURE: 118 MMHG | HEART RATE: 74 BPM | RESPIRATION RATE: 16 BRPM | WEIGHT: 188 LBS | HEIGHT: 64 IN | BODY MASS INDEX: 32.1 KG/M2

## 2021-08-02 DIAGNOSIS — F33.9 RECURRENT DEPRESSION (HCC): ICD-10-CM

## 2021-08-02 DIAGNOSIS — E66.9 OBESITY, CLASS I, BMI 30-34.9: ICD-10-CM

## 2021-08-02 DIAGNOSIS — J45.909 MILD ASTHMA WITHOUT COMPLICATION, UNSPECIFIED WHETHER PERSISTENT: ICD-10-CM

## 2021-08-02 DIAGNOSIS — L30.9 DERMATITIS: ICD-10-CM

## 2021-08-02 DIAGNOSIS — F41.9 ANXIETY: ICD-10-CM

## 2021-08-02 DIAGNOSIS — N92.0 MENORRHAGIA WITH REGULAR CYCLE: ICD-10-CM

## 2021-08-02 DIAGNOSIS — R63.5 WEIGHT GAIN: ICD-10-CM

## 2021-08-02 DIAGNOSIS — F33.9 DEPRESSION, RECURRENT (HCC): Primary | ICD-10-CM

## 2021-08-02 PROCEDURE — 99213 OFFICE O/P EST LOW 20 MIN: CPT | Performed by: PHYSICIAN ASSISTANT

## 2021-08-02 RX ORDER — PREDNISONE 10 MG/1
10 TABLET ORAL DAILY
Start: 2021-08-02 | End: 2021-10-27

## 2021-08-02 RX ORDER — CLONAZEPAM 0.5 MG/1
TABLET ORAL
Qty: 90 TABLET | Refills: 2 | Status: SHIPPED | OUTPATIENT
Start: 2021-08-02 | End: 2021-08-30

## 2021-08-02 RX ORDER — BUPROPION HYDROCHLORIDE 100 MG/1
100 TABLET ORAL 2 TIMES DAILY
Qty: 60 TABLET | Refills: 3 | Status: SHIPPED | OUTPATIENT
Start: 2021-08-02 | End: 2021-08-30 | Stop reason: SINTOL

## 2021-08-02 NOTE — PROGRESS NOTES
Assessment/Plan:           Problem List Items Addressed This Visit        Respiratory    Mild asthma without complication, unspecified whether persistent       Other    Obesity, Class I, BMI 30-34 9    Relevant Medications    buPROPion (WELLBUTRIN) 100 mg tablet      Other Visit Diagnoses     Dermatitis    -  Primary    Relevant Medications    predniSONE 10 mg tablet    Depression, recurrent (HCC)        Relevant Medications    buPROPion (WELLBUTRIN) 100 mg tablet bid  Increased from 50 mg  Anxiety        Relevant Medications    clonazePAM (KlonoPIN) 0 5 mg tablet    Menorrhagia with regular cycle        Relevant Orders    TSH, 3rd generation    T4, free    Estrogens, total    Progesterone    FSH and LH    Weight gain        Relevant Orders    T4, free    Estrogens, total    Progesterone    FSH and LH    Recurrent depression ([address] )        Relevant Medications    buPROPion (WELLBUTRIN) 100 mg tablet    Other Relevant Orders    TSH, 3rd generation    T4, free    Estrogens, total    Progesterone    FSH and LH            Subjective:      Patient ID: Ivan Siegel is a 32 y o  female  Feeling more anxious, having panic attacks, once a month  Does not feel like Wellbutrin is working for patient  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; [employer] job, , and teacher's aid  Last vacation was October 2020, "loves boyfriend", good relationship  Has copper  IUD, LNMP-  July 1st, and very heavy, lasts 4-5 days  Went to urgent care for papular dermatitis, and given prednisone  Review of Systems   Constitutional: Positive for fatigue  Negative for chills, diaphoresis and fever  Respiratory: Negative for cough, chest tightness and shortness of breath  Gastrointestinal: Negative for abdominal pain, constipation, diarrhea, nausea and vomiting  Musculoskeletal: Positive for arthralgias  Negative for back pain, myalgias, neck pain and neck stiffness     Psychiatric/Behavioral: Positive for agitation, decreased concentration, dysphoric mood, sleep disturbance and suicidal ideas  Negative for self-injury  The patient is nervous/anxious  Has occasional ideas of suicide  Cannot stay asleep all night, no nightmares  Objective:      /68   Pulse 74   Resp 16   Ht 5' 4" (1 626 m)   Wt 85 3 kg (188 lb)   SpO2 98%   BMI 32 27 kg/m²          Physical Exam  Vitals and nursing note reviewed  Constitutional:       General: She is not in acute distress  Appearance: Normal appearance  She is not ill-appearing, toxic-appearing or diaphoretic  Cardiovascular:      Rate and Rhythm: Normal rate and regular rhythm  Pulses: Normal pulses  Heart sounds: Normal heart sounds  Pulmonary:      Effort: Pulmonary effort is normal  No respiratory distress  Breath sounds: Normal breath sounds  No stridor  No wheezing or rhonchi  Musculoskeletal:         General: No swelling, tenderness, deformity or signs of injury  Normal range of motion  Right lower leg: No edema  Left lower leg: No edema  Skin:     Findings: Erythema and rash present  Comments: Papular rash noted on arms- went to urgent care and given oral prednisone  Neurological:      General: No focal deficit present  Mental Status: She is alert and oriented to person, place, and time

## 2021-08-02 NOTE — PATIENT INSTRUCTIONS
Recommend vitamin D 5000 IU daily, and probiotics, otc, ex  Align or digestive health  Increase klonopin to 2 tabs  At bedtime  Increase wellbutrin to 100 mg  Twice a day

## 2021-08-06 ENCOUNTER — OFFICE VISIT (OUTPATIENT)
Dept: BARIATRICS | Facility: CLINIC | Age: 31
End: 2021-08-06
Payer: COMMERCIAL

## 2021-08-06 VITALS
HEART RATE: 77 BPM | DIASTOLIC BLOOD PRESSURE: 66 MMHG | SYSTOLIC BLOOD PRESSURE: 104 MMHG | WEIGHT: 184.9 LBS | BODY MASS INDEX: 29.72 KG/M2 | RESPIRATION RATE: 14 BRPM | TEMPERATURE: 97.2 F | HEIGHT: 66 IN

## 2021-08-06 DIAGNOSIS — K21.9 GASTROESOPHAGEAL REFLUX DISEASE, UNSPECIFIED WHETHER ESOPHAGITIS PRESENT: ICD-10-CM

## 2021-08-06 DIAGNOSIS — J45.909 MILD ASTHMA WITHOUT COMPLICATION, UNSPECIFIED WHETHER PERSISTENT: ICD-10-CM

## 2021-08-06 DIAGNOSIS — E66.9 OBESITY, CLASS I, BMI 30-34.9: Primary | ICD-10-CM

## 2021-08-06 DIAGNOSIS — F32.A DEPRESSION: ICD-10-CM

## 2021-08-06 PROCEDURE — 3008F BODY MASS INDEX DOCD: CPT | Performed by: FAMILY MEDICINE

## 2021-08-06 PROCEDURE — 99214 OFFICE O/P EST MOD 30 MIN: CPT | Performed by: FAMILY MEDICINE

## 2021-08-06 RX ORDER — TOPIRAMATE 50 MG/1
50 TABLET, FILM COATED ORAL
Qty: 90 TABLET | Refills: 1 | Status: SHIPPED | OUTPATIENT
Start: 2021-08-06 | End: 2021-10-27 | Stop reason: SDUPTHER

## 2021-08-06 NOTE — PROGRESS NOTES
Assessment/Plan:    No problem-specific Assessment & Plan notes found for this encounter  Diagnoses and all orders for this visit:    Obesity, Class I, BMI 30-34 9  -     topiramate (TOPAMAX) 50 MG tablet; Take 1 tablet (50 mg total) by mouth daily at bedtime    Gastroesophageal reflux disease, unspecified whether esophagitis present  -     topiramate (TOPAMAX) 50 MG tablet; Take 1 tablet (50 mg total) by mouth daily at bedtime    Mild asthma without complication, unspecified whether persistent    Depression        -Patient is pursuing Conservative Program  -Initial weight loss goal of 5-10% weight loss for improved health  -Screening labs-3/30/21  -Patient is interested in pursuing Conservative Program  -  Will continue topamax 50 mg daily to help with her cravings and increased eating/ snacking at dinner  No dosing changes made until she gets her depression under control    - taking wellbutrin 100mg bid for depression, recently dosing changed bc depression/anxiety not controlled  Also on klonopin  Managed by her PCP  -Contraception- IUD  - has upcoming vacation to [place] and feeling hopefull this will help  If depression persist will refer to psychiatrist next visit       Initial:195 5lbs  Current:184 9lbs  Change: -10 1lbs  Goal: ST# LT#     Goals:  Food log (ie ) www myfitnesspal com,sparkpeople  com,loseit com,calorieking  com,etc  baritastic  No sugary beverages  At least 64oz of water daily  Increase physical activity by 10 minutes daily  Gradually increase physical activity to a goal of 5 days per week for 30 minutes of MODERATE intensity PLUS 2 days per week of FULL BODY resistance training  Nutrition Prescription  Calories:1200 calories and flex to 1400 calories on cardio days  Protein:65-82gm  Fluid:64oz    Follow up in approximately 3 months with Non-Surgical Physician/Advanced Practitioner      Subjective:   Chief Complaint   Patient presents with    Follow-up     mwm 6 wk follow up Patient ID: Jacqueline Garcia  is a 32 y o  female with excess weight/obesity here to pursue weight management  Patient is pursuing Conservative Program      HPI    Patient presents for 6 weeks follow-up     last visit she was started on Topamax to see if he would help with her binging episodes  She developed tingling while taking Topamax with Celexa, her PCP changed her Celexa to Wellbutrin on 6/2/21 and the tingling resolved  It has helped decrease her alcohol intake, soda intake  Her depression is not under control  Her PCP has increased her Wellbutrin and her klonopin       lost -10 1 lb      nutrition: not login bc she is really bad login  exercise: 8,000-10,000 steps---> this has decreased   water: this has decreased to 3 bottles a day  sleep: 8-9hr--> 12hrs  Alcohol: 3 times a week - wine or seltzer---> this has decreased to once a week       The following portions of the patient's history were reviewed and updated as appropriate: allergies, current medications, past family history, past medical history, past social history, past surgical history and problem list     Review of Systems   Constitutional: Negative for activity change and appetite change  Respiratory: Negative  Cardiovascular: Negative  Gastrointestinal: Negative  Genitourinary: Negative  Musculoskeletal: Negative for arthralgias  Skin: Negative for rash  Psychiatric/Behavioral: Negative  Objective:    /66 (BP Location: Left arm, Patient Position: Sitting, Cuff Size: Adult)   Pulse 77   Temp (!) 97 2 °F (36 2 °C) (Tympanic)   Resp 14   Ht 5' 5 5" (1 664 m)   Wt 83 9 kg (184 lb 14 4 oz)   BMI 30 30 kg/m²      Physical Exam    Constitutional   General appearance: Abnormal   well developed and obese  Eyes No conjunctival pallor     Musculoskeletal   Gait and station: Normal     Psychiatric   Orientation to person, place and time: Normal     Affect: appropriate

## 2021-08-12 ENCOUNTER — TELEPHONE (OUTPATIENT)
Dept: FAMILY MEDICINE CLINIC | Facility: HOSPITAL | Age: 31
End: 2021-08-12

## 2021-08-12 NOTE — TELEPHONE ENCOUNTER
Patient called states that Dr Swapnil Bolden had requested for patient to call her to discuss medication and some issues she has been having  Patient is asking for Dr Swapnil Bolden to call her at her convenience       [phone]

## 2021-08-13 NOTE — TELEPHONE ENCOUNTER
I left a message on her answering machine to call me -she has not been tolerating the Wellbutrin so we will have her taper off that- may wish to consider venlexafine   if she is not controlled with p r n  clonazepam alone

## 2021-08-30 ENCOUNTER — OFFICE VISIT (OUTPATIENT)
Dept: FAMILY MEDICINE CLINIC | Facility: HOSPITAL | Age: 31
End: 2021-08-30
Payer: COMMERCIAL

## 2021-08-30 VITALS
SYSTOLIC BLOOD PRESSURE: 118 MMHG | BODY MASS INDEX: 30.63 KG/M2 | HEART RATE: 67 BPM | WEIGHT: 190.6 LBS | DIASTOLIC BLOOD PRESSURE: 72 MMHG | OXYGEN SATURATION: 98 % | HEIGHT: 66 IN

## 2021-08-30 DIAGNOSIS — E04.2 MULTIPLE THYROID NODULES: Chronic | ICD-10-CM

## 2021-08-30 DIAGNOSIS — J38.01 PARALYSIS OF LEFT VOCAL CORD: Chronic | ICD-10-CM

## 2021-08-30 DIAGNOSIS — F41.9 ANXIETY: Primary | ICD-10-CM

## 2021-08-30 DIAGNOSIS — R13.10 DYSPHAGIA, UNSPECIFIED TYPE: ICD-10-CM

## 2021-08-30 DIAGNOSIS — F41.1 GENERALIZED ANXIETY DISORDER: ICD-10-CM

## 2021-08-30 DIAGNOSIS — K21.9 GASTROESOPHAGEAL REFLUX DISEASE, UNSPECIFIED WHETHER ESOPHAGITIS PRESENT: ICD-10-CM

## 2021-08-30 DIAGNOSIS — J45.909 MILD ASTHMA WITHOUT COMPLICATION, UNSPECIFIED WHETHER PERSISTENT: ICD-10-CM

## 2021-08-30 PROCEDURE — 99214 OFFICE O/P EST MOD 30 MIN: CPT | Performed by: PHYSICIAN ASSISTANT

## 2021-08-30 PROCEDURE — 3008F BODY MASS INDEX DOCD: CPT | Performed by: PHYSICIAN ASSISTANT

## 2021-08-30 RX ORDER — CLONAZEPAM 0.5 MG/1
TABLET ORAL
Qty: 120 TABLET | Refills: 2 | Status: SHIPPED | OUTPATIENT
Start: 2021-08-30 | End: 2022-08-05

## 2021-08-30 RX ORDER — FAMOTIDINE 20 MG/1
20 TABLET, FILM COATED ORAL
Qty: 90 TABLET | Refills: 6 | Status: SHIPPED | OUTPATIENT
Start: 2021-08-30 | End: 2022-08-05

## 2021-08-30 NOTE — PROGRESS NOTES
Assessment/Plan:       Problem List Items Addressed This Visit        Digestive    Gastroesophageal reflux disease    Relevant Medications    famotidine (PEPCID) 20 mg tablet    Dysphagia    Relevant Medications    famotidine (PEPCID) 20 mg tablet          Respiratory    Mild asthma without complication, unspecified whether persistent - Primary       Other    Generalized anxiety disorder-   Continue therapy  Other Visit Diagnoses     Anxiety        Relevant Medications    clonazePAM (KlonoPIN) 0 5 mg tablet- qid            Subjective:      Patient ID: Kari Dawn is a 32 y o  female  Patient presents for follow up  Became numb and suicidal on Wellbutrin, had to come off  Started counseling/therapy, now goes every Thursday at 5 pm, and started journaling, writing down all thoughts  Drove to [place], 17 hours; left [place] August 7th, and came back Tuesday, 8/17/2021  Visited some friends  [employer] [address], workplace  Has had IUD past 3 years  On Topomax for weight loss, seeing dietician  Review of Systems   Constitutional: Negative for chills, diaphoresis, fatigue and fever  Respiratory: Negative for cough, chest tightness and shortness of breath  Gastrointestinal: Negative for abdominal pain, constipation, diarrhea, nausea and vomiting  Musculoskeletal: Negative for arthralgias, back pain, myalgias, neck pain and neck stiffness  Neurological: Negative for dizziness, tremors, weakness, light-headedness, numbness and headaches  Psychiatric/Behavioral: Positive for dysphoric mood and sleep disturbance  Negative for self-injury and suicidal ideas  The patient is nervous/anxious  Taking Klonopin which helps with anxiety  Moods up and down  Objective:      /72   Pulse 67   Ht 5' 5 5" (1 664 m)   Wt 86 5 kg (190 lb 9 6 oz)   SpO2 98%   BMI 31 24 kg/m²          Physical Exam  Vitals and nursing note reviewed     Constitutional: General: She is not in acute distress  Appearance: Normal appearance  She is not ill-appearing, toxic-appearing or diaphoretic  Cardiovascular:      Rate and Rhythm: Normal rate and regular rhythm  Pulses: Normal pulses  Heart sounds: Normal heart sounds  Pulmonary:      Effort: Pulmonary effort is normal  No respiratory distress  Breath sounds: Normal breath sounds  No stridor  No wheezing, rhonchi or rales  Chest:      Chest wall: No tenderness  Musculoskeletal:         General: No swelling, tenderness, deformity or signs of injury  Normal range of motion  Right lower leg: No edema  Left lower leg: No edema  Neurological:      General: No focal deficit present  Mental Status: She is alert and oriented to person, place, and time  Cranial Nerves: No cranial nerve deficit  Sensory: No sensory deficit  Motor: No weakness  Coordination: Coordination normal    Psychiatric:         Mood and Affect: Mood normal          Behavior: Behavior normal          Thought Content:  Thought content normal          Judgment: Judgment normal

## 2021-10-24 ENCOUNTER — HOSPITAL ENCOUNTER (EMERGENCY)
Facility: HOSPITAL | Age: 31
Discharge: HOME | End: 2021-10-24
Attending: EMERGENCY MEDICINE | Admitting: EMERGENCY MEDICINE
Payer: COMMERCIAL

## 2021-10-24 ENCOUNTER — APPOINTMENT (EMERGENCY)
Dept: RADIOLOGY | Facility: HOSPITAL | Age: 31
End: 2021-10-24
Payer: COMMERCIAL

## 2021-10-24 VITALS
SYSTOLIC BLOOD PRESSURE: 112 MMHG | TEMPERATURE: 98.2 F | OXYGEN SATURATION: 99 % | BODY MASS INDEX: 31.65 KG/M2 | HEIGHT: 65 IN | WEIGHT: 190 LBS | RESPIRATION RATE: 18 BRPM | DIASTOLIC BLOOD PRESSURE: 82 MMHG | HEART RATE: 70 BPM

## 2021-10-24 DIAGNOSIS — R07.9 CHEST PAIN, UNSPECIFIED: Primary | ICD-10-CM

## 2021-10-24 LAB
ALBUMIN SERPL BCP-MCNC: 3.8 G/DL (ref 3.5–5)
ALP SERPL-CCNC: 44 U/L (ref 46–116)
ALT SERPL W P-5'-P-CCNC: 28 U/L (ref 12–78)
ANION GAP SERPL CALCULATED.3IONS-SCNC: 9 MMOL/L (ref 4–13)
AST SERPL W P-5'-P-CCNC: 10 U/L (ref 5–45)
BASOPHILS # BLD AUTO: 0.04 THOUSANDS/ΜL (ref 0–0.1)
BASOPHILS NFR BLD AUTO: 1 % (ref 0–1)
BILIRUB SERPL-MCNC: 0.3 MG/DL (ref 0.2–1)
BUN SERPL-MCNC: 15 MG/DL (ref 5–25)
CALCIUM SERPL-MCNC: 8.7 MG/DL (ref 8.3–10.1)
CHLORIDE SERPL-SCNC: 105 MMOL/L (ref 100–108)
CO2 SERPL-SCNC: 27 MMOL/L (ref 21–32)
CREAT SERPL-MCNC: 0.95 MG/DL (ref 0.6–1.3)
D DIMER PPP FEU-MCNC: <0.27 UG/ML FEU
EOSINOPHIL # BLD AUTO: 0.26 THOUSAND/ΜL (ref 0–0.61)
EOSINOPHIL NFR BLD AUTO: 3 % (ref 0–6)
ERYTHROCYTE [DISTWIDTH] IN BLOOD BY AUTOMATED COUNT: 12.5 % (ref 11.6–15.1)
GFR SERPL CREATININE-BSD FRML MDRD: 80 ML/MIN/1.73SQ M
GLUCOSE SERPL-MCNC: 83 MG/DL (ref 65–140)
HCT VFR BLD AUTO: 40.9 % (ref 34.8–46.1)
HGB BLD-MCNC: 13.2 G/DL (ref 11.5–15.4)
IMM GRANULOCYTES # BLD AUTO: 0.02 THOUSAND/UL (ref 0–0.2)
IMM GRANULOCYTES NFR BLD AUTO: 0 % (ref 0–2)
LYMPHOCYTES # BLD AUTO: 3.13 THOUSANDS/ΜL (ref 0.6–4.47)
LYMPHOCYTES NFR BLD AUTO: 37 % (ref 14–44)
MCH RBC QN AUTO: 28.9 PG (ref 26.8–34.3)
MCHC RBC AUTO-ENTMCNC: 32.3 G/DL (ref 31.4–37.4)
MCV RBC AUTO: 90 FL (ref 82–98)
MONOCYTES # BLD AUTO: 0.69 THOUSAND/ΜL (ref 0.17–1.22)
MONOCYTES NFR BLD AUTO: 8 % (ref 4–12)
NEUTROPHILS # BLD AUTO: 4.39 THOUSANDS/ΜL (ref 1.85–7.62)
NEUTS SEG NFR BLD AUTO: 51 % (ref 43–75)
NRBC BLD AUTO-RTO: 0 /100 WBCS
PLATELET # BLD AUTO: 268 THOUSANDS/UL (ref 149–390)
PMV BLD AUTO: 9.8 FL (ref 8.9–12.7)
POTASSIUM SERPL-SCNC: 3.5 MMOL/L (ref 3.5–5.3)
PROT SERPL-MCNC: 7.8 G/DL (ref 6.4–8.2)
RBC # BLD AUTO: 4.57 MILLION/UL (ref 3.81–5.12)
SODIUM SERPL-SCNC: 141 MMOL/L (ref 136–145)
TROPONIN I SERPL-MCNC: <0.02 NG/ML
WBC # BLD AUTO: 8.53 THOUSAND/UL (ref 4.31–10.16)

## 2021-10-24 PROCEDURE — 84484 ASSAY OF TROPONIN QUANT: CPT | Performed by: EMERGENCY MEDICINE

## 2021-10-24 PROCEDURE — 71045 X-RAY EXAM CHEST 1 VIEW: CPT

## 2021-10-24 PROCEDURE — 99285 EMERGENCY DEPT VISIT HI MDM: CPT | Performed by: EMERGENCY MEDICINE

## 2021-10-24 PROCEDURE — 36415 COLL VENOUS BLD VENIPUNCTURE: CPT | Performed by: EMERGENCY MEDICINE

## 2021-10-24 PROCEDURE — 85025 COMPLETE CBC W/AUTO DIFF WBC: CPT | Performed by: EMERGENCY MEDICINE

## 2021-10-24 PROCEDURE — 93005 ELECTROCARDIOGRAM TRACING: CPT

## 2021-10-24 PROCEDURE — 85379 FIBRIN DEGRADATION QUANT: CPT | Performed by: EMERGENCY MEDICINE

## 2021-10-24 PROCEDURE — 80053 COMPREHEN METABOLIC PANEL: CPT | Performed by: EMERGENCY MEDICINE

## 2021-10-24 PROCEDURE — 99285 EMERGENCY DEPT VISIT HI MDM: CPT

## 2021-10-25 LAB
ATRIAL RATE: 69 BPM
P AXIS: 61 DEGREES
PR INTERVAL: 142 MS
QRS AXIS: 90 DEGREES
QRSD INTERVAL: 90 MS
QT INTERVAL: 406 MS
QTC INTERVAL: 435 MS
T WAVE AXIS: 31 DEGREES
VENTRICULAR RATE: 69 BPM

## 2021-10-25 PROCEDURE — 93010 ELECTROCARDIOGRAM REPORT: CPT | Performed by: INTERNAL MEDICINE

## 2021-10-27 ENCOUNTER — OFFICE VISIT (OUTPATIENT)
Dept: BARIATRICS | Facility: CLINIC | Age: 31
End: 2021-10-27
Payer: COMMERCIAL

## 2021-10-27 VITALS
TEMPERATURE: 97 F | DIASTOLIC BLOOD PRESSURE: 70 MMHG | HEART RATE: 75 BPM | BODY MASS INDEX: 29.81 KG/M2 | HEIGHT: 66 IN | SYSTOLIC BLOOD PRESSURE: 108 MMHG | WEIGHT: 185.5 LBS

## 2021-10-27 DIAGNOSIS — E66.9 OBESITY, CLASS I, BMI 30-34.9: ICD-10-CM

## 2021-10-27 DIAGNOSIS — K21.9 GASTROESOPHAGEAL REFLUX DISEASE, UNSPECIFIED WHETHER ESOPHAGITIS PRESENT: ICD-10-CM

## 2021-10-27 PROCEDURE — 1036F TOBACCO NON-USER: CPT | Performed by: FAMILY MEDICINE

## 2021-10-27 PROCEDURE — 3008F BODY MASS INDEX DOCD: CPT | Performed by: FAMILY MEDICINE

## 2021-10-27 PROCEDURE — 99214 OFFICE O/P EST MOD 30 MIN: CPT | Performed by: FAMILY MEDICINE

## 2021-10-27 RX ORDER — TOPIRAMATE 100 MG/1
100 TABLET, FILM COATED ORAL
Qty: 30 TABLET | Refills: 2 | Status: SHIPPED | OUTPATIENT
Start: 2021-10-27 | End: 2021-12-20 | Stop reason: ALTCHOICE

## 2021-11-23 ENCOUNTER — TELEPHONE (OUTPATIENT)
Dept: BARIATRICS | Facility: CLINIC | Age: 31
End: 2021-11-23

## 2021-12-20 ENCOUNTER — OFFICE VISIT (OUTPATIENT)
Dept: FAMILY MEDICINE CLINIC | Facility: HOSPITAL | Age: 31
End: 2021-12-20
Payer: COMMERCIAL

## 2021-12-20 VITALS
TEMPERATURE: 97.7 F | WEIGHT: 178.4 LBS | BODY MASS INDEX: 28.67 KG/M2 | OXYGEN SATURATION: 99 % | DIASTOLIC BLOOD PRESSURE: 80 MMHG | HEART RATE: 75 BPM | SYSTOLIC BLOOD PRESSURE: 118 MMHG | HEIGHT: 66 IN

## 2021-12-20 DIAGNOSIS — Z86.39 HISTORY OF VITAMIN D DEFICIENCY: ICD-10-CM

## 2021-12-20 DIAGNOSIS — G62.9 NEUROPATHY: Primary | ICD-10-CM

## 2021-12-20 DIAGNOSIS — Z86.711 PERSONAL HISTORY OF PE (PULMONARY EMBOLISM): ICD-10-CM

## 2021-12-20 PROCEDURE — 3008F BODY MASS INDEX DOCD: CPT | Performed by: NURSE PRACTITIONER

## 2021-12-20 PROCEDURE — 99214 OFFICE O/P EST MOD 30 MIN: CPT | Performed by: NURSE PRACTITIONER

## 2021-12-20 PROCEDURE — 1036F TOBACCO NON-USER: CPT | Performed by: NURSE PRACTITIONER

## 2021-12-23 ENCOUNTER — TELEPHONE (OUTPATIENT)
Dept: FAMILY MEDICINE CLINIC | Facility: HOSPITAL | Age: 31
End: 2021-12-23

## 2021-12-24 LAB
25(OH)D3+25(OH)D2 SERPL-MCNC: 52.4 NG/ML (ref 30–100)
ALBUMIN SERPL-MCNC: 4.4 G/DL (ref 3.8–4.8)
ALBUMIN/GLOB SERPL: 1.6 {RATIO} (ref 1.2–2.2)
ALP SERPL-CCNC: 42 IU/L (ref 44–121)
ALT SERPL-CCNC: 17 IU/L (ref 0–32)
ANA SER QL: NEGATIVE
AST SERPL-CCNC: 18 IU/L (ref 0–40)
AT III AG ACT/NOR PPP IA: 76 % (ref 72–124)
BASOPHILS # BLD AUTO: 0 X10E3/UL (ref 0–0.2)
BASOPHILS NFR BLD AUTO: 1 %
BILIRUB SERPL-MCNC: 0.2 MG/DL (ref 0–1.2)
BUN SERPL-MCNC: 14 MG/DL (ref 6–20)
BUN/CREAT SERPL: 18 (ref 9–23)
CALCIUM SERPL-MCNC: 9.5 MG/DL (ref 8.7–10.2)
CHLORIDE SERPL-SCNC: 104 MMOL/L (ref 96–106)
CO2 SERPL-SCNC: 20 MMOL/L (ref 20–29)
CREAT SERPL-MCNC: 0.78 MG/DL (ref 0.57–1)
CRP SERPL-MCNC: 3 MG/L (ref 0–10)
EOSINOPHIL # BLD AUTO: 0.1 X10E3/UL (ref 0–0.4)
EOSINOPHIL NFR BLD AUTO: 2 %
ERYTHROCYTE [DISTWIDTH] IN BLOOD BY AUTOMATED COUNT: 12.7 % (ref 11.7–15.4)
ESTROGEN SERPL-MCNC: 307 PG/ML
FERRITIN SERPL-MCNC: 32 NG/ML (ref 15–150)
FSH SERPL-ACNC: 2.2 MIU/ML
GLOBULIN SER-MCNC: 2.7 G/DL (ref 1.5–4.5)
GLUCOSE SERPL-MCNC: 98 MG/DL (ref 65–99)
HCT VFR BLD AUTO: 39.3 % (ref 34–46.6)
HGB BLD-MCNC: 12.7 G/DL (ref 11.1–15.9)
IMM GRANULOCYTES # BLD: 0 X10E3/UL (ref 0–0.1)
IMM GRANULOCYTES NFR BLD: 0 %
LH SERPL-ACNC: 4 MIU/ML
LYMPHOCYTES # BLD AUTO: 2.4 X10E3/UL (ref 0.7–3.1)
LYMPHOCYTES NFR BLD AUTO: 45 %
MCH RBC QN AUTO: 28.3 PG (ref 26.6–33)
MCHC RBC AUTO-ENTMCNC: 32.3 G/DL (ref 31.5–35.7)
MCV RBC AUTO: 88 FL (ref 79–97)
MONOCYTES # BLD AUTO: 0.5 X10E3/UL (ref 0.1–0.9)
MONOCYTES NFR BLD AUTO: 9 %
NEUTROPHILS # BLD AUTO: 2.2 X10E3/UL (ref 1.4–7)
NEUTROPHILS NFR BLD AUTO: 43 %
PLATELET # BLD AUTO: 235 X10E3/UL (ref 150–450)
POTASSIUM SERPL-SCNC: 4.4 MMOL/L (ref 3.5–5.2)
PROGEST SERPL-MCNC: 13.3 NG/ML
PROT SERPL-MCNC: 7.1 G/DL (ref 6–8.5)
RBC # BLD AUTO: 4.48 X10E6/UL (ref 3.77–5.28)
SL AMB EGFR AFRICAN AMERICAN: 117 ML/MIN/1.73
SL AMB EGFR NON AFRICAN AMERICAN: 102 ML/MIN/1.73
SODIUM SERPL-SCNC: 139 MMOL/L (ref 134–144)
T4 FREE SERPL-MCNC: 1.16 NG/DL (ref 0.82–1.77)
TSH SERPL DL<=0.005 MIU/L-ACNC: 3.55 UIU/ML (ref 0.45–4.5)
TSH SERPL DL<=0.005 MIU/L-ACNC: 3.69 UIU/ML (ref 0.45–4.5)
VIT B12 SERPL-MCNC: 505 PG/ML (ref 232–1245)
WBC # BLD AUTO: 5.2 X10E3/UL (ref 3.4–10.8)

## 2022-02-14 ENCOUNTER — OFFICE VISIT (OUTPATIENT)
Dept: FAMILY MEDICINE CLINIC | Facility: HOSPITAL | Age: 32
End: 2022-02-14
Payer: COMMERCIAL

## 2022-02-14 VITALS
SYSTOLIC BLOOD PRESSURE: 122 MMHG | DIASTOLIC BLOOD PRESSURE: 84 MMHG | BODY MASS INDEX: 29.66 KG/M2 | HEIGHT: 65 IN | HEART RATE: 78 BPM | WEIGHT: 178 LBS | OXYGEN SATURATION: 98 %

## 2022-02-14 DIAGNOSIS — R50.9 FEVER, UNSPECIFIED FEVER CAUSE: ICD-10-CM

## 2022-02-14 DIAGNOSIS — H66.93 OTITIS OF BOTH EARS: Primary | ICD-10-CM

## 2022-02-14 DIAGNOSIS — S89.92XS KNEE INJURY, LEFT, SEQUELA: ICD-10-CM

## 2022-02-14 PROCEDURE — 1036F TOBACCO NON-USER: CPT | Performed by: INTERNAL MEDICINE

## 2022-02-14 PROCEDURE — 99214 OFFICE O/P EST MOD 30 MIN: CPT | Performed by: INTERNAL MEDICINE

## 2022-02-14 PROCEDURE — 3008F BODY MASS INDEX DOCD: CPT | Performed by: INTERNAL MEDICINE

## 2022-02-14 PROCEDURE — 87636 SARSCOV2 & INF A&B AMP PRB: CPT | Performed by: INTERNAL MEDICINE

## 2022-02-14 RX ORDER — TOPIRAMATE 100 MG/1
100 TABLET, FILM COATED ORAL DAILY
COMMUNITY
Start: 2021-12-24 | End: 2022-02-14

## 2022-02-14 RX ORDER — AMOXICILLIN AND CLAVULANATE POTASSIUM 200; 28.5 MG/5ML; MG/5ML
10 POWDER, FOR SUSPENSION ORAL 2 TIMES DAILY
Qty: 150 ML | Refills: 0 | Status: SHIPPED | OUTPATIENT
Start: 2022-02-14 | End: 2022-02-21

## 2022-02-14 RX ORDER — GABAPENTIN 250 MG/5ML
12 SOLUTION ORAL
Qty: 470 ML | Refills: 3 | Status: SHIPPED | OUTPATIENT
Start: 2022-02-14 | End: 2022-08-05

## 2022-02-14 NOTE — PROGRESS NOTES
Assessment/Plan:       Diagnoses and all orders for this visit:    Otitis of both ears  -     Covid/Flu- Office Collect  -     amoxicillin-clavulanate (AUGMENTIN) 200-28 5 mg/5 mL suspension; Take 10 mL by mouth 2 (two) times a day for 7 days    Fever, unspecified fever cause  -     Covid/Flu- Office Collect    Knee injury, left, sequela  -     Gabapentin (NEURONTIN) 300 mg/6mL solution; Take 12 mL (600 mg total) by mouth daily at bedtime    Other orders  -     Discontinue: topiramate (TOPAMAX) 100 mg tablet; Take 100 mg by mouth in the morning (Patient not taking: Reported on 2/14/2022 )          All of the above diagnoses have been assessed  Additional COMMENTS/PLAN: no work until swabs back  She should be seen back in the office with regards to her neuropathic pain by Anders Valdes in 2 months      Subjective:      Patient ID: Laurie Walker is a 28 y o  female  Fever - 9 weeks to 74 years   The current episode started in the past 7 days  The problem occurs constantly  The problem has been gradually improving  The maximum temperature noted was 101 to 101 9 F  The temperature was taken using an oral thermometer  Associated symptoms include congestion, ear pain, headaches, muscle aches, nausea, sleepiness and a sore throat  Has nausea about 4 days ago  Had aches the last 3 days  Yesterday still had fever  Has had 2 neg covid tests  No vaccines,either covid or flu  Patient has also been using gabapentin at bedtime for nerve pain in the right leg  She has escalated up to 600 mg  I renewed that prescription  The following portions of the patient's history were revised and updated as appropriate: Problem list, allergies, med list, FH, SH, Past medical and surgical histories  Current Outpatient Medications   Medication Sig Dispense Refill    clobetasol (TEMOVATE) 0 05 % cream as needed       clonazePAM (KlonoPIN) 0 5 mg tablet 1 tab  In the morning, 2 tabs   At bedtime; and 1 prn during the day extra for panic attacks  120 tablet 2    EUCRISA 2 % OINT Apply 1 application topically 2 (two) times a day as needed       famotidine (PEPCID) 20 mg tablet Take 1 tablet (20 mg total) by mouth daily at bedtime 90 tablet 6    Gabapentin (NEURONTIN) 300 mg/6mL solution Take 12 mL (600 mg total) by mouth daily at bedtime 470 mL 3    amoxicillin-clavulanate (AUGMENTIN) 200-28 5 mg/5 mL suspension Take 10 mL by mouth 2 (two) times a day for 7 days 150 mL 0     No current facility-administered medications for this visit  Review of Systems   Constitutional: Positive for fever  HENT: Positive for congestion, ear pain and sore throat  Gastrointestinal: Positive for nausea  Neurological: Positive for headaches  Objective:    /84 (BP Location: Right arm, Patient Position: Sitting, Cuff Size: Standard)   Pulse 78   Ht 5' 5" (1 651 m)   Wt 80 7 kg (178 lb)   SpO2 98%   BMI 29 62 kg/m²     BP Readings from Last 3 Encounters:   02/14/22 122/84   12/20/21 118/80   10/27/21 108/70                  Wt Readings from Last 3 Encounters:   02/14/22 80 7 kg (178 lb)   12/20/21 80 9 kg (178 lb 6 4 oz)   10/27/21 84 1 kg (185 lb 8 oz)         Physical Exam  Vitals reviewed  Constitutional:       Appearance: Normal appearance  HENT:      Ears:      Comments: Behind both ears with more erythema the right tympanic membrane  Nose: Congestion (With injection) present  Mouth/Throat:      Mouth: Mucous membranes are moist       Pharynx: Oropharynx is clear  Cardiovascular:      Rate and Rhythm: Normal rate and regular rhythm  Pulmonary:      Effort: Pulmonary effort is normal       Breath sounds: Normal breath sounds  Neurological:      Mental Status: She is alert  No visits with results within 2 Week(s) from this visit     Latest known visit with results is:   Office Visit on 12/20/2021   Component Date Value Ref Range Status    White Blood Cell Count 12/22/2021 5 2  3 4 - 10 8 x10E3/uL Final    Red Blood Cell Count 12/22/2021 4 48  3 77 - 5 28 x10E6/uL Final    Hemoglobin 12/22/2021 12 7  11 1 - 15 9 g/dL Final    HCT 12/22/2021 39 3  34 0 - 46 6 % Final    MCV 12/22/2021 88  79 - 97 fL Final    MCH 12/22/2021 28 3  26 6 - 33 0 pg Final    MCHC 12/22/2021 32 3  31 5 - 35 7 g/dL Final    RDW 12/22/2021 12 7  11 7 - 15 4 % Final    Platelet Count 77/55/8811 235  150 - 450 x10E3/uL Final    Neutrophils 12/22/2021 43  Not Estab  % Final    Lymphocytes 12/22/2021 45  Not Estab  % Final    Monocytes 12/22/2021 9  Not Estab  % Final    Eosinophils 12/22/2021 2  Not Estab  % Final    Basophils PCT 12/22/2021 1  Not Estab  % Final    Neutrophils (Absolute) 12/22/2021 2 2  1 4 - 7 0 x10E3/uL Final    Lymphocytes (Absolute) 12/22/2021 2 4  0 7 - 3 1 x10E3/uL Final    Monocytes (Absolute) 12/22/2021 0 5  0 1 - 0 9 x10E3/uL Final    Eosinophils (Absolute) 12/22/2021 0 1  0 0 - 0 4 x10E3/uL Final    Basophils ABS 12/22/2021 0 0  0 0 - 0 2 x10E3/uL Final    Immature Granulocytes 12/22/2021 0  Not Estab  % Final    Immature Granulocytes (Absolute) 12/22/2021 0 0  0 0 - 0 1 x10E3/uL Final    ANTITHROMBIN III AG 12/22/2021 76  72 - 124 % Final    Comment: This test was developed and its performance characteristics  determined by [facility]  It has not been cleared or approved  by the Food and Drug Administration   Glucose, Random 12/22/2021 98  65 - 99 mg/dL Final    BUN 12/22/2021 14  6 - 20 mg/dL Final    Creatinine 12/22/2021 0 78  0 57 - 1 00 mg/dL Final    eGFR Non  12/22/2021 102  >59 mL/min/1 73 Final    eGFR  12/22/2021 117  >59 mL/min/1 73 Final    Comment: **In accordance with recommendations from the NKF-ASN Task force,**    [facility] is in the process of updating its eGFR calculation to the    2021 CKD-EPI creatinine equation that estimates kidney function    without a race variable        SL AMB BUN/CREATININE RATIO 12/22/2021 18  9 - 23 Final  Sodium 12/22/2021 139  134 - 144 mmol/L Final    Potassium 12/22/2021 4 4  3 5 - 5 2 mmol/L Final    Chloride 12/22/2021 104  96 - 106 mmol/L Final    CO2 12/22/2021 20  20 - 29 mmol/L Final    CALCIUM 12/22/2021 9 5  8 7 - 10 2 mg/dL Final    Protein, Total 12/22/2021 7 1  6 0 - 8 5 g/dL Final    Albumin 12/22/2021 4 4  3 8 - 4 8 g/dL Final    Globulin, Total 12/22/2021 2 7  1 5 - 4 5 g/dL Final    Albumin/Globulin Ratio 12/22/2021 1 6  1 2 - 2 2 Final    TOTAL BILIRUBIN 12/22/2021 0 2  0 0 - 1 2 mg/dL Final    Alk Phos Isoenzymes 12/22/2021 42* 44 - 121 IU/L Final                  **Please note reference interval change**    AST 12/22/2021 18  0 - 40 IU/L Final    ALT 12/22/2021 17  0 - 32 IU/L Final    TSH 12/22/2021 3 550  0 450 - 4 500 uIU/mL Final    Ferritin 12/22/2021 32  15 - 150 ng/mL Final    25-HYDROXY VIT D 12/22/2021 52 4  30 0 - 100 0 ng/mL Final    Comment: Vitamin D deficiency has been defined by the [address] practice guideline as a  level of serum 25-OH vitamin D less than 20 ng/mL (1,2)  The Endocrine Society went on to further define vitamin D  insufficiency as a level between 21 and 29 ng/mL (2)  1  IOM ([place] of Medicine)  2010  Dietary reference     intakes for calcium and D  [facility]: The     [employer]  2  Gill MF, Martha NC, Mark FLORENCE, et al      Evaluation, treatment, and prevention of vitamin D     deficiency: an Endocrine Society clinical practice     guideline  JCEM  2011 Jul; 96(7):1911-30   Vitamin B-12 12/22/2021 505  232 - 1,245 pg/mL Final    C-Reactive Protein, Quant 12/22/2021 3  0 - 10 mg/L Final    NELSON 12/22/2021 Negative  Negative Final         Deanna Umana MD    Some or all of this note was generated with a voice recognition dictation system and therefore my contain grammatical or spelling errors

## 2022-02-15 LAB
FLUAV RNA RESP QL NAA+PROBE: NEGATIVE
FLUBV RNA RESP QL NAA+PROBE: NEGATIVE
SARS-COV-2 RNA RESP QL NAA+PROBE: POSITIVE

## 2022-03-09 ENCOUNTER — OFFICE VISIT (OUTPATIENT)
Dept: FAMILY MEDICINE CLINIC | Facility: HOSPITAL | Age: 32
End: 2022-03-09
Payer: COMMERCIAL

## 2022-03-09 VITALS
DIASTOLIC BLOOD PRESSURE: 72 MMHG | WEIGHT: 175 LBS | BODY MASS INDEX: 29.16 KG/M2 | SYSTOLIC BLOOD PRESSURE: 104 MMHG | TEMPERATURE: 97.4 F | RESPIRATION RATE: 16 BRPM | OXYGEN SATURATION: 99 % | HEIGHT: 65 IN | HEART RATE: 64 BPM

## 2022-03-09 DIAGNOSIS — H81.11 BENIGN PAROXYSMAL POSITIONAL VERTIGO OF RIGHT EAR: Primary | ICD-10-CM

## 2022-03-09 DIAGNOSIS — J45.909 MILD ASTHMA WITHOUT COMPLICATION, UNSPECIFIED WHETHER PERSISTENT: ICD-10-CM

## 2022-03-09 DIAGNOSIS — F33.9 DEPRESSION, RECURRENT (HCC): ICD-10-CM

## 2022-03-09 PROBLEM — M65.9 SYNOVITIS OF KNEE: Status: RESOLVED | Noted: 2021-05-17 | Resolved: 2022-03-09

## 2022-03-09 PROBLEM — R10.12 LEFT UPPER QUADRANT PAIN: Status: RESOLVED | Noted: 2020-03-02 | Resolved: 2022-03-09

## 2022-03-09 PROBLEM — R13.10 DYSPHAGIA: Status: RESOLVED | Noted: 2020-03-02 | Resolved: 2022-03-09

## 2022-03-09 PROBLEM — M65.969 SYNOVITIS OF KNEE: Status: RESOLVED | Noted: 2021-05-17 | Resolved: 2022-03-09

## 2022-03-09 PROCEDURE — 3008F BODY MASS INDEX DOCD: CPT | Performed by: INTERNAL MEDICINE

## 2022-03-09 PROCEDURE — 3725F SCREEN DEPRESSION PERFORMED: CPT | Performed by: INTERNAL MEDICINE

## 2022-03-09 PROCEDURE — 99214 OFFICE O/P EST MOD 30 MIN: CPT | Performed by: INTERNAL MEDICINE

## 2022-03-09 RX ORDER — MECLIZINE HCL 12.5 MG/1
12.5 TABLET ORAL EVERY 8 HOURS PRN
Qty: 6 TABLET | Refills: 0 | Status: SHIPPED | OUTPATIENT
Start: 2022-03-09 | End: 2022-04-29

## 2022-03-09 NOTE — ASSESSMENT & PLAN NOTE
Patient's history of depression  Currently she does not feel depressed  She does not take antidepressants    Will monitor closely

## 2022-03-09 NOTE — ASSESSMENT & PLAN NOTE
Patient has mild intermittent asthma  She does not use any inhalers  Denies shortness of breath or wheezing    Her lungs are clear to auscultation

## 2022-03-09 NOTE — PROGRESS NOTES
Assessment/Plan:    Mild asthma without complication, unspecified whether persistent  Patient has mild intermittent asthma  She does not use any inhalers  Denies shortness of breath or wheezing  Her lungs are clear to auscultation    Depression, recurrent ([facility] [address] )  Patient's history of depression  Currently she does not feel depressed  She does not take antidepressants  Will monitor closely       Diagnoses and all orders for this visit:    Benign paroxysmal positional vertigo of right ear  -     meclizine (ANTIVERT) 12 5 MG tablet; Take 1 tablet (12 5 mg total) by mouth every 8 (eight) hours as needed (vertigo)    Mild asthma without complication, unspecified whether persistent    Depression, recurrent ([facility] [address] )          Patient most likely has benign paroxysmal positional vertigo  I doubt otitis media  She was not orthostatic  I recommended referral to physical therapy for vestibular therapy but the co-pay is very high and on affordable  I prescribed meclizine should her symptoms become more severe and longer lasting  Subjective:      Patient ID: Giorgi Kapoor is a 28 y o  female  Patient presents with a chief complaint of vertigo  She 1st noted is vertigo Monday night when she lay down  She does spinning type of sensation the lasted for several seconds and got better  Last night, Tuesday night she had vertigo when she laid down or when she was sitting  Again the vertigo episodes lasted for a short period of time and got better  Today she had multiple recurrent episodes of vertigo sometimes in a sitting position  Today she felt 'weird' like being in a tunnel and had decreased hearing in the left ear  The vertiginous episodes I doubt lasting more than a minute  Denies associated with nausea vomiting  She had COVID in February and since then she has intermittent nausea but no dysphagia, abdominal pain or vomiting  She denies cough, nasal congestion, chest pain, shortness of breath    She denies migraine headaches  She has a cat at home that she could be allergic to  She tried Flonase before that did not work  Claritin works better for her allergies  The following portions of the patient's history were reviewed and updated as appropriate: current medications, past family history, past medical history, past social history, past surgical history and problem list     Review of Systems      Objective:    /72 (BP Location: Left arm, Patient Position: Standing)   Pulse 64   Temp (!) 97 4 °F (36 3 °C) (Tympanic)   Resp 16   Ht 5' 5" (1 651 m)   Wt 79 4 kg (175 lb)   SpO2 99%   BMI 29 12 kg/m²      Physical Exam  HENT:      Head: Normocephalic  Ears:      Comments: Patient had hair in the external canal that were obstructing the view, I did not see any obvious erythema of the tympanic membranes  Nose: No congestion or rhinorrhea  Mouth/Throat:      Mouth: Mucous membranes are moist       Pharynx: No oropharyngeal exudate or posterior oropharyngeal erythema  Eyes:      General:         Right eye: No discharge  Left eye: No discharge  Extraocular Movements: Extraocular movements intact  Conjunctiva/sclera: Conjunctivae normal    Cardiovascular:      Rate and Rhythm: Normal rate and regular rhythm  Heart sounds: No murmur heard  Pulmonary:      Effort: No respiratory distress  Breath sounds: No wheezing or rales  Abdominal:      Palpations: Abdomen is soft  Musculoskeletal:      Cervical back: Neck supple  Lymphadenopathy:      Cervical: Cervical adenopathy (She had 1 less than 1 cm freely movable soft lymph node posterior to the left sternocleido muscle) present  Skin:     General: Skin is warm and dry  Neurological:      Mental Status: She is alert and oriented to person, place, and time  Cranial Nerves: No cranial nerve deficit ( tongue was midline, she had no facial droop)        Motor: No weakness ( she had no pronator drift, strength was normal)  Coordination: Coordination normal ( finger-nose-finger test was negative  Romberg was negative, he to toe walking was normal)  Gait: Gait normal       Deep Tendon Reflexes: Abnormal reflex:  finger-nose-finger, Romberg were negative  Heel to toe walking was normal       Comments: Patient experienced vertigo when I had her lay down while looking to the right  Her symptoms were reproduced to a lesser degree when she lay down looking to the left  Psychiatric:         Mood and Affect: Mood normal          Thought Content:  Thought content normal          Judgment: Judgment normal              Bernie Lilly MD

## 2022-03-31 ENCOUNTER — TELEPHONE (OUTPATIENT)
Dept: PSYCHIATRY | Facility: CLINIC | Age: 32
End: 2022-03-31

## 2022-03-31 NOTE — TELEPHONE ENCOUNTER
Pt called looking to set up np appointment informed pt of the wait list and have added pt ti non referral wait list

## 2022-04-18 ENCOUNTER — OFFICE VISIT (OUTPATIENT)
Dept: FAMILY MEDICINE CLINIC | Facility: HOSPITAL | Age: 32
End: 2022-04-18
Payer: COMMERCIAL

## 2022-04-18 VITALS
HEART RATE: 65 BPM | DIASTOLIC BLOOD PRESSURE: 80 MMHG | WEIGHT: 186 LBS | HEIGHT: 65 IN | BODY MASS INDEX: 30.99 KG/M2 | SYSTOLIC BLOOD PRESSURE: 112 MMHG | OXYGEN SATURATION: 98 %

## 2022-04-18 DIAGNOSIS — F41.1 GENERALIZED ANXIETY DISORDER: ICD-10-CM

## 2022-04-18 DIAGNOSIS — R63.5 WEIGHT GAIN: ICD-10-CM

## 2022-04-18 DIAGNOSIS — F33.9 DEPRESSION, RECURRENT (HCC): Primary | ICD-10-CM

## 2022-04-18 PROCEDURE — 99214 OFFICE O/P EST MOD 30 MIN: CPT | Performed by: PHYSICIAN ASSISTANT

## 2022-04-18 RX ORDER — PAROXETINE HYDROCHLORIDE 12.5 MG/1
12.5 TABLET, FILM COATED, EXTENDED RELEASE ORAL EVERY MORNING
Qty: 30 TABLET | Refills: 3 | Status: SHIPPED | OUTPATIENT
Start: 2022-04-18 | End: 2022-04-21 | Stop reason: ALTCHOICE

## 2022-04-18 NOTE — PROGRESS NOTES
Assessment/Plan:       Problem List Items Addressed This Visit        Other    Depression, recurrent ([address] ) - Primary  Will re-start Paxil-  Patient has been on several   Anti depressants in the past, and prefers this   Particular one-       Relevant Medications    PARoxetine (PAXIL-CR) 12 5 mg 24 hr tablet qd          Generalized anxiety- prn klonopin  Weight gain-  Discussed recommendations for meal planning  Recommend 3 meals daily and regular exercise  Subjective:      Patient ID: Chevy Johnston is a 28 y o  female  Presents for follow up  C/o right hand pain/stiffness since yesterday  January had IUD removed, had side effects  Having trouble finding psychiatrist, through [address]  Seeing counselor, works with school district, 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  Usually eats 2 meals daily; lunch- leftovers, or cheese and meat; dinner- protein/starch/veggies; Snacks- nuts; fluids- water, occasional alcohol -  On weekends- 5-10 hard liquor, or splash H2O, or mger  Has been with current boyfriend about 4 years, doing well  Review of Systems   Constitutional: Negative for chills, diaphoresis, fatigue and fever  Respiratory: Negative for cough, chest tightness and shortness of breath  Gastrointestinal: Negative for abdominal pain, constipation, diarrhea and nausea  Genitourinary: Positive for menstrual problem  Musculoskeletal: Positive for arthralgias, back pain, myalgias, neck pain and neck stiffness  Right hand pain, and knee pains  Psychiatric/Behavioral: Positive for agitation and dysphoric mood  Negative for self-injury, sleep disturbance and suicidal ideas  The patient is nervous/anxious and is hyperactive            Objective:      /80 (BP Location: Right arm, Patient Position: Sitting, Cuff Size: Standard)   Pulse 65   Ht 5' 5" (1 651 m)   Wt 84 4 kg (186 lb)   SpO2 98%   BMI 30 95 kg/m²          Physical Exam  Constitutional:       General: She is not in acute distress  Appearance: Normal appearance  She is not ill-appearing, toxic-appearing or diaphoretic  HENT:      Head: Normocephalic and atraumatic  Cardiovascular:      Rate and Rhythm: Normal rate and regular rhythm  Pulses: Normal pulses  Heart sounds: Normal heart sounds  Pulmonary:      Effort: Pulmonary effort is normal       Breath sounds: Normal breath sounds  Musculoskeletal:         General: No swelling, tenderness, deformity or signs of injury  Normal range of motion  Right lower leg: No edema  Left lower leg: No edema  Neurological:      General: No focal deficit present  Mental Status: She is alert and oriented to person, place, and time  Cranial Nerves: No cranial nerve deficit  Sensory: No sensory deficit  Motor: No weakness  Coordination: Coordination normal    Psychiatric:         Behavior: Behavior normal          Thought Content: Thought content normal          Judgment: Judgment normal       Comments: Low mood, flat affect  BMI Counseling: Body mass index is 30 95 kg/m²  The BMI is above normal  Nutrition recommendations include 3-5 servings of fruits/vegetables daily

## 2022-04-18 NOTE — PATIENT INSTRUCTIONS
Weight Management   AMBULATORY CARE:   Why it is important to manage your weight:  Being overweight increases your risk of health conditions such as heart disease, high blood pressure, type 2 diabetes, and certain types of cancer  It can also increase your risk for osteoarthritis, sleep apnea, and other respiratory problems  Aim for a slow, steady weight loss  Even a small amount of weight loss can lower your risk of health problems  Risks of being overweight:  Extra weight can cause many health problems, including the following:  · Diabetes (high blood sugar level)    · High blood pressure or high cholesterol    · Heart disease    · Stroke    · Gallbladder or liver disease    · Cancer of the colon, breast, prostate, liver, or kidney    · Sleep apnea    · Arthritis or gout    Screening  is done to check for health conditions before you have signs or symptoms  If you are 28to 79years old, your blood sugar level may be checked every 3 years for signs of prediabetes or diabetes  Your healthcare provider will check your blood pressure at each visit  High blood pressure can lead to a stroke or other problems  Your provider may check for signs of heart disease, cancer, or other health problems  How to lose weight safely:  A safe and healthy way to lose weight is to eat fewer calories and get regular exercise  · You can lose up about 1 pound a week by decreasing the number of calories you eat by 500 calories each day  You can decrease calories by eating smaller portion sizes or by cutting out high-calorie foods  Read labels to find out how many calories are in the foods you eat  · You can also burn calories with exercise such as walking, swimming, or biking  You will be more likely to keep weight off if you make these changes part of your lifestyle  Exercise at least 30 minutes per day on most days of the week   You can also fit in more physical activity by taking the stairs instead of the elevator or parking farther away from stores  Ask your healthcare provider about the best exercise plan for you  Healthy meal plan for weight management:  A healthy meal plan includes a variety of foods, contains fewer calories, and helps you stay healthy  A healthy meal plan includes the following:     · Eat whole-grain foods more often  A healthy meal plan should contain fiber  Fiber is the part of grains, fruits, and vegetables that is not broken down by your body  Whole-grain foods are healthy and provide extra fiber in your diet  Some examples of whole-grain foods are whole-wheat breads and pastas, oatmeal, brown rice, and bulgur  · Eat a variety of vegetables every day  Include dark, leafy greens such as spinach, kale, eben greens, and mustard greens  Eat yellow and orange vegetables such as carrots, sweet potatoes, and winter squash  · Eat a variety of fruits every day  Choose fresh or canned fruit (canned in its own juice or light syrup) instead of juice  Fruit juice has very little or no fiber  · Eat low-fat dairy foods  Drink fat-free (skim) milk or 1% milk  Eat fat-free yogurt and low-fat cottage cheese  Try low-fat cheeses such as mozzarella and other reduced-fat cheeses  · Choose meat and other protein foods that are low in fat  Choose beans or other legumes such as split peas or lentils  Choose fish, skinless poultry (chicken or turkey), or lean cuts of red meat (beef or pork)  Before you cook meat or poultry, cut off any visible fat  · Use less fat and oil  Try baking foods instead of frying them  Add less fat, such as margarine, sour cream, regular salad dressing and mayonnaise to foods  Eat fewer high-fat foods  Some examples of high-fat foods include french fries, doughnuts, ice cream, and cakes  · Eat fewer sweets  Limit foods and drinks that are high in sugar  This includes candy, cookies, regular soda, and sweetened drinks  Ways to decrease calories:   · Eat smaller portions  ? Use a small plate with smaller servings  ? Do not eat second helpings  ? When you eat at a restaurant, ask for a box and place half of your meal in the box before you eat  ? Share an entrée with someone else  · Replace high-calorie snacks with healthy, low-calorie snacks  ? Choose fresh fruit, vegetables, fat-free rice cakes, or air-popped popcorn instead of potato chips, nuts, or chocolate  ? Choose water or calorie-free drinks instead of soda or sweetened drinks  · Do not shop for groceries when you are hungry  You may be more likely to make unhealthy food choices  Take a grocery list of healthy foods and shop after you have eaten  · Eat regular meals  Do not skip meals  Skipping meals can lead to overeating later in the day  This can make it harder for you to lose weight  Eat a healthy snack in place of a meal if you do not have time to eat a regular meal  Talk with a dietitian to help you create a meal plan and schedule that is right for you  Other things to consider as you try to lose weight:   · Be aware of situations that may give you the urge to overeat, such as eating while watching television  Find ways to avoid these situations  For example, read a book, go for a walk, or do crafts  · Meet with a weight loss support group or friends who are also trying to lose weight  This may help you stay motivated to continue working on your weight loss goals  © Copyright [employer] 2022 Information is for End User's use only and may not be sold, redistributed or otherwise used for commercial purposes  All illustrations and images included in CareNotes® are the copyrighted property of A D A M , Inc  or [facility] Alvina Pompa   The above information is an  only  It is not intended as medical advice for individual conditions or treatments  Talk to your doctor, nurse or pharmacist before following any medical regimen to see if it is safe and effective for you      Try Voltaren gel for right hand pain, re-start vitamin D supplements  Will re-start paxil

## 2022-04-21 ENCOUNTER — TELEPHONE (OUTPATIENT)
Dept: FAMILY MEDICINE CLINIC | Facility: HOSPITAL | Age: 32
End: 2022-04-21

## 2022-04-21 NOTE — TELEPHONE ENCOUNTER
Patient asking if refills of the paxil tablets can be cancelled and the paxil be sent as capsules going forward      Patient has difficulty swallowing tablets and would prefer capsules going forward

## 2022-04-21 NOTE — TELEPHONE ENCOUNTER
Please inform the patient, insurance might not cover medication, but I sent it to the pharmacy, and there is only one dose, cannot increase dose  Thanks

## 2022-04-29 ENCOUNTER — OFFICE VISIT (OUTPATIENT)
Dept: FAMILY MEDICINE CLINIC | Facility: HOSPITAL | Age: 32
End: 2022-04-29
Payer: COMMERCIAL

## 2022-04-29 VITALS
HEART RATE: 72 BPM | BODY MASS INDEX: 30.79 KG/M2 | DIASTOLIC BLOOD PRESSURE: 68 MMHG | SYSTOLIC BLOOD PRESSURE: 118 MMHG | OXYGEN SATURATION: 97 % | WEIGHT: 184.8 LBS | HEIGHT: 65 IN

## 2022-04-29 DIAGNOSIS — R63.5 WEIGHT GAIN: Primary | ICD-10-CM

## 2022-04-29 DIAGNOSIS — E88.9: ICD-10-CM

## 2022-04-29 PROCEDURE — 99395 PREV VISIT EST AGE 18-39: CPT | Performed by: STUDENT IN AN ORGANIZED HEALTH CARE EDUCATION/TRAINING PROGRAM

## 2022-04-29 PROCEDURE — 1036F TOBACCO NON-USER: CPT | Performed by: STUDENT IN AN ORGANIZED HEALTH CARE EDUCATION/TRAINING PROGRAM

## 2022-04-29 PROCEDURE — 3008F BODY MASS INDEX DOCD: CPT | Performed by: STUDENT IN AN ORGANIZED HEALTH CARE EDUCATION/TRAINING PROGRAM

## 2022-04-29 NOTE — PROGRESS NOTES
[facility] [address]    NAME: Augustine Pappas  AGE: 28 y o  SEX: female  : 1990   DATE: 2022     Assessment and Plan:      Diagnosis ICD-10-CM Associated Orders   1  Weight gain  R63 5 Ambulatory Referral to Endocrinology   2  Metabolic problem involving sugars  E88 9 Ambulatory Referral to Endocrinology   3  BMI 30 0-30 9,adult  Z68 30      Immunizations and preventive care screenings were discussed with patient today  Appropriate education was printed on patient's after visit summary  We discussed possibility of intermittent Fasting     Counseling:  Alcohol/drug use: discussed moderation in alcohol intake, the recommendations for healthy alcohol use, and avoidance of illicit drug use  Dental Health: discussed importance of regular tooth brushing, flossing, and dental visits  Injury prevention: discussed safety/seat belts, safety helmets, smoke detectors, carbon dioxide detectors, and smoking near bedding or upholstery  Sexual health: discussed sexually transmitted diseases, partner selection, use of condoms, avoidance of unintended pregnancy, and contraceptive alternatives  · Exercise: the importance of regular exercise/physical activity was discussed  Recommend exercise 3-5 times per week for at least 30 minutes  TB result reviewed, form completed  F/U after seeing endocrine or sooner if needed  Chief Complaint:     Chief Complaint   Patient presents with    Physical Exam     Work Physical       History of Present Illness:     Adult Annual Physical   Patient here for a comprehensive physical exam  The patient reports no problems  Diet and Physical Activity  · Diet/Nutrition: well balanced diet and consuming 3-5 servings of fruits/vegetables daily  · Trying to figure out what foods help her the most, goes between 170-190  Noom  [employer]     · Exercise: upping her steps, using noom, parking further, limited by left knee pain  · Drinking water only, occasional alcohol only on weekends  · Topamax in the past    · Did not tolerate the wellbutrin in the future, using paxil now x1 week     Depression Screening  No SI or HI - on paxil     General Health  · Sleep: sleeps well  · Hearing: normal - bilateral   · Vision: at night only  · Dental: no dental visits for >1 year, brushes teeth three times daily and flosses teeth occasionally  /GYN Health  · Last menstrual period: light periods, 3d, no cramps  · Contraceptive method: okay with child, longtime BF, just off paraguard due to SE's wants to be cleared for contraception with hormone after PE, seeing Pulm & Heme  · History of STDs?: no   · Brother/Former - now sister has PCOS - sister with ovarian cancer at 25     Review of Systems:     Review of Systems   Constitutional: Negative for chills and fever  HENT: Negative for congestion and sore throat  Eyes: Negative for pain and redness  Respiratory: Negative for cough and shortness of breath  Cardiovascular: Negative for chest pain and palpitations  Gastrointestinal: Negative for diarrhea and nausea  Genitourinary: Negative for dysuria and urgency  Musculoskeletal: Positive for arthralgias (left knee pain medially)  Negative for back pain  Skin: Negative for color change and pallor  Neurological: Negative for dizziness and headaches  Psychiatric/Behavioral: Positive for dysphoric mood (chronic)  The patient is nervous/anxious            Past Medical History:     Past Medical History:   Diagnosis Date    Chronic knee pain     Depression     Disease of thyroid gland     Dysphagia 3/2/2020    Left upper quadrant pain 3/2/2020    Menorrhagia     Multiple thyroid nodules     Psychiatric disorder     Saphenous nerve neuropathy, 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    Vitamin D deficiency     Vocal cord paralysis     Following partial thyroidectomy      Past Surgical History:     Past Surgical History:   Procedure Laterality Date    DILATION AND CURETTAGE OF UTERUS      THYROIDECTOMY, PARTIAL      UPPER GASTROINTESTINAL ENDOSCOPY      WISDOM TOOTH EXTRACTION        Social History:     Social History     Socioeconomic History    Marital status: Single     Spouse name: None    Number of children: None    Years of education: None    Highest education level: None   Occupational History    None   Tobacco Use    Smoking status: Never Smoker    Smokeless tobacco: Never Used    Tobacco comment:  seasonal, only when it's warm out, social   Vaping Use    Vaping Use: Never used   Substance and Sexual Activity    Alcohol use: Yes     Comment: socially    Drug use: Not Currently     Types: Marijuana     Comment:  no longer using marijuana    Sexual activity: Yes     Partners: Male     Birth control/protection: I U D  Other Topics Concern    None   Social History Narrative    Single    1 c/caffeine /day    Wears seatbelt    Regular exercise    No cigarette smoke exposure home/work    Employed    Lives with roommate    No living will in place     Social Determinants of Health     Financial Resource Strain: Not on file   Food Insecurity: Not on file   Transportation Needs: No Transportation Needs    Lack of Transportation (Medical): No    Lack of Transportation (Non-Medical):  No   Physical Activity: Sufficiently Active    Days of Exercise per Week: 4 days    Minutes of Exercise per Session: 60 min   Stress: Stress Concern Present    Feeling of Stress : Rather much   Social Connections: Not on file   Intimate Partner Violence: Not on file   Housing Stability: Not on file      Family History:     Family History   Problem Relation Age of Onset    Thyroid disease Mother     Ovarian cancer Sister     Thyroid disease Sister     No Known Problems Father     No Known Problems Brother     Anorexia nervosa Sister     Mental illness Sister     No Known Problems Sister     Celiac disease Maternal Grandmother     Colon cancer Neg Hx     Colon polyps Neg Hx     Substance Abuse Neg Hx       Current Medications:     Current Outpatient Medications   Medication Sig Dispense Refill    clobetasol (TEMOVATE) 0 05 % cream as needed       clonazePAM (KlonoPIN) 0 5 mg tablet 1 tab  In the morning, 2 tabs  At bedtime; and 1 prn during the day extra for panic attacks  120 tablet 2    EUCRISA 2 % OINT Apply 1 application topically 2 (two) times a day as needed       famotidine (PEPCID) 20 mg tablet Take 1 tablet (20 mg total) by mouth daily at bedtime 90 tablet 6    Gabapentin (NEURONTIN) 300 mg/6mL solution Take 12 mL (600 mg total) by mouth daily at bedtime 470 mL 3    PARoxetine Mesylate 7 5 MG CAPS Take 1 capsule (7 5 mg total) by mouth in the morning 30 capsule 3     No current facility-administered medications for this visit  Allergies:     No Known Allergies   Physical Exam:     /68   Pulse 72   Ht 5' 5" (1 651 m)   Wt 83 8 kg (184 lb 12 8 oz)   SpO2 97%   BMI 30 75 kg/m²     Physical Exam  Vitals reviewed  Constitutional:       General: She is not in acute distress  Appearance: Normal appearance  She is normal weight  HENT:      Head: Normocephalic and atraumatic  Cardiovascular:      Rate and Rhythm: Normal rate and regular rhythm  Pulses: Normal pulses  Heart sounds: Normal heart sounds  No murmur heard  Pulmonary:      Effort: Pulmonary effort is normal  No respiratory distress  Breath sounds: Normal breath sounds  No wheezing  Musculoskeletal:      Cervical back: Normal range of motion  No rigidity  Neurological:      Mental Status: She is alert and oriented to person, place, and time  Gait: Gait normal    Psychiatric:         Mood and Affect: Mood normal          Behavior: Behavior normal          Thought Content:  Thought content normal          Judgment: Judgment normal         DO Annie Hughes Terry

## 2022-06-14 ENCOUNTER — TELEPHONE (OUTPATIENT)
Dept: FAMILY MEDICINE CLINIC | Facility: HOSPITAL | Age: 32
End: 2022-06-14

## 2022-06-14 NOTE — TELEPHONE ENCOUNTER
Patient taking clonopin and paxil  Patient just found out she is pregnant  Patient asking if these medications are safe to take while pregnant?

## 2022-06-19 ENCOUNTER — APPOINTMENT (EMERGENCY)
Dept: ULTRASOUND IMAGING | Facility: HOSPITAL | Age: 32
End: 2022-06-19
Payer: COMMERCIAL

## 2022-06-19 ENCOUNTER — HOSPITAL ENCOUNTER (EMERGENCY)
Facility: HOSPITAL | Age: 32
Discharge: HOME | End: 2022-06-19
Attending: EMERGENCY MEDICINE | Admitting: EMERGENCY MEDICINE
Payer: COMMERCIAL

## 2022-06-19 VITALS
TEMPERATURE: 97.8 F | OXYGEN SATURATION: 100 % | BODY MASS INDEX: 30.82 KG/M2 | HEART RATE: 80 BPM | RESPIRATION RATE: 18 BRPM | DIASTOLIC BLOOD PRESSURE: 80 MMHG | HEIGHT: 65 IN | SYSTOLIC BLOOD PRESSURE: 124 MMHG | WEIGHT: 185 LBS

## 2022-06-19 DIAGNOSIS — R93.89 ABNORMAL PELVIC ULTRASOUND: ICD-10-CM

## 2022-06-19 DIAGNOSIS — R58 BLEEDING: ICD-10-CM

## 2022-06-19 DIAGNOSIS — O26.899 PELVIC PAIN IN PREGNANCY: ICD-10-CM

## 2022-06-19 DIAGNOSIS — R82.71 ASYMPTOMATIC BACTERIURIA IN PREGNANCY IN FIRST TRIMESTER: Primary | ICD-10-CM

## 2022-06-19 DIAGNOSIS — R10.2 PELVIC PAIN IN PREGNANCY: ICD-10-CM

## 2022-06-19 DIAGNOSIS — O99.891 ASYMPTOMATIC BACTERIURIA IN PREGNANCY IN FIRST TRIMESTER: Primary | ICD-10-CM

## 2022-06-19 LAB
ALBUMIN SERPL BCP-MCNC: 3.8 G/DL (ref 3.5–5)
ALP SERPL-CCNC: 54 U/L (ref 46–116)
ALT SERPL W P-5'-P-CCNC: 25 U/L (ref 12–78)
ANION GAP SERPL CALCULATED.3IONS-SCNC: 7 MMOL/L (ref 4–13)
AST SERPL W P-5'-P-CCNC: 23 U/L (ref 5–45)
B-HCG SERPL-ACNC: 295 MIU/ML
BACTERIA UR QL AUTO: NORMAL /HPF
BACTERIA UR QL AUTO: NORMAL /HPF
BASOPHILS # BLD AUTO: 0.03 THOUSANDS/ΜL (ref 0–0.1)
BASOPHILS NFR BLD AUTO: 0 % (ref 0–1)
BILIRUB SERPL-MCNC: 0.3 MG/DL (ref 0.2–1)
BILIRUB UR QL STRIP: NEGATIVE
BILIRUB UR QL STRIP: NEGATIVE
BUN SERPL-MCNC: 13 MG/DL (ref 5–25)
CALCIUM SERPL-MCNC: 9.1 MG/DL (ref 8.3–10.1)
CHLORIDE SERPL-SCNC: 100 MMOL/L (ref 100–108)
CLARITY UR: CLEAR
CLARITY UR: CLEAR
CO2 SERPL-SCNC: 30 MMOL/L (ref 21–32)
COLOR UR: YELLOW
COLOR UR: YELLOW
CREAT SERPL-MCNC: 0.82 MG/DL (ref 0.6–1.3)
EOSINOPHIL # BLD AUTO: 0.23 THOUSAND/ΜL (ref 0–0.61)
EOSINOPHIL NFR BLD AUTO: 2 % (ref 0–6)
ERYTHROCYTE [DISTWIDTH] IN BLOOD BY AUTOMATED COUNT: 12.6 % (ref 11.6–15.1)
GFR SERPL CREATININE-BSD FRML MDRD: 94 ML/MIN/1.73SQ M
GLUCOSE SERPL-MCNC: 87 MG/DL (ref 65–140)
GLUCOSE UR STRIP-MCNC: NEGATIVE MG/DL
GLUCOSE UR STRIP-MCNC: NEGATIVE MG/DL
HCT VFR BLD AUTO: 39.9 % (ref 34.8–46.1)
HGB BLD-MCNC: 12.7 G/DL (ref 11.5–15.4)
HGB UR QL STRIP.AUTO: ABNORMAL
HGB UR QL STRIP.AUTO: ABNORMAL
IMM GRANULOCYTES # BLD AUTO: 0.02 THOUSAND/UL (ref 0–0.2)
IMM GRANULOCYTES NFR BLD AUTO: 0 % (ref 0–2)
KETONES UR STRIP-MCNC: NEGATIVE MG/DL
KETONES UR STRIP-MCNC: NEGATIVE MG/DL
LEUKOCYTE ESTERASE UR QL STRIP: NEGATIVE
LEUKOCYTE ESTERASE UR QL STRIP: NEGATIVE
LYMPHOCYTES # BLD AUTO: 3.95 THOUSANDS/ΜL (ref 0.6–4.47)
LYMPHOCYTES NFR BLD AUTO: 39 % (ref 14–44)
MCH RBC QN AUTO: 29.3 PG (ref 26.8–34.3)
MCHC RBC AUTO-ENTMCNC: 31.8 G/DL (ref 31.4–37.4)
MCV RBC AUTO: 92 FL (ref 82–98)
MONOCYTES # BLD AUTO: 0.9 THOUSAND/ΜL (ref 0.17–1.22)
MONOCYTES NFR BLD AUTO: 9 % (ref 4–12)
NEUTROPHILS # BLD AUTO: 5.09 THOUSANDS/ΜL (ref 1.85–7.62)
NEUTS SEG NFR BLD AUTO: 50 % (ref 43–75)
NITRITE UR QL STRIP: NEGATIVE
NITRITE UR QL STRIP: NEGATIVE
NON-SQ EPI CELLS URNS QL MICRO: NORMAL /HPF
NON-SQ EPI CELLS URNS QL MICRO: NORMAL /HPF
NRBC BLD AUTO-RTO: 0 /100 WBCS
PH UR STRIP.AUTO: 6 [PH]
PH UR STRIP.AUTO: 6.5 [PH]
PLATELET # BLD AUTO: 231 THOUSANDS/UL (ref 149–390)
PMV BLD AUTO: 9.6 FL (ref 8.9–12.7)
POTASSIUM SERPL-SCNC: 3.7 MMOL/L (ref 3.5–5.3)
PROT SERPL-MCNC: 7.7 G/DL (ref 6.4–8.2)
PROT UR STRIP-MCNC: NEGATIVE MG/DL
PROT UR STRIP-MCNC: NEGATIVE MG/DL
RBC # BLD AUTO: 4.33 MILLION/UL (ref 3.81–5.12)
RBC #/AREA URNS AUTO: NORMAL /HPF
RBC #/AREA URNS AUTO: NORMAL /HPF
SODIUM SERPL-SCNC: 137 MMOL/L (ref 136–145)
SP GR UR STRIP.AUTO: 1.02 (ref 1–1.03)
SP GR UR STRIP.AUTO: 1.02 (ref 1–1.03)
UROBILINOGEN UR QL STRIP.AUTO: 0.2 E.U./DL
UROBILINOGEN UR QL STRIP.AUTO: 0.2 E.U./DL
WBC # BLD AUTO: 10.22 THOUSAND/UL (ref 4.31–10.16)
WBC #/AREA URNS AUTO: NORMAL /HPF
WBC #/AREA URNS AUTO: NORMAL /HPF

## 2022-06-19 PROCEDURE — 76815 OB US LIMITED FETUS(S): CPT

## 2022-06-19 PROCEDURE — 99284 EMERGENCY DEPT VISIT MOD MDM: CPT

## 2022-06-19 PROCEDURE — NC001 PR NO CHARGE: Performed by: OBSTETRICS & GYNECOLOGY

## 2022-06-19 PROCEDURE — 36415 COLL VENOUS BLD VENIPUNCTURE: CPT | Performed by: EMERGENCY MEDICINE

## 2022-06-19 PROCEDURE — 81001 URINALYSIS AUTO W/SCOPE: CPT | Performed by: EMERGENCY MEDICINE

## 2022-06-19 PROCEDURE — 99284 EMERGENCY DEPT VISIT MOD MDM: CPT | Performed by: EMERGENCY MEDICINE

## 2022-06-19 PROCEDURE — 87086 URINE CULTURE/COLONY COUNT: CPT | Performed by: EMERGENCY MEDICINE

## 2022-06-19 PROCEDURE — 80053 COMPREHEN METABOLIC PANEL: CPT | Performed by: EMERGENCY MEDICINE

## 2022-06-19 PROCEDURE — 84702 CHORIONIC GONADOTROPIN TEST: CPT | Performed by: EMERGENCY MEDICINE

## 2022-06-19 PROCEDURE — 85025 COMPLETE CBC W/AUTO DIFF WBC: CPT | Performed by: EMERGENCY MEDICINE

## 2022-06-19 RX ORDER — CEPHALEXIN 250 MG/5ML
500 POWDER, FOR SUSPENSION ORAL EVERY 6 HOURS SCHEDULED
Qty: 280 ML | Refills: 0 | Status: SHIPPED | OUTPATIENT
Start: 2022-06-19 | End: 2022-06-26

## 2022-06-19 RX ORDER — CEPHALEXIN 250 MG/1
500 CAPSULE ORAL ONCE
Status: DISCONTINUED | OUTPATIENT
Start: 2022-06-19 | End: 2022-06-19

## 2022-06-19 RX ORDER — CEPHALEXIN 250 MG/5ML
500 POWDER, FOR SUSPENSION ORAL ONCE
Status: COMPLETED | OUTPATIENT
Start: 2022-06-19 | End: 2022-06-19

## 2022-06-19 RX ORDER — CEPHALEXIN 500 MG/1
500 CAPSULE ORAL EVERY 6 HOURS SCHEDULED
Qty: 28 CAPSULE | Refills: 0 | Status: SHIPPED | OUTPATIENT
Start: 2022-06-19 | End: 2022-06-19 | Stop reason: SDUPTHER

## 2022-06-19 RX ORDER — ACETAMINOPHEN 160 MG/5ML
650 SUSPENSION, ORAL (FINAL DOSE FORM) ORAL ONCE
Status: COMPLETED | OUTPATIENT
Start: 2022-06-19 | End: 2022-06-19

## 2022-06-19 RX ADMIN — CEPHALEXIN 500 MG: 250 POWDER, FOR SUSPENSION ORAL at 22:44

## 2022-06-19 RX ADMIN — ACETAMINOPHEN 650 MG: 650 SUSPENSION ORAL at 18:33

## 2022-06-19 NOTE — Clinical Note
Marce Clark was seen and treated in our emergency department on 6/19/2022  Diagnosis:     Sherry    She may return on this date: 06/22/2022         If you have any questions or concerns, please don't hesitate to call        Vijay Nicholson MD    ______________________________           _______________          _______________  Hospital Representative                              Date                                Time

## 2022-06-19 NOTE — ED PROVIDER NOTES
History  Chief Complaint   Patient presents with    Abdominal Pain Pregnant     Pt states that her last period was in May  Pt states that she has been periodically spotting on and off  Pt states that she started today with lower pelvic pain  Pt states that she has a positive preg test last Monday      28year old  at 6 weeks 3 days by dates presents for evaluation of vaginal spotting and sharp right-sided pelvic pain which began 2 hours ago  Patient states she has had intermittent spotting since a positive home pregnancy test 1 week ago  She had stopped taking Klonopin and paroxetine after finding out she was pregnant and is currently taking no medications  No recent fevers or chills  Mild intermittent nausea for the past 3 days  No vomiting  No constipation, diarrhea or urinary symptoms  Prior pregnancy terminated by [facility]  many years ago  IUD removed 5 months ago  History provided by:  Patient  Abdominal Pain  Pain location:  RLQ  Pain quality: sharp    Pain radiates to:  Does not radiate  Onset quality:  Sudden  Duration:  2 hours  Timing:  Constant  Progression:  Waxing and waning  Chronicity:  New  Relieved by:  Nothing  Worsened by:  Nothing  Ineffective treatments:  None tried  Associated symptoms: nausea and vaginal bleeding (spotting)    Associated symptoms: no chest pain, no chills, no constipation, no cough, no diarrhea, no dysuria, no fever, no shortness of breath, no sore throat and no vomiting    Risk factors: pregnancy    Risk factors: has not had multiple surgeries        Prior to Admission Medications   Prescriptions Last Dose Informant Patient Reported? Taking?    EUCRISA 2 % OINT  Self Yes No   Sig: Apply 1 application topically 2 (two) times a day as needed    Gabapentin (NEURONTIN) 300 mg/6mL solution  Self No No   Sig: Take 12 mL (600 mg total) by mouth daily at bedtime   PARoxetine Mesylate 7 5 MG CAPS   No No   Sig: Take 1 capsule (7 5 mg total) by mouth in the morning   clobetasol (TEMOVATE) 0 05 % cream  Self Yes No   Sig: as needed    clonazePAM (KlonoPIN) 0 5 mg tablet  Self No No   Si tab  In the morning, 2 tabs  At bedtime; and 1 prn during the day extra for panic attacks  famotidine (PEPCID) 20 mg tablet  Self No No   Sig: Take 1 tablet (20 mg total) by mouth daily at bedtime      Facility-Administered Medications: None       Past Medical History:   Diagnosis Date    Chronic knee pain     Depression     Disease of thyroid gland     Dysphagia 3/2/2020    Left upper quadrant pain 3/2/2020    Menorrhagia     Multiple thyroid nodules     Psychiatric disorder     Saphenous nerve neuropathy, 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    Vitamin D deficiency     Vocal cord paralysis     Following partial thyroidectomy       Past Surgical History:   Procedure Laterality Date    DILATION AND CURETTAGE OF UTERUS      THYROIDECTOMY, PARTIAL      UPPER GASTROINTESTINAL ENDOSCOPY      WISDOM TOOTH EXTRACTION         Family History   Problem Relation Age of Onset    Thyroid disease Mother     Ovarian cancer Sister     Thyroid disease Sister     No Known Problems Father     No Known Problems Brother     Anorexia nervosa Sister     Mental illness Sister     No Known Problems Sister     Celiac disease Maternal Grandmother     Colon cancer Neg Hx     Colon polyps Neg Hx     Substance Abuse Neg Hx      I have reviewed and agree with the history as documented      E-Cigarette/Vaping    E-Cigarette Use Never User      E-Cigarette/Vaping Substances    Nicotine No     THC No     CBD No     Flavoring No     Other No     Unknown No      Social History     Tobacco Use    Smoking status: Never Smoker    Smokeless tobacco: Never Used    Tobacco comment:  seasonal, only when it's warm out, social   Vaping Use    Vaping Use: Never used   Substance Use Topics    Alcohol use: Yes     Comment: socially    Drug use: Not Currently     Types: Marijuana     Comment:  no longer using marijuana Review of Systems   Constitutional: Negative for appetite change, chills and fever  HENT: Negative for congestion and sore throat  Respiratory: Negative for cough, chest tightness and shortness of breath  Cardiovascular: Negative for chest pain and leg swelling  Gastrointestinal: Positive for abdominal pain and nausea  Negative for constipation, diarrhea and vomiting  Genitourinary: Positive for vaginal bleeding (spotting)  Negative for dysuria  Musculoskeletal: Negative for myalgias  Skin: Negative for rash and wound  Neurological: Negative for dizziness, syncope and headaches  All other systems reviewed and are negative  Physical Exam  Physical Exam  Vitals and nursing note reviewed  Constitutional:       General: She is not in acute distress  Appearance: She is well-developed  She is not toxic-appearing or diaphoretic  HENT:      Head: Normocephalic and atraumatic  Right Ear: External ear normal       Left Ear: External ear normal       Nose: Nose normal    Eyes:      General: No scleral icterus  Cardiovascular:      Rate and Rhythm: Normal rate and regular rhythm  Heart sounds: Normal heart sounds  Pulmonary:      Effort: Pulmonary effort is normal  No respiratory distress  Breath sounds: Normal breath sounds  Abdominal:      General: There is no distension  Palpations: Abdomen is soft  Tenderness: There is abdominal tenderness in the suprapubic area  There is no guarding or rebound  Musculoskeletal:         General: No deformity  Normal range of motion  Skin:     General: Skin is warm and dry  Findings: No rash  Neurological:      General: No focal deficit present  Mental Status: She is alert        Gait: Gait normal    Psychiatric:         Mood and Affect: Mood normal          Vital Signs  ED Triage Vitals   Temperature Pulse Respirations Blood Pressure SpO2   06/19/22 1802 06/19/22 1802 06/19/22 1802 06/19/22 1802 06/19/22 1802   97 8 °F (36 6 °C) 80 18 142/86 100 %      Temp Source Heart Rate Source Patient Position - Orthostatic VS BP Location FiO2 (%)   06/19/22 1802 -- -- -- --   Temporal          Pain Score       06/19/22 1833       5           Vitals:    06/19/22 1802 06/19/22 2015   BP: 142/86 124/80   Pulse: 80          Visual Acuity      ED Medications  Medications   cephalexin (KEFLEX) capsule 500 mg (has no administration in time range)   acetaminophen (TYLENOL) oral suspension 650 mg (650 mg Oral Given 6/19/22 1833)       Diagnostic Studies  Results Reviewed     Procedure Component Value Units Date/Time    Urine Microscopic [[id number]]  (Normal) Collected: 06/19/22 1927    Lab Status: Final result Specimen: Urine, Clean Catch Updated: 06/19/22 1950     RBC, UA None Seen /hpf      WBC, UA 0-1 /hpf      Epithelial Cells Occasional /hpf      Bacteria, UA Occasional /hpf     UA (URINE) with reflex to Scope [[id number]]  (Abnormal) Collected: 06/19/22 1927    Lab Status: Final result Specimen: Urine, Clean Catch Updated: 06/19/22 1934     Color, UA Yellow     Clarity, UA Clear     Specific [place], UA 1 020     pH, UA 6 0     Leukocytes, UA Negative     Nitrite, UA Negative     Protein, UA Negative mg/dl      Glucose, UA Negative mg/dl      Ketones, UA Negative mg/dl      Urobilinogen, UA 0 2 E U /dl      Bilirubin, UA Negative     Blood, UA Trace-lysed    Comprehensive metabolic panel [[id number]] Collected: 06/19/22 1822    Lab Status: Final result Specimen: Blood from Arm, Right Updated: 06/19/22 1851     Sodium 137 mmol/L      Potassium 3 7 mmol/L      Chloride 100 mmol/L      CO2 30 mmol/L      ANION GAP 7 mmol/L      BUN 13 mg/dL      Creatinine 0 82 mg/dL      Glucose 87 mg/dL      Calcium 9 1 mg/dL      AST 23 U/L      ALT 25 U/L      Alkaline Phosphatase 54 U/L      Total Protein 7 7 g/dL      Albumin 3 8 g/dL      Total Bilirubin 0 30 mg/dL      eGFR 94 ml/min/1 73sq m     Narrative:      National Kidney Disease Foundation guidelines for Chronic Kidney Disease (CKD):     Stage 1 with normal or high GFR (GFR > 90 mL/min/1 73 square meters)    Stage 2 Mild CKD (GFR = 60-89 mL/min/1 73 square meters)    Stage 3A Moderate CKD (GFR = 45-59 mL/min/1 73 square meters)    Stage 3B Moderate CKD (GFR = 30-44 mL/min/1 73 square meters)    Stage 4 Severe CKD (GFR = 15-29 mL/min/1 73 square meters)    Stage 5 End Stage CKD (GFR <15 mL/min/1 73 square meters)  Note: GFR calculation is accurate only with a steady state creatinine    hCG, quantitative [[id number]]  (Abnormal) Collected: 06/19/22 1822    Lab Status: Final result Specimen: Blood from Arm, Right Updated: 06/19/22 1851     HCG, Quant 295 mIU/mL     Narrative:       Expected Ranges:     Approximate               Approximate HCG  Gestation age          Concentration ( mIU/mL)  _____________          ______________________   [place] Bran[place]maryan                      HCG values  0 2-1                       5-50  1-2                           2-3                         100-5000  3-4                         500-91535  4-5                         1000-98011  5-6                         15085-241596  6-8                         60679-341778  8-12                        18309-869501      Urine Microscopic [[id number]] Collected: 06/19/22 1832    Lab Status: Final result Specimen: Urine, Clean Catch Updated: 06/19/22 1846     RBC, UA 0-1 /hpf      WBC, UA 0-1 /hpf      Epithelial Cells Occasional /hpf      Bacteria, UA Occasional /hpf      URINE COMMENT --    UA w Reflex to Microscopic w Reflex to Culture [[id number]]  (Abnormal) Collected: 06/19/22 1832    Lab Status: Final result Specimen: Urine, Clean Catch Updated: 06/19/22 1839     Color, UA Yellow     Clarity, UA Clear     Specific Gravity, UA 1 025     pH, UA 6 5     Leukocytes, UA Negative     Nitrite, UA Negative     Protein, UA Negative mg/dl      Glucose, UA Negative mg/dl      Ketones, UA Negative mg/dl      Urobilinogen, UA 0 2 E U /dl      Bilirubin, UA Negative     Blood, UA Small     URINE COMMENT --    Urine culture [[id number]] Collected: 06/19/22 1832    Lab Status: In process Specimen: Urine, Clean Catch Updated: 06/19/22 1839    CBC and differential [[id number]]  (Abnormal) Collected: 06/19/22 1822    Lab Status: Final result Specimen: Blood from Arm, Right Updated: 06/19/22 1828     WBC 10 22 Thousand/uL      RBC 4 33 Million/uL      Hemoglobin 12 7 g/dL      Hematocrit 39 9 %      MCV 92 fL      MCH 29 3 pg      MCHC 31 8 g/dL      RDW 12 6 %      MPV 9 6 fL      Platelets 888 Thousands/uL      nRBC 0 /100 WBCs      Neutrophils Relative 50 %      Immat GRANS % 0 %      Lymphocytes Relative 39 %      Monocytes Relative 9 %      Eosinophils Relative 2 %      Basophils Relative 0 %      Neutrophils Absolute 5 09 Thousands/µL      Immature Grans Absolute 0 02 Thousand/uL      Lymphocytes Absolute 3 95 Thousands/µL      Monocytes Absolute 0 90 Thousand/µL      Eosinophils Absolute 0 23 Thousand/µL      Basophils Absolute 0 03 Thousands/µL                  US OB < 14 weeks with transvaginal   Final Result by Goyo Toney MD (06/19 2135)      A 1 7 x 1 4 x 1 7 cm complex lesion is seen adjacent to the right ovary  While it does not completely separate from the ovary when pressure is applied, the 2 structures slide next to each other rather than deformity other  These findings are concerning    for an ectopic pregnancy  I personally discussed this study with Dr Joseluis Vance on 6/19/2022 at 9:35 PM                Workstation performed: [id number]                    Procedures  Procedures         ED Course  ED Course as of 06/19/22 2229   [place] Jun 19, 2022   1851 HCG QUANTITATIVE(!): 295   1906 US tech aware of study   2140 Patient's OB/GYN is Dr Beatriz Donald in [facility] out of [facility]                               SBIRT 22yo+    1024 [facility] (25 yo +)    In order to provide better care to our patients, we are screening all of our patients for alcohol and drug use  Would it be okay to ask you these screening questions? Yes Filed at: 2022   Initial Alcohol Screen: US AUDIT-C     1  How often do you have a drink containing alcohol? 0 Filed at: 2022   2  How many drinks containing alcohol do you have on a typical day you are drinking? 0 Filed at: 2022   3a  Male UNDER 65: How often do you have five or more drinks on one occasion? 0 Filed at: 2022   3b  FEMALE Any Age, or MALE 65+: How often do you have 4 or more drinks on one occassion? 0 Filed at: 2022   Audit-C Score 0 Filed at: 2022 2720   JERRY: How many times in the past year have you    Used an illegal drug or used a prescription medication for non-medical reasons? Never Filed at: 2022                    MDM  Number of Diagnoses or Management Options  Abnormal pelvic ultrasound: new and requires workup  Asymptomatic bacteriuria in pregnancy in first trimester: new and requires workup  Pelvic pain in pregnancy: new and requires workup  Diagnosis management comments: 28year old  at 6 weeks 3 days by dates presents for evaluation of abdominal pain and vaginal spotting  Unable to visualize IUP on bedside transabdominal ultrasound  No pelvic free fluid noted  UA contaminated with epithelials  Cannot exclude asymptomatic bacteruria  Patient started on Keflex  US concerning for ectopic  OB/GYN consulted who evaluated the patient in the ED and felt ectopic was less likely  Patient to have close follow up with her OB/GYN  Strict return precautions provided         Amount and/or Complexity of Data Reviewed  Clinical lab tests: ordered and reviewed  Tests in the radiology section of CPT®: ordered and reviewed  Discuss the patient with other providers: yes    Patient Progress  Patient progress: stable      Disposition  Final diagnoses:   Asymptomatic bacteriuria in pregnancy in first trimester   Abnormal pelvic ultrasound   Pelvic pain in pregnancy     Time reflects when diagnosis was documented in both MDM as applicable and the Disposition within this note     Time User Action Codes Description Comment    6/19/2022  7:59 PM Maira Schneider Add [J30 034,  R82 71] Asymptomatic bacteriuria in pregnancy in first trimester     6/19/2022 10:23 PM Maira Schneider Add [R93 89] Abnormal pelvic ultrasound     6/19/2022 10:23 PM Maira Schneider Add [O26 899,  R10 2] Pelvic pain in pregnancy       ED Disposition     ED Disposition   Discharge    Condition   Stable    Date/Time   Sun Jun 19, 2022 10:24 PM    Comment   Janieheriberto Found discharge to home/self care  Follow-up Information     Follow up With Specialties Details Why Contact Info Additional Information    Ann-Marie Harp MD Obstetrics and Gynecology, Obstetrics, Gynecology Call in 1 day to discuss your recent abnormal ultrasound and need for repeat beta hcg testing [address] [place] [address],[address] Emergency Department Emergency Medicine Go to  If symptoms worsen, severe abdominal pain, lightheadedness, fever >100 4F [address] [place], [id number]  [address] [facility] Emergency Department, [address] [place] [address], [facility], [facility] 10          Patient's Medications   Discharge Prescriptions    CEPHALEXIN (KEFLEX) 500 MG CAPSULE    Take 1 capsule (500 mg total) by mouth every 6 (six) hours for 7 days       Start Date: 6/19/2022 End Date: 6/26/2022       Order Dose: 500 mg       Quantity: 28 capsule    Refills: 0       No discharge procedures on file      PDMP Review     None          ED Provider  Electronically Signed by           Chana Valdes MD  06/19/22 0783

## 2022-06-20 NOTE — CONSULTS
GYN Consult Note    Patient Name: Mel Mitchell   Date: 2022   Time: 10:36 PM    Chief Complaint: pregnancy and bleeding  History of Present Illness:   Mel Mitchell is 28 y o   presents to the ED with complaints of vaginal bleeding in pregnancy  Pt reports LNMP was 22, placing her at 6 weeks by dates  Pt states she typically has 28 day cycles  Pt has been trying to get pregnant and closely tracking her period  Pt had some bleeding around 4 weeks, which she attributed to be her period  Pt then took a positive pregnancy test last week, which was positive  Pt presented to ED today due to further light vaginal bleeding and abdominal pain  PT states she had a sharp right sided pain that was 7/10, but now is 3/10 following tylenol  Pt states the pain is similar to a pain she has had for years  Most of the discomfort she is having is suprapubic, midline  Pt and partner have been trying for pregnancy, this is a desired pregnancy  Pt's primary GYN is at [facility], which she has an appointment on Wednesday scheduled        Past medical history:  Patient Active Problem List    Diagnosis Date Noted    Generalized anxiety disorder 2021    Mild asthma without complication, unspecified whether persistent 2021    Depression, recurrent (HCC)     Obesity, Class I, BMI 30-34 9 2021    Gastroesophageal reflux disease 2020    Chronic pain of left knee 2020    Multiple thyroid nodules 2019    Paralysis of left vocal cord 2019    Chronic hoarseness 2019       Obstetric History:       Home Medications:  (Not in a hospital admission)       Allergies:   NKDA    Past Surgical History:   D&C  Thyroidectomy, partial      Social History:   No current tobacco, ETOH, or drug use  History of social tobacco use, history of marijuana use, not currently    Family History:   Thyroid disease, ovarian cancer, celiac disease, mental illness    Review of Systems:  See HPI    Physical Exam:  /80   Pulse 80   Temp 97 8 °F (36 6 °C) (Temporal)   Resp 18   Ht 5' 5" (1 651 m)   Wt 83 9 kg (185 lb)   LMP 2022   SpO2 100%   BMI 30 79 kg/m²   General appearance: alert, no distress  Heart: regular rate and rhythm  Lungs: good respiratory effort   Abdomen: soft, non-tender, no masses, no rebound or guarding,   Extremities: No edema  Neurologic: alert and oriented x 3  Skin: Skin color, texture, turgor normal  No rashes or lesions  Pelvic:   deferred      Ultrasound:  IMPRESSION:     A 1 7 x 1 4 x 1 7 cm complex lesion is seen adjacent to the right ovary  While it does not completely separate from the ovary when pressure is applied, the 2 structures slide next to each other rather than deformity other  These findings are concerning   for an ectopic pregnancy      Labs    HCG Quant 295   Hgb 12 7  Hct 39 9  plts 231    Assessment/Plan:  Marce Clark is 28 y o   @ approximately 6 weeks by LNMP  With vaginal bleeding in early pregnancy  -reviewed ultrasound findings with patient  -discussed possible ectopic pregnancy vs early pregnancy vs incomplete miscarriage  -this is a desired pregnancy, pt does not want treatment unless absolutely sure of diagnosis  -pt is HD stable, no free fluid on ultrasound  -Hgb stable  -HCG is low at 295; would not typically see intrauterine gestational sac until quant is at least 1000  -recommend discharge home with close follow up  -recommend repeat HCG Tuesday with follow up at primary GYN this week  Pt instructed to call her GYN tomorrow to arrange labs and follow up appointment  -discussed ectopic precautions  Pt instructed to return to the ED in event of increased abdominal pain, light headedness or dizziness, or feeling unwell  Pt expresses an understanding of the importance of close follow up this week      -bacteruria on urinalysis, to be treated with Keflex by ED physician

## 2022-06-20 NOTE — DISCHARGE INSTRUCTIONS
Your beta hcg was 295 today  Your ultrasound showed:  No IUP  UTERUS:   The uterus is anteverted in position, measuring 5 8 x 3 3 x 4 0 cm  The uterus has a normal contour and echotexture  The cervix appears within normal limits  ENDOMETRIUM:     The endometrial echo complex has an AP caliber of 5 mm        OVARIES/ADNEXA:   Right ovary:  2 9 x 3 7 x 1 7 cm  9 5 mL   A 1 7 x 1 4 x 1 7 cm complex lesion is seen adjacent to the right ovary  While it does not completely separate from the ovary when pressure is applied, the 2 structures slide next to each other rather than deformity other  These findings are concerning   for an ectopic pregnancy  Doppler flow within normal limits  Left ovary:  3 8 x 2 7 x 2 4 cm  13 0 mL   No suspicious left ovarian abnormality  Doppler flow within normal limits  No suspicious adnexal mass or loculated collections  There is no free fluid  Impression:       A 1 7 x 1 4 x 1 7 cm complex lesion is seen adjacent to the right ovary  While it does not completely separate from the ovary when pressure is applied, the 2 structures slide next to each other rather than deformity other  These findings are concerning   for an ectopic pregnancy

## 2022-06-21 LAB — BACTERIA UR CULT: NORMAL

## 2022-06-30 ENCOUNTER — APPOINTMENT (EMERGENCY)
Dept: ULTRASOUND IMAGING | Facility: HOSPITAL | Age: 32
End: 2022-06-30
Payer: COMMERCIAL

## 2022-06-30 ENCOUNTER — HOSPITAL ENCOUNTER (EMERGENCY)
Facility: HOSPITAL | Age: 32
Discharge: HOME | End: 2022-07-01
Attending: EMERGENCY MEDICINE | Admitting: EMERGENCY MEDICINE
Payer: COMMERCIAL

## 2022-06-30 DIAGNOSIS — O36.80X0 PREGNANCY OF UNKNOWN ANATOMIC LOCATION: Primary | ICD-10-CM

## 2022-06-30 LAB
ABO GROUP BLD: NORMAL
ALBUMIN SERPL BCP-MCNC: 3.9 G/DL (ref 3.5–5)
ALP SERPL-CCNC: 46 U/L (ref 46–116)
ALT SERPL W P-5'-P-CCNC: 19 U/L (ref 12–78)
ANION GAP SERPL CALCULATED.3IONS-SCNC: 7 MMOL/L (ref 4–13)
AST SERPL W P-5'-P-CCNC: 30 U/L (ref 5–45)
B-HCG SERPL-ACNC: 246 MIU/ML
BACTERIA UR QL AUTO: NORMAL /HPF
BASOPHILS # BLD AUTO: 0.03 THOUSANDS/ΜL (ref 0–0.1)
BASOPHILS NFR BLD AUTO: 0 % (ref 0–1)
BILIRUB SERPL-MCNC: 0.3 MG/DL (ref 0.2–1)
BILIRUB UR QL STRIP: NEGATIVE
BUN SERPL-MCNC: 17 MG/DL (ref 5–25)
CALCIUM SERPL-MCNC: 9.1 MG/DL (ref 8.3–10.1)
CHLORIDE SERPL-SCNC: 101 MMOL/L (ref 100–108)
CLARITY UR: CLEAR
CO2 SERPL-SCNC: 25 MMOL/L (ref 21–32)
COLOR UR: YELLOW
CREAT SERPL-MCNC: 0.78 MG/DL (ref 0.6–1.3)
EOSINOPHIL # BLD AUTO: 0.2 THOUSAND/ΜL (ref 0–0.61)
EOSINOPHIL NFR BLD AUTO: 2 % (ref 0–6)
ERYTHROCYTE [DISTWIDTH] IN BLOOD BY AUTOMATED COUNT: 12.6 % (ref 11.6–15.1)
EXT PREG TEST URINE: POSITIVE
EXT. CONTROL ED NAV: ABNORMAL
GFR SERPL CREATININE-BSD FRML MDRD: 100 ML/MIN/1.73SQ M
GLUCOSE SERPL-MCNC: 86 MG/DL (ref 65–140)
GLUCOSE UR STRIP-MCNC: NEGATIVE MG/DL
HCT VFR BLD AUTO: 40.6 % (ref 34.8–46.1)
HGB BLD-MCNC: 12.9 G/DL (ref 11.5–15.4)
HGB UR QL STRIP.AUTO: ABNORMAL
IMM GRANULOCYTES # BLD AUTO: 0.03 THOUSAND/UL (ref 0–0.2)
IMM GRANULOCYTES NFR BLD AUTO: 0 % (ref 0–2)
KETONES UR STRIP-MCNC: NEGATIVE MG/DL
LEUKOCYTE ESTERASE UR QL STRIP: NEGATIVE
LYMPHOCYTES # BLD AUTO: 3.37 THOUSANDS/ΜL (ref 0.6–4.47)
LYMPHOCYTES NFR BLD AUTO: 38 % (ref 14–44)
MCH RBC QN AUTO: 28.9 PG (ref 26.8–34.3)
MCHC RBC AUTO-ENTMCNC: 31.8 G/DL (ref 31.4–37.4)
MCV RBC AUTO: 91 FL (ref 82–98)
MONOCYTES # BLD AUTO: 0.75 THOUSAND/ΜL (ref 0.17–1.22)
MONOCYTES NFR BLD AUTO: 9 % (ref 4–12)
NEUTROPHILS # BLD AUTO: 4.49 THOUSANDS/ΜL (ref 1.85–7.62)
NEUTS SEG NFR BLD AUTO: 51 % (ref 43–75)
NITRITE UR QL STRIP: NEGATIVE
NON-SQ EPI CELLS URNS QL MICRO: NORMAL /HPF
NRBC BLD AUTO-RTO: 0 /100 WBCS
PH UR STRIP.AUTO: 5.5 [PH]
PLATELET # BLD AUTO: 261 THOUSANDS/UL (ref 149–390)
PMV BLD AUTO: 9.5 FL (ref 8.9–12.7)
POTASSIUM SERPL-SCNC: 3.6 MMOL/L (ref 3.5–5.3)
PROT SERPL-MCNC: 8 G/DL (ref 6.4–8.2)
PROT UR STRIP-MCNC: NEGATIVE MG/DL
RBC # BLD AUTO: 4.47 MILLION/UL (ref 3.81–5.12)
RBC #/AREA URNS AUTO: NORMAL /HPF
RH BLD: POSITIVE
SODIUM SERPL-SCNC: 133 MMOL/L (ref 136–145)
SP GR UR STRIP.AUTO: 1.02 (ref 1–1.03)
UROBILINOGEN UR QL STRIP.AUTO: 0.2 E.U./DL
WBC # BLD AUTO: 8.87 THOUSAND/UL (ref 4.31–10.16)
WBC #/AREA URNS AUTO: NORMAL /HPF

## 2022-06-30 PROCEDURE — 36415 COLL VENOUS BLD VENIPUNCTURE: CPT

## 2022-06-30 PROCEDURE — 81001 URINALYSIS AUTO W/SCOPE: CPT | Performed by: EMERGENCY MEDICINE

## 2022-06-30 PROCEDURE — 85025 COMPLETE CBC W/AUTO DIFF WBC: CPT | Performed by: EMERGENCY MEDICINE

## 2022-06-30 PROCEDURE — 99284 EMERGENCY DEPT VISIT MOD MDM: CPT | Performed by: EMERGENCY MEDICINE

## 2022-06-30 PROCEDURE — 96372 THER/PROPH/DIAG INJ SC/IM: CPT

## 2022-06-30 PROCEDURE — NC001 PR NO CHARGE: Performed by: OBSTETRICS & GYNECOLOGY

## 2022-06-30 PROCEDURE — 99284 EMERGENCY DEPT VISIT MOD MDM: CPT

## 2022-06-30 PROCEDURE — 86901 BLOOD TYPING SEROLOGIC RH(D): CPT | Performed by: EMERGENCY MEDICINE

## 2022-06-30 PROCEDURE — 76815 OB US LIMITED FETUS(S): CPT

## 2022-06-30 PROCEDURE — 81025 URINE PREGNANCY TEST: CPT | Performed by: EMERGENCY MEDICINE

## 2022-06-30 PROCEDURE — 86900 BLOOD TYPING SEROLOGIC ABO: CPT | Performed by: EMERGENCY MEDICINE

## 2022-06-30 PROCEDURE — 80053 COMPREHEN METABOLIC PANEL: CPT | Performed by: EMERGENCY MEDICINE

## 2022-06-30 PROCEDURE — 84702 CHORIONIC GONADOTROPIN TEST: CPT | Performed by: EMERGENCY MEDICINE

## 2022-06-30 RX ORDER — METHOTREXATE 25 MG/ML
50 INJECTION INTRA-ARTERIAL; INTRAMUSCULAR; INTRATHECAL; INTRAVENOUS ONCE
Status: COMPLETED | OUTPATIENT
Start: 2022-06-30 | End: 2022-06-30

## 2022-06-30 RX ORDER — METHOTREXATE 25 MG/ML
50 INJECTION, SOLUTION INTRA-ARTERIAL; INTRAMUSCULAR; INTRAVENOUS ONCE
Status: DISCONTINUED | OUTPATIENT
Start: 2022-06-30 | End: 2022-06-30

## 2022-06-30 RX ADMIN — METHOTREXATE 95 MG: 25 SOLUTION INTRA-ARTERIAL; INTRAMUSCULAR; INTRATHECAL; INTRAVENOUS at 23:53

## 2022-06-30 NOTE — ED PROVIDER NOTES
History  Chief Complaint   Patient presents with    Pregnancy Problem     Pt reports being sent to er by OB to get ectopic pregnancy ruled out  States that her hcg was 295 on  - following Wednesday was 170, today it is 215  Reports starting with vaginal bleeding on the  but has since then subsided  72-year-old female past medical history depression presents with concern for ectopic pregnancy irregular vaginal bleeding and pelvic pain  Last normal menstrual period May 5, 2022  She had irregular vaginal bleeding  and   She had pelvic pain at the right side on  drinking to the ER  Ultrasound was nonspecific for possible ectopic or cyst   She had repeat hCG levels that vacillated and  Today she had an ultrasound at [facility] that showed possible ectopic  Prior to Admission Medications   Prescriptions Last Dose Informant Patient Reported? Taking? EUCRISA 2 % OINT  Self Yes No   Sig: Apply 1 application topically 2 (two) times a day as needed    Gabapentin (NEURONTIN) 300 mg/6mL solution  Self No No   Sig: Take 12 mL (600 mg total) by mouth daily at bedtime   PARoxetine Mesylate 7 5 MG CAPS   No No   Sig: Take 1 capsule (7 5 mg total) by mouth in the morning   clobetasol (TEMOVATE) 0 05 % cream  Self Yes No   Sig: as needed    clonazePAM (KlonoPIN) 0 5 mg tablet  Self No No   Si tab  In the morning, 2 tabs  At bedtime; and 1 prn during the day extra for panic attacks     famotidine (PEPCID) 20 mg tablet  Self No No   Sig: Take 1 tablet (20 mg total) by mouth daily at bedtime      Facility-Administered Medications: None       Past Medical History:   Diagnosis Date    Chronic knee pain     Depression     Disease of thyroid gland     Dysphagia 3/2/2020    Left upper quadrant pain 3/2/2020    Menorrhagia     Multiple thyroid nodules     Psychiatric disorder     Saphenous nerve neuropathy, 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    Vitamin D deficiency  Vocal cord paralysis     Following partial thyroidectomy       Past Surgical History:   Procedure Laterality Date    DILATION AND CURETTAGE OF UTERUS      THYROIDECTOMY, PARTIAL      UPPER GASTROINTESTINAL ENDOSCOPY      WISDOM TOOTH EXTRACTION         Family History   Problem Relation Age of Onset    Thyroid disease Mother     Ovarian cancer Sister     Thyroid disease Sister     No Known Problems Father     No Known Problems Brother     Anorexia nervosa Sister     Mental illness Sister     No Known Problems Sister     Celiac disease Maternal Grandmother     Colon cancer Neg Hx     Colon polyps Neg Hx     Substance Abuse Neg Hx      I have reviewed and agree with the history as documented  E-Cigarette/Vaping    E-Cigarette Use Never User      E-Cigarette/Vaping Substances    Nicotine No     THC No     CBD No     Flavoring No     Other No     Unknown No      Social History     Tobacco Use    Smoking status: Never Smoker    Smokeless tobacco: Never Used    Tobacco comment:  seasonal, only when it's warm out, social   Vaping Use    Vaping Use: Never used   Substance Use Topics    Alcohol use: Yes     Comment: socially    Drug use: Not Currently     Types: Marijuana     Comment:  no longer using marijuana       Review of Systems   Constitutional: Negative for chills and fever  HENT: Negative for rhinorrhea and sore throat  Respiratory: Negative for shortness of breath  Cardiovascular: Negative for chest pain  Gastrointestinal: Negative for constipation, diarrhea, nausea and vomiting  Genitourinary: Negative for dysuria and frequency  Skin: Negative for rash  All other systems reviewed and are negative  Physical Exam  Physical Exam  Vitals and nursing note reviewed  Constitutional:       Appearance: She is well-developed  HENT:      Head: Normocephalic and atraumatic        Right Ear: External ear normal       Left Ear: External ear normal       Nose: Nose normal    Eyes:      Conjunctiva/sclera: Conjunctivae normal       Pupils: Pupils are equal, round, and reactive to light  Cardiovascular:      Rate and Rhythm: Normal rate and regular rhythm  Heart sounds: Normal heart sounds  Pulmonary:      Effort: Pulmonary effort is normal  No respiratory distress  Breath sounds: Normal breath sounds  No wheezing  Abdominal:      General: Bowel sounds are normal  There is no distension  Palpations: Abdomen is soft  Tenderness: There is no abdominal tenderness  Musculoskeletal:         General: No deformity  Normal range of motion  Cervical back: Normal range of motion and neck supple  No spinous process tenderness  Skin:     General: Skin is warm and dry  Findings: No rash  Neurological:      General: No focal deficit present  Mental Status: She is alert  GCS: GCS eye subscore is 4  GCS verbal subscore is 5  GCS motor subscore is 6  Sensory: No sensory deficit     Psychiatric:         Mood and Affect: Mood normal          Vital Signs  ED Triage Vitals   Temperature Pulse Respirations Blood Pressure SpO2   06/30/22 1710 06/30/22 1710 06/30/22 1710 06/30/22 1710 06/30/22 1710   97 8 °F (36 6 °C) 72 18 124/76 98 %      Temp Source Heart Rate Source Patient Position - Orthostatic VS BP Location FiO2 (%)   06/30/22 1710 06/30/22 1710 06/30/22 1710 07/01/22 0000 --   Temporal Monitor Sitting Right arm       Pain Score       06/30/22 1710       No Pain           Vitals:    06/30/22 1710 07/01/22 0000   BP: 124/76 128/80   Pulse: 72 76   Patient Position - Orthostatic VS: Sitting          Visual Acuity      ED Medications  Medications   methotrexate (PF) injection 95 mg (95 mg Intramuscular Given 6/30/22 6182)       Diagnostic Studies  Results Reviewed     Procedure Component Value Units Date/Time    Urine Microscopic [[id number]]  (Normal) Collected: 06/30/22 1720    Lab Status: Final result Specimen: Urine, Clean Catch Updated: 06/30/22 1805     RBC, UA 0-1 /hpf      WBC, UA None Seen /hpf      Epithelial Cells Occasional /hpf      Bacteria, UA None Seen /hpf     Comprehensive metabolic panel [[id number]]  (Abnormal) Collected: 06/30/22 1714    Lab Status: Final result Specimen: Blood from Arm, Right Updated: 06/30/22 1744     Sodium 133 mmol/L      Potassium 3 6 mmol/L      Chloride 101 mmol/L      CO2 25 mmol/L      ANION GAP 7 mmol/L      BUN 17 mg/dL      Creatinine 0 78 mg/dL      Glucose 86 mg/dL      Calcium 9 1 mg/dL      AST 30 U/L      ALT 19 U/L      Alkaline Phosphatase 46 U/L      Total Protein 8 0 g/dL      Albumin 3 9 g/dL      Total Bilirubin 0 30 mg/dL      eGFR 100 ml/min/1 73sq m     Narrative:      National Kidney Disease Foundation guidelines for Chronic Kidney Disease (CKD):     Stage 1 with normal or high GFR (GFR > 90 mL/min/1 73 square meters)    Stage 2 Mild CKD (GFR = 60-89 mL/min/1 73 square meters)    Stage 3A Moderate CKD (GFR = 45-59 mL/min/1 73 square meters)    Stage 3B Moderate CKD (GFR = 30-44 mL/min/1 73 square meters)    Stage 4 Severe CKD (GFR = 15-29 mL/min/1 73 square meters)    Stage 5 End Stage CKD (GFR <15 mL/min/1 73 square meters)  Note: GFR calculation is accurate only with a steady state creatinine    hCG, quantitative [[id number]]  (Abnormal) Collected: 06/30/22 1714    Lab Status: Final result Specimen: Blood from Arm, Right Updated: 06/30/22 1744     HCG, Quant 246 mIU/mL     Narrative:       Expected Ranges:     Approximate               Approximate HCG  Gestation age          Concentration ( mIU/mL)  _____________          ______________________   [place]                      HCG values  0 2-1                       5-50  1-2                           2-3                         100-5000  3-4                         500-00047  4-5                         1000-16851  5-6                         [id number]  6-8                         [id number]  8-12 [id number]      UA (URINE) with reflex to Scope [[id number]]  (Abnormal) Collected: 06/30/22 1720    Lab Status: Final result Specimen: Urine, Clean Catch Updated: 06/30/22 1731     Color, UA Yellow     Clarity, UA Clear     Specific Gravity, UA 1 025     pH, UA 5 5     Leukocytes, UA Negative     Nitrite, UA Negative     Protein, UA Negative mg/dl      Glucose, UA Negative mg/dl      Ketones, UA Negative mg/dl      Urobilinogen, UA 0 2 E U /dl      Bilirubin, UA Negative     Occult Blood, UA Trace-Intact    POCT pregnancy, urine [[id number]]  (Abnormal) Resulted: 06/30/22 1722    Lab Status: Final result Specimen: Urine Updated: 06/30/22 1722     EXT PREG TEST UR (Ref: Negative) positive     Control valid    CBC and differential [[id number]] Collected: 06/30/22 1714    Lab Status: Final result Specimen: Blood from Arm, Right Updated: 06/30/22 1720     WBC 8 87 Thousand/uL      RBC 4 47 Million/uL      Hemoglobin 12 9 g/dL      Hematocrit 40 6 %      MCV 91 fL      MCH 28 9 pg      MCHC 31 8 g/dL      RDW 12 6 %      MPV 9 5 fL      Platelets 099 Thousands/uL      nRBC 0 /100 WBCs      Neutrophils Relative 51 %      Immat GRANS % 0 %      Lymphocytes Relative 38 %      Monocytes Relative 9 %      Eosinophils Relative 2 %      Basophils Relative 0 %      Neutrophils Absolute 4 49 Thousands/µL      Immature Grans Absolute 0 03 Thousand/uL      Lymphocytes Absolute 3 37 Thousands/µL      Monocytes Absolute 0 75 Thousand/µL      Eosinophils Absolute 0 20 Thousand/µL      Basophils Absolute 0 03 Thousands/µL                  US OB pregnancy limited with transvaginal   Final Result by Tessie Pack MD (06/30 2224)       No intrauterine gestational sac is seen  The differential diagnosis includes early intrauterine pregnancy, failed pregnancy, or ectopic pregnancy  Clinical correlation with serial beta HCG and followup ultrasound are recommended     Left adnexal round    echogenic lesion appears similar to prior examination and may represent an endometrioma versus ectopic pregnancy in the setting of persistent low-level beta hCG values  The study was marked in [facility] for immediate notification  Workstation performed: [id number]                    Procedures  Procedures         ED Course  ED Course as of 07/01/22 0139   Thu Jun 30, 2022 2256 Reviewed with ob Pawan Link - getting another ultrasound, will consider methotrexate for persistent elevated hcg                               SBIRT 20yo+    Flowsheet Row Most Recent Value   SBIRT (23 yo +)    In order to provide better care to our patients, we are screening all of our patients for alcohol and drug use  Would it be okay to ask you these screening questions? Yes Filed at: 06/30/2022 1810   Initial Alcohol Screen: US AUDIT-C     1  How often do you have a drink containing alcohol? 0 Filed at: 06/30/2022 1810   2  How many drinks containing alcohol do you have on a typical day you are drinking? 0 Filed at: 06/30/2022 1810   3a  Male UNDER 65: How often do you have five or more drinks on one occasion? 0 Filed at: 06/30/2022 1810   3b  FEMALE Any Age, or MALE 65+: How often do you have 4 or more drinks on one occassion? 0 Filed at: 06/30/2022 1810   Audit-C Score 0 Filed at: 06/30/2022 1810   JERRY: How many times in the past year have you    Used an illegal drug or used a prescription medication for non-medical reasons?  Never Filed at: 06/30/2022 1810                    MDM  Number of Diagnoses or Management Options  Pregnancy of unknown anatomic location: established and worsening     Amount and/or Complexity of Data Reviewed  Clinical lab tests: ordered and reviewed  Tests in the radiology section of CPT®: ordered and reviewed  Obtain history from someone other than the patient: yes  Discuss the patient with other providers: yes    Patient Progress  Patient progress: improved      Disposition  Final diagnoses:   Pregnancy of unknown anatomic location     Time reflects when diagnosis was documented in both MDM as applicable and the Disposition within this note     Time User Action Codes Description Comment    6/30/2022 11:07 PM Kaden, [address] Pregnancy of unknown anatomic location       ED Disposition     ED Disposition   Discharge    Condition   Stable    Date/Time   Thu Jun 30, 2022 11:08 PM    Comment   [place] discharge to home/self care  Follow-up Information     Follow up With Specialties Details Why Contact Info Additional Information    [address] OB/GYN [facility] Obstetrics and Gynecology Schedule an appointment as soon as possible for a visit   [address] [address] [phone]  [address] [place] [address], [address], [phone], [phone]          Discharge Medication List as of 6/30/2022 11:57 PM      CONTINUE these medications which have NOT CHANGED    Details   clobetasol (TEMOVATE) 0 05 % cream as needed , Starting Thu 11/14/2019, Historical Med      clonazePAM (KlonoPIN) 0 5 mg tablet 1 tab  In the morning, 2 tabs  At bedtime; and 1 prn during the day extra for panic attacks , Normal      EUCRISA 2 % OINT Apply 1 application topically 2 (two) times a day as needed , Starting Thu 11/14/2019, Historical Med      famotidine (PEPCID) 20 mg tablet Take 1 tablet (20 mg total) by mouth daily at bedtime, Starting Mon 8/30/2021, Normal      Gabapentin (NEURONTIN) 300 mg/6mL solution Take 12 mL (600 mg total) by mouth daily at bedtime, Starting Mon 2/14/2022, Normal      PARoxetine Mesylate 7 5 MG CAPS Take 1 capsule (7 5 mg total) by mouth in the morning, Starting Thu 4/21/2022, Normal             Outpatient Discharge Orders   hCG, quantitative   Standing Status: Future Standing Exp  Date: 06/30/23     hCG, quantitative   Standing Status: Future Standing Exp   Date: 06/30/23       PDMP Review     None          ED Provider  Electronically Signed by           Rena Montemayor,   07/01/22 0139

## 2022-07-01 VITALS
TEMPERATURE: 97.8 F | SYSTOLIC BLOOD PRESSURE: 128 MMHG | DIASTOLIC BLOOD PRESSURE: 80 MMHG | HEART RATE: 76 BPM | RESPIRATION RATE: 16 BRPM | OXYGEN SATURATION: 99 %

## 2022-07-01 RX ORDER — SWAB
1 SWAB, NON-MEDICATED MISCELLANEOUS DAILY
COMMUNITY

## 2022-07-01 NOTE — CONSULTS
Consultation - Gynecology   Fabiana Cervantes 28 y o  female MRN: [id number]  Unit/Bed#: ED 10 Encounter: [id number]    Assessment/Plan     Assessment:  29 yo  with persistent beta hCG after presumed spontaneous  with pregnancy unable to be localized - pregnancy of unknown location    Plan:  1  Pregnancy of unknown location -  Beta hovering at 246 today; No evidence of increased size of adnexal cysts or tubal structures on today's ultrasound; Beta had been trending down initially from 22 at 295-->170-->215-->246 today suggesting persistent trophoblastic tissue; We discussed all options of management including expectant management, medical management and surgical management  Patient is very frustrated and wants this to be over  Since she is completely painfree here today, requesting to eat, desires future fertility and no truly concerning features on repeat pelvic US, she opts for medical management with methotrexate  I counseled the patient that methotrexate is a folic acid antagonist most commonly used to treat psoriasis, rheumatoid arthritis and neoplasms  It inhibits DNA synthesis and is excreted by the kidneys  In a single dose regimen, it is generally tolerated quite well  Uncommon side effects are stomatitis/conjunctivitis/gastritis  I advised her to avoid sun exposure in the next week to decrease chances of developing MTX dermatitis  I advised her to stop her prenatal vitamin with folic acid which can decrease the effectiveness of the drug  I advised her she will need repeat beta hCG on day 4 and 7 (22 and 22) and we expect to see decline of at least 15% in beta between days 4 and 7  I advised her she may feel mild abdominal discomfort in the next week; If her symptoms become severe, she should return to this ED  She will require weekly quantitative beta hCG until negative    She should wait at least 3 months after negative pregnancy test before trying to conceive again  2   Blood type B+  3  Patient to f/u with [facility] as outpatient - will call office on Tuesday to schedule appointment; Recommended she persist with one physician/location until this issue resolved  Case reviewed with ED physician Dr Derrick Feliciano MD  OB/GYN Hospitalist  [phone]  2022   11:34 PM    ___________________________________________________________      History of Present Illness   Physician Requesting Consult: Ashlyn Lopez,   Reason for Consult / Principal Problem: pregnancy of unknow location  Subspeciality: General GYN     HPI: Ankur Rutledge is a 28y o  year old female who presents to the [facility] ED with complaint of persistently elevated beta hCG  LNMP 2022 - bled on -22 and thought this was her menses  On 22 she presented here for mild abdominal pain and was evaluated by Dr Sylvester Strong - OB/Gyn who felt she did not have ectopic and more likely to have spontaneous   Her beta  - 295 --> 170--> 215--> today back up to 246  She was seen at [facility] THE [facility] office this afternoon and was told she had an ectopic and chose to treat with MTX  She checked with her medical insurance company and MTX was only covered at [facility] so presents here  Review of Systems   Constitutional: Negative for activity change, appetite change, fatigue and fever  HENT: Negative for congestion, ear pain, facial swelling, mouth sores and sore throat  Eyes: Negative for pain and visual disturbance  Respiratory: Negative for cough, shortness of breath and wheezing  Cardiovascular: Negative for chest pain, palpitations and leg swelling  Gastrointestinal: Negative for abdominal pain, blood in stool, diarrhea and nausea  Endocrine: Negative for cold intolerance, heat intolerance, polydipsia, polyphagia and polyuria  Genitourinary: Positive for vaginal bleeding (light brown like menses ending)   Negative for dysuria, menstrual problem, pelvic pain, vaginal discharge and vaginal pain  Musculoskeletal: Negative for back pain, myalgias and neck stiffness  Skin: Negative for rash and wound  Allergic/Immunologic: Negative for immunocompromised state  Neurological: Negative for dizziness, speech difficulty, weakness, light-headedness and headaches  Hematological: Does not bruise/bleed easily  Psychiatric/Behavioral: Negative for agitation, behavioral problems, dysphoric mood and sleep disturbance  The patient is nervous/anxious (frustrated and hungry per patient)          Historical Information   Past Medical History:   Diagnosis Date    Chronic knee pain     Depression     Disease of thyroid gland     Dysphagia 3/2/2020    Left upper quadrant pain 3/2/2020    Menorrhagia     Multiple thyroid nodules     Psychiatric disorder     Saphenous nerve neuropathy, 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    Vitamin D deficiency     Vocal cord paralysis     Following partial thyroidectomy     Past Surgical History:   Procedure Laterality Date    DILATION AND CURETTAGE OF UTERUS      THYROIDECTOMY, PARTIAL      UPPER GASTROINTESTINAL ENDOSCOPY      WISDOM TOOTH EXTRACTION       OB History    Para Term  AB Living   2       1 0   SAB IAB Ectopic Multiple Live Births                  # Outcome Date GA Lbr Deniz/2nd Weight Sex Delivery Anes PTL Lv   2 Current            1 AB              Family History   Problem Relation Age of Onset    Thyroid disease Mother     Ovarian cancer Sister     Thyroid disease Sister     No Known Problems Father     No Known Problems Brother     Anorexia nervosa Sister     Mental illness Sister     No Known Problems Sister     Celiac disease Maternal Grandmother     Colon cancer Neg Hx     Colon polyps Neg Hx     Substance Abuse Neg Hx      Social History   Social History     Substance and Sexual Activity   Alcohol Use Yes    Comment: socially     Social History     Substance and Sexual Activity Drug Use Not Currently    Types: Marijuana    Comment:  no longer using marijuana     E-Cigarette/Vaping    E-Cigarette Use Never User      E-Cigarette/Vaping Substances    Nicotine No     THC No     CBD No     Flavoring No     Other No     Unknown No      Social History     Tobacco Use   Smoking Status Never Smoker   Smokeless Tobacco Never Used   Tobacco Comment     seasonal, only when it's warm out, social       Meds/Allergies   current meds:   Current Facility-Administered Medications   Medication Dose Route Frequency    methotrexate (PF) injection 95 mg  50 mg/m2 Intramuscular Once       No Known Allergies    Objective   Vitals: Blood pressure 124/76, pulse 72, temperature 97 8 °F (36 6 °C), temperature source Temporal, resp  rate 18, last menstrual period 05/05/2022, SpO2 98 %  There is no height or weight on file to calculate BMI  No intake or output data in the 24 hours ending 06/30/22 2314    Invasive Devices  Timeline    None                 Physical Exam  Vitals and nursing note reviewed  Exam conducted with a chaperone present  Constitutional:       General: She is not in acute distress  Appearance: Normal appearance  She is not toxic-appearing  HENT:      Head: Normocephalic and atraumatic  Right Ear: External ear normal       Left Ear: External ear normal       Nose: Nose normal       Mouth/Throat:      Mouth: Mucous membranes are moist       Pharynx: Oropharynx is clear  Eyes:      General: No scleral icterus  Extraocular Movements: Extraocular movements intact  Cardiovascular:      Rate and Rhythm: Normal rate  Pulses: Normal pulses  Heart sounds: Normal heart sounds  No murmur heard  Pulmonary:      Effort: No respiratory distress  Breath sounds: No wheezing  Abdominal:      General: Abdomen is flat  Bowel sounds are normal       Palpations: Abdomen is soft  There is no mass  Tenderness: There is no abdominal tenderness   There is no guarding or rebound  Hernia: No hernia is present  Genitourinary:     General: Normal vulva  Pubic Area: No rash or pubic lice  Labia:         Right: No rash or lesion  Left: No rash or lesion  Urethra: No prolapse  Musculoskeletal:      Cervical back: No rigidity  Neurological:      Mental Status: She is alert       Pelvic Exam - normal vulva, digital exam nontender, brown mucousy discharge noted on glove, fundus and adnexa nontender    Lab Results:    Latest Reference Range & Units 06/30/22 17:14   Sodium 136 - 145 mmol/L 133 (L)   Potassium 3 5 - 5 3 mmol/L 3 6   Chloride 100 - 108 mmol/L 101   CO2 21 - 32 mmol/L 25   Anion Gap 4 - 13 mmol/L 7   BUN 5 - 25 mg/dL 17   Creatinine 0 60 - 1 30 mg/dL 0 78   Glucose, Random 65 - 140 mg/dL 86   Calcium 8 3 - 10 1 mg/dL 9 1   AST 5 - 45 U/L 30   ALT 12 - 78 U/L 19   Alkaline Phosphatase 46 - 116 U/L 46   Total Protein 6 4 - 8 2 g/dL 8 0   Albumin 3 5 - 5 0 g/dL 3 9   TOTAL BILIRUBIN 0 20 - 1 00 mg/dL 0 30   eGFR ml/min/1 73sq m 100   WBC 4 31 - 10 16 Thousand/uL 8 87   Red Blood Cell Count 3 81 - 5 12 Million/uL 4 47   Hemoglobin 11 5 - 15 4 g/dL 12 9   HCT 34 8 - 46 1 % 40 6   MCV 82 - 98 fL 91   MCH 26 8 - 34 3 pg 28 9   MCHC 31 4 - 37 4 g/dL 31 8   RDW 11 6 - 15 1 % 12 6   Platelet Count 887 - 390 Thousands/uL 261   MPV 8 9 - 12 7 fL 9 5   nRBC /100 WBCs 0   Neutrophils % 43 - 75 % 51   Immat GRANS % 0 - 2 % 0   Lymphocytes Relative 14 - 44 % 38   Monocytes Relative 4 - 12 % 9   Eosinophils 0 - 6 % 2   Basophils Relative 0 - 1 % 0   Immature Grans Absolute 0 00 - 0 20 Thousand/uL 0 03   Absolute Neutrophils 1 85 - 7 62 Thousands/µL 4 49   Lymphocytes Absolute 0 60 - 4 47 Thousands/µL 3 37   Absolute Monocytes 0 17 - 1 22 Thousand/µL 0 75   Absolute Eosinophils 0 00 - 0 61 Thousand/µL 0 20   Basophils Absolute 0 00 - 0 10 Thousands/µL 0 03   HCG QUANTITATIVE <=6 mIU/mL 246 (H)   (L): Data is abnormally low  (H): Data is abnormally high        Imaging Studies: I have personally reviewed pertinent films in PACS     Study Result    Narrative & Impression   PELVIC ULTRASOUND, COMPLETE     INDICATION: Follow-up ectopic pregnancy  Beta hCG 246 on 6/30/2022, beta hCG of 295 on 6/19/2022      COMPARISON: Ultrasound the pelvis on June 19, 2022      TECHNIQUE:   Transabdominal pelvic ultrasound was performed in sagittal and transverse planes with a curvilinear transducer  Additional transvaginal imaging was performed to better evaluate the endometrium and ovaries  Imaging included volumetric   sweeps as well as traditional still imaging technique      FINDINGS:     UTERUS:  The uterus is anteverted in position, measuring 7 2 x 2 4 x 4 0 cm  A small hypoechoic region in the uterus measures 0 6 x 0 4 x 0 5 cm  An additional exophytic region measures 3 mm, findings may represent small fibroids  The cervix appears within normal limits      ENDOMETRIUM:    The endometrial echo complex has an AP caliber of 5 0 mm  No intrauterine gestational sac is seen        OVARIES/ADNEXA:  Right ovary:  2 8 x 3 0 x 1 4 cm  6 0 mL  No suspicious right ovarian abnormality  There is a 2 9 x 1 6 x 1 8 cm cystic lesion in the right ovary with low-level internal echoes likely due to a hemorrhagic cyst   Doppler flow within normal limits      Left ovary:  3 5 x 2 5 x 2 5 cm  11 5 mL  In the left adnexa, inferior and lateral to the left ovary there is a 1 9 x 1 4 x 1 6 cm round echogenic lesion which appears similar to prior exam   Doppler flow within normal limits      There is no free fluid      IMPRESSION:     No intrauterine gestational sac is seen  The differential diagnosis includes early intrauterine pregnancy, failed pregnancy, or ectopic pregnancy  Clinical correlation with serial beta HCG and followup ultrasound are recommended     Left adnexal round   echogenic lesion appears similar to prior examination and may represent an endometrioma versus ectopic pregnancy in the setting of persistent low-level beta hCG values

## 2022-07-01 NOTE — DISCHARGE INSTRUCTIONS
Pregnancy of unknown location - review ectopic precautions    Also on ultrasound was a hemorrhagic cyst on right and non-specific finding on left possible endometrioma

## 2022-07-06 ENCOUNTER — TELEPHONE (OUTPATIENT)
Dept: OBGYN CLINIC | Facility: CLINIC | Age: 32
End: 2022-07-06

## 2022-07-06 NOTE — TELEPHONE ENCOUNTER
Called patient to follow up on repeating beta HCG after treated with methotrexate on 6/30/22  Patient states she had labs done yesterday and her beta HCG resulted today as 204  Patient with light vaginal bleeding, denies having any pelvic pain  Since at least 15% decrease in beta HCG since previous beta HCG of 246 on 6/30/2022, advised patient to schedule in-office follow up after second beta HCG done on Friday 7/8/2022

## 2022-07-11 ENCOUNTER — TELEPHONE (OUTPATIENT)
Dept: OBGYN CLINIC | Facility: CLINIC | Age: 32
End: 2022-07-11

## 2022-07-11 ENCOUNTER — OFFICE VISIT (OUTPATIENT)
Dept: OBGYN CLINIC | Facility: CLINIC | Age: 32
End: 2022-07-11
Payer: COMMERCIAL

## 2022-07-11 VITALS
SYSTOLIC BLOOD PRESSURE: 100 MMHG | DIASTOLIC BLOOD PRESSURE: 70 MMHG | WEIGHT: 194 LBS | BODY MASS INDEX: 32.32 KG/M2 | HEIGHT: 65 IN

## 2022-07-11 DIAGNOSIS — O26.91 ABNORMAL PREGNANCY IN FIRST TRIMESTER: Primary | ICD-10-CM

## 2022-07-11 PROCEDURE — 99204 OFFICE O/P NEW MOD 45 MIN: CPT | Performed by: OBSTETRICS & GYNECOLOGY

## 2022-07-11 NOTE — TELEPHONE ENCOUNTER
DEANNA has appt today at 5:40 with Dr Santos Howe for methotrexate/ectopic f/u  New patinet to SOG>  Contacted [facility] to obtain recent HCG results from 7/5 and 7/8 for Dr Santos Howe to review  Per Ana Kenyon, she will fax result for Dr Santos Howe review  Results received, scanned to chart  Dr Santos Howe aware

## 2022-07-16 LAB — HCG INTACT+B SERPL-ACNC: 47 MIU/ML

## 2022-07-18 DIAGNOSIS — O03.9 SPONTANEOUS ABORTION: Primary | ICD-10-CM

## 2022-07-23 LAB — HCG INTACT+B SERPL-ACNC: 12 MIU/ML

## 2022-07-25 ENCOUNTER — TELEPHONE (OUTPATIENT)
Dept: OBGYN CLINIC | Facility: CLINIC | Age: 32
End: 2022-07-25

## 2022-07-25 NOTE — TELEPHONE ENCOUNTER
Called Sherry to review HCG result  She states she forgot to mention last week she was treated with keflex when in the ER for UTI  For the past week she has been having burning with urination  She has increased her water and cranberry juice intake but symptoms persist  She feels she is emptying her bladder when urinating  Denies frequency, back pain or fever  Confirmed allergies and pharmacy on file  Dr Marquise Rosado, please provde recommendation for burning with urination

## 2022-07-27 NOTE — TELEPHONE ENCOUNTER
Reviewed Dr Silas Gallardo recommendation, Rula Shine requsted to c/b to schedule  She is currently at work and with a client

## 2022-07-27 NOTE — PROGRESS NOTES
Assessment/Plan:      Diagnoses and all orders for this visit:    Abnormal pregnancy in first trimester  -  Informed patient of appropriately decreasing beta HCGs and unremarkable findings on pelvic exam   Advised patient to continue with beta HCGs once a week until value <5  Patient aware of guideline/recommendation post methotrexate treatment  -     hCG, quantitative; Future  -     hCG, quantitative  -     hCG, quantitative; Future  -     hCG, quantitative        Subjective:     Patient ID: Jodi Haynes is a 28 y o  female  Patient presents as a new patient after being treated with methotrexate on 6/30/22, at [facility], for suspected ectopic pregnancy  Patient denies having any pelvic pain  Has had increase in vaginal bleeding over the last day with menstrual type bleeding on 7/8-7/9  Bleeding decreased since yesterday and currently has brown discharge  Patient has had beta HCG done on day 4 and 7  Presents today for an exam and follow up on beta HCGs      Review of Systems   Constitutional: Negative for activity change and unexpected weight change  Genitourinary: Positive for vaginal bleeding  Negative for difficulty urinating, pelvic pain and vaginal pain  Objective:     Physical Exam  Vitals and nursing note reviewed  HENT:      Head: Normocephalic  Abdominal:      General: There is no distension  Palpations: Abdomen is soft  There is no mass  Tenderness: There is no abdominal tenderness  There is no rebound  Genitourinary:     General: Normal vulva  Exam position: Lithotomy position  Labia:         Right: No rash, tenderness or lesion  Left: No rash, tenderness or lesion  Urethra: No urethral pain or urethral lesion  Vagina: Normal  No vaginal discharge  Cervix: No cervical motion tenderness, lesion or erythema  Uterus: Normal        Adnexa: Right adnexa normal and left adnexa normal       Rectum: No external hemorrhoid  Comments: Brown discharge noted in vaginal vault  No active bleeding noted from cervical os  Musculoskeletal:      Right lower leg: No edema  Left lower leg: No edema  Skin:     General: Skin is warm  Neurological:      Mental Status: She is alert and oriented to person, place, and time  Psychiatric:         Mood and Affect: Mood normal          Behavior: Behavior normal          Thought Content:  Thought content normal

## 2022-07-27 NOTE — TELEPHONE ENCOUNTER
Please advise patient to schedule office visit at [facility] or PCP or can be evaluated at urgent care

## 2022-07-30 LAB — HCG INTACT+B SERPL-ACNC: <1 MIU/ML

## 2022-08-05 ENCOUNTER — OFFICE VISIT (OUTPATIENT)
Dept: FAMILY MEDICINE CLINIC | Facility: HOSPITAL | Age: 32
End: 2022-08-05
Payer: COMMERCIAL

## 2022-08-05 VITALS
OXYGEN SATURATION: 97 % | WEIGHT: 195 LBS | RESPIRATION RATE: 16 BRPM | TEMPERATURE: 98 F | HEART RATE: 74 BPM | HEIGHT: 65 IN | BODY MASS INDEX: 32.49 KG/M2 | DIASTOLIC BLOOD PRESSURE: 68 MMHG | SYSTOLIC BLOOD PRESSURE: 108 MMHG

## 2022-08-05 DIAGNOSIS — E16.2 LOW BLOOD SUGAR: ICD-10-CM

## 2022-08-05 DIAGNOSIS — R58 BLOOD LOSS: ICD-10-CM

## 2022-08-05 DIAGNOSIS — R53.82 CHRONIC FATIGUE: ICD-10-CM

## 2022-08-05 DIAGNOSIS — R40.0 HAS DAYTIME DROWSINESS: ICD-10-CM

## 2022-08-05 DIAGNOSIS — R30.0 DYSURIA: Primary | ICD-10-CM

## 2022-08-05 DIAGNOSIS — Z86.39 HISTORY OF ELEVATED GLUCOSE: ICD-10-CM

## 2022-08-05 LAB
SL AMB  POCT GLUCOSE, UA: ABNORMAL
SL AMB LEUKOCYTE ESTERASE,UA: ABNORMAL
SL AMB POCT BILIRUBIN,UA: ABNORMAL
SL AMB POCT BLOOD,UA: ABNORMAL
SL AMB POCT CLARITY,UA: CLEAR
SL AMB POCT COLOR,UA: YELLOW
SL AMB POCT KETONES,UA: ABNORMAL
SL AMB POCT NITRITE,UA: ABNORMAL
SL AMB POCT PH,UA: 6
SL AMB POCT SPECIFIC GRAVITY,UA: 1.02
SL AMB POCT URINE PROTEIN: ABNORMAL
SL AMB POCT UROBILINOGEN: ABNORMAL

## 2022-08-05 PROCEDURE — 81003 URINALYSIS AUTO W/O SCOPE: CPT | Performed by: STUDENT IN AN ORGANIZED HEALTH CARE EDUCATION/TRAINING PROGRAM

## 2022-08-05 PROCEDURE — 99214 OFFICE O/P EST MOD 30 MIN: CPT | Performed by: STUDENT IN AN ORGANIZED HEALTH CARE EDUCATION/TRAINING PROGRAM

## 2022-08-05 NOTE — PROGRESS NOTES
[facility],     Assessment/Plan:      Diagnosis ICD-10-CM Associated Orders   1  Dysuria  R30 0 POCT urine dip auto non-scope   2  Chronic fatigue  R53 82 Ferritin     CBC and differential     TIBC     Ferritin     CBC and differential     TIBC   3  Has daytime drowsiness  R40 0 Ferritin     CBC and differential     TIBC     Ferritin     CBC and differential     TIBC   4  Blood loss  R58 Ferritin     CBC and differential     TIBC     Ferritin     CBC and differential     TIBC   5  Low blood sugar  E16 2 Comprehensive metabolic panel     HEMOGLOBIN A1C W/ EAG ESTIMATION     Comprehensive metabolic panel     HEMOGLOBIN A1C W/ EAG ESTIMATION   6  History of elevated glucose  Z86 39 Comprehensive metabolic panel     HEMOGLOBIN A1C W/ EAG ESTIMATION     Comprehensive metabolic panel     HEMOGLOBIN A1C W/ EAG ESTIMATION      Urine dip done in office negative leukocytes and nitrates  Diagnostic blood work ordered   Continue increased fluid hydration, ER precautions reviewed  Return in about 2 months (around 10/5/2022) for Mental Health F/U    Patient may call or return to office with any questions or concerns  ______________________________________________________________________  Subjective:     Patient ID: Tahmina Miller is a 28 y o  female  HPI  Tahmina Miller  Chief Complaint   Patient presents with    Urinary Tract Infection     C/o dysuria  Onset was 3 weeks ago  Pt taking Cranberry pills  Pt took Azo  Pt thinks this has resolved now  Laying down & turning head makes her lightheaded  Room moves at time, dizzy  Patient had asymptomatic bacteriuria on 6/19/22 ER visit, and miscarriage in June  Transvaginal ultrasound on 06/19 and 6/30/22  Dimple Maurice in ED for 6/19/22 - told her it was ectopic  Then miscarriage, then PUL  Dr Clare Adamson for OB, 170 beta hcg, 215 then 246, then eventually down to <1  Not taking OCP  Live in , 4 yrs  No kids yet   One prior pregnancy - [facility] Jan 2016  IUD 2018 with under sedation  Now seeing Dr Dennis Miles  The following portions of the patient's history were reviewed and updated as appropriate: allergies, current medications, past medical history, and problem list     Review of Systems   Constitutional: Negative for chills and fever  Neurological: Positive for dizziness and light-headedness  Negative for weakness, numbness and headaches  Objective:      Vitals:    08/05/22 1045   BP: 108/68   Pulse: 74   Resp: 16   Temp: 98 °F (36 7 °C)   SpO2: 97%      Physical Exam  Vitals reviewed  Constitutional:       General: She is not in acute distress  Appearance: Normal appearance  She is well-developed  She is not ill-appearing  HENT:      Head: Normocephalic and atraumatic  Eyes:      General: No scleral icterus  Right eye: No discharge  Left eye: No discharge  Cardiovascular:      Rate and Rhythm: Normal rate and regular rhythm  Pulses: Normal pulses  Heart sounds: Normal heart sounds  No murmur heard  Pulmonary:      Effort: Pulmonary effort is normal  No respiratory distress  Breath sounds: Normal breath sounds  No stridor  No wheezing  Musculoskeletal:      Cervical back: Normal range of motion  Right lower leg: No edema  Left lower leg: No edema  Skin:     General: Skin is warm  Neurological:      Mental Status: She is alert and oriented to person, place, and time  Gait: Gait normal    Psychiatric:         Mood and Affect: Mood normal          Behavior: Behavior normal          Thought Content: Thought content normal          Judgment: Judgment normal            Portions of the record may have been created with voice recognition software  Occasional wrong word or "sound alike" substitutions may have occurred due to the inherent limitations of voice recognition software   Please review the chart carefully and recognize, using context, where substitutions/typographical errors may have occurred

## 2022-08-09 LAB
ALBUMIN SERPL-MCNC: 4.3 G/DL (ref 3.8–4.8)
ALBUMIN/GLOB SERPL: 1.7 {RATIO} (ref 1.2–2.2)
ALP SERPL-CCNC: 48 IU/L (ref 44–121)
ALT SERPL-CCNC: 24 IU/L (ref 0–32)
AST SERPL-CCNC: 24 IU/L (ref 0–40)
BASOPHILS # BLD AUTO: 0 X10E3/UL (ref 0–0.2)
BASOPHILS NFR BLD AUTO: 0 %
BILIRUB SERPL-MCNC: 0.4 MG/DL (ref 0–1.2)
BUN SERPL-MCNC: 12 MG/DL (ref 6–20)
BUN/CREAT SERPL: 16 (ref 9–23)
CALCIUM SERPL-MCNC: 9.1 MG/DL (ref 8.7–10.2)
CHLORIDE SERPL-SCNC: 102 MMOL/L (ref 96–106)
CO2 SERPL-SCNC: 22 MMOL/L (ref 20–29)
CREAT SERPL-MCNC: 0.76 MG/DL (ref 0.57–1)
EGFR: 107 ML/MIN/1.73
EOSINOPHIL # BLD AUTO: 0.4 X10E3/UL (ref 0–0.4)
EOSINOPHIL NFR BLD AUTO: 6 %
ERYTHROCYTE [DISTWIDTH] IN BLOOD BY AUTOMATED COUNT: 12.6 % (ref 11.7–15.4)
EST. AVERAGE GLUCOSE BLD GHB EST-MCNC: 100 MG/DL
FERRITIN SERPL-MCNC: 37 NG/ML (ref 15–150)
GLOBULIN SER-MCNC: 2.6 G/DL (ref 1.5–4.5)
GLUCOSE SERPL-MCNC: 88 MG/DL (ref 65–99)
HBA1C MFR BLD: 5.1 % (ref 4.8–5.6)
HCT VFR BLD AUTO: 38.4 % (ref 34–46.6)
HGB BLD-MCNC: 12.8 G/DL (ref 11.1–15.9)
IMM GRANULOCYTES # BLD: 0 X10E3/UL (ref 0–0.1)
IMM GRANULOCYTES NFR BLD: 0 %
IRON SATN MFR SERPL: 16 % (ref 15–55)
IRON SERPL-MCNC: 60 UG/DL (ref 27–159)
LYMPHOCYTES # BLD AUTO: 2.5 X10E3/UL (ref 0.7–3.1)
LYMPHOCYTES NFR BLD AUTO: 36 %
MCH RBC QN AUTO: 29.1 PG (ref 26.6–33)
MCHC RBC AUTO-ENTMCNC: 33.3 G/DL (ref 31.5–35.7)
MCV RBC AUTO: 87 FL (ref 79–97)
MONOCYTES # BLD AUTO: 0.5 X10E3/UL (ref 0.1–0.9)
MONOCYTES NFR BLD AUTO: 8 %
NEUTROPHILS # BLD AUTO: 3.4 X10E3/UL (ref 1.4–7)
NEUTROPHILS NFR BLD AUTO: 50 %
PLATELET # BLD AUTO: 233 X10E3/UL (ref 150–450)
POTASSIUM SERPL-SCNC: 4.1 MMOL/L (ref 3.5–5.2)
PROT SERPL-MCNC: 6.9 G/DL (ref 6–8.5)
RBC # BLD AUTO: 4.4 X10E6/UL (ref 3.77–5.28)
SODIUM SERPL-SCNC: 138 MMOL/L (ref 134–144)
TIBC SERPL-MCNC: 378 UG/DL (ref 250–450)
UIBC SERPL-MCNC: 318 UG/DL (ref 131–425)
WBC # BLD AUTO: 6.8 X10E3/UL (ref 3.4–10.8)

## 2022-08-15 ENCOUNTER — CONSULT (OUTPATIENT)
Dept: ENDOCRINOLOGY | Facility: HOSPITAL | Age: 32
End: 2022-08-15
Payer: COMMERCIAL

## 2022-08-15 VITALS
DIASTOLIC BLOOD PRESSURE: 80 MMHG | HEIGHT: 65 IN | BODY MASS INDEX: 33.49 KG/M2 | WEIGHT: 201 LBS | HEART RATE: 76 BPM | SYSTOLIC BLOOD PRESSURE: 110 MMHG

## 2022-08-15 DIAGNOSIS — E89.0 H/O PARTIAL THYROIDECTOMY: ICD-10-CM

## 2022-08-15 DIAGNOSIS — E88.9: ICD-10-CM

## 2022-08-15 DIAGNOSIS — R63.5 WEIGHT GAIN: ICD-10-CM

## 2022-08-15 DIAGNOSIS — E04.2 MULTIPLE THYROID NODULES: Primary | Chronic | ICD-10-CM

## 2022-08-15 PROCEDURE — 99204 OFFICE O/P NEW MOD 45 MIN: CPT | Performed by: INTERNAL MEDICINE

## 2022-08-15 NOTE — PATIENT INSTRUCTIONS
Let's do some fasting blood work, drawn between 7-9 am     I'll order repeat thyroid ultrasound  Follow up to be determined based on the studies

## 2022-08-15 NOTE — PROGRESS NOTES
8/15/2022    Assessment/Plan      Diagnoses and all orders for this visit:    Multiple thyroid nodules  -     Cortisol Level, AM Specimen Lab Collect  -     Comprehensive metabolic panel Lab Collect  -     TSH, 3rd generation Lab Collect  -     T4, free Lab Collect  -     Testosterone, free, total Lab Collect  -     Insulin, fasting- Lab Collect  -     Thyroid Peroxidase and Thyroglobulin Antibodies  -     DHEA-sulfate Lab Collect  -     T3, free  -     US thyroid; Future    Weight gain  -     Ambulatory Referral to Endocrinology  -     Cortisol Level, AM Specimen Lab Collect  -     Comprehensive metabolic panel Lab Collect  -     TSH, 3rd generation Lab Collect  -     T4, free Lab Collect  -     Testosterone, free, total Lab Collect  -     Insulin, fasting- Lab Collect  -     Thyroid Peroxidase and Thyroglobulin Antibodies  -     DHEA-sulfate Lab Collect  -     T3, free    Metabolic problem involving sugars  -     Ambulatory Referral to Endocrinology  -     Cortisol Level, AM Specimen Lab Collect  -     Comprehensive metabolic panel Lab Collect  -     TSH, 3rd generation Lab Collect  -     T4, free Lab Collect  -     Testosterone, free, total Lab Collect  -     Insulin, fasting- Lab Collect  -     Thyroid Peroxidase and Thyroglobulin Antibodies  -     DHEA-sulfate Lab Collect  -     T3, free    H/O partial thyroidectomy  -     Cortisol Level, AM Specimen Lab Collect  -     Comprehensive metabolic panel Lab Collect  -     TSH, 3rd generation Lab Collect  -     T4, free Lab Collect  -     Testosterone, free, total Lab Collect  -     Insulin, fasting- Lab Collect  -     Thyroid Peroxidase and Thyroglobulin Antibodies  -     DHEA-sulfate Lab Collect  -     T3, free  -     US thyroid; Future        Assessment/Plan:  1  Weight gain  She has struggled with weight gain and some possibilities include insulin resistance along with thyroid disease and excess cortisol    To that end, I will have blood work drawn fasting between 7-9 a m     Will not be able to do her female hormone levels due to her recent pregnancy  2  History of partial thyroidectomy with multiple thyroid nodules  I will repeat her thyroid ultrasound now as it has been about a year  I will repeat her thyroid function studies and evaluate for autoimmune thyroid disease  Blood work to be drawn includes a fasting CMP with insulin level, free and total testosterone level, DHEA-S, a m  cortisol, TSH, free T4, free T3, and thyroid peroxidase antibodies along with antithyroglobulin antibodies  Follow-up will be determined based on the blood work  CC:  Weight gain consult    History of Present Illness     HPI: Amy Smalls is a 28y o  year old female with history of abnormal weight gain for evaluation/consult  She reports that she is someone who used to weigh about 130 lb in high school then she started to date someone who used to go out to dinner on a regular basis and gained about 50 lb in 19 months to 178 lb  She was able to drop her weight to 162 lb but then weight went easily when she went off her medications  She did go on a diet once and lose 3 lb with eating a meal only diet but then it went back up again with a healthy diet  Of note, in December 2021 through February 2022, she started a glucose control shake after eating to decrease fatigue and improve her blood sugars and weight went down 15 lb  Unfortunately, she has recently been more emotional eating and weight is up 12 lb after that and stopping these protein shakes  She does try to eat healthy and eats weight watchers snacks that are 70 calories or protein snacks for a snack  She has no hirsutism on her chin or mid chest her abdomen but has some hirsutism around the nipples  She is no violaceous striae or acne  She has polyphagia and polydipsia but no polyuria  She has once a night nocturia  She denies numbness or tingling of the feet    She has some blurry vision with driving  She of note had multiple thyroid nodules for which she had a partial thyroidectomy on the left but still has some nodules in the right lobe of the thyroid which have been stable in size but are T rad 4 and T rad 5  She has a very large right-sided thyroid gland  She does often feel like she has a choking tight sensation in her throat  She was told she had heartburn and tried Pepcid for a very short time which helped heartburn but she did not give it long enough to see if it would help the choking  She is to feel like she has to clear her throat frequently and has difficulty with swallowing pills  Certain foods that are drier such as potatoes, and breads can not be swallow without liquid and she feels like they get stuck with some pain in her chest   She does of note have thyroid disease and a mother  She is no sensation of difficulty with breathing when she lays on her back  She reports she is always exhausted  She has cold intolerance  She denies palpitation or tremors  She has insomnia will toss and turn at night  She has anxiety and depression  She denies diarrhea or constipation  She has dry skin and brittle nails but no hair loss  She denies diplopia  She is no history of head or neck irradiation in the past   Menstrual cycles were very long and heavy when she had an IUD in place but this was removed in January 2022 and now menses last about 3 days with no cramps and are quite light occur on a regular basis  Review of Systems   Constitutional: Positive for fatigue and unexpected weight change  Exhausted  HENT: Positive for trouble swallowing  Negative for hearing loss and tinnitus  No XRT to the head or neck in the past    Eyes: Positive for visual disturbance  No diplopia  Has blurry vision and to wear glasses to drive  Respiratory: Positive for choking and shortness of breath  Negative for chest tightness  SOB with weight      Cardiovascular: Positive for chest pain  Negative for palpitations and leg swelling  Chest pain if food gets stuck  Gastrointestinal: Negative for abdominal pain, constipation, diarrhea and nausea  Endocrine: Positive for cold intolerance, polydipsia and polyphagia  Negative for heat intolerance and polyuria  Nocturia once a night  Always hungry  Genitourinary:        Menses were very long and heavy with IUD which was removed jan 2022  Significant increase in cramps and pain with sex with IUD  Still painful intercourse  Menses now light and last 3 days with no cramps and regular  Had recent ectopic pregnancy and menses was last 8/8-8/10 postpartum, there was a clot in flow  Musculoskeletal: Negative for arthralgias and back pain  Skin: Positive for rash  Has dry skin  Has brittle nails  No hair loss  Recent arm rash after MTX  Neurological: Positive for dizziness and light-headedness  Negative for tremors, weakness, numbness and headaches  Dizzy when laying down and lightheaded when standing up  Worse since MTX injection  Psychiatric/Behavioral: Positive for dysphoric mood and sleep disturbance  The patient is nervous/anxious  Will toss and turn at night but sleeps well when asleep  Gets impatient and will get anxiety with heart racing and has depression  Some malaise and sadness when by herself  Off Paxil and clonopin at present          Historical Information   Past Medical History:   Diagnosis Date    Chronic knee pain     Depression     Disease of thyroid gland     Dysphagia 3/2/2020    Left upper quadrant pain 3/2/2020    Menorrhagia     Multiple thyroid nodules     Psychiatric disorder     Saphenous nerve neuropathy, 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    Vitamin D deficiency     Vocal cord paralysis     Following partial thyroidectomy     Past Surgical History:   Procedure Laterality Date    DILATION AND EVACUATION      THYROIDECTOMY, PARTIAL Left 2013    UPPER GASTROINTESTINAL ENDOSCOPY      WISDOM TOOTH EXTRACTION       Social History   Social History     Substance and Sexual Activity   Alcohol Use Yes    Comment: socially     Social History     Substance and Sexual Activity   Drug Use Not Currently    Types: Marijuana    Comment:  no longer using marijuana     Social History     Tobacco Use   Smoking Status Never Smoker   Smokeless Tobacco Never Used   Tobacco Comment     seasonal, only when it's warm out, social     Family History:   Family History   Problem Relation Age of Onset    Thyroid disease Mother         thyroid nodules    No Known Problems Father     Ovarian cancer Sister     Thyroid disease Sister     Anorexia nervosa Sister     Mental illness Sister     No Known Problems Sister     No Known Problems Brother     Other Maternal Aunt         hypoglycemia    Celiac disease Maternal Grandmother     Colon cancer Neg Hx     Colon polyps Neg Hx     Substance Abuse Neg Hx        Meds/Allergies   Current Outpatient Medications   Medication Sig Dispense Refill    clobetasol (TEMOVATE) 0 05 % cream as needed       EUCRISA 2 % OINT Apply 1 application topically 2 (two) times a day as needed       Prenatal Vit-Fe Fumarate-FA (prenatal multivitamin) 28-0 8 MG TABS Take 1 tablet by mouth daily       No current facility-administered medications for this visit  No Known Allergies    Objective   Vitals: Blood pressure 110/80, pulse 76, height 5' 5" (1 651 m), weight 91 2 kg (201 lb), last menstrual period 05/05/2022  Invasive Devices  Report    None                 Physical Exam  Vitals reviewed  Constitutional:       Appearance: Normal appearance  She is well-developed  She is obese  HENT:      Head: Normocephalic and atraumatic  Eyes:      Extraocular Movements: Extraocular movements intact  Conjunctiva/sclera: Conjunctivae normal       Comments: No lid lag, stare, proptosis, or periorbital edema  Neck:      Thyroid: No thyromegaly  Vascular: No carotid bruit  Comments: Healed anterior neck scar  Right lobe enlarged and nodular  No bruits over the thyroid gland  Cardiovascular:      Rate and Rhythm: Normal rate and regular rhythm  Heart sounds: Normal heart sounds  No murmur heard  Pulmonary:      Effort: Pulmonary effort is normal       Breath sounds: Normal breath sounds  No wheezing  Abdominal:      General: Bowel sounds are normal       Palpations: Abdomen is soft  Tenderness: There is no abdominal tenderness  Musculoskeletal:         General: No deformity  Normal range of motion  Cervical back: Normal range of motion and neck supple  Right lower leg: No edema  Left lower leg: No edema  Comments: No tremor of the outstretched hands  No spinous process tenderness  No CVA tenderness  Lymphadenopathy:      Cervical: No cervical adenopathy  Skin:     General: Skin is warm and dry  Findings: No rash  Comments: Acanthosis nigricans in the nape of her neck  No violaceous striae  No hirsutism  Neurological:      Mental Status: She is alert and oriented to person, place, and time  Deep Tendon Reflexes: Reflexes are normal and symmetric  Comments: Patellar deep tendon reflexes normal          The history was obtained from the review of the chart and from the patient  Lab Results:   Blood work done on 12/22/2021 showed a TSH of 3 55 that is elevated from a TSH in March 2021 which was 2 52          Lab Results   Component Value Date    CREATININE 0 76 08/08/2022    CREATININE 0 78 06/30/2022    CREATININE 0 82 06/19/2022    BUN 12 08/08/2022    K 4 1 08/08/2022     08/08/2022    CO2 22 08/08/2022     eGFR   Date Value Ref Range Status   08/08/2022 107 >59 mL/min/1 73 Final   06/30/2022 100 ml/min/1 73sq m Final         Lab Results   Component Value Date    HDL 70 03/30/2021    TRIG 85 03/30/2021    CHOLHDL 2 3 03/30/2021       Lab Results   Component Value Date    ALT 24 08/08/2022    AST 24 08/08/2022    ALKPHOS 46 06/30/2022       Lab Results   Component Value Date    TSH 3 690 12/22/2021    TSH 3 550 12/22/2021    FREET4 1 16 12/22/2021     Thyroid ultrasound:     THYROID ULTRASOUND performed on 07/12/2021     INDICATION:    E04 2: Nontoxic multinodular goiter      COMPARISON:  February 19, 2020     TECHNIQUE:   Ultrasound of the thyroid was performed with a high frequency linear transducer in transverse and sagittal planes including volumetric imaging sweeps as well as traditional still imaging technique      FINDINGS:  Right thyroid lobe is markedly heterogeneous and enlarged      Right lobe:  7 0 x 2 3 x 2 0 cm  Volume 15 3 mL  Left lobe:  2 9 x 1 2 x 0 8 cm  Volume 1 3 mL  Isthmus:  0 1 cm         Nodule #1  Image 20  RIGHT upper pole nodule measuring 1 2 x 0 4 x 0 7 cm  This nodule has decreased in size from prior  COMPOSITION:  2 points, could not be determined with certainty as a result of technical factors though I suspect based on posterior acoustic enhancement that this is in fact the cystic nodule  ECHOGENICITY:  2 points, hypoechoic  SHAPE:  0 points, wider-than-tall  MARGIN: 0 points, smooth  ECHOGENIC FOCI:  0 points, none or large comet-tail artifacts  TI-RADS Classification: TR 4 (4-6 points), Moderately suspicious  FNA if > 1 5 cm  Follow if > 1cm      Nodule #2  Image 22 of series 1000  RIGHT upper pole nodule measuring 0 6 x 0 4 x 0 3 cm  This nodule has decreased in size from prior  COMPOSITION:  2 points, could not be determined with certainty as a result of technical factors though I suspect based on posterior acoustic enhancement that this is in fact the cystic nodule  ECHOGENICITY:  2 points, hypoechoic  SHAPE:  0 points, wider-than-tall  MARGIN: 0 points, smooth  ECHOGENIC FOCI:  3 points, punctate echogenic foci  TI-RADS Classification: TR 5 (7 or > points), Highly suspicious  FNA if > 1 cm    Follow if > 0 5 cm      There are additional nodules of lesser size and/or TI-RADS score  There is a dominant echogenic 2 1 cm cyst at the lower pole the right thyroid lobe which is similar from previous examination  These do not necessitate additional evaluation based on ACR   criteria       IMPRESSION:     Heterogeneous enlarged right thyroid lobe with multiple cysts and small intermixed nodules  The nodules of concern described in the prior examination have decreased in size  Suspect that these diminishing size nodules probably represent complex cysts   rather than true solid nodules  Given the patient's young age and the complex heterogeneous appearance of the thyroid, conservative management with repeat ultrasound follow-up two years is considered warranted          Future Appointments   Date Time Provider Loyd Williamson   8/19/2022  6:00 PM Bedelmar Elder  [phone]

## 2022-08-17 LAB
ALBUMIN SERPL-MCNC: 4.4 G/DL (ref 3.8–4.8)
ALBUMIN/GLOB SERPL: 1.6 {RATIO} (ref 1.2–2.2)
ALP SERPL-CCNC: 53 IU/L (ref 44–121)
ALT SERPL-CCNC: 19 IU/L (ref 0–32)
AST SERPL-CCNC: 19 IU/L (ref 0–40)
BILIRUB SERPL-MCNC: 0.4 MG/DL (ref 0–1.2)
BUN SERPL-MCNC: 14 MG/DL (ref 6–20)
BUN/CREAT SERPL: 16 (ref 9–23)
CALCIUM SERPL-MCNC: 9.8 MG/DL (ref 8.7–10.2)
CHLORIDE SERPL-SCNC: 102 MMOL/L (ref 96–106)
CO2 SERPL-SCNC: 26 MMOL/L (ref 20–29)
CORTIS AM PEAK SERPL-MCNC: 21.3 UG/DL (ref 6.2–19.4)
CREAT SERPL-MCNC: 0.86 MG/DL (ref 0.57–1)
DHEA-S SERPL-MCNC: 177 UG/DL (ref 84.8–378)
EGFR: 92 ML/MIN/1.73
GLOBULIN SER-MCNC: 2.8 G/DL (ref 1.5–4.5)
GLUCOSE SERPL-MCNC: 87 MG/DL (ref 65–99)
INSULIN SERPL-ACNC: 8.8 UIU/ML (ref 2.6–24.9)
POTASSIUM SERPL-SCNC: 4.4 MMOL/L (ref 3.5–5.2)
PROT SERPL-MCNC: 7.2 G/DL (ref 6–8.5)
SODIUM SERPL-SCNC: 141 MMOL/L (ref 134–144)
T3FREE SERPL-MCNC: 3.1 PG/ML (ref 2–4.4)
T4 FREE SERPL-MCNC: 1.04 NG/DL (ref 0.82–1.77)
TESTOST FREE SERPL-MCNC: 3.1 PG/ML (ref 0–4.2)
TESTOST SERPL-MCNC: 33 NG/DL (ref 8–60)
THYROGLOB AB SERPL-ACNC: <1 IU/ML (ref 0–0.9)
THYROPEROXIDASE AB SERPL-ACNC: <8 IU/ML (ref 0–34)
TSH SERPL DL<=0.005 MIU/L-ACNC: 5.2 UIU/ML (ref 0.45–4.5)

## 2022-08-19 ENCOUNTER — HOSPITAL ENCOUNTER (OUTPATIENT)
Dept: ULTRASOUND IMAGING | Facility: HOSPITAL | Age: 32
Discharge: HOME | End: 2022-08-19
Payer: COMMERCIAL

## 2022-08-19 DIAGNOSIS — E04.2 MULTIPLE THYROID NODULES: ICD-10-CM

## 2022-08-19 DIAGNOSIS — E89.0 H/O PARTIAL THYROIDECTOMY: ICD-10-CM

## 2022-08-19 PROCEDURE — 76536 US EXAM OF HEAD AND NECK: CPT

## 2022-08-25 ENCOUNTER — TELEPHONE (OUTPATIENT)
Dept: ENDOCRINOLOGY | Facility: HOSPITAL | Age: 32
End: 2022-08-25

## 2022-08-30 ENCOUNTER — TELEPHONE (OUTPATIENT)
Dept: ENDOCRINOLOGY | Facility: HOSPITAL | Age: 32
End: 2022-08-30

## 2022-08-30 NOTE — TELEPHONE ENCOUNTER
The blood work does show an under active thyroid  You should be started on thyroid hormone replacement  The rest of the blood work is normal except for the cortisol is slightly above normal   We can evaluate that in the future after the thyroid is treated  The thyroid ultrasound shows multiple large nodules in the right lobe of the thyroid and a very enlarged right lobe of the thyroid almost twice normal size  The left lobe is normal in size with a few small nodules  Some of the nodules in the right are large enough to be recommended for biopsy  Has she ever had a biopsy of any right-sided thyroid nodules?

## 2022-08-30 NOTE — TELEPHONE ENCOUNTER
The patient called in and questioned her results of her most recent blood work and ultrasound  Please review

## 2022-08-30 NOTE — TELEPHONE ENCOUNTER
Spoke with patient  She reports the left side of her thyroid was removed and is questioning the left lobe with nodules  Reports a biopsy, unsure if it was on the right side in 2013, was following with Dr Earl Rausch of [facility] in [place]  Patient reports she is unable to swallow pills, unless they can be crushed, chewed or very small

## 2022-09-02 DIAGNOSIS — E04.2 MULTIPLE THYROID NODULES: Chronic | ICD-10-CM

## 2022-09-02 DIAGNOSIS — E89.0 POSTOPERATIVE HYPOTHYROIDISM: Primary | ICD-10-CM

## 2022-09-02 RX ORDER — LEVOTHYROXINE SODIUM 50 UG/ML
SOLUTION ORAL
Qty: 30 ML | Refills: 6 | Status: SHIPPED | OUTPATIENT
Start: 2022-09-02

## 2022-09-02 NOTE — TELEPHONE ENCOUNTER
Left message for patient to call the office and schedule a fu appointment for 2 months  Lab slips for blood work and US thyroid were mailed

## 2022-09-02 NOTE — TELEPHONE ENCOUNTER
I spoke with the patient  She was informed of her results of her ultrasound and I recommend biopsy of the lower right T rad 3 thyroid nodule under ultrasound guidance since she is in agreement of this  She was informed that her thyroid is under active and we need to get it to a TSH of less than 2 5 to help her get pregnant so she needs thyroid hormone replacement  I will try Tirosint SOL which is a liquid form of thyroid hormone 50 mcg daily  She was instructed to take this on an empty stomach and wait 30 minutes to eat or take any coffee  She needs to make sure that any vitamins or fiber are 3-4 hours away from when she takes the thyroid hormone  She was informed that if we start thyroid hormone and try to get her TSH around 1 that it is possible we may be able to shrink her thyroid lobe and help with some of her choking sensation  I will order her thyroid medicine to her pharmacy  I will order her thyroid ultrasound  I will order a TSH and free T4 for 2 months which is when she needs follow-up visit  Please call to schedule follow-up visit and send her studies

## 2022-09-05 ENCOUNTER — TELEPHONE (OUTPATIENT)
Dept: OTHER | Facility: OTHER | Age: 32
End: 2022-09-05

## 2022-09-05 NOTE — TELEPHONE ENCOUNTER
Pt calling and stated needed to come to office wanted to get PCR test stated needed for work   Please call back to schedule

## 2022-09-06 ENCOUNTER — DOCUMENTATION (OUTPATIENT)
Dept: ENDOCRINOLOGY | Facility: HOSPITAL | Age: 32
End: 2022-09-06

## 2022-09-07 ENCOUNTER — CLINICAL SUPPORT (OUTPATIENT)
Dept: FAMILY MEDICINE CLINIC | Facility: HOSPITAL | Age: 32
End: 2022-09-07

## 2022-09-07 DIAGNOSIS — Z11.52 ENCOUNTER FOR SCREENING FOR COVID-19: Primary | ICD-10-CM

## 2022-09-07 PROCEDURE — U0003 INFECTIOUS AGENT DETECTION BY NUCLEIC ACID (DNA OR RNA); SEVERE ACUTE RESPIRATORY SYNDROME CORONAVIRUS 2 (SARS-COV-2) (CORONAVIRUS DISEASE [COVID-19]), AMPLIFIED PROBE TECHNIQUE, MAKING USE OF HIGH THROUGHPUT TECHNOLOGIES AS DESCRIBED BY CMS-2020-01-R: HCPCS | Performed by: STUDENT IN AN ORGANIZED HEALTH CARE EDUCATION/TRAINING PROGRAM

## 2022-09-07 PROCEDURE — U0005 INFEC AGEN DETEC AMPLI PROBE: HCPCS | Performed by: STUDENT IN AN ORGANIZED HEALTH CARE EDUCATION/TRAINING PROGRAM

## 2022-09-08 LAB — SARS-COV-2 RNA RESP QL NAA+PROBE: NEGATIVE

## 2022-09-12 ENCOUNTER — TELEPHONE (OUTPATIENT)
Dept: OBGYN CLINIC | Facility: CLINIC | Age: 32
End: 2022-09-12

## 2022-09-12 NOTE — TELEPHONE ENCOUNTER
Renan Farmer called with concerns of no menses this month  States she received methotrexate June 30th  Resolved end of July  Last month her menses lasted 3 days  She expected another cycle last week  No bleeding as of today  Multiple home pregnancy tests negative  She is following with Dr Nabila Root for irregular thyroid levels  She has stopped her paxil and klonopin  Does not want to be on these medications when/if she becomes pregnant again  Her friend informed her once she had methotrexate she never got another period  Reassured Renan Farmer not uncommon to have episode of irregular or late menstrual cycle  May level out over next several weeks  She admits to high stress level  Explained irregular thyroid levels and stress can contribute to irregular cycle  Encouraged her to track days of bleeding  If no menses after next cycle to call and schedule appointment with physician to discuss further  Patient verbalized understanding and agreement to plan

## 2022-09-19 ENCOUNTER — CLINICAL SUPPORT (OUTPATIENT)
Dept: FAMILY MEDICINE CLINIC | Facility: HOSPITAL | Age: 32
End: 2022-09-19
Payer: COMMERCIAL

## 2022-09-19 DIAGNOSIS — Z11.1 ENCOUNTER FOR PPD TEST: Primary | ICD-10-CM

## 2022-09-19 PROCEDURE — 86580 TB INTRADERMAL TEST: CPT

## 2022-09-21 ENCOUNTER — CLINICAL SUPPORT (OUTPATIENT)
Dept: FAMILY MEDICINE CLINIC | Facility: HOSPITAL | Age: 32
End: 2022-09-21

## 2022-09-21 DIAGNOSIS — Z11.1 ENCOUNTER FOR PPD SKIN TEST READING: Primary | ICD-10-CM

## 2022-09-21 LAB
INDURATION: 0 MM
TB SKIN TEST: NEGATIVE

## 2022-09-21 PROCEDURE — 99024 POSTOP FOLLOW-UP VISIT: CPT

## 2022-11-03 LAB
T4 FREE SERPL-MCNC: 1.09 NG/DL (ref 0.82–1.77)
TSH SERPL DL<=0.005 MIU/L-ACNC: 3.32 UIU/ML (ref 0.45–4.5)

## 2022-11-08 ENCOUNTER — OFFICE VISIT (OUTPATIENT)
Dept: ENDOCRINOLOGY | Facility: HOSPITAL | Age: 32
End: 2022-11-08

## 2022-11-08 ENCOUNTER — HOSPITAL ENCOUNTER (OUTPATIENT)
Dept: ULTRASOUND IMAGING | Facility: HOSPITAL | Age: 32
Discharge: HOME | End: 2022-11-08
Attending: INTERNAL MEDICINE

## 2022-11-08 ENCOUNTER — DOCUMENTATION (OUTPATIENT)
Dept: ENDOCRINOLOGY | Facility: HOSPITAL | Age: 32
End: 2022-11-08

## 2022-11-08 VITALS
HEART RATE: 76 BPM | HEIGHT: 65 IN | BODY MASS INDEX: 33.02 KG/M2 | SYSTOLIC BLOOD PRESSURE: 120 MMHG | WEIGHT: 198.2 LBS | DIASTOLIC BLOOD PRESSURE: 72 MMHG

## 2022-11-08 DIAGNOSIS — E04.2 MULTIPLE THYROID NODULES: Chronic | ICD-10-CM

## 2022-11-08 DIAGNOSIS — E89.0 POSTOPERATIVE HYPOTHYROIDISM: Primary | ICD-10-CM

## 2022-11-08 DIAGNOSIS — E89.0 H/O PARTIAL THYROIDECTOMY: ICD-10-CM

## 2022-11-08 RX ORDER — LIDOCAINE HYDROCHLORIDE 10 MG/ML
5 INJECTION, SOLUTION EPIDURAL; INFILTRATION; INTRACAUDAL; PERINEURAL ONCE
Status: COMPLETED | OUTPATIENT
Start: 2022-11-08 | End: 2022-11-08

## 2022-11-08 RX ORDER — LEVOTHYROXINE SODIUM 75 UG/ML
SOLUTION ORAL
Qty: 90 ML | Refills: 3 | Status: SHIPPED | OUTPATIENT
Start: 2022-11-08

## 2022-11-08 RX ADMIN — LIDOCAINE HYDROCHLORIDE 5 ML: 10 INJECTION, SOLUTION EPIDURAL; INFILTRATION; INTRACAUDAL; PERINEURAL at 15:51

## 2022-11-08 NOTE — PATIENT INSTRUCTIONS
The thyroid blood work is not good enough  We'll increase the tirosint SOL 75 mcg daily  We'll recheck blood work in 6-8 weeks  Follow up in 8 weeks

## 2022-11-08 NOTE — PROGRESS NOTES
11/9/2022    Assessment/Plan      Diagnoses and all orders for this visit:    Postoperative hypothyroidism  -     Tirosint-SOL 75 MCG/ML SOLN; Take 75 mcg daily  -     TSH, 3rd generation Lab Collect; Future  -     T4, free Lab Collect; Future  -     TSH, 3rd generation Lab Collect  -     T4, free Lab Collect    Multiple thyroid nodules  -     TSH, 3rd generation Lab Collect; Future  -     T4, free Lab Collect; Future  -     TSH, 3rd generation Lab Collect  -     T4, free Lab Collect    H/O partial thyroidectomy  -     TSH, 3rd generation Lab Collect; Future  -     T4, free Lab Collect; Future  -     TSH, 3rd generation Lab Collect  -     T4, free Lab Collect        Assessment/Plan:  1  Hypothyroidism post partial thyroidectomy  Most recent thyroid function tests do not show a TSH that is adequate for pregnancy  I have increased her Tirosint SOL to 75 mcg daily  We did discuss the possibility of T3 supplementation but given she is hoping to get pregnant and T3 is not approved in pregnancy, I have asked her to put this off until well after pregnancy  I will then repeat blood work in 6-8 weeks  2  Multiple thyroid nodules  She has a T rad 3 thyroid nodule in the lower right lobe for which she will be getting get biopsied later today  I have asked her to follow up in 8 weeks with preceding TSH and free T4       CC:  Thyroid and thyroid nodule follow-up    History of Present Illness     HPI: Marce Clark is a 28y o  year old female with history of hypothyroidism post partial thyroidectomy for multiple thyroid nodules for follow-up visit  Multiple thyroid nodules were noted in the past   She would a partial thyroidectomy of her left thyroid lobe and still has some right-sided thyroid nodules that are stable in size but are T rad 4 and T rad 5  Her right side of her lobe is quite large  She was seen in August 2022 due to significant weight gain    Thyroid function tests were noted to be hypothyroid and she was started on Tirosint SOL 50 mcg daily for thyroid hormone replacement as she can not swallow pill  The rest of her blood work was normal ruling out other causes of her weight issues  PCOS and insulin resistance were ruled out  She did have a mildly elevated a m  cortisol 21 3 with normals up to 19 4 which is not remarkable  She denies heat or cold intolerance but does tend to be on the colder side  She has some slight constipation  She is fatigued  She denies palpitation or tremors  Weight is 3 lb less than August   She has some variable anxiety and depression  She denies insomnia or diarrhea  She denies dry skin, brittle nails, or hair loss  Menstrual cycles do occur on a regular monthly basis and last about 1-3 days  She denies diplopia  She has some issues with certain foods that are a little more difficult to swallow if she does not drink water with them  Thyroid ultrasound did show a lower right T rad 3 nodule for which biopsy was recommended  This has been scheduled but has not yet been completed  Review of Systems   Constitutional: Positive for fatigue  Negative for unexpected weight change  Weight is 3 lb less than last visit in August 2022  Still fatigued in am and difficult waking up  Still taking naps as able as very fatigued  Went gluten free  HENT: Positive for trouble swallowing  Foods like potatoes and bread are difficult to swallow if not drinking fluid  Will get stuck  Happened again last night  Feels air gets stuck, does have reflux  Eyes: Positive for visual disturbance  Has chronic blurry vision  Glasses do not help her  No diplopia  Respiratory: Negative for chest tightness and shortness of breath  Cardiovascular: Negative for chest pain and palpitations  Gastrointestinal: Positive for constipation  Negative for abdominal pain, diarrhea and nausea  Some slight constipation      Endocrine: Negative for cold intolerance and heat intolerance  Tends to be on the colder side  Genitourinary:        Menses regular on a monthly basis and last about 1-3 days  Skin: Negative for rash  No dry skin  No brittle nails  No hair loss  Neurological: Negative for dizziness, tremors, light-headedness and headaches  Psychiatric/Behavioral: Positive for dysphoric mood  Negative for sleep disturbance  The patient is nervous/anxious  Using melatonin and lindsay meditation for sleep  variable anxiety or depression          Historical Information   Past Medical History:   Diagnosis Date   • Chronic knee pain    • Depression    • Disease of thyroid gland    • Dysphagia 3/2/2020   • Left upper quadrant pain 3/2/2020   • Menorrhagia    • Multiple thyroid nodules    • Psychiatric disorder    • Saphenous nerve neuropathy, 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   • Vitamin D deficiency    • Vocal cord paralysis     Following partial thyroidectomy     Past Surgical History:   Procedure Laterality Date   • DILATION AND EVACUATION     • THYROIDECTOMY, PARTIAL Left 2013   • UPPER GASTROINTESTINAL ENDOSCOPY     • WISDOM TOOTH EXTRACTION       Social History   Social History     Substance and Sexual Activity   Alcohol Use Yes    Comment: socially     Social History     Substance and Sexual Activity   Drug Use Not Currently   • Types: Marijuana    Comment:  no longer using marijuana     Social History     Tobacco Use   Smoking Status Never Smoker   Smokeless Tobacco Never Used   Tobacco Comment     seasonal, only when it's warm out, social     Family History:   Family History   Problem Relation Age of Onset   • Thyroid disease Mother         thyroid nodules   • No Known Problems Father    • Ovarian cancer Sister    • Thyroid disease Sister    • Anorexia nervosa Sister    • Mental illness Sister    • No Known Problems Sister    • No Known Problems Brother    • Other Maternal Aunt         hypoglycemia   • Other Maternal Aunt         brain tumor, had siezure • Celiac disease Maternal Grandmother    • Colon cancer Neg Hx    • Colon polyps Neg Hx    • Substance Abuse Neg Hx        Meds/Allergies   Current Outpatient Medications   Medication Sig Dispense Refill   • clobetasol (TEMOVATE) 0 05 % cream as needed      • EUCRISA 2 % OINT Apply 1 application topically 2 (two) times a day as needed      • Prenatal Vit-Fe Fumarate-FA (prenatal multivitamin) 28-0 8 MG TABS Take 1 tablet by mouth daily Late afternoon     • Tirosint-SOL 75 MCG/ML SOLN Take 75 mcg daily 90 mL 3     No current facility-administered medications for this visit  No Known Allergies    Objective   Vitals: Blood pressure 120/72, pulse 76, height 5' 5" (1 651 m), weight 89 9 kg (198 lb 3 2 oz), last menstrual period 05/05/2022  Invasive Devices  Report    None                 Physical Exam  Vitals reviewed  Constitutional:       Appearance: Normal appearance  She is well-developed  HENT:      Head: Normocephalic and atraumatic  Eyes:      Extraocular Movements: Extraocular movements intact  Conjunctiva/sclera: Conjunctivae normal       Comments: No lid lag, stare, proptosis, or periorbital edema  Neck:      Thyroid: No thyromegaly  Vascular: No carotid bruit  Comments: Healed anterior neck scar  Palpable enlarged thyroid lobe  Cardiovascular:      Rate and Rhythm: Normal rate and regular rhythm  Heart sounds: Normal heart sounds  No murmur heard  Pulmonary:      Effort: Pulmonary effort is normal       Breath sounds: Normal breath sounds  No wheezing  Abdominal:      Palpations: Abdomen is soft  Musculoskeletal:         General: No deformity  Normal range of motion  Cervical back: Normal range of motion and neck supple  Right lower leg: No edema  Left lower leg: No edema  Comments: No tremor of the outstretched hands  Lymphadenopathy:      Cervical: No cervical adenopathy  Skin:     General: Skin is warm and dry  Findings: No rash  Neurological:      Mental Status: She is alert and oriented to person, place, and time  Deep Tendon Reflexes: Reflexes are normal and symmetric  Comments: Patellar deep tendon reflexes normal          The history was obtained from the review of the chart and from the patient      Lab Results:          Lab Results   Component Value Date    CREATININE 0 86 08/16/2022    CREATININE 0 76 08/08/2022    CREATININE 0 78 06/30/2022    BUN 14 08/16/2022    K 4 4 08/16/2022     08/16/2022    CO2 26 08/16/2022     eGFR   Date Value Ref Range Status   08/16/2022 92 >59 mL/min/1 73 Final   06/30/2022 100 ml/min/1 73sq m Final         Lab Results   Component Value Date    HDL 70 03/30/2021    TRIG 85 03/30/2021    CHOLHDL 2 3 03/30/2021       Lab Results   Component Value Date    ALT 19 08/16/2022    AST 19 08/16/2022    ALKPHOS 46 06/30/2022       Lab Results   Component Value Date    TSH 3 320 11/02/2022    FREET4 1 09 11/02/2022             Future Appointments   Date Time Provider Loyd Williamson   1/16/2023 11:00 AM Vera Hill MD [facility]

## 2022-11-10 ENCOUNTER — TELEPHONE (OUTPATIENT)
Dept: OTHER | Facility: OTHER | Age: 32
End: 2022-11-10

## 2022-11-10 NOTE — TELEPHONE ENCOUNTER
Pt called in stating she would like a call back with updates regarding her pre auth for Tirosint-SOL 75 MCG/ML SOLN [[id number]]   She would like a call back after 10am

## 2022-11-11 NOTE — TELEPHONE ENCOUNTER
The pathology was bethesda class 3 and was sent for afirma testing which can take another 1-2 weeks for the result

## 2022-11-11 NOTE — TELEPHONE ENCOUNTER
Spoke to the pharmacy  Her insurance will only allow for a 30 day supply   They did order the medication and will have it in on Monday

## 2022-11-11 NOTE — TELEPHONE ENCOUNTER
Patient informed   She also would like you to review her biopsy result and would like to know if [place] was [place] [facility]

## 2022-12-09 ENCOUNTER — TELEPHONE (OUTPATIENT)
Dept: ENDOCRINOLOGY | Facility: HOSPITAL | Age: 32
End: 2022-12-09

## 2022-12-09 DIAGNOSIS — E89.0 POSTOPERATIVE HYPOTHYROIDISM: Primary | ICD-10-CM

## 2022-12-09 NOTE — TELEPHONE ENCOUNTER
Patient aware and ordered thyroid labs as a standing order  Will fax to [facility] in [facility]

## 2022-12-09 NOTE — TELEPHONE ENCOUNTER
Patient left voicemail stating she may be pregnant and would like to know if she needs to have lab work done now  Left message for patient to call back to gather more information

## 2022-12-11 LAB
T4 FREE SERPL-MCNC: 1.35 NG/DL (ref 0.82–1.77)
TSH SERPL DL<=0.005 MIU/L-ACNC: 1.74 UIU/ML (ref 0.45–4.5)

## 2022-12-12 ENCOUNTER — TELEPHONE (OUTPATIENT)
Dept: FAMILY MEDICINE CLINIC | Facility: HOSPITAL | Age: 32
End: 2022-12-12

## 2022-12-12 DIAGNOSIS — Z34.90 PREGNANCY, UNSPECIFIED GESTATIONAL AGE: Primary | ICD-10-CM

## 2022-12-12 NOTE — TELEPHONE ENCOUNTER
KALA IS ASKING FOR A SCRIPT FOR A LAB FOR HCG - SHE IS PREGNANT AND THIS PAST SUMMER SHE LOST A PREGNANCY  - SHE WANTS TO HAVE A BASELINE

## 2022-12-14 ENCOUNTER — TELEPHONE (OUTPATIENT)
Dept: FAMILY MEDICINE CLINIC | Facility: HOSPITAL | Age: 32
End: 2022-12-14

## 2022-12-14 LAB — HCG INTACT+B SERPL-ACNC: 89 MIU/ML

## 2022-12-15 ENCOUNTER — TELEPHONE (OUTPATIENT)
Dept: OBGYN CLINIC | Facility: CLINIC | Age: 32
End: 2022-12-15

## 2022-12-15 DIAGNOSIS — O09.11 PREGNANCY WITH HISTORY OF ECTOPIC PREGNANCY, ANTEPARTUM, FIRST TRIMESTER: Primary | ICD-10-CM

## 2022-12-15 NOTE — TELEPHONE ENCOUNTER
Spoke with Rula Shine, reviewed Dr Silas Gallardo recommendation for serial HCG testing x 3 every 48 hours starting today  Monitor for bleeding/spotting or pelvic pain/cramping  Contact [facility] or provider on call if symptoms occur or has additional questions  Patient in agreement to complete labs as ordered  Addition recommendations pending HCG results

## 2022-12-15 NOTE — TELEPHONE ENCOUNTER
Sherry morales she scheduled her first prenatal visit for January  She has a h/o pregnancy of unknown location and would like to know if anything needs to be done sooner than 1st PN appt  Dr Josafat Villa, H/O methotrexate 6/30/2022- Had one HCG done by PCP on 12/13/2022 Resulted as 89    Please advise

## 2022-12-16 LAB — HCG INTACT+B SERPL-ACNC: 223 MIU/ML

## 2022-12-16 NOTE — TELEPHONE ENCOUNTER
Pt informed of HCG level from 12/15/22-(223) and recommendation to repeat in 48hrs from last level  Pt informed she is scheduled to have Hcg level drawn 12/17/22, forwarded to weekend on call provider, Dr Rigoberto Teixeira to monitor level

## 2022-12-18 LAB — HCG INTACT+B SERPL-ACNC: 490 MIU/ML

## 2022-12-18 NOTE — TELEPHONE ENCOUNTER
Level doubled - this is appropriate, I usually continue to follow until about 4000 and then get ultrasound to confirm IUP and rule out ectopic pregnancy  Please check with Dr Rafi Ornelas if this is her plan

## 2022-12-20 ENCOUNTER — OFFICE VISIT (OUTPATIENT)
Dept: FAMILY MEDICINE CLINIC | Facility: HOSPITAL | Age: 32
End: 2022-12-20

## 2022-12-20 VITALS
DIASTOLIC BLOOD PRESSURE: 72 MMHG | BODY MASS INDEX: 33.85 KG/M2 | HEART RATE: 87 BPM | OXYGEN SATURATION: 100 % | SYSTOLIC BLOOD PRESSURE: 116 MMHG | HEIGHT: 65 IN | WEIGHT: 203.2 LBS | TEMPERATURE: 98.4 F

## 2022-12-20 DIAGNOSIS — R49.0 VOICE HOARSENESS: Primary | ICD-10-CM

## 2022-12-20 DIAGNOSIS — R05.1 ACUTE COUGH: ICD-10-CM

## 2022-12-20 LAB — HCG INTACT+B SERPL-ACNC: 1081 MIU/ML

## 2022-12-20 NOTE — PROGRESS NOTES
[address] [facility],     Assessment/Plan:      Diagnosis ICD-10-CM Associated Orders   1  Voice hoarseness  R49 0 Covid/Flu- Office Collect      2  Acute cough  R05 1 Covid/Flu- Office Collect        • C/W pregnancy f/u's appt on Friday  • 5d home, 5d masking  F/U prn  • Patient may call or return to office with any questions or concerns  ______________________________________________________________________  Subjective:     Patient ID: Marlene Dallas is a 28 y o  female  HPI  Marlene Dallas  Chief Complaint   Patient presents with   • Cold Like Symptoms     Horse voice, cough, runny nose, started Friday      CHRISTOPHER through octavio - August 20th or 22nd 2023  OB appt Friday with dating scan  5 weeks or 5 weeks 2d  F/U again on 1/12/23  [place] for 6d  Friday with loss of voice after work  Now getting cough, mucus  Friday home covid negative  Client is sick, no loss of voice, runny nose, different than her  The following portions of the patient's history were reviewed and updated as appropriate: allergies, current medications, past medical history, and problem list     Review of Systems   Constitutional: Negative for appetite change, chills, diaphoresis, fatigue and fever  HENT: Positive for rhinorrhea, sneezing (improved), sore throat (only w cough) and voice change (hoarse, lost)  Negative for congestion, ear pain, postnasal drip, sinus pressure, sinus pain and trouble swallowing  Eyes: Negative for pain and redness  Respiratory: Positive for cough  Negative for shortness of breath  Cardiovascular: Negative for palpitations  Chest painful from coughing   Gastrointestinal: Positive for constipation (since last week)  Negative for diarrhea, nausea and vomiting  Endocrine: Positive for heat intolerance (pregnancy)  Genitourinary: Negative for dysuria and frequency          Avoiding UTI, taking cranberry chews   Neurological: Positive for headaches  Negative for dizziness and light-headedness  Objective:      Vitals:    12/20/22 1726   BP: 116/72   Pulse: 87   Temp: 98 4 °F (36 9 °C)   SpO2: 100%      Physical Exam  Vitals reviewed  Constitutional:       General: She is not in acute distress  Appearance: Normal appearance  She is well-developed  She is not ill-appearing  HENT:      Head: Normocephalic and atraumatic  Comments: No sinus TTP     Right Ear: Tympanic membrane, ear canal and external ear normal  There is no impacted cerumen  Left Ear: Tympanic membrane, ear canal and external ear normal  There is no impacted cerumen  Nose: Nose normal  No congestion  Mouth/Throat:      Mouth: Mucous membranes are moist       Pharynx: Oropharynx is clear  No oropharyngeal exudate  Eyes:      General: No scleral icterus  Right eye: No discharge  Left eye: No discharge  Neck:      Vascular: No carotid bruit  Cardiovascular:      Rate and Rhythm: Normal rate and regular rhythm  Pulses: Normal pulses  Heart sounds: Normal heart sounds  No murmur heard  Pulmonary:      Effort: Pulmonary effort is normal  No respiratory distress  Breath sounds: Normal breath sounds  No stridor  No wheezing  Comments: Coughing, hoarse voice  Musculoskeletal:      Cervical back: Normal range of motion  No rigidity or tenderness  Right lower leg: No edema  Left lower leg: No edema  Lymphadenopathy:      Cervical: No cervical adenopathy  Skin:     General: Skin is warm  Neurological:      Mental Status: She is alert and oriented to person, place, and time  Psychiatric:         Mood and Affect: Mood normal          Behavior: Behavior normal          Thought Content: Thought content normal          Judgment: Judgment normal          Portions of the record may have been created with voice recognition software   Occasional wrong word or "sound alike" substitutions may have occurred due to the inherent limitations of voice recognition software  Please review the chart carefully and recognize, using context, where substitutions/typographical errors may have occurred

## 2022-12-22 ENCOUNTER — TELEPHONE (OUTPATIENT)
Dept: OBGYN CLINIC | Facility: CLINIC | Age: 32
End: 2022-12-22

## 2022-12-22 LAB
FLUAV RNA RESP QL NAA+PROBE: NEGATIVE
FLUBV RNA RESP QL NAA+PROBE: NEGATIVE
HCG INTACT+B SERPL-ACNC: 2036 MIU/ML
SARS-COV-2 RNA RESP QL NAA+PROBE: NEGATIVE

## 2022-12-22 NOTE — TELEPHONE ENCOUNTER
Pt stopped in the office to obtain HCG results done 12/21/22, HCG level 2,036 and inquired if she needs to have any further blood work  Pt is scheduled to have an US done in the office 12/23/22 with Dr Farzana Courtney  Conferred with Dr Marlon Rob and informed she does not need to repeat HCG level, keep [address] [facility] appointment 12/23/22 as scheduled  Pt informed

## 2022-12-23 ENCOUNTER — ROUTINE PRENATAL (OUTPATIENT)
Dept: OBGYN CLINIC | Facility: CLINIC | Age: 32
End: 2022-12-23

## 2022-12-23 VITALS
BODY MASS INDEX: 33.89 KG/M2 | WEIGHT: 203.4 LBS | HEIGHT: 65 IN | SYSTOLIC BLOOD PRESSURE: 114 MMHG | DIASTOLIC BLOOD PRESSURE: 72 MMHG

## 2022-12-23 DIAGNOSIS — Z87.59 HISTORY OF ECTOPIC PREGNANCY: ICD-10-CM

## 2022-12-23 DIAGNOSIS — O36.80X0 PREGNANCY WITH UNCERTAIN FETAL VIABILITY, SINGLE OR UNSPECIFIED FETUS: Primary | ICD-10-CM

## 2022-12-23 NOTE — PROGRESS NOTES
[address] [facility], [address], [facility], [address] [facility]    Assessment/Plan:  1  Pregnancy with uncertain fetal viability, single or unspecified fetus  Assessment & Plan:  - Ultrasound performed in office today  See report below  Ectopic pregnancy has been ruled out  Findings are consistent with intrauterine pregnancy of uncertain viability due to non-visualization of fetal cardiac activity  Reviewed with patient that non-visualization of FCA is most likely due to early gestational age  - Serial hCG trend reviewed  Appropriate rise noted, with most recent value 2,036 on 12/21    - Serial TSH reviewed  Now at target on Tirosint 75 mg PO daily   - Blood type B positive  - Return to office for initial prenatal visit, which is scheduled for 1/12/2022 with Dr Tequila Amin  Orders:  -     AMB US OB < 14 weeks single or first gestation level 1    2  History of ectopic pregnancy      Subjective:   Gorge Sher is a 28 y o  Thressa Flower   CC:   Chief Complaint   Patient presents with   • Pregnancy Ultrasound     Ultrasound        HPI: Patient presents for evaluation of pregnancy viability in the setting of history of ectopic pregnancy  Serial hCG trend has been ongoing, and is rising appropriately  Today, she denies bleeding or spotting  Occasional mild cramping  ROS: Negative except as noted in HPI    Patient's last menstrual period was 11/13/2022 (exact date)  She  reports being sexually active and has had partner(s) who are male  She reports using the following method of birth control/protection: I U D          The following portions of the patient's history were reviewed and updated as appropriate:   Past Medical History:   Diagnosis Date   • Chronic knee pain    • Depression    • Disease of thyroid gland    • Dysphagia 3/2/2020   • Left upper quadrant pain 3/2/2020   • Menorrhagia    • Multiple thyroid nodules    • Psychiatric disorder    • Saphenous nerve neuropathy, 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   • Vitamin D deficiency    • Vocal cord paralysis     Following partial thyroidectomy     Past Surgical History:   Procedure Laterality Date   • DILATION AND EVACUATION     • THYROIDECTOMY, PARTIAL Left 2013   • UPPER GASTROINTESTINAL ENDOSCOPY     • US GUIDED THYROID BIOPSY  11/8/2022   • WISDOM TOOTH EXTRACTION       Family History   Problem Relation Age of Onset   • Thyroid disease Mother         thyroid nodules   • No Known Problems Father    • Ovarian cancer Sister    • Thyroid disease Sister    • Anorexia nervosa Sister    • Mental illness Sister    • No Known Problems Sister    • No Known Problems Brother    • Other Maternal Aunt         hypoglycemia   • Other Maternal Aunt         brain tumor, had siezure   • Celiac disease Maternal Grandmother    • Colon cancer Neg Hx    • Colon polyps Neg Hx    • Substance Abuse Neg Hx      Social History     Socioeconomic History   • Marital status: Single     Spouse name: None   • Number of children: None   • Years of education: None   • Highest education level: None   Occupational History   • None   Tobacco Use   • Smoking status: Never   • Smokeless tobacco: Never   • Tobacco comments:      seasonal, only when it's warm out, social   Vaping Use   • Vaping Use: Never used   Substance and Sexual Activity   • Alcohol use: Not Currently     Comment: socially   • Drug use: Not Currently     Types: Marijuana     Comment:  no longer using marijuana   • Sexual activity: Yes     Partners: Male     Birth control/protection: I U D     Other Topics Concern   • None   Social History Narrative    Single    1 c/caffeine /day    Wears seatbelt    Regular exercise    No cigarette smoke exposure home/work    Employed    Lives with roommate    No living will in place     Social Determinants of Health     Financial Resource Strain: Not on file   Food Insecurity: Not on file   Transportation Needs: Not on file   Physical Activity: Not on file   Stress: Not on file   Social Connections: Not on file   Intimate Partner Violence: Not on file   Housing Stability: Not on file     Outpatient Medications Marked as Taking for the 12/23/22 encounter (Routine Prenatal) with Chad Fried MD   Medication   • clobetasol (TEMOVATE) 0 05 % cream   • EUCRISA 2 % OINT   • Prenatal Vit-Fe Fumarate-FA (prenatal multivitamin) 28-0 8 MG TABS   • Tirosint-SOL 75 MCG/ML SOLN     No Known Allergies      Labs:         Imaging:   FIRST TRIMESTER OBSTETRIC ULTRASOUND  Date of study: 12/23/2022  Performed by: Wendy Silva MD     INDICATION: Amenorrhea, viability    COMPARISON: None  TECHNIQUE:   Transvaginal imaging was performed to assess the gestation, myometrial/endometrial architecture and ovarian parenchymal detail  The study includes volumetric sweeps and traditional still imaging technique  FINDINGS:     A single intrauterine gestation is identified  Cardiac activity is not definitively visualized, likely due to early gestational age  YOLK SAC:  Present and normal in size and appearance  MEAN CROWN RUMP LENGTH:  3 1 mm = 5 weeks 6 days   AMNIOTIC FLUID/SAC SHAPE:  Within expected normal range  UTERUS/ADNEXA:   No adnexal mass or pathologic cyst   No free fluid identified  IMPRESSION:    Patient's last menstrual period was 11/13/2022 (exact date)  Gestational age based on LMP: 11w7d  Gestational age based on US: 11w10d    Will assign dating based on LMP, which is concordant with ultrasound today  Final Gestational age: 11w7d  Final Estimated Date of Delivery: 8/20/2023  Findings are consistent with intrauterine pregnancy of uncertain viability  Fetal cardiac activity is not visualized, likely secondary to early gestational age  No adnexal masses seen  There is no suspicion of ectopic      Wendy Silva MD  12/23/2022 11:54 AM    Objective:  /72 (BP Location: Right arm, Patient Position: Sitting, Cuff Size: Standard)   Ht 5' 5" (1 651 m)   Wt 92 3 kg (203 lb 6 4 oz)   LMP 11/13/2022 (Exact Date)   BMI 33 85 kg/m²        Chaperone present? Yes: Darrel Good MA  General Appearance: alert and oriented, in no acute distress  Abdomen: Soft, non-tender, non-distended, no masses, no rebound or guarding  Extremities: Normal range of motion     Skin: normal, no rash or abnormalities  Neurologic: alert, oriented x3  Psychiatric: Appropriate affect, mood stable, cooperative with exam         Arjun Morales MD  12/23/2022 12:01 PM

## 2022-12-23 NOTE — ASSESSMENT & PLAN NOTE
- Ultrasound performed in office today  See report below  Ectopic pregnancy has been ruled out  Findings are consistent with intrauterine pregnancy of uncertain viability due to non-visualization of fetal cardiac activity  Reviewed with patient that non-visualization of FCA is most likely due to early gestational age  - Serial hCG trend reviewed  Appropriate rise noted, with most recent value 2,036 on 12/21    - Serial TSH reviewed  Now at target on Tirosint 75 mg PO daily   - Blood type B positive  - Return to office for initial prenatal visit, which is scheduled for 1/12/2022 with Dr Papi Bob

## 2023-01-12 ENCOUNTER — INITIAL PRENATAL (OUTPATIENT)
Dept: OBGYN CLINIC | Facility: CLINIC | Age: 33
End: 2023-01-12

## 2023-01-12 VITALS
HEIGHT: 65 IN | SYSTOLIC BLOOD PRESSURE: 118 MMHG | DIASTOLIC BLOOD PRESSURE: 80 MMHG | BODY MASS INDEX: 32.82 KG/M2 | WEIGHT: 197 LBS

## 2023-01-12 DIAGNOSIS — O36.80X0 PREGNANCY WITH UNCERTAIN FETAL VIABILITY, SINGLE OR UNSPECIFIED FETUS: ICD-10-CM

## 2023-01-12 DIAGNOSIS — Z34.91 PRENATAL CARE IN FIRST TRIMESTER: ICD-10-CM

## 2023-01-12 DIAGNOSIS — F41.1 GENERALIZED ANXIETY DISORDER: ICD-10-CM

## 2023-01-12 DIAGNOSIS — E89.0 H/O PARTIAL THYROIDECTOMY: ICD-10-CM

## 2023-01-12 DIAGNOSIS — E66.9 OBESITY, CLASS I, BMI 30-34.9: Primary | ICD-10-CM

## 2023-01-12 DIAGNOSIS — J45.909 MILD ASTHMA WITHOUT COMPLICATION, UNSPECIFIED WHETHER PERSISTENT: ICD-10-CM

## 2023-01-12 DIAGNOSIS — Z87.59 HISTORY OF ECTOPIC PREGNANCY: ICD-10-CM

## 2023-01-12 DIAGNOSIS — Z3A.08 8 WEEKS GESTATION OF PREGNANCY: Primary | ICD-10-CM

## 2023-01-12 DIAGNOSIS — F33.9 DEPRESSION, RECURRENT (HCC): ICD-10-CM

## 2023-01-12 DIAGNOSIS — Z86.711 HISTORY OF PULMONARY EMBOLUS (PE): ICD-10-CM

## 2023-01-12 LAB
SL AMB  POCT GLUCOSE, UA: NORMAL
SL AMB POCT URINE PROTEIN: NORMAL

## 2023-01-12 NOTE — PROGRESS NOTES
First OB Visit  [address], [address], [facility], [address] [facility]    Assessment/Plan:  Ruben Dewitt is a 35y o  year old  at 8w3d who presents for initial prenatal visit  Final CHRISTOPHER: 2023, by Last Menstrual Period      Supervision of normal pregnancy  - Aneuploidy screening discussed  Patient opts for sequential aneuploidy screening   - Routine cervical cancer screening: Pap Up to date  - Routine STI Screening: GC/Chlamydia sent today  HIV/Hep B/Hep C/Syphilis ordered in prenatal panel   - Patient Education: Patient was counseled regarding diet, exercise, weight gain, foods to avoid, vaccines in pregnancy, aneuploidy screening, travel precautions to include seat belt use and VTE risk reduction  She has been provided our pregnancy packet which includes how and when to contact providers, medication recommendations, dietary suggestions, breastfeeding information as well as websites for additional information, hospital and delivery concerns  Additional Pregnancy Problems:   1  Obesity, Class I, BMI 30-34 9  -     Ambulatory Referral to Maternal Fetal Medicine; Future; Expected date: 2023  -     Ambulatory Referral to Hematology / Oncology; Future; Expected date: 2023  -     AMB US OB < 14 weeks single or first gestation level 1    2  Mild asthma without complication, unspecified whether persistent  -     Ambulatory Referral to Maternal Fetal Medicine; Future; Expected date: 2023    3  H/O partial thyroidectomy  Comments:  Managed by Endocrinologist (Dr Savannah Reddy)  Tirosint 75 mcg/ml  TSH checked 2023  Thyroid levels q trimester  Orders:  -     Ambulatory Referral to Maternal Fetal Medicine; Future; Expected date: 2023    4  Depression, recurrent ([facility] [address] )  Comments:  Was managed via Paxil by PCP  Discontinued Paxil in 2022      Depression screen 2023 score was 3  Declines any medical or therapy managment  Orders:  -     Ambulatory Referral to Maternal Fetal Medicine; Future; Expected date: 2023    5  Generalized anxiety disorder  Comments:  Was managed via Paxil by PCP  Discontinued Paxil in 2022  Depression screen 2023 score was 3  Declines any medical or therapy managment  Orders:  -     Ambulatory Referral to Maternal Fetal Medicine; Future; Expected date: 2023    6  History of ectopic pregnancy  -     Ambulatory Referral to Maternal Fetal Medicine; Future; Expected date: 2023  -     AMB  OB < 14 weeks single or first gestation level 1    7  Prenatal care in first trimester  -     Ambulatory Referral to Maternal Fetal Medicine; Future; Expected date: 2023  -     AMB  OB < 14 weeks single or first gestation level 1    8  History of pulmonary embolus (PE)  -     Ambulatory Referral to Maternal Fetal Medicine; Future; Expected date: 2023  -     Ambulatory Referral to Hematology / Oncology; Future; Expected date: 2023  -     [facility] OB < 14 weeks single or first gestation level 1          -  Patient states she had left knee arthroscopy in 2017 and during 4 weeks of using brace on the knee she developed P E  Patient was placed on short term Eliquis therapy and one baby ASA daily  Patient discontinued ASA 4 years ago and has not had any follow up with Hematologist for over a year  Patient states she had "extensive Lab work done by [facility] and all results were normal"  -  Will obtain records from pt's previous Hematologist        -  Referred patient to [address] [place] [address] referral given for early anatomy and aneuploidy screening, due 11-13 weeks  Next OB visit: 4 weeks    Subjective:   CC:  Desires prenatal care  Elizabeth Oconnor is a 35 y o   female who presents for prenatal care  Patient's last menstrual period was 2022 (exact date)  Which would make her 8 weeks and 4 days pregnant today      Pregnancy ROS: no leakage of fluid, pelvic pain, or vaginal bleeding  no nausea/vomiting  OB History    Para Term  AB Living   3       2 0   SAB IAB Ectopic Multiple Live Births     1 1          # Outcome Date GA Lbr Deniz/2nd Weight Sex Delivery Anes PTL Lv   3 Current            2 Ectopic 22     ECTOPIC         Birth Comments: PUL treated with methotrexate   1 IAB                The following portions of the patient's history were reviewed and updated as appropriate: She  has a past medical history of Chronic knee pain, Depression, Disease of thyroid gland, Dysphagia (2020), History of pulmonary embolus (PE) (), Left upper quadrant pain (2020), Menorrhagia, Multiple thyroid nodules, Psychiatric disorder, Saphenous nerve neuropathy, left, Vitamin D deficiency, and Vocal cord paralysis  She  has a past surgical history that includes Thyroidectomy, partial (Left, ); [place] tooth extraction; Upper gastrointestinal endoscopy; Dilation and evacuation (); US guided thyroid biopsy (2022); and Knee arthroscopy w/ plica excision (Left, )  Her family history includes Anorexia nervosa in her sister; Celiac disease in her maternal grandmother; Hodgkin's lymphoma in her maternal grandfather and paternal grandfather; Mental illness in her sister; No Known Problems in her brother, father, and sister; Other in her maternal aunt and maternal aunt; Ovarian cancer in her sister; Thyroid disease in her mother and sister  She  reports that she has never smoked  She has never used smokeless tobacco  She reports that she does not currently use alcohol  She reports that she does not currently use drugs    Current Outpatient Medications   Medication Sig Dispense Refill   • clobetasol (TEMOVATE) 0 05 % cream as needed Using as needed     • Cranberry-Vit C-Lactobacillus (RA CRANBERRY SUPPLEMENTS PO) Take by mouth     • EUCRISA 2 % OINT Apply 1 application topically 2 (two) times a day as needed  (Patient not taking: Reported on 2023) • Prenatal Vit-Fe Fumarate-FA (prenatal multivitamin) 28-0 8 MG TABS Take 1 tablet by mouth daily Late afternoon     • Tirosint-SOL 75 MCG/ML SOLN Take 75 mcg daily 90 mL 3     No current facility-administered medications for this visit  She has No Known Allergies         Objective:  LMP 2022 (Exact Date)   Pregravid Weight/BMI: 92 1 kg (203 lb) (BMI 33 78)  Current Weight:     Total Weight Gain: -2 722 kg (-6 lb)   Pre-Rohit Vitals    Flowsheet Row Most Recent Value   Prenatal Assessment    Fetal Heart Rate 150-160   Movement Absent   Prenatal Vitals    Urine Albumin/Glucose    Dilation/Effacement/Station    Cervical Dilation 0   Cervical Effacement 0   Vaginal Drainage    Draining Fluid No   Edema          General: Well appearing, no distress  Breasts: Normal bilaterally, nontender without masses, asymmetry, or nipple discharge  Abdomen: Soft, gravid, nontender  : Urethra normal  Normal labia majora and minora  Vagina normal   No vaginal bleeding  No vaginal discharge  Cervix closed  Uterus non-tender  Extremities: Warm and well perfused  Non tender  No edema      Ultrasound: ultrasound confirmed SLIUP, see report for details

## 2023-01-12 NOTE — PROGRESS NOTES
OB INTAKE INTERVIEW  Patient is 35 y  o who presents for OB intake at 8 4 wks  She is accompanied by: spouse   The father of her baby Lydia Haney is  involved in the pregnancy    Last Menstrual Period: 2022 Ultrasound: Measured 5 weeks 6 days  2023 First Prenatal US: Measured 8w4d  Estimated Date of Delivery: 2023 confirmed by US     Signs/Symptoms of Pregnancy  Current pregnancy symptoms:  Food Aversion   Constipation no  Headaches no  Cramping/spotting no  PICA cravings no    Diabetes-  There is no height or weight on file to calculate BMI  If patient has 1 or more, please order early 1 hour GTT  History of GDM no  BMI >30 YES  History of PCOS or current metformin use no  History of LGA/macrosomic infant (4000g/9lbs) no    If patient has 2 or more, please order early 1 hour GTT  AMA no  First degree relative with type 2 diabetes no  History of chronic HTN, hyperlipidemia, elevated A1C no  High risk race (, , ,  or ) no    Hypertension- if you answer yes, please order preeclampsia labs (cbc, comprehensive metabolic panel, urine protein creatinine ratio, uric acid)  History of of chronic HTN no  History of gestational HTN no  History of preeclampsia, eclampsia, or HELLP syndrome no  History of diabetes no  History of lupus, autoimmune disease, kidney disease, antiphospholipid syndrome, rheumatoid arthritis, sjogren's syndrome no    Thyroid- if yes order TSH with reflex T4 (Unless TSH value on file within last 4 weeks then do not need to order)  History of thyroid disease YES,Follows Endocrinologist Dr Margarita Knapp ,has a follow-up appointment scheduled on 23 Recent TSH level 1 250 /Free T4-1 24   Pt currently on Tirosint 75 mcg daily   Hypothyroidism   Partial Thyroidectomy 2013    Bleeding Disorder or Hx of DVT:  Pt reports history of PE 2017 following knee arthroscopy and [place] excision -placed on short term Eliquis therpay and transitioned to take Baby ASA daily, pt reports she stopped ASA use 4 yrs ago  Pt has not followed-up with  Hematologist due to location  Pt provided with consult for  Hematology to have labs and pregnancy treatment plan  Record release given, pt will return to office to have prior Hematology records on file  OB/GYN-  History of abnormal pap smear no  History of HPV no  History of Herpes/HSV YES, reports oral HSV 1  History of other STI (gonorrhea, chlamydia, trich) (or current partner with Hep B, Hep C, or HIV) no  History of prior  no  History of prior  no  History of  delivery prior to 36 weeks 6 days no  History of blood transfusion no  Ok for blood transfusion Yes     Substance screening-   History of tobacco use no  Currently using tobacco no  Currently using alcohol no  Presently using drugs no  Past drug use (if yes, order urine drug screening panel)  no  IV drug use (if yes, order urine drug screening panel) no  Partner drug use (if yes, order urine drug screening panel) no  Parent/Family drug use (do not order urine drug screening panel just for family hx) no    Depression Screening-  PHQ-9 Score: (3)  History of Depression and anxiety- follows PCP, had been on (Wellbutrin, Celexa in past)  Reports Paxil has been effective, stopped taking in 2022  Denies any current problems with Depression or anxiety management  MRSA Screening-   Does the pt have a hx of MRSA? no  If yes- please follow MRSA protocol and obtain a nasal swab for MRSA culture at 12 week visit  Immunizations:  Influenza vaccine given this season (ask date) Declined Flu vaccine   Discussed Tdap vaccine: discussed   Discussed COVID Vaccine: Pt has not been Covid vaccinated  Genetic/MFM-  Do you or your partner have a history of any of the following in yourselves or first degree relatives?   Cystic fibrosis no, screening order given  Spinal muscular atrophy no screening order given  Hemoglobinopathy/Sickle Cell/Thalassemia no  Fragile X Intellectual Disability no    If yes, discuss carrier screening and recommend consultation with [facility]/genetic counseling  If no, discuss option for carrier screening and/or genetic testing with [facility]  Patient interested Yes   Appointment at [facility] made: Referral  For [facility] given  Interview education  [facility] Pregnancy Essentials Book reviewed and discussed Yes      Prenatal lab work scripts: Yes, transmitted to   [facility]     Extra labs ordered:  Early 1 hr GTT  CF/SMA screening   Hematology Consult   MF Referral     Details that I feel the provider should be aware of:   Hypothyroidism  Multiple thyroid nodules, pt underwent partial thyroidectomy Paralysis of left vocal cord   Follows Endocrinologist Dr Shaylee Ny   Obesity-Early 1 hr gtt ordered   History of PE-Hematology consult given  Asthma-  Depression/Anxiety-Follows PCP, stopped Paxil use 6/2022 (deprssion screen score-3)  Records release for Hematology and prior GYN given         The patient was oriented to our practice, reviewed delivering physicians and Loyd Torres in [address] [place] [address] for Delivery  All questions were answered  Interviewed by: Yessy Hankins

## 2023-01-15 LAB
C TRACH RRNA SPEC QL NAA+PROBE: NEGATIVE
N GONORRHOEA RRNA SPEC QL NAA+PROBE: NEGATIVE

## 2023-01-16 ENCOUNTER — OFFICE VISIT (OUTPATIENT)
Dept: ENDOCRINOLOGY | Facility: HOSPITAL | Age: 33
End: 2023-01-16

## 2023-01-16 VITALS
DIASTOLIC BLOOD PRESSURE: 70 MMHG | BODY MASS INDEX: 33.32 KG/M2 | HEIGHT: 65 IN | WEIGHT: 200 LBS | HEART RATE: 74 BPM | SYSTOLIC BLOOD PRESSURE: 120 MMHG

## 2023-01-16 DIAGNOSIS — E04.2 MULTIPLE THYROID NODULES: Chronic | ICD-10-CM

## 2023-01-16 DIAGNOSIS — E07.9 THYROID DISEASE DURING PREGNANCY IN FIRST TRIMESTER: ICD-10-CM

## 2023-01-16 DIAGNOSIS — O99.281 THYROID DISEASE DURING PREGNANCY IN FIRST TRIMESTER: ICD-10-CM

## 2023-01-16 DIAGNOSIS — E89.0 POSTOPERATIVE HYPOTHYROIDISM: Primary | ICD-10-CM

## 2023-01-16 NOTE — PATIENT INSTRUCTIONS
The blood work is good  No change in tirosint SOL 75 mcg daily  We have you get monthly blood work in pregnancy and follow up in  2 months

## 2023-01-16 NOTE — PROGRESS NOTES
1/16/2023    Assessment/Plan      Diagnoses and all orders for this visit:    Postoperative hypothyroidism  -     TSH, 3rd generation Lab Collect; Standing  -     T4, free Lab Collect; Standing  -     TSH, 3rd generation Lab Collect  -     T4, free Lab Collect    Multiple thyroid nodules  -     TSH, 3rd generation Lab Collect; Standing  -     T4, free Lab Collect; Standing  -     TSH, 3rd generation Lab Collect  -     T4, free Lab Collect    Thyroid disease during pregnancy in first trimester  -     TSH, 3rd generation Lab Collect; Standing  -     T4, free Lab Collect; Standing  -     TSH, 3rd generation Lab Collect  -     T4, free Lab Collect        Assessment/Plan:  1  Hypothyroidism post thyroidectomy  Recent thyroid function studies are normal   She is clinically and biochemically euthyroid  She will continue the same Tirosint SOL brand 75 mcg daily  2   Thyroid nodules  She is post biopsy of a right sided thyroid nodule that ended up being biopsied benign  These nodules will be followed over time with serial ultrasounds  3   Thyroid disease in pregnancy  TSH recommendation is to keep her TSH less than 2 5 in pregnancy  She will continue to have blood work done on a monthly basis and follow-up every 2 months while pregnant  I have asked her to follow-up in 2 months with preceding TSH and free T4       CC: Hypothyroid, thyroid nodule follow-up    History of Present Illness     HPI: Al Patterson is a 35y o  year old female with history of hypothyroidism post partial thyroidectomy for follow-up visit  Multiple thyroid nodules were noted in the past   She would a partial thyroidectomy of her left thyroid lobe and still has some right-sided thyroid nodules that are stable in size but are T rad 4 and T rad 5  Her right side of her lobe is quite large  She was seen in August 2022 due to significant weight gain    Thyroid function tests were noted to be hypothyroid and she was started on Tirosint SOL 50 mcg daily for thyroid hormone replacement as she can not swallow pill  The rest of her blood work was normal ruling out other causes of her weight issues  PCOS and insulin resistance were ruled out  She did have a mildly elevated a m  cortisol 21 3 with normals up to 19 4 which is not remarkable  She is currently taking Tirosint SOL 75 mcg daily  She is always somewhat cold  She denies heat intolerance, palpitation, or tremors  Weight is stable  Fatigue is less than it was several weeks ago  She will get diarrhea if she eats certain foods  She denies constipation, insomnia, anxiety or depression  She has some dry and itchy skin but no brittle nails or hair loss  She denies diplopia  She denies compressive thyroid symptoms or difficulties with swallowing  She has multiple nodules on her thyroid and a T RADS 3 thyroid nodule in the right lower lobe for which biopsy was recommended  She underwent biopsy of this nodule on 11/8/2022 demonstrating [place] class III pathology  The Afirma testing was benign  She is now pregnant and CHRISTOPHER: 8/20/2023  She is 9 weeks gestation  Review of Systems   Constitutional: Positive for fatigue  Negative for unexpected weight change  Fatigued but less than several weeks ago  Has issues with textures with food  Can not eat big meals  Difficulty eating meat  HENT: Negative for trouble swallowing  Eyes: Negative for visual disturbance  Respiratory: Negative for chest tightness and shortness of breath  Cardiovascular: Negative for chest pain and palpitations  Occasional sharp stabbing pain in left breast  There for 15 min and then resolves on own  Gastrointestinal: Positive for diarrhea  Negative for abdominal pain, constipation and nausea  Will get diarrhea if eats ice cream and with certain foods  Endocrine: Positive for cold intolerance  Negative for heat intolerance  Had glucose tolerance test today 1 hour  Skin: Positive for rash  Rash not bad  Some dry and itchy skin  No brittle nails  No hair loss  Neurological: Negative for dizziness, tremors, light-headedness and headaches  Psychiatric/Behavioral: Negative for dysphoric mood and sleep disturbance  The patient is not nervous/anxious          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   • Vitamin D deficiency    • Vocal cord paralysis     Following partial thyroidectomy     Past Surgical History:   Procedure Laterality Date   • DILATION AND EVACUATION  2016   • KNEE ARTHROSCOPY W/ PLICA EXCISION Left 8413   • THYROIDECTOMY, PARTIAL Left 2013   • UPPER GASTROINTESTINAL ENDOSCOPY     • US GUIDED THYROID BIOPSY  11/08/2022   • WISDOM TOOTH EXTRACTION       Social History   Social History     Substance and Sexual Activity   Alcohol Use Not Currently    Comment: socially     Social History     Substance and Sexual Activity   Drug Use Not Currently    Comment:  no longer using marijuana     Social History     Tobacco Use   Smoking Status Never   Smokeless Tobacco Never   Tobacco Comments     seasonal, only when it's warm out, social     Family History:   Family History   Problem Relation Age of Onset   • Thyroid disease Mother         thyroid nodules   • No Known Problems Father    • Ovarian cancer Sister    • Thyroid disease Sister    • Anorexia nervosa Sister    • Mental illness Sister    • No Known Problems Sister    • No Known Problems Brother    • Celiac disease Maternal Grandmother    • Hodgkin's lymphoma Maternal Grandfather    • Hodgkin's lymphoma Paternal Grandfather    • Other Maternal Aunt hypoglycemia   • Other Maternal Aunt         brain tumor, had siezure   • Colon cancer Neg Hx    • Colon polyps Neg Hx    • Substance Abuse Neg Hx        Meds/Allergies   Current Outpatient Medications   Medication Sig Dispense Refill   • Cranberry-Vit C-Lactobacillus (RA CRANBERRY SUPPLEMENTS PO) Take by mouth     • Prenatal Vit-Fe Fumarate-FA (prenatal multivitamin) 28-0 8 MG TABS Take 1 tablet by mouth daily Late afternoon     • Tirosint-SOL 75 MCG/ML SOLN Take 75 mcg daily 90 mL 3   • clobetasol (TEMOVATE) 0 05 % cream as needed Using as needed (Patient not taking: Reported on 1/16/2023)     • EUCRISA 2 % OINT Apply 1 application topically 2 (two) times a day as needed (Patient not taking: Reported on 1/16/2023)       No current facility-administered medications for this visit  No Known Allergies    Objective   Vitals: Blood pressure 120/70, pulse 74, height 5' 5" (1 651 m), weight 90 7 kg (200 lb), last menstrual period 11/13/2022  Invasive Devices     None                 Physical Exam  Vitals reviewed  Constitutional:       Appearance: Normal appearance  She is well-developed  HENT:      Head: Normocephalic and atraumatic  Eyes:      Extraocular Movements: Extraocular movements intact  Conjunctiva/sclera: Conjunctivae normal       Comments: No lid lag, stare, proptosis, or periorbital edema  Neck:      Thyroid: No thyromegaly  Vascular: No carotid bruit  Comments: Healed anterior neck scar  Palpable thyroid tissue on the right  No bruits over the thyroid tissue  Cardiovascular:      Rate and Rhythm: Normal rate and regular rhythm  Heart sounds: Normal heart sounds  No murmur heard  Pulmonary:      Effort: Pulmonary effort is normal       Breath sounds: Normal breath sounds  No wheezing  Abdominal:      Palpations: Abdomen is soft  Musculoskeletal:         General: No deformity  Normal range of motion  Cervical back: Normal range of motion and neck supple  Right lower leg: No edema  Left lower leg: No edema  Comments: No tremor of the outstretched hands  Lymphadenopathy:      Cervical: No cervical adenopathy  Skin:     General: Skin is warm and dry  Findings: No rash  Neurological:      Mental Status: She is alert and oriented to person, place, and time  Deep Tendon Reflexes: Reflexes are normal and symmetric  Comments: Patellar deep tendon reflexes normal          The history was obtained from the review of the chart and from the patient      Lab Results:        Lab Results   Component Value Date    CREATININE 0 86 2022    CREATININE 0 76 2022    CREATININE 0 78 2022    BUN 14 2022    K 4 4 2022     2022    CO2 26 2022     eGFR   Date Value Ref Range Status   2022 92 >59 mL/min/1 73 Final   2022 100 ml/min/1 73sq m Final         Lab Results   Component Value Date    HDL 70 2021    TRIG 85 2021    CHOLHDL 2 3 2021       Lab Results   Component Value Date    ALT 19 2022    AST 19 2022    ALKPHOS 46 2022       Lab Results   Component Value Date    TSH 1 250 2023    FREET4 1 24 2023             Future Appointments   Date Time Provider Loyd Williamson   2/15/2023  3:20 PM Farrel Olszewski, DO HEM ONC [facility]   2023 10:00 AM  US [address]   2023  3:20 PM MD DAVID Vick Mc OB [facility]   3/17/2023  9:50 AM Soundsuzan Labrador, PA-C ENDO [facility]   3/17/2023  2:40 PM MD DAVID Vick Mc [facility]   2023  2:30 PM  [place]   2023  5:00 PM MD DAVID Vick Mc [facility]   2023  5:40 PM MD DAVID Charlton [facility]   2023  8:50 AM Soundra Labrador, PA-C ENDO [facility]   2023  8:50 AM Tonio Gasca PA-C ENDO [facility]

## 2023-01-23 ENCOUNTER — TELEPHONE (OUTPATIENT)
Dept: OTHER | Facility: OTHER | Age: 33
End: 2023-01-23

## 2023-01-23 NOTE — TELEPHONE ENCOUNTER
Patient called regarding her appt on 02/15/23 with Dr Maeve Coates  Patient wanted to mention that she is currently 10 weeks pregnant with a hx of pulmonary embolism  Patient would like a callback from the office to verify that she's still ok to come to her appt on 02/15/23

## 2023-01-24 ENCOUNTER — TELEPHONE (OUTPATIENT)
Dept: HEMATOLOGY ONCOLOGY | Facility: CLINIC | Age: 33
End: 2023-01-24

## 2023-01-24 LAB
ABO GROUP BLD: NORMAL
APPEARANCE UR: CLEAR
BACTERIA UR CULT: ABNORMAL
BACTERIA URNS QL MICRO: ABNORMAL
BASOPHILS # BLD AUTO: 0 X10E3/UL (ref 0–0.2)
BASOPHILS NFR BLD AUTO: 1 %
BILIRUB UR QL STRIP: NEGATIVE
BLD GP AB SCN SERPL QL: NEGATIVE
CASTS URNS QL MICRO: ABNORMAL /LPF
CF COMMENT: NORMAL
CFTR MUT ANL BLD/T: NORMAL
CLINICAL INFO: NORMAL
COLOR UR: YELLOW
EOSINOPHIL # BLD AUTO: 0.2 X10E3/UL (ref 0–0.4)
EOSINOPHIL NFR BLD AUTO: 3 %
EPI CELLS #/AREA URNS HPF: >10 /HPF (ref 0–10)
ERYTHROCYTE [DISTWIDTH] IN BLOOD BY AUTOMATED COUNT: 12.6 % (ref 11.7–15.4)
ETHNIC BACKGROUND STATED: NORMAL
GENE MUT TESTED BLD/T: NORMAL
GENERAL COMMENTS:: NORMAL
GLUCOSE 1H P 50 G GLC PO SERPL-MCNC: 71 MG/DL (ref 70–139)
GLUCOSE UR QL: NEGATIVE
HBV SURFACE AG SERPL QL IA: NEGATIVE
HCT VFR BLD AUTO: 39 % (ref 34–46.6)
HCV AB S/CO SERPL IA: <0.1 S/CO RATIO (ref 0–0.9)
HGB BLD-MCNC: 12.7 G/DL (ref 11.1–15.9)
HGB UR QL STRIP: NEGATIVE
HIV 1+2 AB+HIV1 P24 AG SERPL QL IA: NON REACTIVE
IMM GRANULOCYTES # BLD: 0 X10E3/UL (ref 0–0.1)
IMM GRANULOCYTES NFR BLD: 0 %
KETONES UR QL STRIP: NEGATIVE
LAB DIRECTOR NAME PROVIDER: NORMAL
LEUKOCYTE ESTERASE UR QL STRIP: ABNORMAL
LYMPHOCYTES # BLD AUTO: 2.2 X10E3/UL (ref 0.7–3.1)
LYMPHOCYTES NFR BLD AUTO: 36 %
Lab: ABNORMAL
MCH RBC QN AUTO: 28.5 PG (ref 26.6–33)
MCHC RBC AUTO-ENTMCNC: 32.6 G/DL (ref 31.5–35.7)
MCV RBC AUTO: 87 FL (ref 79–97)
MICRO URNS: ABNORMAL
MONOCYTES # BLD AUTO: 0.6 X10E3/UL (ref 0.1–0.9)
MONOCYTES NFR BLD AUTO: 9 %
NEUTROPHILS # BLD AUTO: 3.2 X10E3/UL (ref 1.4–7)
NEUTROPHILS NFR BLD AUTO: 51 %
NITRITE UR QL STRIP: NEGATIVE
PH UR STRIP: 6.5 [PH] (ref 5–7.5)
PLATELET # BLD AUTO: 263 X10E3/UL (ref 150–450)
PROT UR QL STRIP: NEGATIVE
RBC # BLD AUTO: 4.46 X10E6/UL (ref 3.77–5.28)
RBC #/AREA URNS HPF: ABNORMAL /HPF (ref 0–2)
REASON FOR REFERRAL (NARRATIVE): NORMAL
REF LAB TEST METHOD: NORMAL
RH BLD: POSITIVE
RPR SER QL: NON REACTIVE
RUBV IGG SERPL IA-ACNC: 2.48 INDEX
SL AMB DISCLAIMER: NORMAL
SL AMB GENETIC COUNSELOR: NORMAL
SMN1 GENE MUT ANL BLD/T: NORMAL
SP GR UR: 1.02 (ref 1–1.03)
SPECIMEN SOURCE: NORMAL
UROBILINOGEN UR STRIP-ACNC: 0.2 MG/DL (ref 0.2–1)
WBC # BLD AUTO: 6.1 X10E3/UL (ref 3.4–10.8)
WBC #/AREA URNS HPF: ABNORMAL /HPF (ref 0–5)

## 2023-01-24 NOTE — TELEPHONE ENCOUNTER
Argentina Cedillo! Can you call the patient and let her know that she can still keep her appointment  This is actually appropriate for Lorenza to see as well if she has room to see her instead  Thanks!   Bailey

## 2023-01-26 ENCOUNTER — TELEPHONE (OUTPATIENT)
Dept: OBGYN CLINIC | Facility: CLINIC | Age: 33
End: 2023-01-26

## 2023-01-26 DIAGNOSIS — R82.71 GBS BACTERIURIA: Primary | ICD-10-CM

## 2023-01-26 RX ORDER — PENICILLIN V POTASSIUM 250 MG/1
250 TABLET ORAL 4 TIMES DAILY
Qty: 28 TABLET | Refills: 0 | Status: SHIPPED | OUTPATIENT
Start: 2023-01-26 | End: 2023-02-02

## 2023-01-26 NOTE — PROGRESS NOTES
Rx for PCN V 250 mg P O  QID X 7 days sent to pharmacy for Tx of GBS bacturia  Repeat Urine C&S after Abx completed    PCN I V  in labor for GBS prophylaxis

## 2023-01-26 NOTE — TELEPHONE ENCOUNTER
Sherry morales pengisselllin was sent to pharmacy for her   She is unable to swallow pills and is asking for alternate liquid

## 2023-01-30 ENCOUNTER — TELEPHONE (OUTPATIENT)
Dept: OBGYN CLINIC | Facility: CLINIC | Age: 33
End: 2023-01-30

## 2023-01-30 DIAGNOSIS — R82.71 GBS BACTERIURIA: Primary | ICD-10-CM

## 2023-01-30 NOTE — TELEPHONE ENCOUNTER
Sherry morales she has a uti and is concerned about weight loss  Return call to andrew dykes if having intractable vomiting to contact provider on call for recommendations  Otherewise continue with increased water intake and frequent, high protein meals   Call back in am for further discussion

## 2023-01-31 NOTE — TELEPHONE ENCOUNTER
Spoke with Iza Glass who reports she is not vomiting but struggling with food aversion  She is not interested in meats although has tolerated several meat meals in the last week  She is eating but does not feel it is as healthy as it could be  Reassured- appetite typically will balance as she approaches 2nd trimester  Keep trying different foods to see what she is able to tolerate  Water intake is approx >100ounces per day  Currently on ABX for UTI  Per Dr Dwayne Morelos note needs repeat c/s after completing ABX therapy  Order generate for [facility]  Patient verbalized understanding and agreement to plan

## 2023-02-06 ENCOUNTER — ROUTINE PRENATAL (OUTPATIENT)
Facility: HOSPITAL | Age: 33
End: 2023-02-06
Attending: OBSTETRICS & GYNECOLOGY

## 2023-02-06 VITALS
WEIGHT: 194.6 LBS | DIASTOLIC BLOOD PRESSURE: 60 MMHG | BODY MASS INDEX: 32.42 KG/M2 | HEIGHT: 65 IN | HEART RATE: 110 BPM | SYSTOLIC BLOOD PRESSURE: 120 MMHG

## 2023-02-06 DIAGNOSIS — Z34.91 PRENATAL CARE IN FIRST TRIMESTER: ICD-10-CM

## 2023-02-06 DIAGNOSIS — Z3A.12 12 WEEKS GESTATION OF PREGNANCY: Primary | ICD-10-CM

## 2023-02-06 DIAGNOSIS — F33.9 DEPRESSION, RECURRENT (HCC): ICD-10-CM

## 2023-02-06 DIAGNOSIS — J45.909 MILD ASTHMA WITHOUT COMPLICATION, UNSPECIFIED WHETHER PERSISTENT: ICD-10-CM

## 2023-02-06 DIAGNOSIS — E89.0 H/O PARTIAL THYROIDECTOMY: ICD-10-CM

## 2023-02-06 DIAGNOSIS — F41.1 GENERALIZED ANXIETY DISORDER: ICD-10-CM

## 2023-02-06 DIAGNOSIS — Z86.711 HISTORY OF PULMONARY EMBOLUS (PE): ICD-10-CM

## 2023-02-06 DIAGNOSIS — E66.9 OBESITY, CLASS I, BMI 30-34.9: ICD-10-CM

## 2023-02-06 DIAGNOSIS — Z36.82 ENCOUNTER FOR (NT) NUCHAL TRANSLUCENCY SCAN: ICD-10-CM

## 2023-02-06 DIAGNOSIS — Z87.59 HISTORY OF ECTOPIC PREGNANCY: ICD-10-CM

## 2023-02-06 RX ORDER — ASPIRIN 81 MG/1
162 TABLET ORAL DAILY
Qty: 60 TABLET | Refills: 5 | Status: CANCELLED | OUTPATIENT
Start: 2023-02-06

## 2023-02-06 RX ORDER — ASPIRIN 81 MG/1
162 TABLET, CHEWABLE ORAL DAILY
Qty: 60 TABLET | Refills: 5 | Status: SHIPPED | OUTPATIENT
Start: 2023-02-06

## 2023-02-06 NOTE — PROGRESS NOTES
Ultrasound Probe Disinfection    A transvaginal ultrasound was performed  Prior to use, disinfection was performed with High Level Disinfection Process ([email or web address])  Probe serial number A4: [id number] was used        Joy Juan  02/06/23  3:06 PM

## 2023-02-06 NOTE — LETTER
February 6, 2023     Cole Gorman, DO  [id number] [facility]    Patient: Kirt Patricio   YOB: 1990   Date of Visit: 2/6/2023       Dear Dr El Murcia: Thank you for referring Kirt Patricio to me for evaluation  Below are my notes for this consultation  If you have questions, please do not hesitate to call me  I look forward to following your patient along with you  Sincerely,        Laci Magana MD        CC: No Recipients  Laci Magana MD  2/6/2023  4:35 PM  Sign when Signing Visit  [address]: Ms Vickey Rodriguez was seen today for nuchal translucency ultrasound  See ultrasound report under "OB Procedures" tab  Review of Systems   Constitutional: Negative for chills, fever and unexpected weight change  HENT: Negative for congestion, dental problem, facial swelling and sore throat  Eyes: Negative for visual disturbance  Respiratory: Negative for cough and shortness of breath  Cardiovascular: Negative for chest pain and palpitations  Gastrointestinal: Negative for diarrhea and vomiting  Endocrine: Negative for polydipsia  Genitourinary: Positive for vaginal discharge  Negative for dysuria and vaginal bleeding  Musculoskeletal: Negative for back pain and joint swelling  Skin: Negative for rash and wound  Allergic/Immunologic: Negative for immunocompromised state  Neurological: Negative for seizures and headaches  Hematological: Does not bruise/bleed easily  Psychiatric/Behavioral: Negative for dysphoric mood, hallucinations and suicidal ideas  Physical Exam  Constitutional:       General: She is not in acute distress  Appearance: Normal appearance  HENT:      Head: Normocephalic and atraumatic  Eyes:      Extraocular Movements: Extraocular movements intact  Cardiovascular:      Rate and Rhythm: Normal rate     Pulmonary:      Effort: Pulmonary effort is normal  No respiratory distress  Skin:     Findings: No erythema or rash  Neurological:      Mental Status: She is alert and oriented to person, place, and time  Psychiatric:         Mood and Affect: Mood normal          Behavior: Behavior normal          Please don't hesitate to contact our office with any concerns or questions    -Vira Mccrary MD

## 2023-02-06 NOTE — PROGRESS NOTES
Patient chose to have [facility] Non-invasive Prenatal Screen with fetal sex  Patient given brochure and is aware [facility] will contact their insurance and coordinate coverage  Patient made aware she will need to respond to text message or e-mail from [employer] within 2 business days or testing will be run through insurance  Patient informed text message will come from area code  "415"  Provided [employer] # [phone] and web site : [email or web address]    "Haven your test online" card with barcode and test tube ID provided to patient  Reviewed [facility]'s web site states 5-7 business days for results via their portal    [employer] message will be sent to patient when MFM receives results /provider reviews  2 vials of blood drawn from LEFT arm by OVIDIO ROMERO  Patient tolerated blood draw without difficulty  Specimens labeled with patient identifiers (name, date of birth, specimen collection date), order and specimen were verified with patient, packed and sent via [employer]  Copy of lab order scanned to Epic media  [facility] will have results in approximately 7-10 business days and will call patient or notify via [address]  Patient aware viewing lab result online will reveal fetal sex if ordered  Patient verbalized understanding of all instructions and no questions at this time

## 2023-02-06 NOTE — PROGRESS NOTES
[address]: Ms Vickey Rodriguez was seen today for nuchal translucency ultrasound  See ultrasound report under "OB Procedures" tab  Review of Systems   Constitutional: Negative for chills, fever and unexpected weight change  HENT: Negative for congestion, dental problem, facial swelling and sore throat  Eyes: Negative for visual disturbance  Respiratory: Negative for cough and shortness of breath  Cardiovascular: Negative for chest pain and palpitations  Gastrointestinal: Negative for diarrhea and vomiting  Endocrine: Negative for polydipsia  Genitourinary: Positive for vaginal discharge  Negative for dysuria and vaginal bleeding  Musculoskeletal: Negative for back pain and joint swelling  Skin: Negative for rash and wound  Allergic/Immunologic: Negative for immunocompromised state  Neurological: Negative for seizures and headaches  Hematological: Does not bruise/bleed easily  Psychiatric/Behavioral: Negative for dysphoric mood, hallucinations and suicidal ideas  Physical Exam  Constitutional:       General: She is not in acute distress  Appearance: Normal appearance  HENT:      Head: Normocephalic and atraumatic  Eyes:      Extraocular Movements: Extraocular movements intact  Cardiovascular:      Rate and Rhythm: Normal rate  Pulmonary:      Effort: Pulmonary effort is normal  No respiratory distress  Skin:     Findings: No erythema or rash  Neurological:      Mental Status: She is alert and oriented to person, place, and time  Psychiatric:         Mood and Affect: Mood normal          Behavior: Behavior normal          Please don't hesitate to contact our office with any concerns or questions    -Laci Magana MD

## 2023-02-09 ENCOUNTER — TELEPHONE (OUTPATIENT)
Facility: HOSPITAL | Age: 33
End: 2023-02-09

## 2023-02-09 NOTE — TELEPHONE ENCOUNTER
Called PT on 2/9/23 regarding a voicemail PT left at office  PT stated a medication was sent to the wrong pharmacy, and wanted to confirm we had the correct pharmacy in her chart  PT did not answer when I called back, left voice message stating we had two pharmacy's on file, but I did delete the incorrect one  I left our office phone number to call back if PT needed the medication to be sent to the correct pharmacy, or if she has any other questions or concerns

## 2023-02-13 ENCOUNTER — TELEPHONE (OUTPATIENT)
Dept: ENDOCRINOLOGY | Facility: HOSPITAL | Age: 33
End: 2023-02-13

## 2023-02-13 DIAGNOSIS — E89.0 POSTOPERATIVE HYPOTHYROIDISM: Primary | ICD-10-CM

## 2023-02-13 LAB
BACTERIA UR CULT: NORMAL
Lab: NO GROWTH

## 2023-02-13 RX ORDER — LEVOTHYROXINE SODIUM 0.07 MG/1
75 TABLET ORAL DAILY
Qty: 90 TABLET | Refills: 1 | Status: SHIPPED | OUTPATIENT
Start: 2023-02-13

## 2023-02-13 NOTE — TELEPHONE ENCOUNTER
The patient called this morning and stated that when she went to finally  her prescription she found out that the [address] has been recalled for subpotency  She at this time has the 50 mcg, and she can not get the 75 mcg anywhere and she does not know what to do at this time  She also noted that her levels have been going up as well  She would like to know what to do at this point

## 2023-02-13 NOTE — TELEPHONE ENCOUNTER
I was able to call the patient back and she was wondering if the Synthroid or the Levothyroxine come in liquid form as well    If not she wondered if the pill is ok to be crushed up as well, because she has issues swallowing the pills

## 2023-02-13 NOTE — TELEPHONE ENCOUNTER
No synthroid or levothyroxine is only in pill form  It can be crushed and taken in water  Should not be in for as it would not be absorbed as well

## 2023-02-13 NOTE — TELEPHONE ENCOUNTER
I was able to speak with the and she will try which ever pill is the smaller of the two  She will attempt to swallow the pill and if that does not work she will crush it and mix in water  She also stated that she did not take any medication today and when she picks up prescription when should she take the medication and would she need to have her levels tested sooner than March    [facility] does not want her levels over 2 5

## 2023-02-13 NOTE — TELEPHONE ENCOUNTER
She needs to start levothyroxine 75 mcg daily  Take the dose today  Monthly blood work is still fine  No need to test sooner than march as it takes time for the dosage change to show up in the blood

## 2023-02-13 NOTE — TELEPHONE ENCOUNTER
Options include giving her 25 mcg and 50 mcg tirosint SOL to equal 75 mcg or going back to synthroid 75 mcg daily temporarily until the tirosint is available again

## 2023-02-14 ENCOUNTER — ROUTINE PRENATAL (OUTPATIENT)
Dept: OBGYN CLINIC | Facility: CLINIC | Age: 33
End: 2023-02-14

## 2023-02-14 ENCOUNTER — TELEPHONE (OUTPATIENT)
Dept: OBGYN CLINIC | Facility: CLINIC | Age: 33
End: 2023-02-14

## 2023-02-14 VITALS
SYSTOLIC BLOOD PRESSURE: 90 MMHG | WEIGHT: 193.6 LBS | BODY MASS INDEX: 32.26 KG/M2 | DIASTOLIC BLOOD PRESSURE: 62 MMHG | HEIGHT: 65 IN

## 2023-02-14 DIAGNOSIS — N89.8 VAGINAL DISCHARGE DURING PREGNANCY IN FIRST TRIMESTER: Primary | ICD-10-CM

## 2023-02-14 DIAGNOSIS — O26.891 VAGINAL DISCHARGE DURING PREGNANCY IN FIRST TRIMESTER: Primary | ICD-10-CM

## 2023-02-14 DIAGNOSIS — Z3A.13 13 WEEKS GESTATION OF PREGNANCY: ICD-10-CM

## 2023-02-14 LAB
SL AMB  POCT GLUCOSE, UA: NEGATIVE
SL AMB POCT URINE PROTEIN: NEGATIVE

## 2023-02-14 NOTE — TELEPHONE ENCOUNTER
Patient called stating she is 13 wks pregnant and having some on and off pelvic and bilateral abd cramping  Denies spotting/bleeding  She also having some itchiness, vaginal irritation, slight fishy vaginal odor and increased in yellow tinge discharge  She wanted to know if she can wait until Friday to be seen as it is her day off  Encourage her to be seen today for further evaluation  Pt declined as she need either in early appointment around 6-7 am or after 4 pm which there are no appointment available  Pt wanted to ask the on-call provider of any other alternatives as her work schedule is strict  Dr Laya Diez please advise

## 2023-02-14 NOTE — TELEPHONE ENCOUNTER
Reviewed Dr Riri Mcdowell recommendation with Chun Guzman, transferred to reception to provide sooner appt  For evaluation of vaginal symptoms

## 2023-02-14 NOTE — TELEPHONE ENCOUNTER
Agree with recommendations  Exam necessary before starting any treatment, as it is unclear what the causative organism might be  Recommend hydration until she is able to be seen in office  If unable to come before Friday, we will plan to examine at that time       Jd Macedo MD  2/14/2023 9:41 AM

## 2023-02-15 ENCOUNTER — CONSULT (OUTPATIENT)
Dept: HEMATOLOGY ONCOLOGY | Facility: HOSPITAL | Age: 33
End: 2023-02-15
Attending: OBSTETRICS & GYNECOLOGY

## 2023-02-15 ENCOUNTER — TELEPHONE (OUTPATIENT)
Dept: HEMATOLOGY ONCOLOGY | Facility: HOSPITAL | Age: 33
End: 2023-02-15

## 2023-02-15 VITALS
OXYGEN SATURATION: 99 % | BODY MASS INDEX: 32.15 KG/M2 | HEIGHT: 65 IN | WEIGHT: 193 LBS | TEMPERATURE: 96 F | HEART RATE: 85 BPM | RESPIRATION RATE: 16 BRPM | DIASTOLIC BLOOD PRESSURE: 60 MMHG | SYSTOLIC BLOOD PRESSURE: 102 MMHG

## 2023-02-15 DIAGNOSIS — E66.9 OBESITY, CLASS I, BMI 30-34.9: ICD-10-CM

## 2023-02-15 DIAGNOSIS — Z3A.13 13 WEEKS GESTATION OF PREGNANCY: ICD-10-CM

## 2023-02-15 DIAGNOSIS — Z86.711 HISTORY OF PULMONARY EMBOLUS (PE): Primary | ICD-10-CM

## 2023-02-15 RX ORDER — ENOXAPARIN SODIUM 100 MG/ML
40 INJECTION SUBCUTANEOUS EVERY 24 HOURS
Qty: 40 ML | Refills: 2 | Status: SHIPPED | OUTPATIENT
Start: 2023-02-15

## 2023-02-15 RX ORDER — ENOXAPARIN SODIUM 100 MG/ML
40 INJECTION SUBCUTANEOUS EVERY 24 HOURS
Qty: 90 ML | Refills: 2 | Status: SHIPPED | OUTPATIENT
Start: 2023-02-15 | End: 2023-02-15
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
## 2023-02-15 NOTE — TELEPHONE ENCOUNTER
Called patient and LVM to see if patient could come in at lunch time instead of 3:20 pm left my number for patient to call

## 2023-02-18 PROBLEM — N89.8 VAGINAL DISCHARGE DURING PREGNANCY IN FIRST TRIMESTER: Status: ACTIVE | Noted: 2023-02-14

## 2023-02-18 PROBLEM — O26.891 VAGINAL DISCHARGE DURING PREGNANCY IN FIRST TRIMESTER: Status: ACTIVE | Noted: 2023-02-14

## 2023-02-18 NOTE — ASSESSMENT & PLAN NOTE
C/o copious vaginal discharge with odor as well as cramping  Exam shows normal physiological discharge with a pH of 3 5, negative whiff/KOH and normal microscopy  Cervix is closed and thick   Pt informed that there are currently no abnormal findings

## 2023-02-18 NOTE — PROGRESS NOTES
Routine Prenatal Visit  [address], [address], [facility], [address] [facility]    Assessment/Plan:  Violeta Santos is a 35y o  year old  at 13w2d who presents for routine prenatal visit  1  Vaginal discharge during pregnancy in first trimester  Assessment & Plan:  C/o copious vaginal discharge with odor as well as cramping  Exam shows normal physiological discharge with a pH of 3 5, negative whiff/KOH and normal microscopy  Cervix is closed and thick  Pt informed that there are currently no abnormal findings       2  13 weeks gestation of pregnancy  -     POCT urine dip        Subjective:     CC: Prenatal care    Gaurav Saha is a 35 y o   female who presents for routine prenatal care at 13w2d  Pregnancy ROS: no leakage of fluid, pelvic pain, or vaginal bleeding  no fetal movement  The following portions of the patient's history were reviewed and updated as appropriate: allergies, current medications, past family history, past medical history, obstetric history, gynecologic history, past social history, past surgical history and problem list       Objective:  BP 90/62   Ht 5' 5" (1 651 m)   Wt 87 8 kg (193 lb 9 6 oz)   LMP 2022 (Exact Date)   BMI 32 22 kg/m²   Pregravid Weight/BMI: 92 1 kg (203 lb) (BMI 33 78)  Current Weight: 87 8 kg (193 lb 9 6 oz)   Total Weight Gain: -4 264 kg (-9 lb 6 4 oz)   Pre-Rohit Vitals    Flowsheet Row Most Recent Value   Prenatal Assessment    Fetal Heart Rate 150   Fundal Height (cm) 14 cm   Movement Absent   Prenatal Vitals    Blood Pressure 90/62   Weight - Scale 87 8 kg (193 lb 9 6 oz)   Urine Albumin/Glucose    Dilation/Effacement/Station    Vaginal Drainage    Edema    LLE Edema None   RLE Edema None   Facial Edema None           General: Well appearing, no distress  Respiratory: Unlabored breathing  Cardiovascular: Regular rate  Abdomen: Soft, gravid, nontender  Fundal Height: Appropriate for gestational age    Extremities: Warm and well perfused  Non tender

## 2023-02-21 ENCOUNTER — APPOINTMENT (EMERGENCY)
Dept: MRI IMAGING | Facility: HOSPITAL | Age: 33
End: 2023-02-21

## 2023-02-21 ENCOUNTER — OFFICE VISIT (OUTPATIENT)
Dept: FAMILY MEDICINE CLINIC | Facility: HOSPITAL | Age: 33
End: 2023-02-21

## 2023-02-21 ENCOUNTER — HOSPITAL ENCOUNTER (EMERGENCY)
Facility: HOSPITAL | Age: 33
Discharge: HOME | End: 2023-02-21
Attending: EMERGENCY MEDICINE

## 2023-02-21 ENCOUNTER — TELEPHONE (OUTPATIENT)
Dept: OBGYN CLINIC | Facility: CLINIC | Age: 33
End: 2023-02-21

## 2023-02-21 VITALS
OXYGEN SATURATION: 97 % | HEIGHT: 65 IN | BODY MASS INDEX: 31.72 KG/M2 | HEART RATE: 74 BPM | DIASTOLIC BLOOD PRESSURE: 59 MMHG | WEIGHT: 190.4 LBS | SYSTOLIC BLOOD PRESSURE: 100 MMHG

## 2023-02-21 VITALS
OXYGEN SATURATION: 99 % | HEART RATE: 71 BPM | RESPIRATION RATE: 18 BRPM | SYSTOLIC BLOOD PRESSURE: 109 MMHG | TEMPERATURE: 97.7 F | DIASTOLIC BLOOD PRESSURE: 72 MMHG

## 2023-02-21 DIAGNOSIS — R09.81 NASAL CONGESTION: ICD-10-CM

## 2023-02-21 DIAGNOSIS — J06.9 UPPER RESPIRATORY TRACT INFECTION, UNSPECIFIED TYPE: Primary | ICD-10-CM

## 2023-02-21 DIAGNOSIS — Z86.711 HISTORY OF PULMONARY EMBOLUS (PE): ICD-10-CM

## 2023-02-21 DIAGNOSIS — O26.892 PREGNANCY HEADACHE IN SECOND TRIMESTER: Primary | ICD-10-CM

## 2023-02-21 DIAGNOSIS — E89.0 POSTOPERATIVE HYPOTHYROIDISM: ICD-10-CM

## 2023-02-21 DIAGNOSIS — R51.9 PREGNANCY HEADACHE IN SECOND TRIMESTER: Primary | ICD-10-CM

## 2023-02-21 DIAGNOSIS — Z3A.14 14 WEEKS GESTATION OF PREGNANCY: ICD-10-CM

## 2023-02-21 DIAGNOSIS — F41.1 GENERALIZED ANXIETY DISORDER: ICD-10-CM

## 2023-02-21 DIAGNOSIS — E03.9 HYPOTHYROIDISM: ICD-10-CM

## 2023-02-21 LAB
ANION GAP SERPL CALCULATED.3IONS-SCNC: 10 MMOL/L (ref 4–13)
BASOPHILS # BLD AUTO: 0.04 THOUSANDS/ÂΜL (ref 0–0.1)
BASOPHILS NFR BLD AUTO: 0 % (ref 0–1)
BUN SERPL-MCNC: 7 MG/DL (ref 5–25)
CALCIUM SERPL-MCNC: 9 MG/DL (ref 8.4–10.2)
CHLORIDE SERPL-SCNC: 102 MMOL/L (ref 96–108)
CO2 SERPL-SCNC: 23 MMOL/L (ref 21–32)
CREAT SERPL-MCNC: 0.62 MG/DL (ref 0.6–1.3)
EOSINOPHIL # BLD AUTO: 0.2 THOUSAND/ÂΜL (ref 0–0.61)
EOSINOPHIL NFR BLD AUTO: 2 % (ref 0–6)
ERYTHROCYTE [DISTWIDTH] IN BLOOD BY AUTOMATED COUNT: 12.4 % (ref 11.6–15.1)
GFR SERPL CREATININE-BSD FRML MDRD: 118 ML/MIN/1.73SQ M
GLUCOSE SERPL-MCNC: 81 MG/DL (ref 65–140)
HCT VFR BLD AUTO: 38.1 % (ref 34.8–46.1)
HGB BLD-MCNC: 12.3 G/DL (ref 11.5–15.4)
IMM GRANULOCYTES # BLD AUTO: 0.02 THOUSAND/UL (ref 0–0.2)
IMM GRANULOCYTES NFR BLD AUTO: 0 % (ref 0–2)
LYMPHOCYTES # BLD AUTO: 3.31 THOUSANDS/ÂΜL (ref 0.6–4.47)
LYMPHOCYTES NFR BLD AUTO: 36 % (ref 14–44)
MCH RBC QN AUTO: 28.9 PG (ref 26.8–34.3)
MCHC RBC AUTO-ENTMCNC: 32.3 G/DL (ref 31.4–37.4)
MCV RBC AUTO: 89 FL (ref 82–98)
MONOCYTES # BLD AUTO: 0.65 THOUSAND/ÂΜL (ref 0.17–1.22)
MONOCYTES NFR BLD AUTO: 7 % (ref 4–12)
NEUTROPHILS # BLD AUTO: 5.11 THOUSANDS/ÂΜL (ref 1.85–7.62)
NEUTS SEG NFR BLD AUTO: 55 % (ref 43–75)
NRBC BLD AUTO-RTO: 0 /100 WBCS
PLATELET # BLD AUTO: 256 THOUSANDS/UL (ref 149–390)
PMV BLD AUTO: 9.6 FL (ref 8.9–12.7)
POTASSIUM SERPL-SCNC: 3.5 MMOL/L (ref 3.5–5.3)
RBC # BLD AUTO: 4.26 MILLION/UL (ref 3.81–5.12)
SODIUM SERPL-SCNC: 135 MMOL/L (ref 135–147)
WBC # BLD AUTO: 9.33 THOUSAND/UL (ref 4.31–10.16)

## 2023-02-21 RX ADMIN — GADOBUTROL 8 ML: 604.72 INJECTION INTRAVENOUS at 21:51

## 2023-02-21 RX ADMIN — SODIUM CHLORIDE 1000 ML: 0.9 INJECTION, SOLUTION INTRAVENOUS at 20:17

## 2023-02-21 NOTE — TELEPHONE ENCOUNTER
Patient called stating she is currently 14 wks pregnancy and she develop a pounding headache for the past 24 hours and had not gone away  She did not take Tylenol as she declined to take any medication during pregnancy and was seeking over the counter natural remedies as alternatives  She states her normal temp is usually around 97 4 and any time her temp is 98 or above she get the severe headache  Her temp was 98  She does have slight congestion with some mucous dripping down her throat and her throat is scratchy where she consistently need to try to clear it but not coughing up any phlegm  She admit to having a lot of sinus pressure, and she did noticed for about 10 sec that  her ears was bothering her but had resolved  She recently saw Linn Huang last week which prescribed her Lovenox due to her history of PE and no longer on the baby ASA  She does not have a blood pressure monitor at home  She denies visual disturbance, facial or lower extremities swelling, denies epigastric pain, and denies nausea/vomting  In the meantime advised her to rest with light down, she can use ice pack on forehead, and stay hydrated with increase fluid intake to see if it helps, if no better then encourage patient to take Tylenol prn (pt states she can not take pills, informed her there are powder versions now that she can take)  However, if she develop a lot of sinus pressure and congested or develop fever to follow-up with PCP  Informed her will forward to on-call provider Dr Kymberly Jackson for additional recommendations  She voiced appreciation  Dr Kymberly Jackson please advise

## 2023-02-21 NOTE — PROGRESS NOTES
[facility],     Assessment/Plan:      Diagnosis ICD-10-CM Associated Orders   1  Upper respiratory tract infection, unspecified type  J06 9       2  14 weeks gestation of pregnancy  Z3A 14       3  Generalized anxiety disorder  F41 1       4  Postoperative hypothyroidism  E89 0       5  History of pulmonary embolus (PE)  Z86 711       6  Nasal congestion  R09 81         • Nasal saline rinses, mucinex, & can consider antibiotics if not better  • Limited for illness by pregnancy and high risk concerns with prior miscarriage  Options researched during encounter with patient as well  Declined mucinex  • OBGYN - appt 3/17/23 - and several after  Endo same day  Return in about 3 months (around 5/21/2023) for Annual Physical   • Patient may call or return to office with any questions or concerns  ______________________________________________________________________  Subjective:     Patient ID: Ayden Quevedo is a 35 y o  female  HPI  Ayden Quevedo  Chief Complaint   Patient presents with   • Headache     Pressure in head and face     Pressure in nose, face, & forehead  Feeling like she has to clear her throat, hoarse voice  Not coughing  No known sick contacts  Aug 20th 23 Due Date - Girl!  - 14 weeks  PNV, Lovenox  Was on tyrosint, but recalled, and now med switched to synthroid 75  Recheck in March  Just saw OB last week  Facial flare ups at times, more [place], eucrisa only as needed  The following portions of the patient's history were reviewed and updated as appropriate: allergies, current medications, past medical history, and problem list     Review of Systems   HENT:        See HPI   Respiratory: Negative for cough and shortness of breath  Cardiovascular: Negative for chest pain  Objective:      Vitals:    02/21/23 1325   BP: 100/59   Pulse: 74   SpO2: 97%      Physical Exam  Vitals reviewed     Constitutional:       General: "She is not in acute distress  Appearance: Normal appearance  She is well-developed  She is not ill-appearing  HENT:      Head: Normocephalic and atraumatic  Comments: No major sinus TTP     Right Ear: Tympanic membrane, ear canal and external ear normal  There is no impacted cerumen  Left Ear: Tympanic membrane, ear canal and external ear normal  There is no impacted cerumen  Nose: Congestion present  Mouth/Throat:      Mouth: Mucous membranes are moist       Pharynx: Oropharynx is clear  No oropharyngeal exudate  Eyes:      General: No scleral icterus  Right eye: No discharge  Left eye: No discharge  Cardiovascular:      Rate and Rhythm: Normal rate and regular rhythm  Pulses: Normal pulses  Heart sounds: Normal heart sounds  No murmur heard  Pulmonary:      Effort: Pulmonary effort is normal  No respiratory distress  Breath sounds: Normal breath sounds  No stridor  No wheezing  Musculoskeletal:      Cervical back: Normal range of motion  No rigidity  Right lower leg: No edema  Left lower leg: No edema  Skin:     General: Skin is warm  Neurological:      Mental Status: She is alert and oriented to person, place, and time  Gait: Gait normal    Psychiatric:         Mood and Affect: Mood normal          Behavior: Behavior normal          Thought Content: Thought content normal          Judgment: Judgment normal            Portions of the record may have been created with voice recognition software  Occasional wrong word or \"sound alike\" substitutions may have occurred due to the inherent limitations of voice recognition software  Please review the chart carefully and recognize, using context, where substitutions/typographical errors may have occurred       "

## 2023-02-21 NOTE — TELEPHONE ENCOUNTER
I recommend that she take OTC Tylenol for her headache, as this will help us to determine if she needs urgent evaluation  If she feels that her headache is currently severe or if it is not improved by Tylenol, she should proceed to the ER for further evaluation, particularly given her history of VTE  Please assess for weakness and numbness/tingling  If she has any of these focal symptoms, she should proceed to the ER immediately       Jessica Mas MD  2/21/2023 11:46 AM

## 2023-02-21 NOTE — TELEPHONE ENCOUNTER
Called and spoke with patient  Advised her of Dr Andres Diaz recommendations  She denies any weakness or numbness/tingling  She states she have an appointment with her PCP today to follow-up incase regarding sinus pressure  She verbalized understanding of the recommendations and precautionary measures

## 2023-02-22 NOTE — ED PROVIDER NOTES
History  Chief Complaint   Patient presents with   • Headache     Pt is 14 weeks pregnant, has had a headache since 2/20  Is taking lovenox during the pregnancy d/t hx of blood clots  Pt states "my dr told me to come to make sure I don't have a brain clot " Took tylenol at 250      27-year-old G3, P0 14-week pregnant female presents for the evaluation of 2 days of headache that has been unresponsive to 2 doses of 500 mg of Tylenol  The patient has a remote history of pulmonary embolism after a knee injury several years ago  The patient is currently on Lovenox prophylactically  She was sent to the emergency department for further evaluation of headache and concern for cerebral clot  The patient denies any syncope or focal neurologic deficit, seizure, or visual disturbance  Prior to Admission Medications   Prescriptions Last Dose Informant Patient Reported? Taking?    Prenatal Vit-Fe Fumarate-FA (prenatal multivitamin) 28-0 8 MG TABS   Yes No   Sig: Take 1 tablet by mouth daily Late afternoon   enoxaparin (LOVENOX) 100 mg/mL   No No   Sig: Inject 0 4 mL (40 mg total) under the skin every 24 hours   levothyroxine (Synthroid) 75 mcg tablet   No No   Sig: Take 1 tablet (75 mcg total) by mouth daily      Facility-Administered Medications: None       Past Medical History:   Diagnosis Date   • Chronic knee pain    • Depression    • Disease of thyroid gland    • Dysphagia 03/02/2020   • History of pulmonary embolus (PE) 2017    Result of Knee injury   • Left upper quadrant pain 03/02/2020   • Menorrhagia    • Multiple thyroid nodules    • Psychiatric disorder    • Saphenous nerve neuropathy, 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   • Vitamin D deficiency    • Vocal cord paralysis     Following partial thyroidectomy       Past Surgical History:   Procedure Laterality Date   • DILATION AND EVACUATION  2016   • KNEE ARTHROSCOPY W/ PLICA EXCISION Left 2011   • THYROIDECTOMY, PARTIAL Left 2013   • UPPER GASTROINTESTINAL ENDOSCOPY • US GUIDED THYROID BIOPSY  11/08/2022   • WISDOM TOOTH EXTRACTION         Family History   Problem Relation Age of Onset   • Thyroid disease Mother         thyroid nodules   • No Known Problems Father    • Ovarian cancer Sister    • Thyroid disease Sister    • Anorexia nervosa Sister    • Mental illness Sister    • No Known Problems Sister    • No Known Problems Brother    • Celiac disease Maternal Grandmother    • Hodgkin's lymphoma Maternal Grandfather    • Hodgkin's lymphoma Paternal Grandfather    • Other Maternal Aunt         hypoglycemia   • Other Maternal Aunt         brain tumor, had siezure   • Colon cancer Neg Hx    • Colon polyps Neg Hx    • Substance Abuse Neg Hx      I have reviewed and agree with the history as documented  E-Cigarette/Vaping   • E-Cigarette Use Never User      E-Cigarette/Vaping Substances   • Nicotine No    • THC No    • CBD No    • Flavoring No    • Other No    • Unknown No      Social History     Tobacco Use   • Smoking status: Never   • Smokeless tobacco: Never   • Tobacco comments:      seasonal, only when it's warm out, social   Vaping Use   • Vaping Use: Never used   Substance Use Topics   • Alcohol use: Not Currently     Comment: socially   • Drug use: Not Currently     Comment:  no longer using marijuana       Review of Systems   Constitutional: Negative for fever  Gastrointestinal: Negative for vomiting  Neurological: Positive for headaches  Negative for seizures and syncope  Physical Exam  Physical Exam  Vitals and nursing note reviewed  Constitutional:       General: She is not in acute distress  Appearance: She is well-developed  HENT:      Head: Normocephalic and atraumatic  Right Ear: External ear normal       Left Ear: External ear normal    Eyes:      General: No scleral icterus  Conjunctiva/sclera: Conjunctivae normal       Pupils: Pupils are equal, round, and reactive to light     Cardiovascular:      Rate and Rhythm: Normal rate and regular rhythm  Heart sounds: Normal heart sounds  Pulmonary:      Effort: Pulmonary effort is normal  No respiratory distress  Breath sounds: Normal breath sounds  Abdominal:      General: Bowel sounds are normal       Palpations: Abdomen is soft  Tenderness: There is no abdominal tenderness  There is no guarding or rebound  Musculoskeletal:         General: Normal range of motion  Cervical back: Normal range of motion  Skin:     General: Skin is warm and dry  Findings: No rash  Neurological:      Mental Status: She is alert and oriented to person, place, and time           Vital Signs  ED Triage Vitals [02/21/23 1950]   Temperature Pulse Respirations Blood Pressure SpO2   97 7 °F (36 5 °C) 85 20 129/70 97 %      Temp Source Heart Rate Source Patient Position - Orthostatic VS BP Location FiO2 (%)   Temporal Monitor Sitting Left arm --      Pain Score       2           Vitals:    02/21/23 1950   BP: 129/70   Pulse: 85   Patient Position - Orthostatic VS: Sitting         Visual Acuity      ED Medications  Medications   sodium chloride 0 9 % bolus 1,000 mL (1,000 mL Intravenous New Bag 2/21/23 2017)   Gadobutrol injection (SINGLE-DOSE) SOLN 8 mL (8 mL Intravenous Given 2/21/23 2151)       Diagnostic Studies  Results Reviewed     Procedure Component Value Units Date/Time    Basic metabolic panel [[id number]] Collected: 02/21/23 2018    Lab Status: Final result Specimen: Blood from Arm, Left Updated: 02/21/23 2042     Sodium 135 mmol/L      Potassium 3 5 mmol/L      Chloride 102 mmol/L      CO2 23 mmol/L      ANION GAP 10 mmol/L      BUN 7 mg/dL      Creatinine 0 62 mg/dL      Glucose 81 mg/dL      Calcium 9 0 mg/dL      eGFR 118 ml/min/1 73sq m     Narrative:      [place] guidelines for Chronic Kidney Disease (CKD):   •  Stage 1 with normal or high GFR (GFR > 90 mL/min/1 73 square meters)  •  Stage 2 Mild CKD (GFR = 60-89 mL/min/1 73 square meters)  •  Stage 3A Moderate CKD (GFR = 45-59 mL/min/1 73 square meters)  •  Stage 3B Moderate CKD (GFR = 30-44 mL/min/1 73 square meters)  •  Stage 4 Severe CKD (GFR = 15-29 mL/min/1 73 square meters)  •  Stage 5 End Stage CKD (GFR <15 mL/min/1 73 square meters)  Note: GFR calculation is accurate only with a steady state creatinine    CBC and differential [[id number]] Collected: 02/21/23 2018    Lab Status: Final result Specimen: Blood from Arm, Left Updated: 02/21/23 2023     WBC 9 33 Thousand/uL      RBC 4 26 Million/uL      Hemoglobin 12 3 g/dL      Hematocrit 38 1 %      MCV 89 fL      MCH 28 9 pg      MCHC 32 3 g/dL      RDW 12 4 %      MPV 9 6 fL      Platelets 316 Thousands/uL      nRBC 0 /100 WBCs      Neutrophils Relative 55 %      Immat GRANS % 0 %      Lymphocytes Relative 36 %      Monocytes Relative 7 %      Eosinophils Relative 2 %      Basophils Relative 0 %      Neutrophils Absolute 5 11 Thousands/µL      Immature Grans Absolute 0 02 Thousand/uL      Lymphocytes Absolute 3 31 Thousands/µL      Monocytes Absolute 0 65 Thousand/µL      Eosinophils Absolute 0 20 Thousand/µL      Basophils Absolute 0 04 Thousands/µL                  MRI brain w wo contrast   Final Result by Sandy Bhatti MD (02/21 2301)      Unremarkable MRI brain examination with and without intravenous gadolinium contrast       Workstation performed: [id number]                    Procedures  POC Pelvic US    Date/Time: 2/21/2023 11:18 PM  Performed by: Kyle Yepez DO  Authorized by:  Kyle Yepez DO     Patient location:  ED  Procedure details:     Exam Type:  Diagnostic    Assessment for: evaluate fetal viability      Technique:  Transabdominal obstetric (HCG+) exam    Views obtained: uterus (transverse and sagittal)      Image quality: diagnostic      Image availability:  Still images obtained  Uterine findings:     Intrauterine pregnancy: identified      Single gestation: identified      Fetal heart rate: identified      Fetal heart rate (bpm):  143  Interpretation:     Ultrasound impressions: normal      Pregnancy findings: intrauterine pregnancy (IUP)                   ED Course  ED Course as of 02/21/23 2330   Tue Feb 21, 2023 2005 Message sent to Dr Cade Monge from [address] [place] [address] Discussed with Dr Cade Monge who agrees with need for brain imaging advised MRI W/Wo contrast  CT ok if MRI unavailable                                             Medical Decision Making  Plan is to check basic laboratories to rule out anemia, electrolyte disturbance, renal failure  I will also obtain MRI imaging of the brain to rule out dural venous thrombosis  the case was discussed with radiology and OB/GYN  Patient was improved after 1 L of fluid  We discussed the unremarkable MRI of the brain and the plan for discharge and outpatient OB/GYN follow-up  The patient (and any family present) verbalized understanding of the discharge instructions and warnings that would necessitate return to the Emergency Department  All questions were answered prior to discharge  14 weeks gestation of pregnancy: complicated acute illness or injury  Hypothyroidism: complicated acute illness or injury  Pregnancy headache in second trimester: complicated acute illness or injury  Amount and/or Complexity of Data Reviewed  Labs: ordered  Radiology: ordered  Risk  Prescription drug management            Disposition  Final diagnoses:   Pregnancy headache in second trimester   14 weeks gestation of pregnancy   Hypothyroidism     Time reflects when diagnosis was documented in both MDM as applicable and the Disposition within this note     Time User Action Codes Description Comment    2/21/2023 11:16 PM Nova Greener Add [Y17 100,  R51 9] Pregnancy headache in second trimester     2/21/2023 11:16 PM Nova Greener Add [Z3A 14] 14 weeks gestation of pregnancy     2/21/2023 11:17 PM Nova Greener Add [E03 9] Hypothyroidism       ED Disposition     ED Disposition Discharge    Condition   Stable    Date/Time   Tue Feb 21, 2023 11:15 PM    Comment   Sherry Cervantes discharge to home/self care  Follow-up Information     Follow up With Specialties Details Why Princess Ziegler MD Obstetrics and Gynecology, Obstetrics, Gynecology Schedule an appointment as soon as possible for a visit  For further evaluation, As needed [phone]  [facility] [address]  [phone]            Current Discharge Medication List      CONTINUE these medications which have NOT CHANGED    Details   enoxaparin (LOVENOX) 100 mg/mL Inject 0 4 mL (40 mg total) under the skin every 24 hours  Qty: 40 mL, Refills: 2    Associated Diagnoses: History of pulmonary embolus (PE); 13 weeks gestation of pregnancy      levothyroxine (Synthroid) 75 mcg tablet Take 1 tablet (75 mcg total) by mouth daily  Qty: 90 tablet, Refills: 1    Associated Diagnoses: Postoperative hypothyroidism      Prenatal Vit-Fe Fumarate-FA (prenatal multivitamin) 28-0 8 MG TABS Take 1 tablet by mouth daily Late afternoon             No discharge procedures on file      PDMP Review     None          ED Provider  Electronically Signed by           Elizabeth Tinoco DO  02/21/23 9882

## 2023-03-17 ENCOUNTER — ROUTINE PRENATAL (OUTPATIENT)
Dept: OBGYN CLINIC | Facility: CLINIC | Age: 33
End: 2023-03-17

## 2023-03-17 ENCOUNTER — OFFICE VISIT (OUTPATIENT)
Dept: ENDOCRINOLOGY | Facility: HOSPITAL | Age: 33
End: 2023-03-17

## 2023-03-17 VITALS
DIASTOLIC BLOOD PRESSURE: 68 MMHG | HEIGHT: 65 IN | HEART RATE: 73 BPM | BODY MASS INDEX: 31.65 KG/M2 | SYSTOLIC BLOOD PRESSURE: 110 MMHG | WEIGHT: 190 LBS | OXYGEN SATURATION: 99 %

## 2023-03-17 VITALS
WEIGHT: 190 LBS | DIASTOLIC BLOOD PRESSURE: 70 MMHG | SYSTOLIC BLOOD PRESSURE: 120 MMHG | BODY MASS INDEX: 31.65 KG/M2 | HEIGHT: 65 IN

## 2023-03-17 DIAGNOSIS — Z36.89 ENCOUNTER FOR OTHER SPECIFIED ANTENATAL SCREENING: ICD-10-CM

## 2023-03-17 DIAGNOSIS — Z3A.17 17 WEEKS GESTATION OF PREGNANCY: ICD-10-CM

## 2023-03-17 DIAGNOSIS — O26.892 PREGNANCY HEADACHE IN SECOND TRIMESTER: ICD-10-CM

## 2023-03-17 DIAGNOSIS — O26.892 PELVIC PAIN AFFECTING PREGNANCY IN SECOND TRIMESTER, ANTEPARTUM: Primary | ICD-10-CM

## 2023-03-17 DIAGNOSIS — Z86.711 HISTORY OF PULMONARY EMBOLUS (PE): ICD-10-CM

## 2023-03-17 DIAGNOSIS — R10.2 PELVIC PAIN AFFECTING PREGNANCY IN SECOND TRIMESTER, ANTEPARTUM: Primary | ICD-10-CM

## 2023-03-17 DIAGNOSIS — E89.0 POSTOPERATIVE HYPOTHYROIDISM: Primary | ICD-10-CM

## 2023-03-17 DIAGNOSIS — R82.71 GBS BACTERIURIA: ICD-10-CM

## 2023-03-17 DIAGNOSIS — R51.9 PREGNANCY HEADACHE IN SECOND TRIMESTER: ICD-10-CM

## 2023-03-17 LAB
SL AMB  POCT GLUCOSE, UA: NORMAL
SL AMB POCT URINE PROTEIN: NORMAL

## 2023-03-17 NOTE — ASSESSMENT & PLAN NOTE
- Prenatal lab results reviewed  - Aneuploidy screening reviewed  Order for msAFP provided today  - First trimester MFM consult report reviewed  Level II ultrasound is scheduled  - Problem list updated, results console reviewed and updated with pertinent prenatal labs  - PMH, PSH, medications reviewed and updated as needed  - Return to office in 4 wk(s) for routine prenatal care

## 2023-03-17 NOTE — PATIENT INSTRUCTIONS
Thyroid function was normal      Continue levothyroxine 75 mcg daily  We have you get monthly blood work in pregnancy and follow up in  2 months 
Risks/benefits discussed with patient/surrogate

## 2023-03-17 NOTE — ASSESSMENT & PLAN NOTE
- Symptoms consistent with musculoskeletal pelvic pain  Will refer to pelvic PT for evaluation and treatment

## 2023-03-17 NOTE — PROGRESS NOTES
Satish Vee 35 y o  female MRN: [id number]    Encounter: [id number]      Assessment/Plan     Assessment: This is a 35y o -year-old female with hypothyroidism post thyroidectomy with thyroid nodules currently pregnant  Plan:  1  Hypothyroidism post partial thyroidectomy: Recent thyroid lab work came back normal   Between office visit she was switched from [facility] to levothyroxine due to recall  Medically and biochemically euthyroid  Doing well on levothyroxine  She will continue with levothyroxine 75 mcg daily  Contact the office if there is any change in symptoms  Follow-up in 2 months with lab work completed prior to visit  2   Thyroid nodules: Biopsy of right nodule November 2022 was benign  Will be due for a follow-up ultrasound around August 2022     3   Thyroid disease in pregnancy: Recommend to keep TSH below 2 5 during pregnancy  Currently TSH is within target range  She will continue with monthly lab work and follow-up every 2 months  CC: Thyroid follow-up    History of Present Illness     HPI:  Satish Vee is a 35  year old female with hypothyroidism post partial thyroidectomy for follow-up visit      Multiple thyroid nodules were noted in the past   She would a partial thyroidectomy of her left thyroid lobe and still has some right-sided thyroid nodules that are stable in size but are T rad 4 and T rad 5   Her right side of her lobe is quite large  Darrion Crane was seen in August 2022 due to significant weight gain   Thyroid function tests were noted to be hypothyroid and she was started on Tirosint SOL 50 mcg daily for thyroid hormone replacement as she can not swallow pill  However there was a recall of Tirosint SOL and she was switched to levothyroxine    States that she has been crushing the pill to take medication   The rest of her blood work was normal ruling out other causes of her weight issues   PCOS and insulin resistance were ruled out  Darrion Crane did have a mildly elevated a m  cortisol 21 3 with normals up to 19 4 which is not remarkable        She is currently taking levothyroxine 75 mcg daily  Continues with cold intolerance  Has chronic fatigue, but states that it slightly worse due to the pregnancy  She denies heat intolerance, palpitation, or tremors  Has lost 10 pounds since last office visit  She will get diarrhea if she eats certain foods, has not been getting worse  She denies constipation, insomnia, anxiety or depression  She has some dry and itchy skin but no brittle nails or hair loss  She denies diplopia  She denies compressive thyroid symptoms or difficulties with swallowing  Overall doing well today      She has multiple nodules on her thyroid and a T RADS 3 thyroid nodule in the right lower lobe for which biopsy was recommended  She underwent biopsy of this nodule on 11/8/2022 demonstrating Aulander class III pathology  The Afirma testing was benign      She is now pregnant and CHRISTOPHER: 8/20/2023  She is 19 weeks gestation  Review of Systems   Constitutional: Positive for fatigue  Negative for activity change, appetite change, diaphoresis and unexpected weight change  HENT: Negative for sore throat, trouble swallowing and voice change  Eyes: Negative for visual disturbance  Respiratory: Negative for chest tightness and shortness of breath  Cardiovascular: Negative for chest pain, palpitations and leg swelling  Gastrointestinal: Positive for diarrhea (Occasional)  Negative for abdominal pain and constipation  Endocrine: Positive for cold intolerance  Negative for heat intolerance, polydipsia, polyphagia and polyuria  Skin: Negative for rash  Neurological: Negative for dizziness, tremors, light-headedness, numbness and headaches  Hematological: Negative for adenopathy  Psychiatric/Behavioral: Negative for dysphoric mood and sleep disturbance  The patient is not nervous/anxious          Historical Information   Past Medical History: Diagnosis Date   • Chronic knee pain    • Depression    • Disease of thyroid gland    • Dysphagia 03/02/2020   • History of pulmonary embolus (PE) 2017    Result of Knee injury   • Left upper quadrant pain 03/02/2020   • Menorrhagia    • Multiple thyroid nodules    • Psychiatric disorder    • Saphenous nerve neuropathy, 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   • Vitamin D deficiency    • Vocal cord paralysis     Following partial thyroidectomy     Past Surgical History:   Procedure Laterality Date   • DILATION AND EVACUATION  2016   • KNEE ARTHROSCOPY W/ PLICA EXCISION Left 9198   • THYROIDECTOMY, PARTIAL Left 2013   • UPPER GASTROINTESTINAL ENDOSCOPY     • US GUIDED THYROID BIOPSY  11/08/2022   • WISDOM TOOTH EXTRACTION       Social History   Social History     Substance and Sexual Activity   Alcohol Use Not Currently    Comment: socially     Social History     Substance and Sexual Activity   Drug Use Not Currently    Comment:  no longer using marijuana     Social History     Tobacco Use   Smoking Status Never   Smokeless Tobacco Never   Tobacco Comments     seasonal, only when it's warm out, social     Family History:   Family History   Problem Relation Age of Onset   • Thyroid disease Mother         thyroid nodules   • No Known Problems Father    • Ovarian cancer Sister    • Thyroid disease Sister    • Anorexia nervosa Sister    • Mental illness Sister    • No Known Problems Sister    • No Known Problems Brother    • Celiac disease Maternal Grandmother    • Hodgkin's lymphoma Maternal Grandfather    • Hodgkin's lymphoma Paternal Grandfather    • Other Maternal Aunt         hypoglycemia   • Other Maternal Aunt         brain tumor, had siezure   • Colon cancer Neg Hx    • Colon polyps Neg Hx    • Substance Abuse Neg Hx        Meds/Allergies   Current Outpatient Medications   Medication Sig Dispense Refill   • enoxaparin (LOVENOX) 100 mg/mL Inject 0 4 mL (40 mg total) under the skin every 24 hours 40 mL 2   • levothyroxine (Synthroid) 75 mcg tablet Take 1 tablet (75 mcg total) by mouth daily 90 tablet 1   • Prenatal Vit-Fe Fumarate-FA (prenatal multivitamin) 28-0 8 MG TABS Take 1 tablet by mouth daily Late afternoon       No current facility-administered medications for this visit  No Known Allergies    Objective   Vitals: Blood pressure 110/68, pulse 73, height 5' 5" (1 651 m), weight 86 2 kg (190 lb), last menstrual period 11/13/2022, SpO2 99 %  Physical Exam  Vitals and nursing note reviewed  Constitutional:       General: She is not in acute distress  Appearance: Normal appearance  She is not diaphoretic  HENT:      Head: Normocephalic and atraumatic  Eyes:      Pupils: Pupils are equal, round, and reactive to light  Neck:      Comments: Left lobe removed, thyroid nodule felt on right side  Cardiovascular:      Rate and Rhythm: Normal rate and regular rhythm  Heart sounds: No murmur heard  Pulmonary:      Effort: Pulmonary effort is normal  No respiratory distress  Breath sounds: Normal breath sounds  No wheezing  Musculoskeletal:      Cervical back: Normal range of motion  Right lower leg: No edema  Left lower leg: No edema  Lymphadenopathy:      Cervical: No cervical adenopathy  Neurological:      Mental Status: She is alert and oriented to person, place, and time  Mental status is at baseline  Sensory: No sensory deficit  Motor: No tremor  Gait: Gait normal       Deep Tendon Reflexes:      Reflex Scores:       Patellar reflexes are 2+ on the right side and 2+ on the left side  Psychiatric:         Mood and Affect: Mood normal          Behavior: Behavior normal          Thought Content: Thought content normal          The history was obtained from the review of the chart, patient      Lab Results:   Lab Results   Component Value Date/Time    Free t4 1 23 03/06/2023 07:35 AM    Free t4 1 15 02/07/2023 07:29 AM    Free t4 1 24 01/07/2023 08:29 AM       Imaging Studies: Results for orders placed during the hospital encounter of 08/19/22    US thyroid    Impression  The following meet current ACR criteria for recommending ultrasound guided biopsy: Right lower pole nodule measuring 2 8 x 2 5 x 2 4 cm on image 34  Remaining nodules do not meet ACR criteria for biopsy, surveillance imaging in one year recommended  Significant findings noted in Epic  Reference: ACR Thyroid Imaging, Reporting and Data System (TI-RADS): White Paper of the [employer]  J AM Kenia Radiol 0085;46:499-074  (additional recommendations based on American Thyroid Association 2015 guidelines )      Workstation performed: [id number]      I have personally reviewed pertinent reports  Portions of the record may have been created with voice recognition software  Occasional wrong word or "sound a like" substitutions may have occurred due to the inherent limitations of voice recognition software  Read the chart carefully and recognize, using context, where substitutions have occurred

## 2023-03-17 NOTE — PROGRESS NOTES
Routine Prenatal Visit  [address], [facility], [address] [facility]    Assessment/Plan:  Keisha Rojas is a 35y o  year old  at 17w5d who presents for routine prenatal visit  1  Pelvic pain affecting pregnancy in second trimester, antepartum  Assessment & Plan:  - Symptoms consistent with musculoskeletal pelvic pain  Will refer to pelvic PT for evaluation and treatment  Orders:  -     Ambulatory Referral to Physical Therapy; Future    2  Pregnancy headache in second trimester    3  17 weeks gestation of pregnancy  Assessment & Plan:  - Prenatal lab results reviewed  - Aneuploidy screening reviewed  Order for msAFP provided today  - First trimester MFM consult report reviewed  Level II ultrasound is scheduled  - Problem list updated, results console reviewed and updated with pertinent prenatal labs  - PMH, PSH, medications reviewed and updated as needed  - Return to office in 4 wk(s) for routine prenatal care  Orders:  -     POCT urine dip    4  GBS bacteriuria    5  History of pulmonary embolus (PE)    6  Encounter for other specified  screening  -     Alpha fetoprotein, maternal; Future  -     Alpha fetoprotein, maternal          Subjective:   Jovanny Jimenez is a 35 y o   who presents for routine prenatal care at 17w5d  Complaints today: Pelvic pain with movement  LOF: No; VB: no; Contractions: No    Objective:  /70 (BP Location: Left arm, Patient Position: Sitting, Cuff Size: Standard)   Ht 5' 5" (1 651 m)   Wt 86 2 kg (190 lb)   LMP 2022 (Exact Date)   BMI 31 62 kg/m²     General: Well appearing, no distress  Respiratory: Unlabored breathing  Cardiovascular: Regular rate  Abdomen: Soft, gravid, nontender  Extremities: Warm and well perfused  Non tender      Pregravid Weight/BMI: 92 1 kg (203 lb) (BMI 33 78)  Current Weight: 86 2 kg (190 lb)   Total Weight Gain: -5 897 kg (-13 lb)     Pre- Vitals    Flowsheet Row Most Recent Value   Prenatal Assessment    Fetal Heart Rate 145   Fundal Height (cm) 17 cm   Movement Absent   Prenatal Vitals    Blood Pressure 120/70   Weight - Scale 86 2 kg (190 lb)   Urine Albumin/Glucose    Dilation/Effacement/Station    Vaginal Drainage    Draining Fluid No   Edema    LLE Edema None   RLE Edema None   Facial Edema None           Rai Mcbride MD  3/17/2023 3:20 PM

## 2023-03-21 ENCOUNTER — TELEPHONE (OUTPATIENT)
Dept: OBGYN CLINIC | Facility: CLINIC | Age: 33
End: 2023-03-21

## 2023-03-21 DIAGNOSIS — O26.892 PELVIC PAIN AFFECTING PREGNANCY IN SECOND TRIMESTER, ANTEPARTUM: Primary | ICD-10-CM

## 2023-03-21 DIAGNOSIS — R10.2 PELVIC PAIN AFFECTING PREGNANCY IN SECOND TRIMESTER, ANTEPARTUM: Primary | ICD-10-CM

## 2023-03-21 NOTE — TELEPHONE ENCOUNTER
I've placed a new order with Genny Stewart selected as the physical therapist  It looks like this is who she is seeing on 4/6  Please print or fax referral for patient      Shakira De León MD  3/21/2023 4:45 PM

## 2023-03-21 NOTE — TELEPHONE ENCOUNTER
Patient l/m stating that Dr Cade Monge placed a Pelvic Floor Physical Therapy for [place] however they are unable to get her in until June  She called [facility] [place] ([facility]) and was able to get in sooner  She states that Dr Cade Monge will need to place a new referral for the [facility] in order for her insurance to cover  Dr Cade Monge please advise

## 2023-03-22 NOTE — TELEPHONE ENCOUNTER
Left a message for patient informing a new order has been placed for selected physical therapist  If she needs a copy of her order, will leave a copy at the (S) office or if with [facility] order will be viewable to PT

## 2023-03-23 ENCOUNTER — EVALUATION (OUTPATIENT)
Dept: PHYSICAL THERAPY | Facility: CLINIC | Age: 33
End: 2023-03-23

## 2023-03-23 DIAGNOSIS — O26.892 PELVIC PAIN AFFECTING PREGNANCY IN SECOND TRIMESTER, ANTEPARTUM: Primary | ICD-10-CM

## 2023-03-23 DIAGNOSIS — R10.2 PELVIC PAIN AFFECTING PREGNANCY IN SECOND TRIMESTER, ANTEPARTUM: Primary | ICD-10-CM

## 2023-03-23 NOTE — PROGRESS NOTES
PT Evaluation     Today's date: 3/23/2023  Patient name: Peterson Pena  : 1990  MRN: [id number]  Referring provider: Natty Hodge MD  Dx:   Encounter Diagnosis     ICD-10-CM    1  Pelvic pain affecting pregnancy in second trimester, antepartum  O26 892     R10 2                      Assessment  Assessment details: Peterson Pena is a 35 y o  female who presents with concerns of acute onset of pelvic pain during 2nd trimester of pregnancy  Pelvic floor anatomy was explained to patient utilizing a pelvic model and examination technique was discussed, including all risks and precautions  Patient provided verbal and written consent for internal pelvic floor muscle assessment prior to examination  Demonstrates decrease in pelvic floor muscle (PFM) strength/endurance and decrease PFM relaxation  Neuromuscular re-education exercises include instruction and cues on appropriate contraction pelvic floor muscles (PFM) and transverse abdominal muscle (TA) without accessory muscles and relaxation of PFM through diaphragm breathing and stretches  Patient presents with the below outlined deficits and is appropriate for skilled physical therapy in order to address deficits and ultimately meet goal of independent self management of condition   Thank you for this referral!  Impairments: abnormal muscle tone, abnormal or restricted ROM, impaired physical strength, lacks appropriate home exercise program, pain with function and poor posture     Goals  Impairment Goals upon discharge  - Decrease pelvic pain to 0/10 with ADLs and transfers  - Improve MMT for pelvic floor muscle (PFM) to greater than or equal to 3/5 in order to improve lumbopelvic stability  - Increase PFM endurance to 5 second hold for 10 reps  - Improve relaxation of PFM to 100% in 2 seconds  - Demonstrates appropriate breathing mechanics with pelvic floor relaxation and contraction for improved muscle coordination in 8 weeks    Functional Goals Upon Discharge  - Pt will be independent with home exercise program, with proper demonstration, rationale and understanding in order to improve functional abilities and quality of life  - Patient is knowledgeable of postural and pelvic floor relaxation strategies to optimize toileting techniques  - Patient demonstrates proper body mechanics with transfers without c/o pelvic pain  - Patient reports decrease nocturia to 1 void/night for more thorough sleep    Plan  Patient would benefit from: women's health eval and skilled physical therapy  Planned modality interventions: biofeedback  Planned therapy interventions: manual therapy, neuromuscular re-education, patient education, self care, strengthening, stretching, home exercise program and behavior modification  Frequency: 1x week  Duration in weeks: 8  Plan of Care expiration date: 5/18/2023  Treatment plan discussed with: patient        PT Pelvic Floor Subjective:   History of Present Illness:   36 yo female presents with pelvic pain during 2nd trimeter of pregnancy  Pt recalls severe groin pain when attempted to walk starting on Wednesday 3/15/23  Pt reports groin pain with transfers, walking, lifting, and even moving her legs in sitting  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 who often attempts to run away  Pt reports requiring to sit on the floor and run after the child  Pt c/o left hip/ITB numbness and tingling with sleeping in left sidelying  Pt reports history of left saphenous nerve damage affecting her left knee  Pt reports history of mild back pain, urinary frequency without incontinence, and pain with intercourse prior to pregnancy  Pt c/o new onset of constipation and groin pain during pregnancy     Diet and Exercise:      Exercise type: walking    Exercise frequency: never    "Just dance"   OB/ gyn History    Gestational History:     Prior Pregnancy: Yes      Number of prior pregnancies: 1    Menstrual History:      Menstrual irregularities irregular menses    Painful periods:  No difficulty managing menstrual pain  Bladder Function:     Voiding Difficulties positive for: urgency, frequent urination and incomplete emptying      Voiding Difficulties comments:     Voiding frequency: every 3-4 hours and every 15-30 minutes    Urinary leakage: no urine leakage    Urinary leakage not aggravated by: coughing, sneezing, walking to the bathroom and post-void dribble    Nocturia (episodes per night): 2    Painful urination: No      Intake (ounces):  Water: 80, Soda: 24,   Incontinence Management:     Pads/Diaper Use:  None    Patient has attempted at least 4 weeks of independent pelvic floor strengthening exercises without a resolution of symptoms  Bowel Function:     Voiding DIfficulties: constipation      Bowel frequency: daily and every 3 days    [place] Stool Scale: type 4 and type 2    Stool softener use: no stool softeners  Sexual Function:     Pain during intercourse: Yes      pain causes abstinence  Pain:     Current pain ratin    At best pain ratin    At worst pain ratin (average 4/10)    Location:  Pelvic    Onset:  Less than 1 month ago    Quality:  Sharp and numbness  Patient Goals:     Patient goals for therapy:  Improved quality of life, improved pain management, decreased pain and improved sleep      Objective   Pelvic Floor Exam   Position: supine exam    General Perineum Exam:   Positive for no pelvic organ prolapse at rest      Visual Inspection of Perineum:   Excursion of perineal body in cephalad direction with contraction of pelvic floor muscles (PFM): weak  Excursion of perineal body in caudal direction with relaxation of pelvic floor muscles (PFM): weak  Involuntary contraction with coughing: yes  Involuntary relaxation with bearing down: yes    Pelvic Organ Prolapse   At rest: none    Pelvic Floor Muscle Exam     Palpation   No increased muscle tension in the super transverse perineal  Minimal increased muscle tension in the ischiocavernosus  Moderate increased muscle tension in the bulbospongiosus, puborectalis, pubococcygeus and iliococcygeus  No tenderness on right in the bulbospongiosus, ischiocavernosus and super transverse perineal  No tenderness on left in the bulbospongiosus, ischiocavernosus and super transverse perineal    Pelvic floor muscle relaxation is delayed and incomplete  25% pelvic floor relaxation  8 seconds required for complete relaxation       PERFECT Score   Power right: 2/5  Power left: 2/5           Precautions: none  Note: Improve BMs, PFM relaxation, hip adductors, transfers    Manuals 3/23            S/l external PFM TPR VITALIY            TPR to hip adductor VITALIY  s/l                                      Neuro Re-Ed             Supine TA attempted            Supine Kegel attempted                                                                             Ther Ex             Seated Happy Baby stretch 30"x3                                                                                                       Ther Activity             Urge suppression nv            PFM relaxation with BM nv            Pregnancy Info             Gait Training                                       Modalities

## 2023-03-29 LAB
2ND TRIMESTER 4 SCREEN SERPL-IMP: NORMAL
AFP ADJ MOM SERPL: 2.21
AFP INTERP AMN-IMP: NORMAL
AFP INTERP SERPL-IMP: NORMAL
AFP INTERP SERPL-IMP: NORMAL
AFP SERPL-MCNC: 99.1 NG/ML
AGE AT DELIVERY: 33.6 YR
GA METHOD: NORMAL
GA: 19.1 WEEKS
IDDM PATIENT QL: NO
MULTIPLE PREGNANCY: NO
NEURAL TUBE DEFECT RISK FETUS: 513 %

## 2023-04-04 ENCOUNTER — OFFICE VISIT (OUTPATIENT)
Dept: PHYSICAL THERAPY | Facility: CLINIC | Age: 33
End: 2023-04-04

## 2023-04-04 DIAGNOSIS — O26.892 PELVIC PAIN AFFECTING PREGNANCY IN SECOND TRIMESTER, ANTEPARTUM: Primary | ICD-10-CM

## 2023-04-04 DIAGNOSIS — R10.2 PELVIC PAIN AFFECTING PREGNANCY IN SECOND TRIMESTER, ANTEPARTUM: Primary | ICD-10-CM

## 2023-04-04 NOTE — PROGRESS NOTES
"Daily Note     Today's date: 2023  Patient name: Karen Soto  : 1990  MRN: [id number]  Referring provider: Catherine Angel MD  Dx:   Encounter Diagnosis     ICD-10-CM    1  Pelvic pain affecting pregnancy in second trimester, antepartum  O26 892     R10 2                      Subjective: Pt reports working a lot this weekend at home, but reports minimal increase in pelvic pain  Objective: See treatment diary below      Assessment: Demonstrates muscle tension in b/l PFM and hip adductor with release with TPR  Instructed pt in standing hip adductor stretch and s/l clamshells with fatigue and challenge noted  Provided updated HEP  Patient would benefit from continued PT      Plan: Continue per plan of care        Precautions: none  Note: Improve BMs, PFM relaxation, hip adductors, transfers    Print HEP  Manuals 3/23 4/4           S/l external PFM TPR VITALIY VITALIY           TPR to hip adductor VITALIY  s/l VITALIY                                     Neuro Re-Ed             Supine TA attempted            Supine Kegel attempted            S/l clamshells  5\"x10           S/l clamshells neutral  5\"x10           PFM relaxation   instructed                                     Ther Ex             Seated Happy Baby stretch 30\"x3            Hip Adductor stretch standing  30\"x3                                                                                         Ther Activity             Urge suppression nv            PFM relaxation with BM nv instructed           Pregnancy Info             Gait Training                                       Modalities                                            "

## 2023-04-07 ENCOUNTER — ROUTINE PRENATAL (OUTPATIENT)
Dept: PERINATAL CARE | Facility: OTHER | Age: 33
End: 2023-04-07

## 2023-04-07 VITALS
WEIGHT: 193.2 LBS | DIASTOLIC BLOOD PRESSURE: 58 MMHG | BODY MASS INDEX: 32.19 KG/M2 | SYSTOLIC BLOOD PRESSURE: 114 MMHG | HEART RATE: 78 BPM | HEIGHT: 65 IN

## 2023-04-07 DIAGNOSIS — E07.9 THYROID DISEASE DURING PREGNANCY IN FIRST TRIMESTER: ICD-10-CM

## 2023-04-07 DIAGNOSIS — Z36.86 ENCOUNTER FOR ANTENATAL SCREENING FOR CERVICAL LENGTH: ICD-10-CM

## 2023-04-07 DIAGNOSIS — Z36.3 ENCOUNTER FOR ANTENATAL SCREENING FOR MALFORMATION: ICD-10-CM

## 2023-04-07 DIAGNOSIS — O99.281 THYROID DISEASE DURING PREGNANCY IN FIRST TRIMESTER: ICD-10-CM

## 2023-04-07 DIAGNOSIS — Z86.711 HISTORY OF PULMONARY EMBOLUS (PE): ICD-10-CM

## 2023-04-07 DIAGNOSIS — O99.212 OBESITY AFFECTING PREGNANCY IN SECOND TRIMESTER: ICD-10-CM

## 2023-04-07 DIAGNOSIS — Z3A.20 20 WEEKS GESTATION OF PREGNANCY: Primary | ICD-10-CM

## 2023-04-07 NOTE — PROGRESS NOTES
"[id number] [facility]: Ms Eloy Potter was seen today for anatomic survey and cervical length screening ultrasound  See ultrasound report under \"OB Procedures\" tab  The time spent on this established patient on the encounter date included 5 minutes previsit service time reviewing records and precharting, 5 minutes face-to-face service time counseling regarding results and coordinating care, and  5 minutes charting, totalling 15 minutes  Please don't hesitate to contact our office with any concerns or questions    -Cristine Maier MD      "

## 2023-04-07 NOTE — PROGRESS NOTES
Ultrasound Probe Disinfection    A transvaginal ultrasound was performed  Prior to use, disinfection was performed with High Level Disinfection Process (Trophon)  Probe serial number [id number] was used        Nohemi Quach  04/07/23  2:57 PM

## 2023-04-12 PROBLEM — Z3A.21 21 WEEKS GESTATION OF PREGNANCY: Status: ACTIVE | Noted: 2023-02-15

## 2023-04-13 ENCOUNTER — APPOINTMENT (OUTPATIENT)
Dept: PHYSICAL THERAPY | Facility: CLINIC | Age: 33
End: 2023-04-13

## 2023-04-20 ENCOUNTER — APPOINTMENT (OUTPATIENT)
Dept: PHYSICAL THERAPY | Facility: CLINIC | Age: 33
End: 2023-04-20

## 2023-04-25 ENCOUNTER — APPOINTMENT (OUTPATIENT)
Dept: PHYSICAL THERAPY | Facility: CLINIC | Age: 33
End: 2023-04-25

## 2023-04-28 ENCOUNTER — ULTRASOUND (OUTPATIENT)
Facility: HOSPITAL | Age: 33
End: 2023-04-28

## 2023-04-28 VITALS
DIASTOLIC BLOOD PRESSURE: 56 MMHG | HEART RATE: 99 BPM | SYSTOLIC BLOOD PRESSURE: 98 MMHG | HEIGHT: 65 IN | WEIGHT: 194.45 LBS | BODY MASS INDEX: 32.4 KG/M2

## 2023-04-28 DIAGNOSIS — E03.9 HYPOTHYROIDISM DURING PREGNANCY IN SECOND TRIMESTER: Primary | ICD-10-CM

## 2023-04-28 DIAGNOSIS — Z86.711 HISTORY OF PULMONARY EMBOLUS (PE): ICD-10-CM

## 2023-04-28 DIAGNOSIS — O99.512 ASTHMA AFFECTING PREGNANCY IN SECOND TRIMESTER: ICD-10-CM

## 2023-04-28 DIAGNOSIS — Z3A.23 23 WEEKS GESTATION OF PREGNANCY: ICD-10-CM

## 2023-04-28 DIAGNOSIS — J45.909 ASTHMA AFFECTING PREGNANCY IN SECOND TRIMESTER: ICD-10-CM

## 2023-04-28 DIAGNOSIS — O99.212 MATERNAL OBESITY, ANTEPARTUM, SECOND TRIMESTER: ICD-10-CM

## 2023-04-28 DIAGNOSIS — O99.282 HYPOTHYROIDISM DURING PREGNANCY IN SECOND TRIMESTER: Primary | ICD-10-CM

## 2023-04-28 NOTE — LETTER
2023     [address] [facility] [address]  [facility]    Patient: Tessie Sarmiento   YOB: 1990   Date of Visit: 2023       Dear Dr Miles Miller:    Thank you for referring Tessie Sarmiento to me for evaluation  Below are my notes for this consultation  If you have questions, please do not hesitate to call me  I look forward to following your patient along with you           Sincerely,         US 4 LAMAR        CC: No Recipients  Yulisa Baron MD  2023  7:50 AM  Sign when Signing Visit  Please refer to the [facility] ultrasound report in Ob Procedures for additional information regarding today's visit

## 2023-04-28 NOTE — PROGRESS NOTES
Please refer to the [facility] ultrasound report in Ob Procedures for additional information regarding today's visit

## 2023-05-08 PROBLEM — Z3A.25 25 WEEKS GESTATION OF PREGNANCY: Status: ACTIVE | Noted: 2023-02-15

## 2023-05-08 PROBLEM — O99.212 OBESITY AFFECTING PREGNANCY IN SECOND TRIMESTER: Status: ACTIVE | Noted: 2021-05-17

## 2023-05-09 ENCOUNTER — ROUTINE PRENATAL (OUTPATIENT)
Dept: OBGYN CLINIC | Facility: CLINIC | Age: 33
End: 2023-05-09

## 2023-05-09 VITALS
WEIGHT: 195.4 LBS | SYSTOLIC BLOOD PRESSURE: 118 MMHG | BODY MASS INDEX: 32.55 KG/M2 | DIASTOLIC BLOOD PRESSURE: 70 MMHG | HEIGHT: 65 IN

## 2023-05-09 DIAGNOSIS — Z36.89 ENCOUNTER FOR OTHER SPECIFIED ANTENATAL SCREENING: ICD-10-CM

## 2023-05-09 DIAGNOSIS — E07.9 THYROID DISEASE DURING PREGNANCY IN SECOND TRIMESTER: Primary | ICD-10-CM

## 2023-05-09 DIAGNOSIS — Z3A.25 25 WEEKS GESTATION OF PREGNANCY: ICD-10-CM

## 2023-05-09 DIAGNOSIS — Z86.711 HISTORY OF PULMONARY EMBOLUS (PE): ICD-10-CM

## 2023-05-09 DIAGNOSIS — O99.212 OBESITY AFFECTING PREGNANCY IN SECOND TRIMESTER: ICD-10-CM

## 2023-05-09 DIAGNOSIS — O99.282 THYROID DISEASE DURING PREGNANCY IN SECOND TRIMESTER: Primary | ICD-10-CM

## 2023-05-09 LAB
SL AMB  POCT GLUCOSE, UA: NORMAL
SL AMB POCT URINE PROTEIN: NORMAL

## 2023-05-09 NOTE — ASSESSMENT & PLAN NOTE
Recent TSH 0 657  Following with Dr Marylu Rocha is worried about becoming hyperthyroid  Reassured her current level is normal in pregnancy  She is seeing Dr Marylu Moraes next week - encouraged her to review her concerns with her

## 2023-05-09 NOTE — PROGRESS NOTES
"Routine Prenatal Visit  [address], [address], [facility], [address] [facility]    Assessment/Plan:  [facility] is a 35y o  year old  at 25w2d who presents for routine prenatal visit  1  Thyroid disease during pregnancy in second trimester  Assessment & Plan:  Recent TSH 0 657  Following with Dr Odalis Reddy  [facility] is worried about becoming hyperthyroid  Reassured her current level is normal in pregnancy  She is seeing Dr Odalis Reddy next week - encouraged her to review her concerns with her  2  Obesity affecting pregnancy in second trimester  Assessment & Plan:  Growth u/s scheduled 32 weeks  3  History of pulmonary embolus (PE)  Assessment & Plan:  On Lovenox for h/o PE, managed by hematology  4  Encounter for other specified  screening  -     Glucose, 1H PG; Future  -     CBC; Future  -     RPR, Rfx Qn RPR/Confirm TP; Future  -     Glucose, 1H PG  -     CBC  -     RPR, Rfx Qn RPR/Confirm TP    5  25 weeks gestation of pregnancy  Assessment & Plan:  Reviewed recommendation for Adacel next visit  Orders:  -     POCT urine dip        Next OB Visit 4 weeks  Subjective:     CC: Prenatal care    Ishan Vasquez is a 35 y o   female who presents for routine prenatal care at 25w2d  Pregnancy ROS: no leakage of fluid, pelvic pain, or vaginal bleeding  normal fetal movement      The following portions of the patient's history were reviewed and updated as appropriate: allergies, current medications, past family history, past medical history, obstetric history, gynecologic history, past social history, past surgical history and problem list       Objective:  /70 (BP Location: Left arm, Patient Position: Sitting, Cuff Size: Standard)   Ht 5' 5\" (1 651 m)   Wt 88 6 kg (195 lb 6 4 oz)   LMP 2022 (Exact Date)   BMI 32 52 kg/m²   Pregravid Weight/BMI: 92 1 kg (203 lb) (BMI 33 78)  Current Weight: 88 6 kg (195 lb 6 4 oz)   Total Weight Gain: -3 447 " kg (-7 lb 9 6 oz)   Pre- Vitals    Flowsheet Row Most Recent Value   Prenatal Assessment    Fetal Heart Rate 150   Fundal Height (cm) 25 cm   Movement Present   Prenatal Vitals    Blood Pressure 118/70   Weight - Scale 88 6 kg (195 lb 6 4 oz)   Urine Albumin/Glucose    Dilation/Effacement/Station    Vaginal Drainage    Edema    LLE Edema None   RLE Edema None           General: Well appearing, no distress  Abdomen: Soft, gravid, nontender  Extremities: Non tender

## 2023-05-11 ENCOUNTER — OFFICE VISIT (OUTPATIENT)
Dept: PHYSICAL THERAPY | Facility: CLINIC | Age: 33
End: 2023-05-11

## 2023-05-11 DIAGNOSIS — O26.892 PELVIC PAIN AFFECTING PREGNANCY IN SECOND TRIMESTER, ANTEPARTUM: Primary | ICD-10-CM

## 2023-05-11 DIAGNOSIS — R10.2 PELVIC PAIN AFFECTING PREGNANCY IN SECOND TRIMESTER, ANTEPARTUM: Primary | ICD-10-CM

## 2023-05-11 NOTE — PROGRESS NOTES
"Daily Note     Today's date: 2023  Patient name: Sadi Stewart  : 1990  MRN: [id number]  Referring provider: Dougie Barrientos MD  Dx:   Encounter Diagnosis     ICD-10-CM    1  Pelvic pain affecting pregnancy in second trimester, antepartum  O26 892     R10 2                      Subjective: Pt reports daily right lumbar pain with bending forward and rotated to the left at work  Pt notes overall decrease in hip and pelvic floor pain to once a week  Objective: See treatment diary below      Assessment: Focused on manuals to release tension in thoracic and lumbar pvm and multifidus  Instructed pt in quadruped TA with scapular retraction, pt noted challenge without hip shift forward  Notable challenged with standing hip abd added  Instructed pt in self-release of spine pvm with massage ball against the wall  Patient would benefit from continued PT      Plan: Continue per plan of care        Precautions: none  Note: Improve BMs, PFM relaxation, hip adductors flexibility/strength, transfers    Print HEP  Manuals 3/23 4/4 5/11          S/l external PFM TPR VITALIY VITALIY           TPR to hip adductor VITALIY  s/l VITALIY           TPR to multifidus and lumbar pvm   VITALIY          TPR to thoracic pvm   VITALIY          Neuro Re-Ed             Supine TA attempted            Supine Kegel attempted            Supine or Sitting Hip add iso  nv           S/l clamshells  5\"x10           S/l clamshells neutral  5\"x10           PFM relaxation   instructed           Seated TA  nv           Quadruped TA with scap retraction  nv 5\"x10  elbows on wedge          Standing Hip abd  As curtis 9x/10x/10x ea          Standing hip Add AROM                          Ther Ex             Seated Happy Baby stretch 30\"x3            Hip Adductor stretch standing  30\"x3           Trigger point ball in scapula   instructed for home use          Belly Hang stretch   30\"x1                                                              Ther Activity             Sit " to stand with TA  nv           Sit to stand with kegel  When able           Urge suppression nv nv           PFM relaxation with BM nv instructed           Pregnancy Info             Gait Training                                       Modalities

## 2023-05-16 ENCOUNTER — OFFICE VISIT (OUTPATIENT)
Dept: ENDOCRINOLOGY | Facility: HOSPITAL | Age: 33
End: 2023-05-16

## 2023-05-16 VITALS
SYSTOLIC BLOOD PRESSURE: 108 MMHG | OXYGEN SATURATION: 98 % | BODY MASS INDEX: 32.79 KG/M2 | HEART RATE: 82 BPM | WEIGHT: 196.8 LBS | HEIGHT: 65 IN | DIASTOLIC BLOOD PRESSURE: 68 MMHG

## 2023-05-16 DIAGNOSIS — E89.0 POSTOPERATIVE HYPOTHYROIDISM: Primary | ICD-10-CM

## 2023-05-16 NOTE — PATIENT INSTRUCTIONS
Thyroid function was normal      Decrease levothyroxine to 75 mcg 1 tab 6 days a week and half a tab on Sunday  We have you get monthly blood work in pregnancy and follow up in  2 months

## 2023-05-16 NOTE — PROGRESS NOTES
Luz Elena Dodge 35 y o  female MRN: [id number]    Encounter: [id number]      Assessment/Plan     Assessment: This is a 35y o -year-old female with hypothyroidism post thyroidectomy with thyroid nodules and currently pregnant  Plan:  1  Hypothyroidism post partial thyroidectomy: Recent thyroid lab work came back normal   Sh has been trending down though  She is concerned about becoming hyperactive  Reason for the previous high TSH may have been due to manufacture error with the Tirosint solution  At this time I asked her to decrease her levothyroxine 75 mcg to 1 tablet 6 days a week and half tablet on Sunday  She will get lab work completed next week and at that time we will see if any further adjustments need to be made  Like the office with any concerns or questions  Follow-up in 2 months with lab work completed prior to visit      2  Thyroid nodules: Biopsy of right nodule November 2022 was benign  Will be due for a follow-up ultrasound around August 2023      3  Thyroid disease in pregnancy: Recommend to keep TSH below 2 5 during pregnancy  Currently TSH is within target range, however adjustments made to medication today  She will continue with monthly lab work and follow-up every 2 months  CC: Thyroid follow-up    History of Present Illness     HPI:  Tristania Ethan Huertaemerson is W 4 WNKN old female with hypothyroidism post partial thyroidectomy for follow-up visit      Multiple thyroid nodules were noted in the past   She would a partial thyroidectomy of her left thyroid lobe and still has some right-sided thyroid nodules that are stable in size but are T rad 4 and T rad 5   Her right side of her lobe is quite large  Alfredo Engel was seen in August 2022 due to significant weight gain   Thyroid function tests were noted to be hypothyroid and she was started on Tirosint SOL 50 mcg daily for thyroid hormone replacement as she can not swallow pill    However there was a recall of Tirosint SOL and she was switched to levothyroxine  States that she has been crushing the pill to take medication   The rest of her blood work was normal ruling out other causes of her weight issues   PCOS and insulin resistance were ruled out  Khloe Pepper did have a mildly elevated a m  cortisol 21 3 with normals up to 19 4 which is not remarkable        She is currently taking levothyroxine 75 mcg daily  Continues with cold intolerance  Fatigue is stable at this time  She denies heat intolerance, palpitation, or tremors     Has lost 4 pounds since last office visit  She will get diarrhea if she eats certain foods, has not been getting worse   She denies constipation, insomnia, anxiety or depression   She has some dry and itchy skin but no brittle nails or hair loss   She denies diplopia   She denies compressive thyroid symptoms or difficulties with swallowing  Overall doing well today  Is concerned that her TSH has been slowly trending down now  She did lose 1 pregnancy due to uncontrolled thyroid levels in the past      She has multiple nodules on her thyroid and a T RADS 3 thyroid nodule in the right lower lobe for which biopsy was recommended   She underwent biopsy of this nodule on 11/8/2022 demonstrating Cromwell class III pathology   The Afirma testing was benign      She is now pregnant and CHRISTOPHER: 8/20/2023  She is 26 weeks gestation  Review of Systems   Constitutional: Positive for fatigue  Negative for activity change, appetite change, diaphoresis and unexpected weight change  HENT: Negative for sore throat, trouble swallowing and voice change  Eyes: Negative for visual disturbance  Respiratory: Negative for chest tightness and shortness of breath  Cardiovascular: Negative for chest pain, palpitations and leg swelling  Gastrointestinal: Positive for diarrhea (Occasional)  Negative for abdominal pain and constipation  Endocrine: Positive for cold intolerance   Negative for heat intolerance, polydipsia, polyphagia and polyuria  Skin: Negative for rash  Neurological: Negative for dizziness, tremors, light-headedness, numbness and headaches  Hematological: Negative for adenopathy  Psychiatric/Behavioral: Negative for dysphoric mood and sleep disturbance  The patient is not nervous/anxious          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   • Vitamin D deficiency    • Vocal cord paralysis     Following partial thyroidectomy     Past Surgical History:   Procedure Laterality Date   • DILATION AND EVACUATION  2016   • KNEE ARTHROSCOPY W/ PLICA EXCISION Left 0305   • THYROIDECTOMY, PARTIAL Left 2013   • UPPER GASTROINTESTINAL ENDOSCOPY     • US GUIDED THYROID BIOPSY  11/08/2022   • WISDOM TOOTH EXTRACTION       Social History   Social History     Substance and Sexual Activity   Alcohol Use Not Currently    Comment: socially     Social History     Substance and Sexual Activity   Drug Use Not Currently    Comment:  no longer using marijuana     Social History     Tobacco Use   Smoking Status Never   Smokeless Tobacco Never   Tobacco Comments     seasonal, only when it's warm out, social     Family History:   Family History   Problem Relation Age of Onset   • Thyroid disease Mother         thyroid nodules   • No Known Problems Father    • Ovarian cancer Sister    • Thyroid disease Sister    • Anorexia nervosa Sister    • Mental illness Sister    • No Known Problems Sister    • No Known Problems Brother    • Celiac disease Maternal Grandmother    • Hodgkin's lymphoma Maternal Grandfather    • Hodgkin's lymphoma Paternal Grandfather    • Other Maternal Aunt         hypoglycemia   • "Other Maternal Aunt         brain tumor, had siezure   • Colon cancer Neg Hx    • Colon polyps Neg Hx    • Substance Abuse Neg Hx        Meds/Allergies   Current Outpatient Medications   Medication Sig Dispense Refill   • enoxaparin (LOVENOX) 40 mg/0 4 mL Inject 0 4 mL (40 mg total) under the skin every 24 hours 36 mL 1   • levothyroxine (Synthroid) 75 mcg tablet Take 1 tablet (75 mcg total) by mouth daily 90 tablet 1   • Prenatal Vit-Fe Fumarate-FA (prenatal multivitamin) 28-0 8 MG TABS Take 1 tablet by mouth daily Late afternoon       No current facility-administered medications for this visit  No Known Allergies    Objective   Vitals: Blood pressure 108/68, pulse 82, height 5' 5\" (1 651 m), weight 89 3 kg (196 lb 12 8 oz), last menstrual period 11/13/2022, SpO2 98 %  Physical Exam  Vitals and nursing note reviewed  Constitutional:       General: She is not in acute distress  HENT:      Head: Normocephalic and atraumatic  Eyes:      Pupils: Pupils are equal, round, and reactive to light  Cardiovascular:      Rate and Rhythm: Normal rate and regular rhythm  Heart sounds: No murmur heard  Pulmonary:      Effort: Pulmonary effort is normal  No respiratory distress  Breath sounds: Normal breath sounds  No wheezing  Musculoskeletal:         General: No swelling  Cervical back: Normal range of motion  Lymphadenopathy:      Cervical: No cervical adenopathy  Neurological:      Mental Status: She is alert and oriented to person, place, and time  Sensory: No sensory deficit  Psychiatric:         Mood and Affect: Mood normal          Behavior: Behavior normal          Thought Content: Thought content normal          The history was obtained from the review of the chart, patient      Lab Results:   Lab Results   Component Value Date/Time    Free t4 1 10 04/29/2023 10:29 AM    Free t4 1 23 03/06/2023 07:35 AM    Free t4 1 15 02/07/2023 07:29 AM       Imaging Studies:   Results for " "orders placed during the hospital encounter of 08/19/22    US thyroid    Impression  The following meet current ACR criteria for recommending ultrasound guided biopsy: Right lower pole nodule measuring 2 8 x 2 5 x 2 4 cm on image 34  Remaining nodules do not meet ACR criteria for biopsy, surveillance imaging in one year recommended  Significant findings noted in Epic  Reference: ACR Thyroid Imaging, Reporting and Data System (TI-RADS): White Paper of the [employer]  J AM Kenia Radiol 0627;52:993-689  (additional recommendations based on American Thyroid Association 2015 guidelines )      Workstation performed: [id number]      I have personally reviewed pertinent reports  Portions of the record may have been created with voice recognition software  Occasional wrong word or \"sound a like\" substitutions may have occurred due to the inherent limitations of voice recognition software  Read the chart carefully and recognize, using context, where substitutions have occurred    "

## 2023-05-18 ENCOUNTER — APPOINTMENT (OUTPATIENT)
Dept: PHYSICAL THERAPY | Facility: CLINIC | Age: 33
End: 2023-05-18
Payer: COMMERCIAL

## 2023-05-25 ENCOUNTER — EVALUATION (OUTPATIENT)
Dept: PHYSICAL THERAPY | Facility: CLINIC | Age: 33
End: 2023-05-25

## 2023-05-25 DIAGNOSIS — O26.892 PELVIC PAIN AFFECTING PREGNANCY IN SECOND TRIMESTER, ANTEPARTUM: Primary | ICD-10-CM

## 2023-05-25 DIAGNOSIS — R10.2 PELVIC PAIN AFFECTING PREGNANCY IN SECOND TRIMESTER, ANTEPARTUM: Primary | ICD-10-CM

## 2023-05-25 DIAGNOSIS — M54.50 PAIN IN RIGHT LUMBAR REGION OF BACK: ICD-10-CM

## 2023-05-25 DIAGNOSIS — M25.552 LATERAL PAIN OF LEFT HIP: ICD-10-CM

## 2023-05-25 NOTE — PROGRESS NOTES
PT Re-Evaluation     Today's date: 2023  Patient name: Manuel Bennett  : 1990  MRN: [id number]  Referring provider: Sapphire Arzola MD  Dx:   Encounter Diagnosis     ICD-10-CM    1  Pelvic pain affecting pregnancy in second trimester, antepartum  O26 892     R10 2       2  Pain in right lumbar region of back  M54 50       3  Lateral pain of left hip  M25 552           Start Time: 1655  Stop Time: 1745  Total time in clinic (min): 50 minutes    Assessment  Assessment details: Patient reports notable improvement in pelvic pain since starting therapy, noting she is able to perform transfers without pelvic pain  Pt still notes right SI pain affecting transfers and numbness/tingling along left ITB while sleeping  Demonstrates improvement in PFM relaxation noted through external palpation, since internal pelvic floor assessment not performed  Demonstrates decrease in b/l core and LEs, decrease lumbar/sacral spine mobility, ttp/spasm around L>R SI and left ITB, and b/l gastroc  Patient would benefit from further skilled PT to address right SI and left ITB sx affecting functional capacity and quality of life during pregnancy     Impairments: abnormal muscle tone, abnormal or restricted ROM, impaired physical strength, lacks appropriate home exercise program, pain with function and poor posture     Goals  Impairment Goals upon discharge  - Improve MMT for pelvic floor muscle (PFM) to greater than or equal to 3/5 in order to improve lumbopelvic stability - not assessed  - Decrease pelvic pain to 0/10 with ADLs and transfers - met  - Increase PFM endurance to 5 second hold for 10 reps - not assessed  - Improve relaxation of PFM to 100% in 2 seconds - partially met  - Demonstrates appropriate breathing mechanics with pelvic floor relaxation and contraction for improved muscle coordination in 8 weeks - partially met  Added 23: Decrease right SI pain to 0-1/10 with transfers    Functional Goals Upon Discharge  - Pt will be independent with home exercise program, with proper demonstration, rationale and understanding in order to improve functional abilities and quality of life - partially met  - Patient is knowledgeable of postural and pelvic floor relaxation strategies to optimize toileting techniques - met  - Patient demonstrates proper body mechanics with transfers without c/o pelvic pain - met  - Patient reports decrease nocturia to 1 void/night for more thorough sleep - met  Added 23: Patient is able to perform sit to stand transfers without right SI pain    Plan  Patient would benefit from: skilled physical therapy  Planned modality interventions: biofeedback  Planned therapy interventions: manual therapy, neuromuscular re-education, patient education, self care, strengthening, stretching, home exercise program and behavior modification  Frequency: 1x week  Duration in weeks: 8  Plan of Care expiration date: 2023  Treatment plan discussed with: patient        Subjective Evaluation    History of Present Illness  Mechanism of injury: Right SI Pain:  At least: 0/10  At worse: 4/10 (up and down from the floor)    Pt c/o tingling/numbness along left ITB while sleeping every night  Bladder Function:     Voiding Difficulties positive for: frequent urination and incomplete emptying      Voiding Difficulties comments:     Voiding frequency: every 3-4 hours and every 15-30 minutes    Urinary leakage: no urine leakage    Urinary leakage not aggravated by: coughing, sneezing, walking to the bathroom and post-void dribble    Nocturia (episodes per night): 0-1    Painful urination: No      Intake (ounces):  Water: 80, Soda: 24,   Incontinence Management:     Pads/Diaper Use:  None    Bowel Function:     Voiding DIfficulties: none    Bowel frequency: daily    [place] Stool Scale: type 4    Stool softener use: no stool softeners  Sexual Function:     Pain during intercourse: No  Pain  Current pain ratin  At best pain "ratin  At worst pain ratin  Location: Pelvic          Objective     Active Range of Motion     Lumbar   Flexion:  Restriction level: moderate  Extension:  with pain Restriction level: minimal    Additional Active Range of Motion Details  Pelvic drop with left SLS    Strength/Myotome Testing     Left Hip   Planes of Motion   Flexion: 4+  Abduction: 4+  External rotation: 4    Right Hip   Planes of Motion   Flexion: 4  Abduction: 4  External rotation: 4    Additional Strength Details  Palpation: severe along left ITB  Moderate hypomobility and ttp along left SI    External palpation of PFM: 75% pelvic floor relaxation         Precautions: none  Note: Improve BMs, PFM relaxation, hip adductors flexibility/strength, transfers    Print HEP  Manuals 3/23 4/4 5/11 5/25         S/l external PFM TPR VITALIY VITALIY  VITALIY         Left ITB TPR/STM    VITALIY         TPR to hip adductor VITALIY  s/l VITALIY           TPR to multifidus and lumbar pvm   VITALIY          TPR to thoracic pvm   VITALIY          Lumbar/sacral mobs in s/l    VITALIY         Neuro Re-Ed             Supine TA attempted            Supine Kegel attempted            Supine or Sitting Hip add iso  nv           S/l clamshells  5\"x10           S/l clamshells neutral  5\"x10           PFM relaxation   instructed           Seated TA  nv           Quadruped TA with scap retraction  nv 5\"x10  elbows on wedge          Standing Hip abd  As curtis 9x/10x/10x ea          Standing hip Add AROM                          Ther Ex             Seated Happy Baby stretch 30\"x3            Hip Adductor stretch standing  30\"x3           Trigger point ball in scapula   instructed for home use          Belly Hang stretch   30\"x1          Re-assessment    performed                                                Ther Activity             Sit to stand with TA  nv           Sit to stand with kegel  When able           Urge suppression nv nv           PFM relaxation with BM nv instructed           Pregnancy Info           " Gait Training                                       Modalities

## 2023-05-30 LAB
EXTERNAL HEMOGLOBIN: 11.3 G/DL
EXTERNAL PLATELET COUNT: 231 K/ÂΜL
EXTERNAL SYPHILIS TOTAL IGG/IGM SCREENING: NORMAL

## 2023-05-31 LAB
ERYTHROCYTE [DISTWIDTH] IN BLOOD BY AUTOMATED COUNT: 12.7 % (ref 11.7–15.4)
GLUCOSE 1H P 50 G GLC PO SERPL-MCNC: 113 MG/DL (ref 70–139)
HCT VFR BLD AUTO: 33.5 % (ref 34–46.6)
HGB BLD-MCNC: 11.3 G/DL (ref 11.1–15.9)
MCH RBC QN AUTO: 29.7 PG (ref 26.6–33)
MCHC RBC AUTO-ENTMCNC: 33.7 G/DL (ref 31.5–35.7)
MCV RBC AUTO: 88 FL (ref 79–97)
PLATELET # BLD AUTO: 231 X10E3/UL (ref 150–450)
RBC # BLD AUTO: 3.81 X10E6/UL (ref 3.77–5.28)
RPR SER QL: NON REACTIVE
WBC # BLD AUTO: 8.9 X10E3/UL (ref 3.4–10.8)

## 2023-06-01 ENCOUNTER — OFFICE VISIT (OUTPATIENT)
Dept: PHYSICAL THERAPY | Facility: CLINIC | Age: 33
End: 2023-06-01

## 2023-06-01 DIAGNOSIS — M54.50 PAIN IN RIGHT LUMBAR REGION OF BACK: ICD-10-CM

## 2023-06-01 DIAGNOSIS — R10.2 PELVIC PAIN AFFECTING PREGNANCY IN SECOND TRIMESTER, ANTEPARTUM: Primary | ICD-10-CM

## 2023-06-01 DIAGNOSIS — O26.892 PELVIC PAIN AFFECTING PREGNANCY IN SECOND TRIMESTER, ANTEPARTUM: Primary | ICD-10-CM

## 2023-06-01 DIAGNOSIS — M25.552 LATERAL PAIN OF LEFT HIP: ICD-10-CM

## 2023-06-01 NOTE — PROGRESS NOTES
"Daily Note     Today's date: 2023  Patient name: Nasreen Almanzar  : 1990  MRN: [id number]  Referring provider: Bj Flores MD  Dx:   Encounter Diagnosis     ICD-10-CM    1  Pelvic pain affecting pregnancy in second trimester, antepartum  O26 892     R10 2       2  Pain in right lumbar region of back  M54 50       3  Lateral pain of left hip  M25 552                      Subjective: Pt reports minimal groin/pelvic pain recently, but still notes lower back pain at times  Pt reports less gastroc spasm since last visit with manuals, but notes plantar fascia pain with stretching while in bed and with sit to stand transfers from floor  Denies plantar fascia pain with first step in the morning  Objective: See treatment diary below      Assessment: Focused on STM/TPR to b/l gastroc and plantar foot muscles and foot mobs  Demonstrates increase in foot mobility following manuals  Encouraged pt to continue with gastroc stretches and add plantar fascia stretching  Patient would benefit from continued PT      Plan: Continue per plan of care        Precautions: none  Note: Improve BMs, PFM relaxation, hip adductors flexibility/strength, transfers    Print HEP  Manuals 3/23 4/4 5/11 5/25 6/1        S/l external PFM TPR VITALIY VITALIY  VITALIY         Left ITB TPR/STM    VITALIY         TPR to hip adductor VITALIY  s/l VITALIY           TPR to multifidus and lumbar pvm   VITALIY          TPR to thoracic pvm   VITALIY          Lumbar/sacral mobs in s/l    VITALIY VFM compression to pelvis        TPR to gastroc    VITALIY VITALIY        TPR/STM to plantar foot muscles     VITALIY        Neuro Re-Ed             Supine TA attempted            Supine Kegel attempted            Supine or Sitting Hip add iso  nv           S/l clamshells  5\"x10           S/l clamshells neutral  5\"x10           PFM relaxation   instructed           Seated TA  nv           Quadruped TA with scap retraction  nv 5\"x10  elbows on wedge          Standing Hip abd  As curtis 9x/10x/10x ea          Standing " "hip Add AROM                          Ther Ex             Seated Happy Baby stretch 30\"x3            Hip Adductor stretch standing  30\"x3           Trigger point ball in scapula   instructed for home use          Belly Hang stretch   30\"x1          Re-assessment    performed                                                Ther Activity             Sit to stand with TA  nv           Sit to stand with kegel  When able           Urge suppression nv nv           PFM relaxation with BM nv instructed           Pregnancy Info             Gait Training                                       Modalities                                            "

## 2023-06-05 ENCOUNTER — ROUTINE PRENATAL (OUTPATIENT)
Dept: OBGYN CLINIC | Facility: CLINIC | Age: 33
End: 2023-06-05
Payer: COMMERCIAL

## 2023-06-05 VITALS
WEIGHT: 198 LBS | HEIGHT: 65 IN | DIASTOLIC BLOOD PRESSURE: 60 MMHG | BODY MASS INDEX: 32.99 KG/M2 | SYSTOLIC BLOOD PRESSURE: 100 MMHG

## 2023-06-05 DIAGNOSIS — E07.9 THYROID DISEASE DURING PREGNANCY IN THIRD TRIMESTER: ICD-10-CM

## 2023-06-05 DIAGNOSIS — Z3A.29 29 WEEKS GESTATION OF PREGNANCY: ICD-10-CM

## 2023-06-05 DIAGNOSIS — R10.2 PELVIC PAIN AFFECTING PREGNANCY IN SECOND TRIMESTER, ANTEPARTUM: ICD-10-CM

## 2023-06-05 DIAGNOSIS — R51.9 PREGNANCY HEADACHE IN SECOND TRIMESTER: ICD-10-CM

## 2023-06-05 DIAGNOSIS — O26.892 PREGNANCY HEADACHE IN SECOND TRIMESTER: ICD-10-CM

## 2023-06-05 DIAGNOSIS — O99.283 THYROID DISEASE DURING PREGNANCY IN THIRD TRIMESTER: ICD-10-CM

## 2023-06-05 DIAGNOSIS — O26.892 PELVIC PAIN AFFECTING PREGNANCY IN SECOND TRIMESTER, ANTEPARTUM: ICD-10-CM

## 2023-06-05 DIAGNOSIS — Z86.711 HISTORY OF PULMONARY EMBOLUS (PE): ICD-10-CM

## 2023-06-05 DIAGNOSIS — R82.71 GBS BACTERIURIA: ICD-10-CM

## 2023-06-05 DIAGNOSIS — O99.213 OBESITY AFFECTING PREGNANCY IN THIRD TRIMESTER: Primary | ICD-10-CM

## 2023-06-05 DIAGNOSIS — Z23 NEED FOR TDAP VACCINATION: ICD-10-CM

## 2023-06-05 LAB
SL AMB  POCT GLUCOSE, UA: NORMAL
SL AMB POCT URINE PROTEIN: NORMAL

## 2023-06-05 PROCEDURE — 90471 IMMUNIZATION ADMIN: CPT | Performed by: OBSTETRICS & GYNECOLOGY

## 2023-06-05 PROCEDURE — PNV: Performed by: OBSTETRICS & GYNECOLOGY

## 2023-06-05 PROCEDURE — 90715 TDAP VACCINE 7 YRS/> IM: CPT | Performed by: OBSTETRICS & GYNECOLOGY

## 2023-06-05 PROCEDURE — 81002 URINALYSIS NONAUTO W/O SCOPE: CPT | Performed by: OBSTETRICS & GYNECOLOGY

## 2023-06-05 NOTE — PROGRESS NOTES
"Routine Prenatal Visit  [address], [address], [facility], [address]    Assessment/Plan:  Sohail Dudley is a 35y o  year old  at 29w1d who presents for routine prenatal visit  1  Need for Tdap vaccination  -     TDAP VACCINE GREATER THAN OR EQUAL TO 6YO IM    2  29 weeks gestation of pregnancy  -     POCT urine dip    3  Pregnancy headache in second trimester    4  Pelvic pain affecting pregnancy in second trimester, antepartum    5  25 weeks gestation of pregnancy    6  GBS bacteriuria    7  Thyroid disease during pregnancy in second trimester    8  History of pulmonary embolus (PE)    9  Obesity affecting pregnancy in second trimester      + fm  No  UTCX  Labs reviewed  Has  32 week  US  Growth scan scheduled   Subjective:     CC: Prenatal care    Jenny Rodas is a 35 y o   female who presents for routine prenatal care at 29w1d  Pregnancy ROS: no  leakage of fluid, pelvic pain, or vaginal bleeding   + fetal movement      The following portions of the patient's history were reviewed and updated as appropriate: allergies, current medications, past family history, past medical history, obstetric history, gynecologic history, past social history, past surgical history and problem list       Objective:  /60 (BP Location: Left arm, Patient Position: Sitting, Cuff Size: Standard)   Ht 5' 5\" (1 651 m)   Wt 89 8 kg (198 lb)   LMP 2022 (Exact Date)   BMI 32 95 kg/m²   Pregravid Weight/BMI: 92 1 kg (203 lb) (BMI 33 78)  Current Weight: 89 8 kg (198 lb)   Total Weight Gain: -2 268 kg (-5 lb)   Pre-Rohit Vitals    Flowsheet Row Most Recent Value   Prenatal Assessment    Fetal Heart Rate 144-148   Fundal Height (cm) 29 cm   Movement Present   Prenatal Vitals    Blood Pressure 100/60   Weight - Scale 89 8 kg (198 lb)   Urine Albumin/Glucose    Dilation/Effacement/Station    Vaginal Drainage    Draining Fluid No   Edema    LLE Edema None   RLE Edema None " Facial Edema None           General: Well appearing, no distress  Respiratory: Unlabored breathing  Cardiovascular: Regular rate  Abdomen: Soft, gravid, nontender  Fundal Height: Appropriate for gestational age  Extremities: Warm and well perfused  Non tender

## 2023-06-26 ENCOUNTER — ULTRASOUND (OUTPATIENT)
Dept: PERINATAL CARE | Facility: OTHER | Age: 33
End: 2023-06-26
Payer: COMMERCIAL

## 2023-06-26 ENCOUNTER — ROUTINE PRENATAL (OUTPATIENT)
Dept: OBGYN CLINIC | Facility: CLINIC | Age: 33
End: 2023-06-26
Payer: COMMERCIAL

## 2023-06-26 VITALS
WEIGHT: 197.4 LBS | SYSTOLIC BLOOD PRESSURE: 100 MMHG | DIASTOLIC BLOOD PRESSURE: 58 MMHG | BODY MASS INDEX: 32.89 KG/M2 | HEIGHT: 65 IN

## 2023-06-26 VITALS
DIASTOLIC BLOOD PRESSURE: 64 MMHG | SYSTOLIC BLOOD PRESSURE: 104 MMHG | HEART RATE: 79 BPM | BODY MASS INDEX: 32.96 KG/M2 | WEIGHT: 197.8 LBS | HEIGHT: 65 IN

## 2023-06-26 DIAGNOSIS — R10.2 PELVIC PAIN AFFECTING PREGNANCY IN SECOND TRIMESTER, ANTEPARTUM: ICD-10-CM

## 2023-06-26 DIAGNOSIS — Z3A.32 32 WEEKS GESTATION OF PREGNANCY: Primary | ICD-10-CM

## 2023-06-26 DIAGNOSIS — O26.892 PELVIC PAIN AFFECTING PREGNANCY IN SECOND TRIMESTER, ANTEPARTUM: ICD-10-CM

## 2023-06-26 DIAGNOSIS — O99.212 OBESITY AFFECTING PREGNANCY IN SECOND TRIMESTER: ICD-10-CM

## 2023-06-26 DIAGNOSIS — R82.71 GBS BACTERIURIA: ICD-10-CM

## 2023-06-26 DIAGNOSIS — Z86.711 HISTORY OF PULMONARY EMBOLUS (PE): ICD-10-CM

## 2023-06-26 DIAGNOSIS — O99.282 THYROID DISEASE DURING PREGNANCY IN SECOND TRIMESTER: ICD-10-CM

## 2023-06-26 DIAGNOSIS — E07.9 THYROID DISEASE DURING PREGNANCY IN SECOND TRIMESTER: ICD-10-CM

## 2023-06-26 DIAGNOSIS — R51.9 PREGNANCY HEADACHE IN SECOND TRIMESTER: ICD-10-CM

## 2023-06-26 DIAGNOSIS — O26.892 PREGNANCY HEADACHE IN SECOND TRIMESTER: ICD-10-CM

## 2023-06-26 DIAGNOSIS — O99.213 OBESITY AFFECTING PREGNANCY IN THIRD TRIMESTER: ICD-10-CM

## 2023-06-26 LAB
SL AMB  POCT GLUCOSE, UA: NEGATIVE
SL AMB POCT URINE PROTEIN: NEGATIVE

## 2023-06-26 PROCEDURE — 76816 OB US FOLLOW-UP PER FETUS: CPT | Performed by: OBSTETRICS & GYNECOLOGY

## 2023-06-26 PROCEDURE — PNV: Performed by: OBSTETRICS & GYNECOLOGY

## 2023-06-26 PROCEDURE — 81002 URINALYSIS NONAUTO W/O SCOPE: CPT | Performed by: OBSTETRICS & GYNECOLOGY

## 2023-06-26 PROCEDURE — 99212 OFFICE O/P EST SF 10 MIN: CPT | Performed by: OBSTETRICS & GYNECOLOGY

## 2023-06-26 NOTE — PROGRESS NOTES
"Routine Prenatal Visit  [address], [address], [facility], [address] [facility]    Assessment/Plan:  Kavon Dwyer is a 35y o  year old  at 32w1d who presents for routine prenatal visit  1  32 weeks gestation of pregnancy  -     POCT urine dip    2  Pregnancy headache in second trimester    3  Pelvic pain affecting pregnancy in second trimester, antepartum    4  GBS bacteriuria    5  Thyroid disease during pregnancy in second trimester    6  History of pulmonary embolus (PE)    7  Obesity affecting pregnancy in second trimester        Next OB Visit 2 weeks  Subjective:     CC: Prenatal care    Misty Mcbride is a 35 y o   female who presents for routine prenatal care at 32w1d  Pregnancy ROS: no leakage of fluid, pelvic pain, or vaginal bleeding  normal fetal movement  The following portions of the patient's history were reviewed and updated as appropriate: allergies, current medications, past family history, past medical history, obstetric history, gynecologic history, past social history, past surgical history and problem list       Objective:  /58   Ht 5' 5\" (1 651 m)   Wt 89 5 kg (197 lb 6 4 oz)   LMP 2022 (Exact Date)   BMI 32 85 kg/m²   Pregravid Weight/BMI: 92 1 kg (203 lb) (BMI 33 78)  Current Weight: 89 5 kg (197 lb 6 4 oz)   Total Weight Gain: -2 54 kg (-5 lb 9 6 oz)   Pre-Rohit Vitals    Flowsheet Row Most Recent Value   Prenatal Assessment    Fetal Heart Rate 140   Fundal Height (cm) 32 cm   Movement Present   Prenatal Vitals    Blood Pressure 100/58   Weight - Scale 89 5 kg (197 lb 6 4 oz)   Urine Albumin/Glucose    Dilation/Effacement/Station    Vaginal Drainage    Draining Fluid No   Edema    LLE Edema None   RLE Edema None   Facial Edema None           General: Well appearing, no distress  Abdomen: Soft, gravid, nontender  Extremities: Non tender    "

## 2023-06-26 NOTE — LETTER
June 26, 2023     Madeline Estrada, [address]    Patient: Tevin Whitehead   YOB: 1990   Date of Visit: 6/26/2023       Dear Dr Myers Rom: Thank you for referring Tevin Whitehead to me for evaluation  Below are my notes for this consultation  If you have questions, please do not hesitate to call me  I look forward to following your patient along with you  Sincerely,        Dinesh Munoz MD        CC: No Recipients    Dinesh Munoz MD  6/26/2023  4:12 PM  Sign when Signing Visit  A fetal ultrasound was completed  See Ob procedures in Epic for an interpretation and recommendations  Do not hesitate to contact us in [facility] if you have questions  Bernadette John  Kelsey Kumar MD, [address] [facility]  Maternal Fetal Medicine

## 2023-06-26 NOTE — PROGRESS NOTES
A fetal ultrasound was completed  See Ob procedures in Epic for an interpretation and recommendations  Do not hesitate to contact us in [facility] if you have questions  Imani So  Ace Boston MD, [address] [facility]  Maternal Fetal Medicine

## 2023-07-10 ENCOUNTER — TELEPHONE (OUTPATIENT)
Dept: FAMILY MEDICINE CLINIC | Facility: HOSPITAL | Age: 33
End: 2023-07-10

## 2023-07-10 ENCOUNTER — OFFICE VISIT (OUTPATIENT)
Dept: ENDOCRINOLOGY | Facility: HOSPITAL | Age: 33
End: 2023-07-10
Payer: COMMERCIAL

## 2023-07-10 ENCOUNTER — OFFICE VISIT (OUTPATIENT)
Age: 33
End: 2023-07-10
Payer: COMMERCIAL

## 2023-07-10 VITALS
HEART RATE: 83 BPM | DIASTOLIC BLOOD PRESSURE: 68 MMHG | WEIGHT: 200 LBS | BODY MASS INDEX: 33.32 KG/M2 | SYSTOLIC BLOOD PRESSURE: 118 MMHG | HEIGHT: 65 IN

## 2023-07-10 VITALS
DIASTOLIC BLOOD PRESSURE: 66 MMHG | WEIGHT: 200 LBS | SYSTOLIC BLOOD PRESSURE: 102 MMHG | RESPIRATION RATE: 18 BRPM | HEIGHT: 65 IN | HEART RATE: 75 BPM | OXYGEN SATURATION: 100 % | BODY MASS INDEX: 33.32 KG/M2

## 2023-07-10 DIAGNOSIS — E04.2 MULTIPLE THYROID NODULES: Primary | ICD-10-CM

## 2023-07-10 DIAGNOSIS — Z86.711 HISTORY OF PULMONARY EMBOLUS (PE): Primary | ICD-10-CM

## 2023-07-10 DIAGNOSIS — E89.0 POSTOPERATIVE HYPOTHYROIDISM: ICD-10-CM

## 2023-07-10 PROCEDURE — 99213 OFFICE O/P EST LOW 20 MIN: CPT | Performed by: PHYSICIAN ASSISTANT

## 2023-07-10 PROCEDURE — 99214 OFFICE O/P EST MOD 30 MIN: CPT | Performed by: INTERNAL MEDICINE

## 2023-07-10 RX ORDER — HEPARIN SODIUM 5000 [USP'U]/ML
10000 INJECTION, SOLUTION INTRAVENOUS; SUBCUTANEOUS EVERY 12 HOURS SCHEDULED
Qty: 14 ML | Refills: 1 | Status: SHIPPED | OUTPATIENT
Start: 2023-07-10 | End: 2023-07-12

## 2023-07-10 RX ORDER — ENOXAPARIN SODIUM 100 MG/ML
80 INJECTION SUBCUTANEOUS EVERY 24 HOURS
Qty: 24 ML | Refills: 0 | Status: SHIPPED | OUTPATIENT
Start: 2023-07-10

## 2023-07-10 NOTE — PATIENT INSTRUCTIONS
Continue same dose of levothyroxine. Thyroid ultrasound due August 2023, but can get within a few months after. We have you get monthly blood work in pregnancy and follow up in 3 months.

## 2023-07-10 NOTE — PROGRESS NOTES
Darek Hernandez 35 y.o. female MRN: [id number]    Encounter: [id number]      Assessment/Plan     Assessment: This is a 35y.o.-year-old female with hypothyroidism post thyroidectomy with thyroid nodules and currently pregnant. Plan:  1.  Hypothyroidism post partial thyroidectomy: Had lab work completed this morning. We will call once results are in to see if there is any adjustments that need to be made. At this time she will continue with levothyroxine 75 mcg 1 tablet 6 days a week and alternating every other Sunday between a full tablet and half tablet. Contact the office if there is any change in symptoms. Follow-up in 3 months with lab work completed prior to visit.     2.  Thyroid nodules: Biopsy of right nodule November 2022 was benign. Jameel Palm be due for a follow-up ultrasound around August 2023. She will be due for her next ultrasound around her delivery date, so at this time and like her to get it completed prior to the end of this year. Did print out a prescription for her.     3.  Thyroid disease in pregnancy: Recommend to keep TSH below 2.5 during pregnancy.  Currently TSH is within target range. Lab work was completed this morning. She is delivering in 2 months, so we will stretch out office visit to 3 months.     CC: Thyroid follow-up    History of Present Illness     HPI:  Fei Cervantes is L 49 OZSL old female with hypothyroidism post partial thyroidectomy for follow-up visit.     Multiple thyroid nodules were noted in the past.  She would a partial thyroidectomy of her left thyroid lobe and still has some right-sided thyroid nodules that are stable in size but are T rad 4 and T rad 5.  Her right side of her lobe is quite large. Vic Rainey was seen in August 2022 due to significant weight gain.  Thyroid function tests were noted to be hypothyroid and she was started on Tirosint SOL 50 mcg daily for thyroid hormone replacement as she can not swallow pill.  However there was a recall of Tirosint SOL and she was switched to levothyroxine.  States that she has been crushing the pill to take medication.  The rest of her blood work was normal ruling out other causes of her weight issues.  PCOS and insulin resistance were ruled out. José Miguel Winters did have a mildly elevated a.m. cortisol 21.3 with normals up to 19.4 which is not remarkable.       She is currently taking levothyroxine 75 mcg daily. She is currently pregnant with an CHRISTOPHER of August 20, 2023. She is at 34 weeks gestation.  Continues with cold intolerance. Fatigue is stable at this time. She denies heat intolerance, palpitation, or tremors.    Has gained 4 pounds since last office visit. She will get diarrhea if she eats certain foods, has not been getting worse.  She denies constipation, insomnia, anxiety or depression.  She has some dry and itchy skin but no brittle nails or hair loss.  She denies diplopia.  She denies compressive thyroid symptoms or difficulties with swallowing.  Overall doing well today. Is late with her most recent set of lab work, but had a completed this morning.     She has multiple nodules on her thyroid and a T RADS 3 thyroid nodule in the right lower lobe for which biopsy was recommended.  She underwent biopsy of this nodule on 11/8/2022 demonstrating [place] class III pathology.  The Afirma testing was benign. Review of Systems   Constitutional: Positive for fatigue. Negative for activity change, appetite change, diaphoresis and unexpected weight change. HENT: Negative for sore throat, trouble swallowing and voice change. Eyes: Negative for visual disturbance. Respiratory: Negative for chest tightness and shortness of breath. Cardiovascular: Negative for chest pain, palpitations and leg swelling. Gastrointestinal: Positive for diarrhea (Occasional). Negative for abdominal pain and constipation. Endocrine: Positive for cold intolerance. Negative for heat intolerance, polydipsia, polyphagia and polyuria.    Skin: Negative for rash. Neurological: Negative for dizziness, tremors, light-headedness, numbness and headaches. Hematological: Negative for adenopathy. Psychiatric/Behavioral: Negative for dysphoric mood and sleep disturbance. The patient is not nervous/anxious.         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   • Vitamin D deficiency    • Vocal cord paralysis     Following partial thyroidectomy     Past Surgical History:   Procedure Laterality Date   • DILATION AND EVACUATION  2016   • KNEE ARTHROSCOPY W/ PLICA EXCISION Left 5159   • THYROIDECTOMY, PARTIAL Left 2013   • UPPER GASTROINTESTINAL ENDOSCOPY     • US GUIDED THYROID BIOPSY  11/08/2022   • WISDOM TOOTH EXTRACTION       Social History   Social History     Substance and Sexual Activity   Alcohol Use Not Currently    Comment: socially     Social History     Substance and Sexual Activity   Drug Use Not Currently    Comment:  no longer using marijuana     Social History     Tobacco Use   Smoking Status Never   • Passive exposure: Never   Smokeless Tobacco Never   Tobacco Comments     seasonal, only when it's warm out, social     Family History:   Family History   Problem Relation Age of Onset   • Thyroid disease Mother         thyroid nodules   • No Known Problems Father    • Ovarian cancer Sister    • Thyroid disease Sister    • Anorexia nervosa Sister    • Mental illness Sister    • No Known Problems Sister    • No Known Problems Brother    • Celiac disease Maternal Grandmother    • Hodgkin's lymphoma Maternal Grandfather    • Hodgkin's lymphoma Paternal Grandfather    • Other Maternal Aunt hypoglycemia   • Other Maternal Aunt         brain tumor, had siezure   • Colon cancer Neg Hx    • Colon polyps Neg Hx    • Substance Abuse Neg Hx        Meds/Allergies   Current Outpatient Medications   Medication Sig Dispense Refill   • enoxaparin (LOVENOX) 40 mg/0.4 mL Inject 0.4 mL (40 mg total) under the skin every 24 hours 36 mL 1   • levothyroxine (Synthroid) 75 mcg tablet Take 1 tablet (75 mcg total) by mouth daily 90 tablet 1   • Prenatal Vit-Fe Fumarate-FA (prenatal multivitamin) 28-0.8 MG TABS Take 1 tablet by mouth daily Late afternoon       No current facility-administered medications for this visit. No Known Allergies    Objective   Vitals: Blood pressure 118/68, pulse 83, height 5' 5" (1.651 m), weight 90.7 kg (200 lb), last menstrual period 11/13/2022. Physical Exam  Vitals and nursing note reviewed. Constitutional:       General: She is not in acute distress. Appearance: Normal appearance. She is not diaphoretic. HENT:      Head: Normocephalic and atraumatic. Eyes:      General: No scleral icterus. Extraocular Movements: Extraocular movements intact. Conjunctiva/sclera: Conjunctivae normal.      Pupils: Pupils are equal, round, and reactive to light. Cardiovascular:      Rate and Rhythm: Normal rate and regular rhythm. Heart sounds: No murmur heard. Pulmonary:      Effort: Pulmonary effort is normal. No respiratory distress. Breath sounds: Normal breath sounds. No wheezing. Musculoskeletal:      Cervical back: Normal range of motion. Right lower leg: No edema. Left lower leg: No edema. Lymphadenopathy:      Cervical: No cervical adenopathy. Neurological:      Mental Status: She is alert and oriented to person, place, and time. Sensory: No sensory deficit. Psychiatric:         Mood and Affect: Mood normal.         Behavior: Behavior normal.         Thought Content:  Thought content normal.         The history was obtained from the review of the chart, patient. Lab Results:   Lab Results   Component Value Date/Time    Free t4 1.15 05/27/2023 08:42 AM    Free t4 1.10 04/29/2023 10:29 AM    Free t4 1.23 03/06/2023 07:35 AM       Imaging Studies:   Results for orders placed during the hospital encounter of 08/19/22    US thyroid    Impression  The following meet current ACR criteria for recommending ultrasound guided biopsy: Right lower pole nodule measuring 2.8 x 2.5 x 2.4 cm on image 34. Remaining nodules do not meet ACR criteria for biopsy, surveillance imaging in one year recommended. Significant findings noted in Epic. Reference: ACR Thyroid Imaging, Reporting and Data System (TI-RADS): White Paper of the [employer]. J AM Kenia Radiol 1790;80:115-938. (additional recommendations based on American Thyroid Association 2015 guidelines.)      Workstation performed: [id number]      I have personally reviewed pertinent reports. Portions of the record may have been created with voice recognition software. Occasional wrong word or "sound a like" substitutions may have occurred due to the inherent limitations of voice recognition software. Read the chart carefully and recognize, using context, where substitutions have occurred.

## 2023-07-11 ENCOUNTER — ROUTINE PRENATAL (OUTPATIENT)
Dept: OBGYN CLINIC | Facility: CLINIC | Age: 33
End: 2023-07-11
Payer: COMMERCIAL

## 2023-07-11 ENCOUNTER — OFFICE VISIT (OUTPATIENT)
Dept: FAMILY MEDICINE CLINIC | Facility: HOSPITAL | Age: 33
End: 2023-07-11
Payer: COMMERCIAL

## 2023-07-11 VITALS
SYSTOLIC BLOOD PRESSURE: 100 MMHG | HEART RATE: 78 BPM | DIASTOLIC BLOOD PRESSURE: 72 MMHG | WEIGHT: 200 LBS | HEIGHT: 65 IN | OXYGEN SATURATION: 98 % | BODY MASS INDEX: 33.32 KG/M2

## 2023-07-11 VITALS — SYSTOLIC BLOOD PRESSURE: 114 MMHG | DIASTOLIC BLOOD PRESSURE: 62 MMHG | BODY MASS INDEX: 33.28 KG/M2 | WEIGHT: 200 LBS

## 2023-07-11 DIAGNOSIS — Z86.711 HISTORY OF PULMONARY EMBOLUS (PE): ICD-10-CM

## 2023-07-11 DIAGNOSIS — Z87.59 HISTORY OF ECTOPIC PREGNANCY: ICD-10-CM

## 2023-07-11 DIAGNOSIS — Z3A.34 34 WEEKS GESTATION OF PREGNANCY: ICD-10-CM

## 2023-07-11 DIAGNOSIS — O99.213 OBESITY AFFECTING PREGNANCY IN THIRD TRIMESTER: ICD-10-CM

## 2023-07-11 DIAGNOSIS — E07.9 THYROID DISEASE DURING PREGNANCY IN THIRD TRIMESTER: Primary | ICD-10-CM

## 2023-07-11 DIAGNOSIS — F41.1 GENERALIZED ANXIETY DISORDER: ICD-10-CM

## 2023-07-11 DIAGNOSIS — Z00.00 ROUTINE ADULT HEALTH MAINTENANCE: Primary | ICD-10-CM

## 2023-07-11 DIAGNOSIS — O99.283 THYROID DISEASE DURING PREGNANCY IN THIRD TRIMESTER: Primary | ICD-10-CM

## 2023-07-11 DIAGNOSIS — E89.0 POSTOPERATIVE HYPOTHYROIDISM: ICD-10-CM

## 2023-07-11 LAB
SL AMB  POCT GLUCOSE, UA: NEGATIVE
SL AMB POCT URINE PROTEIN: NEGATIVE

## 2023-07-11 PROCEDURE — PNV: Performed by: OBSTETRICS & GYNECOLOGY

## 2023-07-11 PROCEDURE — 99395 PREV VISIT EST AGE 18-39: CPT | Performed by: STUDENT IN AN ORGANIZED HEALTH CARE EDUCATION/TRAINING PROGRAM

## 2023-07-11 PROCEDURE — 81002 URINALYSIS NONAUTO W/O SCOPE: CPT | Performed by: OBSTETRICS & GYNECOLOGY

## 2023-07-11 NOTE — PROGRESS NOTES
[address] [facility] [address]    NAME: Sam Gutierrez  AGE: 35 y.o. SEX: female  : 1990   DATE: 2023     Assessment and Plan:      Diagnosis ICD-10-CM Associated Orders   1. Routine adult health maintenance  Z00.00       2. 34 weeks gestation of pregnancy  Z3A.34       3. History of pulmonary embolus (PE)  Z86.711       4. Postoperative hypothyroidism  E89.0       5. Generalized anxiety disorder  F41.1       6. History of ectopic pregnancy  Z87.59         Pregnancy Labs reviewed, and none due at this time. Will have urine dip done tonight in office with obstetrics. No prior proteinuria or glucosuria. TSH and T4 have been well managed during this pregnancy and patient is overall feeling well. Just about 6 weeks out from pregnancy due date. Notes reviewed from hematology as well as letter  Stating that patient will be on temporary anticoagulation during her pregnancy. Immunizations and preventive care screenings were discussed with patient today. Appropriate education was printed on patient's after visit summary. Counseling:  Alcohol/drug use: discussed moderation in alcohol intake, the recommendations for healthy alcohol use, and avoidance of illicit drug use. Dental Health: discussed importance of regular tooth brushing, flossing, and dental visits. Injury prevention: discussed safety/seat belts, safety helmets, smoke detectors, carbon dioxide detectors, and smoking near bedding or upholstery. Sexual health: discussed sexually transmitted diseases, partner selection, use of condoms, avoidance of unintended pregnancy, and contraceptive alternatives. · Exercise: the importance of regular exercise/physical activity was discussed. Recommend exercise 3-5 times per week for at least 30 minutes.       Return in about 1 year (around 2024) for Annual Physical.     Chief Complaint:     Chief Complaint   Patient presents with   • Physical Exam      History of Present Illness:     Adult Annual Physical   Patient here for a comprehensive physical exam.     Diet and Physical Activity  · Diet/Nutrition: regular, hungry while pregnant. · Exercise: walking at work. No Drug use. Tobacco use:  reports that she has never smoked. She has never been exposed to tobacco smoke. She has never used smokeless tobacco.  · Alcohol use - no     General Health   · Sleep: sleeps well. · Hearing: normal - right and Left seems like a wind tunnel, since vertigo. · Vision: wears glasses only for driving  · Dental: overdue, brushes teeth twice daily. /GYN Health  · Last menstrual period: 11/13/22 - Due date 8/20/23 [address]     Review of Systems:     Review of Systems   Constitutional: Negative for chills and fever. Respiratory: Negative for cough and shortness of breath. Mild RUFF + while pregnant   Cardiovascular: Negative for chest pain, palpitations and leg swelling. Gastrointestinal: Positive for diarrhea (soft). Negative for constipation. Genitourinary: Negative for dysuria and frequency. Urine a bit cloudy per pt, going to OB tonight   Neurological: Negative for dizziness and light-headedness.        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   • Vitamin D deficiency    • Vocal cord paralysis     Following partial thyroidectomy      Past Surgical History:     Past Surgical History:   Procedure Laterality Date   • DILATION AND EVACUATION  2016   • KNEE ARTHROSCOPY W/ PLICA EXCISION Left 9933   • THYROIDECTOMY, PARTIAL Left 2013   • UPPER GASTROINTESTINAL ENDOSCOPY     • US GUIDED THYROID BIOPSY  11/08/2022   • WISDOM TOOTH EXTRACTION        Social History:     Social History     Socioeconomic History   • Marital status: Single     Spouse name: None   • Number of children: None   • Years of education: None   • Highest education level: None   Occupational History   • None   Tobacco Use   • Smoking status: Never     Passive exposure: Never   • Smokeless tobacco: Never   • Tobacco comments:      seasonal, only when it's warm out, social   Vaping Use   • Vaping Use: Never used   Substance and Sexual Activity   • Alcohol use: Not Currently     Comment: socially   • Drug use: Not Currently     Comment:  no longer using marijuana   • Sexual activity: Yes     Partners: Male   Other Topics Concern   • None   Social History Narrative    Single    1 c/caffeine /day    Wears seatbelt    Regular exercise    No cigarette smoke exposure home/work    Employed    Lives with roommate    No living will in place     Social Determinants of Health     Financial Resource Strain: Not on file   Food Insecurity: Not on file   Transportation Needs: No Transportation Needs (6/2/2021)    PRAPARE - Transportation    • Lack of Transportation (Medical): No    • Lack of Transportation (Non-Medical):  No   Physical Activity: Sufficiently Active (6/2/2021)    Exercise Vital Sign    • Days of Exercise per Week: 4 days    • Minutes of Exercise per Session: 60 min   Stress: Stress Concern Present (6/2/2021)    [facility]    • Feeling of Stress : Rather much   Social Connections: Not on file   Intimate Partner Violence: Not on file   Housing Stability: Not on file      Family History:     Family History   Problem Relation Age of Onset   • Thyroid disease Mother         thyroid nodules   • No Known Problems Father    • Ovarian cancer Sister    • Thyroid disease Sister    • Anorexia nervosa Sister    • Mental illness Sister    • No Known Problems Sister    • No Known Problems Brother    • Celiac disease Maternal Grandmother    • Hodgkin's lymphoma Maternal Grandfather    • Hodgkin's lymphoma Paternal Grandfather    • Other Maternal Aunt         hypoglycemia   • Other Maternal Aunt         brain tumor, had siezure   • Colon cancer Neg Hx    • Colon polyps Neg Hx    • Substance Abuse Neg Hx       Current Medications:     Current Outpatient Medications   Medication Sig Dispense Refill   • enoxaparin (Lovenox) 80 mg/0.8 mL Inject 0.8 mL (80 mg total) under the skin every 24 hours 24 mL 0   • levothyroxine (Synthroid) 75 mcg tablet Take 1 tablet (75 mcg total) by mouth daily 90 tablet 1   • Prenatal Vit-Fe Fumarate-FA (prenatal multivitamin) 28-0.8 MG TABS Take 1 tablet by mouth daily Late afternoon     • Heparin Sodium, Porcine, (heparin, porcine,) 5,000 units/mL INJECT 2 MILLILITERS SUBCUTANEOUSLY EVERY 12 HOURS 14 mL 1     No current facility-administered medications for this visit. Allergies:     No Known Allergies   Physical Exam:     /72   Pulse 78   Ht 5' 5" (1.651 m)   Wt 90.7 kg (200 lb)   LMP 11/13/2022 (Exact Date)   SpO2 98%   BMI 33.28 kg/m²     Physical Exam  Vitals reviewed. Constitutional:       General: She is not in acute distress. Appearance: Normal appearance. She is not ill-appearing. HENT:      Head: Normocephalic and atraumatic. Right Ear: Tympanic membrane, ear canal and external ear normal. There is no impacted cerumen. Left Ear: Tympanic membrane, ear canal and external ear normal. There is no impacted cerumen. Nose: Nose normal. No congestion or rhinorrhea. Mouth/Throat:      Mouth: Mucous membranes are moist.      Pharynx: Oropharynx is clear. No oropharyngeal exudate or posterior oropharyngeal erythema. Eyes:      General: No scleral icterus. Right eye: No discharge. Left eye: No discharge.       Conjunctiva/sclera: Conjunctivae normal.   Cardiovascular:      Rate and Rhythm: Normal rate and regular rhythm. Pulses: Normal pulses. Heart sounds: Normal heart sounds. No murmur heard. No friction rub. No gallop. Pulmonary:      Effort: Pulmonary effort is normal. No respiratory distress. Breath sounds: Normal breath sounds. No stridor. No wheezing. Abdominal:      Comments: Gravid abdomen, baby appears very midline, with soft abdomen on lateral sides. Nontender. Musculoskeletal:         General: Normal range of motion. Cervical back: Normal range of motion and neck supple. No rigidity. Right lower leg: No edema. Left lower leg: No edema. Lymphadenopathy:      Cervical: No cervical adenopathy. Skin:     General: Skin is warm and dry. Capillary Refill: Capillary refill takes less than 2 seconds. Coloration: Skin is not jaundiced or pale. Neurological:      Mental Status: She is alert and oriented to person, place, and time. Gait: Gait normal.   Psychiatric:         Mood and Affect: Mood normal.         Behavior: Behavior normal.         Thought Content:  Thought content normal.         Judgment: Judgment normal.          WallyAk?Lexmarline [facility], DO   [address]

## 2023-07-12 LAB
DME PARACHUTE DELIVERY DATE REQUESTED: NORMAL
DME PARACHUTE ITEM DESCRIPTION: NORMAL
DME PARACHUTE ORDER STATUS: NORMAL
DME PARACHUTE SUPPLIER NAME: NORMAL
DME PARACHUTE SUPPLIER PHONE: NORMAL

## 2023-07-12 RX ORDER — HEPARIN SODIUM 5000 [USP'U]/ML
INJECTION, SOLUTION INTRAVENOUS; SUBCUTANEOUS
Qty: 14 ML | Refills: 1 | Status: SHIPPED | OUTPATIENT
Start: 2023-07-12

## 2023-07-12 NOTE — PROGRESS NOTES
HEMATOLOGY / [facility] FOLLOW UP NOTE    Primary Care Provider: Jessie Moon DO  Referring Provider:    MRN: [id number]  : 1990    Reason for Encounter: follow up history of DVT PE     Interval History: Patient presents for follow up of of her history of DVT/PE. This occurred in  after she had an injury to her leg and was taking estrogen-containing NuvaRing. She took a limited course of anticoagulation and does not take any estrogen-containing products now. She has had a few values of Antithrombin III that were slightly out of the normal range, but not low enough to diagnose her with Antithrombin III deficiency. She is not pregnant with her first child and is due on . She has been on Lovenox 40 mg daily throughout the pregnancy. She has had 1 mild nosebleed. No other bleeding problems. The Lovenox has not been excessively expensive for her. REVIEW OF SYSTEMS:  Please note that a 14-point review of systems was performed to include Constitutional, HEENT, Respiratory, CVS, GI, , Musculoskeletal, Integumentary, Neurologic, Rheumatologic, Endocrinologic, Psychiatric, Lymphatic, and Hematologic/Oncologic systems were reviewed and are negative unless otherwise stated in HPI. Positive and negative findings pertinent to this evaluation are incorporated into the history of present illness.       ECOG PS: 0    PROBLEM LIST:  Patient Active Problem List   Diagnosis   • Multiple thyroid nodules   • Paralysis of left vocal cord   • Chronic hoarseness   • Gastroesophageal reflux disease   • Chronic pain of left knee   • Obesity affecting pregnancy in third trimester   • Depression, recurrent (HCC)   • Mild asthma without complication, unspecified whether persistent   • Generalized anxiety disorder   • H/O partial thyroidectomy   • Weight gain   • Postoperative hypothyroidism   • History of ectopic pregnancy   • History of pulmonary embolus (PE)   • Thyroid disease during pregnancy in second trimester   • GBS bacteriuria   • 34 weeks gestation of pregnancy   • Pregnancy headache in second trimester   • Pelvic pain affecting pregnancy in second trimester, antepartum       Assessment / Plan: 25-year-old female with a 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 As she gets closer to her due date and her weight increases we will increase her Lovenox to 1 mg/kg daily. I sent a prescription to her pharmacy for 80 mg daily and she will take this until August 13. At that point it will be a week before her due date and I will transition her to heparin 10,000 units every 12. She would like to have epidural anesthesia. Depending on the timing of when she goes into labor I am hopeful that she will be able to hold all anticoagulation did the day before that occurs. I asked her to  the heparin at her pharmacy now to make sure there is no issue with her getting an adequate supply of it. I will leave our follow-up as needed going forward but am available via telephone if any further questions should arise as she approaches her due date. I spent 30 minutes on chart review, face to face counseling time, coordination of care and documentation. Past Medical History:   has a past medical history of Chronic knee pain, Depression, Disease of thyroid gland, Dysphagia (03/02/2020), History of pulmonary embolus (PE) (2017), Left upper quadrant pain (03/02/2020), Menorrhagia, Multiple thyroid nodules, Psychiatric disorder, Saphenous nerve neuropathy, left, Vitamin D deficiency, and Vocal cord paralysis. PAST SURGICAL HISTORY:   has a past surgical history that includes Thyroidectomy, partial (Left, 2013); [place] tooth extraction; Upper gastrointestinal endoscopy; Dilation and evacuation (2016); US guided thyroid biopsy (11/08/2022); and Knee arthroscopy w/ plica excision (Left, 2019).     CURRENT MEDICATIONS  Current Outpatient Medications   Medication Sig Dispense Refill   • enoxaparin (Lovenox) 80 mg/0.8 mL Inject 0.8 mL (80 mg total) under the skin every 24 hours 24 mL 0   • Heparin Sodium, Porcine, (heparin, porcine,) 5,000 units/mL INJECT 2 MILLILITERS SUBCUTANEOUSLY EVERY 12 HOURS 14 mL 1   • levothyroxine (Synthroid) 75 mcg tablet Take 1 tablet (75 mcg total) by mouth daily 90 tablet 1   • Prenatal Vit-Fe Fumarate-FA (prenatal multivitamin) 28-0.8 MG TABS Take 1 tablet by mouth daily Late afternoon       No current facility-administered medications for this visit. [unfilled]    SOCIAL HISTORY:   reports that she has never smoked. She has never been exposed to tobacco smoke. She has never used smokeless tobacco. She reports that she does not currently use alcohol. She reports that she does not currently use drugs. FAMILY HISTORY:  family history includes Anorexia nervosa in her sister; Celiac disease in her maternal grandmother; Hodgkin's lymphoma in her maternal grandfather and paternal grandfather; Mental illness in her sister; No Known Problems in her brother, father, and sister; Other in her maternal aunt and maternal aunt; Ovarian cancer in her sister; Thyroid disease in her mother and sister. ALLERGIES:  has No Known Allergies. Physical Exam:  Vital Signs:   Visit Vitals  /66   Pulse 75   Resp 18   Ht 5' 5" (1.651 m)   Wt 90.7 kg (200 lb)   LMP 11/13/2022 (Exact Date)   SpO2 100%   BMI 33.28 kg/m²   OB Status Pregnant   Smoking Status Never   BSA 1.98 m²     Body mass index is 33.28 kg/m². Body surface area is 1.98 meters squared. GEN: Alert, awake oriented x3, in no acute distress  HEENT- No pallor, icterus, cyanosis, no oral mucosal lesions,   LAD - no palpable cervical, clavicle, axillary, inguinal LAD  Heart- normal S1 S2, regular rate and rhythm, No murmur, rubs.    Lungs- clear breathing sound bilateral.   Abdomen- soft, Non tender, bowel sounds present, pregnant abdomen  Extremities- No cyanosis, clubbing, edema  Neuro- No focal neurological deficit    Labs:  Lab Results   Component Value Date    WBC 8.9 05/30/2023    HGB 11.3 05/30/2023    HCT 33.5 (L) 05/30/2023    MCV 88 05/30/2023     05/30/2023     Lab Results   Component Value Date    SODIUM 135 02/21/2023    K 3.5 02/21/2023     02/21/2023    CO2 23 02/21/2023    AGAP 10 02/21/2023    BUN 7 02/21/2023    CREATININE 0.62 02/21/2023    GLUC 81 02/21/2023    CALCIUM 9.0 02/21/2023    AST 19 08/16/2022    ALT 19 08/16/2022    ALKPHOS 46 06/30/2022    TP 7.2 08/16/2022    TBILI 0.4 08/16/2022    EGFR 118 02/21/2023

## 2023-07-15 ENCOUNTER — CLINICAL SUPPORT (OUTPATIENT)
Age: 33
End: 2023-07-15

## 2023-07-15 DIAGNOSIS — Z32.2 ENCOUNTER FOR CHILDBIRTH INSTRUCTION: Primary | ICD-10-CM

## 2023-07-17 PROBLEM — O99.283 THYROID DISEASE DURING PREGNANCY IN THIRD TRIMESTER: Status: ACTIVE | Noted: 2023-01-16

## 2023-07-17 NOTE — PROGRESS NOTES
Routine Prenatal Visit  [address]  [address] [facility], [address], [facility], [address] [facility],[address]    Assessment/Plan:  Hipolito Gonzalez is a 35y.o. year old  at 34w2d who presents for routine prenatal visit. 1. Thyroid disease during pregnancy in third trimester    2. Obesity affecting pregnancy in third trimester    3. History of pulmonary embolus (PE)  Assessment & Plan:  Has prescription to switch from lovenox to heparin @ 36 weeks      4. 34 weeks gestation of pregnancy  -     POCT urine dip        Subjective:     CC: Prenatal care    Pearl Ho is a 35 y.o.  female who presents for routine prenatal care at 34w2d. Pregnancy ROS: no leakage of fluid, pelvic pain, or vaginal bleeding. normal fetal movement. The following portions of the patient's history were reviewed and updated as appropriate: allergies, current medications, past family history, past medical history, obstetric history, gynecologic history, past social history, past surgical history and problem list.      Objective:  /62 (BP Location: Left arm, Patient Position: Sitting, Cuff Size: Standard)   Wt 90.7 kg (200 lb)   LMP 2022 (Exact Date)   BMI 33.28 kg/m²   Pregravid Weight/BMI: 92.1 kg (203 lb) (BMI 33.78)  Current Weight: 90.7 kg (200 lb)   Total Weight Gain: -1.361 kg (-3 lb)   Pre-Rohit Vitals    Flowsheet Row Most Recent Value   Prenatal Assessment    Fetal Heart Rate 140   Fundal Height (cm) 34 cm   Movement Present   Presentation Vertex   Prenatal Vitals    Blood Pressure 114/62   Weight - Scale 90.7 kg (200 lb)   Urine Albumin/Glucose    Dilation/Effacement/Station    Vaginal Drainage    Edema            General: Well appearing, no distress  Respiratory: Unlabored breathing  Cardiovascular: Regular rate. Abdomen: Soft, gravid, nontender  Fundal Height: Appropriate for gestational age. Extremities: Warm and well perfused. Non tender.

## 2023-07-19 ENCOUNTER — DOCUMENTATION (OUTPATIENT)
Dept: HEMATOLOGY ONCOLOGY | Facility: CLINIC | Age: 33
End: 2023-07-19

## 2023-07-19 NOTE — PROGRESS NOTES
Received request for patient to get a PA on her heparin. This is a plan excluded med so I submitted over the phone a plan exclusion PA. [phone].     BIN:       [id number]  N:      [id number]  ID:          [id number]       Case number [id number]

## 2023-07-24 ENCOUNTER — ROUTINE PRENATAL (OUTPATIENT)
Dept: OBGYN CLINIC | Facility: CLINIC | Age: 33
End: 2023-07-24
Payer: COMMERCIAL

## 2023-07-24 VITALS
DIASTOLIC BLOOD PRESSURE: 62 MMHG | HEIGHT: 65 IN | BODY MASS INDEX: 33.89 KG/M2 | WEIGHT: 203.4 LBS | SYSTOLIC BLOOD PRESSURE: 108 MMHG

## 2023-07-24 DIAGNOSIS — Z86.711 HISTORY OF PULMONARY EMBOLUS (PE): Primary | ICD-10-CM

## 2023-07-24 DIAGNOSIS — R82.71 GBS BACTERIURIA: ICD-10-CM

## 2023-07-24 DIAGNOSIS — Z86.711 HISTORY OF PULMONARY EMBOLUS (PE): ICD-10-CM

## 2023-07-24 DIAGNOSIS — O99.213 OBESITY AFFECTING PREGNANCY IN THIRD TRIMESTER: ICD-10-CM

## 2023-07-24 DIAGNOSIS — E07.9 THYROID DISEASE DURING PREGNANCY IN THIRD TRIMESTER: Primary | ICD-10-CM

## 2023-07-24 DIAGNOSIS — Z3A.13 13 WEEKS GESTATION OF PREGNANCY: ICD-10-CM

## 2023-07-24 DIAGNOSIS — O99.283 THYROID DISEASE DURING PREGNANCY IN THIRD TRIMESTER: Primary | ICD-10-CM

## 2023-07-24 DIAGNOSIS — Z3A.36 36 WEEKS GESTATION OF PREGNANCY: ICD-10-CM

## 2023-07-24 LAB
EXTERNAL GROUP B STREP ANTIGEN: POSITIVE
SL AMB  POCT GLUCOSE, UA: NORMAL
SL AMB POCT URINE PROTEIN: NORMAL

## 2023-07-24 PROCEDURE — 81002 URINALYSIS NONAUTO W/O SCOPE: CPT | Performed by: STUDENT IN AN ORGANIZED HEALTH CARE EDUCATION/TRAINING PROGRAM

## 2023-07-24 PROCEDURE — PNV: Performed by: STUDENT IN AN ORGANIZED HEALTH CARE EDUCATION/TRAINING PROGRAM

## 2023-07-24 RX ORDER — HEPARIN SODIUM 5000 [USP'U]/ML
10000 INJECTION, SOLUTION INTRAVENOUS; SUBCUTANEOUS EVERY 12 HOURS SCHEDULED
Qty: 1 ML | Refills: 0 | Status: SHIPPED | OUTPATIENT
Start: 2023-07-24 | End: 2023-08-21

## 2023-07-24 NOTE — LETTER
July 24, 2023     Patient: Eugenie Nazario  YOB: 1990  Date of Visit: 7/24/2023      To Whom it May Concern:    Eugenie Nazario is under my professional care. Doretha Simmons is currently pregnant. Her estimated date of delivery is 8/20/23. If you have any questions or concerns, please don't hesitate to call.          Sincerely,          Lien Ugarte MD

## 2023-07-24 NOTE — ASSESSMENT & PLAN NOTE
- Labor/Bleeding/ROM precautions given. Kick counts reviewed. - Delivery consent reviewed and signed today. Risks of delivery reviewed, including bleeding, infection, damage to surrounding organs/structures (specifically bowel, bladder, vessels, nerves, ureters), need for operative vaginal delivery or  delivery, hysterectomy, need for blood transfusion, need for further surgery. - Problem list updated, results console reviewed and updated with pertinent prenatal labs.   - PMH, PSH, medications reviewed and updated as needed  - Return to office in 1 wk(s) for routine prenatal care

## 2023-07-24 NOTE — PROGRESS NOTES
Routine Prenatal Visit  [address]  [address] [place] [address], [place], [address]    Assessment/Plan:  Lynette Guy is a 35y.o. year old  at 36w1d who presents for routine prenatal visit. 1. Thyroid disease during pregnancy in third trimester  Assessment & Plan:  - Continue levothyroxine. TSH at goal (1.62) on .      2. Obesity affecting pregnancy in third trimester    3. 36 weeks gestation of pregnancy  Assessment & Plan:  - Labor/Bleeding/ROM precautions given. Kick counts reviewed. - Delivery consent reviewed and signed today. Risks of delivery reviewed, including bleeding, infection, damage to surrounding organs/structures (specifically bowel, bladder, vessels, nerves, ureters), need for operative vaginal delivery or  delivery, hysterectomy, need for blood transfusion, need for further surgery. - Problem list updated, results console reviewed and updated with pertinent prenatal labs. - PMH, PSH, medications reviewed and updated as needed  - Return to office in 1 wk(s) for routine prenatal care      Orders:  -     POCT urine dip    4. GBS bacteriuria  Assessment & Plan:  - Plan for intrapartum ppx, per protocol. 5. History of pulmonary embolus (PE)  Assessment & Plan:  - Switching from Lovenox to heparin, per Hematology recommendations. Subjective:   Julienne Coe is a 35 y.o.  who presents for routine prenatal care at 36w1d. Complaints today: No  LOF: No; VB: No; Contractions: No; FM: Present and normal    Objective:  /62 (BP Location: Left arm, Patient Position: Sitting, Cuff Size: Standard)   Ht 5' 5" (1.651 m)   Wt 92.3 kg (203 lb 6.4 oz)   LMP 2022 (Exact Date)   BMI 33.85 kg/m²     General: Well appearing, no distress  Respiratory: Unlabored breathing  Cardiovascular: Regular rate. Abdomen: Soft, gravid, nontender  Extremities: Warm and well perfused. Non tender.     Pregravid Weight/BMI: 92.1 kg (203 lb) (BMI 33.78)  Current Weight: 92.3 kg (203 lb 6.4 oz)   Total Weight Gain: 0.181 kg (6.4 oz)     Pre- Vitals    Flowsheet Row Most Recent Value   Prenatal Assessment    Fetal Heart Rate 120   Fundal Height (cm) 36 cm   Movement Present   Presentation Vertex   Prenatal Vitals    Blood Pressure 108/62   Weight - Scale 92.3 kg (203 lb 6.4 oz)   Urine Albumin/Glucose    Dilation/Effacement/Station    Vaginal Drainage    Draining Fluid No   Edema    LLE Edema None   RLE Edema None   Facial Edema None           Glinda Riedel, MD  2023 9:05 AM

## 2023-08-01 DIAGNOSIS — Z86.711 HISTORY OF PULMONARY EMBOLUS (PE): Primary | ICD-10-CM

## 2023-08-02 ENCOUNTER — CLINICAL SUPPORT (OUTPATIENT)
Dept: POSTPARTUM | Facility: CLINIC | Age: 33
End: 2023-08-02

## 2023-08-02 ENCOUNTER — ROUTINE PRENATAL (OUTPATIENT)
Dept: OBGYN CLINIC | Facility: CLINIC | Age: 33
End: 2023-08-02
Payer: COMMERCIAL

## 2023-08-02 VITALS
HEIGHT: 65 IN | WEIGHT: 201 LBS | SYSTOLIC BLOOD PRESSURE: 102 MMHG | BODY MASS INDEX: 33.49 KG/M2 | DIASTOLIC BLOOD PRESSURE: 60 MMHG

## 2023-08-02 DIAGNOSIS — E07.9 THYROID DISEASE DURING PREGNANCY IN THIRD TRIMESTER: ICD-10-CM

## 2023-08-02 DIAGNOSIS — O99.283 THYROID DISEASE DURING PREGNANCY IN THIRD TRIMESTER: ICD-10-CM

## 2023-08-02 DIAGNOSIS — R82.71 GBS BACTERIURIA: ICD-10-CM

## 2023-08-02 DIAGNOSIS — Z32.2 ENCOUNTER FOR CHILDBIRTH INSTRUCTION: Primary | ICD-10-CM

## 2023-08-02 DIAGNOSIS — Z3A.37 37 WEEKS GESTATION OF PREGNANCY: ICD-10-CM

## 2023-08-02 DIAGNOSIS — O99.213 OBESITY AFFECTING PREGNANCY IN THIRD TRIMESTER: Primary | ICD-10-CM

## 2023-08-02 DIAGNOSIS — Z86.711 HISTORY OF PULMONARY EMBOLUS (PE): ICD-10-CM

## 2023-08-02 LAB
SL AMB  POCT GLUCOSE, UA: NEGATIVE
SL AMB POCT URINE PROTEIN: NORMAL

## 2023-08-02 PROCEDURE — 81002 URINALYSIS NONAUTO W/O SCOPE: CPT | Performed by: STUDENT IN AN ORGANIZED HEALTH CARE EDUCATION/TRAINING PROGRAM

## 2023-08-02 PROCEDURE — PNV: Performed by: STUDENT IN AN ORGANIZED HEALTH CARE EDUCATION/TRAINING PROGRAM

## 2023-08-02 RX ORDER — HEPARIN SODIUM 5000 [USP'U]/.5ML
INJECTION, SOLUTION INTRAVENOUS; SUBCUTANEOUS
COMMUNITY
Start: 2023-07-25 | End: 2023-08-11

## 2023-08-02 NOTE — PROGRESS NOTES
Routine Prenatal Visit  [address]  [address] [facility], [address], [facility], [address] [facility],[address]    Assessment/Plan:  Rebeka Vasquez is a 35y.o. year old  at 37w3d who presents for routine prenatal visit. 1. Obesity affecting pregnancy in third trimester    2. Thyroid disease during pregnancy in third trimester  Assessment & Plan:  - Continue levothyroxine 75 mcg PO daily. 3. History of pulmonary embolus (PE)  Assessment & Plan:  - Continue anticoagulation, per Hematology recommendations. She is reportedly transitioning to heparin 10,000u BID today. 4. GBS bacteriuria    5. 37 weeks gestation of pregnancy  Assessment & Plan:  - Labor/Bleeding/ROM precautions given. Kick counts reviewed. - GBS positive result reviewed. - Problem list updated, results console reviewed and updated with pertinent prenatal labs. - PMH, PSH, medications reviewed and updated as needed  - Return to office in 1 wk(s) for routine prenatal care      Orders:  -     POCT urine dip          Subjective:   Kaya Espinoza is a 35 y.o.  who presents for routine prenatal care at 37w3d. Complaints today: Anxiety and social stressors with work. No obstetric complaints. LOF: No; VB: No; Contractions: No; FM: Present and normal    Objective:  /60   Ht 5' 5" (1.651 m)   Wt 91.2 kg (201 lb)   LMP 2022 (Exact Date)   BMI 33.45 kg/m²     General: Well appearing, no distress  Respiratory: Unlabored breathing  Cardiovascular: Regular rate. Abdomen: Soft, gravid, nontender  Extremities: Warm and well perfused. Non tender.     Pregravid Weight/BMI: 92.1 kg (203 lb) (BMI 33.78)  Current Weight: 91.2 kg (201 lb)   Total Weight Gain: -0.907 kg (-2 lb)     Pre-Rohit Vitals    Flowsheet Row Most Recent Value   Prenatal Assessment    Fetal Heart Rate 145   Fundal Height (cm) 37 cm   Movement Present   Presentation Vertex   Prenatal Vitals    Blood Pressure 102/60   Weight - Scale 91.2 kg (201 lb)   Urine Albumin/Glucose    Dilation/Effacement/Station    Vaginal Drainage    Draining Fluid No   Edema    LLE Edema None   RLE Edema None   Facial Edema None           Ramesh Simmons MD  8/2/2023 5:12 PM

## 2023-08-02 NOTE — ASSESSMENT & PLAN NOTE
- Labor/Bleeding/ROM precautions given. Kick counts reviewed. - GBS positive result reviewed. - Problem list updated, results console reviewed and updated with pertinent prenatal labs.   - PMH, PSH, medications reviewed and updated as needed  - Return to office in 1 wk(s) for routine prenatal care

## 2023-08-02 NOTE — ASSESSMENT & PLAN NOTE
- Continue anticoagulation, per Hematology recommendations. She is reportedly transitioning to heparin 10,000u BID today.

## 2023-08-04 LAB
DME PARACHUTE DELIVERY DATE ACTUAL: NORMAL
DME PARACHUTE DELIVERY DATE REQUESTED: NORMAL
DME PARACHUTE ITEM DESCRIPTION: NORMAL
DME PARACHUTE ORDER STATUS: NORMAL
DME PARACHUTE SUPPLIER NAME: NORMAL
DME PARACHUTE SUPPLIER PHONE: NORMAL

## 2023-08-05 ENCOUNTER — CLINICAL SUPPORT (OUTPATIENT)
Age: 33
End: 2023-08-05

## 2023-08-05 DIAGNOSIS — Z32.2 ENCOUNTER FOR CHILDBIRTH INSTRUCTION: Primary | ICD-10-CM

## 2023-08-10 DIAGNOSIS — E89.0 POSTOPERATIVE HYPOTHYROIDISM: ICD-10-CM

## 2023-08-10 RX ORDER — LEVOTHYROXINE SODIUM 0.07 MG/1
75 TABLET ORAL DAILY
Qty: 30 TABLET | Refills: 5 | Status: SHIPPED | OUTPATIENT
Start: 2023-08-10

## 2023-08-11 ENCOUNTER — ROUTINE PRENATAL (OUTPATIENT)
Dept: OBGYN CLINIC | Facility: CLINIC | Age: 33
End: 2023-08-11
Payer: COMMERCIAL

## 2023-08-11 VITALS
SYSTOLIC BLOOD PRESSURE: 102 MMHG | BODY MASS INDEX: 32.99 KG/M2 | WEIGHT: 198 LBS | HEIGHT: 65 IN | DIASTOLIC BLOOD PRESSURE: 62 MMHG

## 2023-08-11 DIAGNOSIS — O99.213 OBESITY AFFECTING PREGNANCY IN THIRD TRIMESTER: ICD-10-CM

## 2023-08-11 DIAGNOSIS — Z3A.38 38 WEEKS GESTATION OF PREGNANCY: ICD-10-CM

## 2023-08-11 DIAGNOSIS — O99.283 THYROID DISEASE DURING PREGNANCY IN THIRD TRIMESTER: ICD-10-CM

## 2023-08-11 DIAGNOSIS — R82.71 GBS BACTERIURIA: ICD-10-CM

## 2023-08-11 DIAGNOSIS — E07.9 THYROID DISEASE DURING PREGNANCY IN THIRD TRIMESTER: ICD-10-CM

## 2023-08-11 DIAGNOSIS — Z86.711 HISTORY OF PULMONARY EMBOLUS (PE): Primary | ICD-10-CM

## 2023-08-11 LAB
SL AMB  POCT GLUCOSE, UA: NORMAL
SL AMB POCT URINE PROTEIN: NORMAL

## 2023-08-11 PROCEDURE — PNV: Performed by: STUDENT IN AN ORGANIZED HEALTH CARE EDUCATION/TRAINING PROGRAM

## 2023-08-11 PROCEDURE — 81002 URINALYSIS NONAUTO W/O SCOPE: CPT | Performed by: STUDENT IN AN ORGANIZED HEALTH CARE EDUCATION/TRAINING PROGRAM

## 2023-08-11 NOTE — ASSESSMENT & PLAN NOTE
- Labor/Bleeding/ROM precautions given. Kick counts reviewed. - GBS positive result reviewed. Plan for routine prophylaxis intrapartum.    - Problem list updated, results console reviewed and updated with pertinent prenatal labs. - PMH, PSH, medications reviewed and updated as needed  - Return to office for postpartum care.

## 2023-08-11 NOTE — ASSESSMENT & PLAN NOTE
- Reviewed plan for delivery. She is interested in induction of labor at 39 weeks in order to optimize her candidacy for neuraxial analgesia due to logistics of timing of her anticoagulation. She requests induction following her last day of scheduled work on 8/17. Her cervix is unfavorable today. Therefore scheduled for cervical ripening on 8/17 at 20:00. Induction consent reviewed and signed with patient today. - Patient reports pain at injection site with heparin 10,000 units BID. She admits that she is therefore only taking the 10,000 unit dose every-other day and 5,000 units on the in between days. I discussed the patient's anticoagulation plan with Dr. Madeleine Smith by phone today. In order to increase compliance, will transition back to Lovenox 40 mg SC daily. Patient accustomed to taking her Lovenox at night. Will take her final dose on 8/16 in evening and plan to initiate SCDs upon admission for induction on 8/17.

## 2023-08-11 NOTE — PROGRESS NOTES
Routine Prenatal Visit  [address]  [address] [facility], [address], [facility], [address] [facility],8W    Assessment/Plan:  Teresa Herrera is a 35y.o. year old  at 40w9d who presents for routine prenatal visit. 1. History of pulmonary embolus (PE)  Assessment & Plan:  - Reviewed plan for delivery. She is interested in induction of labor at 39 weeks in order to optimize her candidacy for neuraxial analgesia due to logistics of timing of her anticoagulation. She requests induction following her last day of scheduled work on . Her cervix is unfavorable today. Therefore scheduled for cervical ripening on  at 20:00. Induction consent reviewed and signed with patient today. - Patient reports pain at injection site with heparin 10,000 units BID. She admits that she is therefore only taking the 10,000 unit dose every-other day and 5,000 units on the in between days. I discussed the patient's anticoagulation plan with Dr. Myrna Cardoza by phone today. In order to increase compliance, will transition back to Lovenox 40 mg SC daily. Patient accustomed to taking her Lovenox at night. Will take her final dose on  in evening and plan to initiate SCDs upon admission for induction on . 2. Thyroid disease during pregnancy in third trimester  Assessment & Plan:  - Continue levothyroxine 75 mcg PO daily. 3. Obesity affecting pregnancy in third trimester    4. GBS bacteriuria    5. 38 weeks gestation of pregnancy  Assessment & Plan:  - Labor/Bleeding/ROM precautions given. Kick counts reviewed. - GBS positive result reviewed. Plan for routine prophylaxis intrapartum.    - Problem list updated, results console reviewed and updated with pertinent prenatal labs. - PMH, PSH, medications reviewed and updated as needed  - Return to office for postpartum care. Orders:  -     POCT urine dip        Subjective:   Dayanna Nicholson is a 35 y.o.  who presents for routine prenatal care at 38w5d.   Complaints today: No  LOF: No; VB: No; Contractions: No; FM: Present and normal    Objective:  /62 (BP Location: Left arm, Patient Position: Sitting, Cuff Size: Standard)   Ht 5' 5" (1.651 m)   Wt 89.8 kg (198 lb)   LMP 2022 (Exact Date)   BMI 32.95 kg/m²     General: Well appearing, no distress  Respiratory: Unlabored breathing  Cardiovascular: Regular rate. Abdomen: Soft, gravid, nontender  Extremities: Warm and well perfused. Non tender.     Pregravid Weight/BMI: 92.1 kg (203 lb) (BMI 33.78)  Current Weight: 89.8 kg (198 lb)   Total Weight Gain: -2.268 kg (-5 lb)     Pre- Vitals    Flowsheet Row Most Recent Value   Prenatal Assessment    Prenatal Vitals    Blood Pressure 102/62   Weight - Scale 89.8 kg (198 lb)   Urine Albumin/Glucose    Dilation/Effacement/Station    Vaginal Drainage    Edema            Lavelle Ayala MD  2023 4:30 PM

## 2023-08-17 ENCOUNTER — HOSPITAL ENCOUNTER (INPATIENT)
Facility: HOSPITAL | Age: 33
LOS: 3 days | Discharge: HOME | End: 2023-08-20
Attending: OBSTETRICS & GYNECOLOGY | Admitting: OBSTETRICS & GYNECOLOGY
Payer: COMMERCIAL

## 2023-08-17 ENCOUNTER — HOSPITAL ENCOUNTER (OUTPATIENT)
Dept: LABOR AND DELIVERY | Facility: HOSPITAL | Age: 33
Discharge: HOME | End: 2023-08-17
Payer: COMMERCIAL

## 2023-08-17 DIAGNOSIS — Z86.711 HISTORY OF PULMONARY EMBOLUS (PE): ICD-10-CM

## 2023-08-17 PROBLEM — O99.820 GBS (GROUP B STREPTOCOCCUS CARRIER), +RV CULTURE, CURRENTLY PREGNANT: Status: ACTIVE | Noted: 2023-08-17

## 2023-08-17 PROBLEM — Z3A.39 39 WEEKS GESTATION OF PREGNANCY: Status: ACTIVE | Noted: 2023-02-15

## 2023-08-17 LAB
ABO GROUP BLD: NORMAL
BLD GP AB SCN SERPL QL: NEGATIVE
ERYTHROCYTE [DISTWIDTH] IN BLOOD BY AUTOMATED COUNT: 12.5 % (ref 11.6–15.1)
HCT VFR BLD AUTO: 32.7 % (ref 34.8–46.1)
HGB BLD-MCNC: 10.7 G/DL (ref 11.5–15.4)
HOLD SPECIMEN: NORMAL
MCH RBC QN AUTO: 27.6 PG (ref 26.8–34.3)
MCHC RBC AUTO-ENTMCNC: 32.7 G/DL (ref 31.4–37.4)
MCV RBC AUTO: 85 FL (ref 82–98)
PLATELET # BLD AUTO: 284 THOUSANDS/UL (ref 149–390)
PMV BLD AUTO: 10.1 FL (ref 8.9–12.7)
RBC # BLD AUTO: 3.87 MILLION/UL (ref 3.81–5.12)
RH BLD: POSITIVE
SPECIMEN EXPIRATION DATE: NORMAL
WBC # BLD AUTO: 12.74 THOUSAND/UL (ref 4.31–10.16)

## 2023-08-17 PROCEDURE — 86850 RBC ANTIBODY SCREEN: CPT | Performed by: OBSTETRICS & GYNECOLOGY

## 2023-08-17 PROCEDURE — 10907ZC DRAINAGE OF AMNIOTIC FLUID, THERAPEUTIC FROM PRODUCTS OF CONCEPTION, VIA NATURAL OR ARTIFICIAL OPENING: ICD-10-PCS | Performed by: OBSTETRICS & GYNECOLOGY

## 2023-08-17 PROCEDURE — 3E033VJ INTRODUCTION OF OTHER HORMONE INTO PERIPHERAL VEIN, PERCUTANEOUS APPROACH: ICD-10-PCS | Performed by: OBSTETRICS & GYNECOLOGY

## 2023-08-17 PROCEDURE — 86900 BLOOD TYPING SEROLOGIC ABO: CPT | Performed by: OBSTETRICS & GYNECOLOGY

## 2023-08-17 PROCEDURE — 86901 BLOOD TYPING SEROLOGIC RH(D): CPT | Performed by: OBSTETRICS & GYNECOLOGY

## 2023-08-17 PROCEDURE — NC001 PR NO CHARGE: Performed by: OBSTETRICS & GYNECOLOGY

## 2023-08-17 PROCEDURE — 85027 COMPLETE CBC AUTOMATED: CPT | Performed by: OBSTETRICS & GYNECOLOGY

## 2023-08-17 PROCEDURE — 86780 TREPONEMA PALLIDUM: CPT | Performed by: OBSTETRICS & GYNECOLOGY

## 2023-08-17 PROCEDURE — 4A1HXCZ MONITORING OF PRODUCTS OF CONCEPTION, CARDIAC RATE, EXTERNAL APPROACH: ICD-10-PCS | Performed by: OBSTETRICS & GYNECOLOGY

## 2023-08-17 RX ORDER — LEVOTHYROXINE SODIUM 0.07 MG/1
75 TABLET ORAL DAILY
Status: DISCONTINUED | OUTPATIENT
Start: 2023-08-18 | End: 2023-08-18

## 2023-08-17 RX ORDER — BUPIVACAINE HYDROCHLORIDE 2.5 MG/ML
30 INJECTION, SOLUTION EPIDURAL; INFILTRATION; INTRACAUDAL ONCE AS NEEDED
Status: DISCONTINUED | OUTPATIENT
Start: 2023-08-17 | End: 2023-08-20 | Stop reason: HOSPADM

## 2023-08-17 RX ORDER — SODIUM CHLORIDE, SODIUM LACTATE, POTASSIUM CHLORIDE, CALCIUM CHLORIDE 600; 310; 30; 20 MG/100ML; MG/100ML; MG/100ML; MG/100ML
125 INJECTION, SOLUTION INTRAVENOUS CONTINUOUS
Status: DISCONTINUED | OUTPATIENT
Start: 2023-08-17 | End: 2023-08-18

## 2023-08-18 ENCOUNTER — ANESTHESIA (INPATIENT)
Dept: ANESTHESIOLOGY | Facility: HOSPITAL | Age: 33
End: 2023-08-18
Payer: COMMERCIAL

## 2023-08-18 ENCOUNTER — ANESTHESIA EVENT (INPATIENT)
Dept: ANESTHESIOLOGY | Facility: HOSPITAL | Age: 33
End: 2023-08-18
Payer: COMMERCIAL

## 2023-08-18 LAB
BASE EXCESS BLDCOA CALC-SCNC: -6.1 MMOL/L (ref 3–11)
BASE EXCESS BLDCOV CALC-SCNC: -2.2 MMOL/L (ref 1–9)
HCO3 BLDCOA-SCNC: 21.9 MMOL/L (ref 17.3–27.3)
HCO3 BLDCOV-SCNC: 22.5 MMOL/L (ref 12.2–28.6)
O2 CT VFR BLDCOA CALC: 7.5 ML/DL
OXYHGB MFR BLDCOA: 38.2 %
OXYHGB MFR BLDCOV: 75.1 %
PCO2 BLDCOA: 53.4 MM[HG] (ref 30–60)
PCO2 BLDCOV: 38.6 MM HG (ref 27–43)
PH BLDCOA: 7.23 [PH] (ref 7.23–7.43)
PH BLDCOV: 7.38 [PH] (ref 7.19–7.49)
PO2 BLDCOA: 22.5 MM HG (ref 5–25)
PO2 BLDCOV: 35.8 MM HG (ref 15–45)
SAO2 % BLDCOV: 14.3 ML/DL
TREPONEMA PALLIDUM IGG+IGM AB [PRESENCE] IN SERUM OR PLASMA BY IMMUNOASSAY: NORMAL

## 2023-08-18 PROCEDURE — 59400 OBSTETRICAL CARE: CPT | Performed by: OBSTETRICS & GYNECOLOGY

## 2023-08-18 PROCEDURE — 0KQM0ZZ REPAIR PERINEUM MUSCLE, OPEN APPROACH: ICD-10-PCS | Performed by: OBSTETRICS & GYNECOLOGY

## 2023-08-18 PROCEDURE — 82805 BLOOD GASES W/O2 SATURATION: CPT | Performed by: OBSTETRICS & GYNECOLOGY

## 2023-08-18 RX ORDER — OXYCODONE HYDROCHLORIDE 5 MG/1
5 TABLET ORAL
Status: DISCONTINUED | OUTPATIENT
Start: 2023-08-18 | End: 2023-08-20 | Stop reason: HOSPADM

## 2023-08-18 RX ORDER — ACETAMINOPHEN 325 MG/1
650 TABLET ORAL EVERY 4 HOURS PRN
Status: DISCONTINUED | OUTPATIENT
Start: 2023-08-18 | End: 2023-08-18

## 2023-08-18 RX ORDER — BUPIVACAINE HYDROCHLORIDE 2.5 MG/ML
INJECTION, SOLUTION EPIDURAL; INFILTRATION; INTRACAUDAL AS NEEDED
Status: DISCONTINUED | OUTPATIENT
Start: 2023-08-18 | End: 2023-08-18 | Stop reason: HOSPADM

## 2023-08-18 RX ORDER — DOCUSATE SODIUM 100 MG/1
100 CAPSULE, LIQUID FILLED ORAL 2 TIMES DAILY
Status: DISCONTINUED | OUTPATIENT
Start: 2023-08-18 | End: 2023-08-20 | Stop reason: HOSPADM

## 2023-08-18 RX ORDER — IBUPROFEN 600 MG/1
600 TABLET ORAL EVERY 6 HOURS
Status: DISCONTINUED | OUTPATIENT
Start: 2023-08-18 | End: 2023-08-18

## 2023-08-18 RX ORDER — ENOXAPARIN SODIUM 100 MG/ML
40 INJECTION SUBCUTANEOUS
Status: DISCONTINUED | OUTPATIENT
Start: 2023-08-19 | End: 2023-08-20 | Stop reason: HOSPADM

## 2023-08-18 RX ORDER — OXYTOCIN/RINGER'S LACTATE 30/500 ML
1-30 PLASTIC BAG, INJECTION (ML) INTRAVENOUS
Status: DISCONTINUED | OUTPATIENT
Start: 2023-08-18 | End: 2023-08-18

## 2023-08-18 RX ORDER — DIAPER,BRIEF,INFANT-TODD,DISP
1 EACH MISCELLANEOUS DAILY PRN
Status: DISCONTINUED | OUTPATIENT
Start: 2023-08-18 | End: 2023-08-20 | Stop reason: HOSPADM

## 2023-08-18 RX ORDER — CALCIUM CARBONATE 500 MG/1
1000 TABLET, CHEWABLE ORAL DAILY PRN
Status: DISCONTINUED | OUTPATIENT
Start: 2023-08-18 | End: 2023-08-20 | Stop reason: HOSPADM

## 2023-08-18 RX ORDER — DEXTROSE, SODIUM CHLORIDE, SODIUM LACTATE, POTASSIUM CHLORIDE, AND CALCIUM CHLORIDE 5; .6; .31; .03; .02 G/100ML; G/100ML; G/100ML; G/100ML; G/100ML
125 INJECTION, SOLUTION INTRAVENOUS CONTINUOUS
Status: DISCONTINUED | OUTPATIENT
Start: 2023-08-18 | End: 2023-08-18

## 2023-08-18 RX ORDER — ACETAMINOPHEN 160 MG/5ML
650 SUSPENSION ORAL EVERY 4 HOURS PRN
Status: DISCONTINUED | OUTPATIENT
Start: 2023-08-18 | End: 2023-08-20 | Stop reason: HOSPADM

## 2023-08-18 RX ORDER — ONDANSETRON 2 MG/ML
4 INJECTION INTRAMUSCULAR; INTRAVENOUS EVERY 8 HOURS PRN
Status: DISCONTINUED | OUTPATIENT
Start: 2023-08-18 | End: 2023-08-20 | Stop reason: HOSPADM

## 2023-08-18 RX ORDER — KETOROLAC TROMETHAMINE 30 MG/ML
30 INJECTION, SOLUTION INTRAMUSCULAR; INTRAVENOUS EVERY 6 HOURS PRN
Status: ACTIVE | OUTPATIENT
Start: 2023-08-18 | End: 2023-08-18

## 2023-08-18 RX ORDER — LEVOTHYROXINE SODIUM 0.07 MG/1
75 TABLET ORAL
Status: DISCONTINUED | OUTPATIENT
Start: 2023-08-19 | End: 2023-08-20 | Stop reason: HOSPADM

## 2023-08-18 RX ORDER — FAMOTIDINE 10 MG/ML
20 INJECTION, SOLUTION INTRAVENOUS ONCE
Status: COMPLETED | OUTPATIENT
Start: 2023-08-18 | End: 2023-08-18

## 2023-08-18 RX ADMIN — HYDROCORTISONE 1 APPLICATION: 1 CREAM TOPICAL at 18:46

## 2023-08-18 RX ADMIN — ONDANSETRON 4 MG: 2 INJECTION INTRAMUSCULAR; INTRAVENOUS at 20:15

## 2023-08-18 RX ADMIN — PENICILLIN G POTASSIUM 5 MILLION UNITS: 5000000 INJECTION, POWDER, FOR SOLUTION INTRAMUSCULAR; INTRAVENOUS at 03:38

## 2023-08-18 RX ADMIN — WITCH HAZEL 1 PAD: 500 SOLUTION RECTAL; TOPICAL at 18:46

## 2023-08-18 RX ADMIN — FAMOTIDINE 20 MG: 10 INJECTION INTRAVENOUS at 14:21

## 2023-08-18 RX ADMIN — SODIUM CHLORIDE 2.5 MILLION UNITS: 9 INJECTION, SOLUTION INTRAVENOUS at 07:50

## 2023-08-18 RX ADMIN — DEXTROSE, SODIUM CHLORIDE, SODIUM LACTATE, POTASSIUM CHLORIDE, AND CALCIUM CHLORIDE 125 ML/HR: 5; .6; .31; .03; .02 INJECTION, SOLUTION INTRAVENOUS at 10:23

## 2023-08-18 RX ADMIN — Medication 1 APPLICATION: at 18:46

## 2023-08-18 RX ADMIN — BUPIVACAINE HYDROCHLORIDE 1.2 ML: 2.5 INJECTION, SOLUTION EPIDURAL; INFILTRATION; INTRACAUDAL; PERINEURAL at 09:41

## 2023-08-18 RX ADMIN — DEXTROSE, SODIUM CHLORIDE, SODIUM LACTATE, POTASSIUM CHLORIDE, AND CALCIUM CHLORIDE 500 ML: 5; .6; .31; .03; .02 INJECTION, SOLUTION INTRAVENOUS at 09:22

## 2023-08-18 RX ADMIN — SODIUM CHLORIDE, SODIUM LACTATE, POTASSIUM CHLORIDE, AND CALCIUM CHLORIDE 125 ML/HR: .6; .31; .03; .02 INJECTION, SOLUTION INTRAVENOUS at 03:38

## 2023-08-18 RX ADMIN — BUPIVACAINE HYDROCHLORIDE 6 ML: 2.5 INJECTION, SOLUTION EPIDURAL; INFILTRATION; INTRACAUDAL; PERINEURAL at 12:12

## 2023-08-18 RX ADMIN — Medication 2 MILLI-UNITS/MIN: at 07:23

## 2023-08-18 RX ADMIN — ROPIVACAINE HYDROCHLORIDE: 2 INJECTION, SOLUTION EPIDURAL; INFILTRATION at 10:26

## 2023-08-18 RX ADMIN — SODIUM CHLORIDE 2.5 MILLION UNITS: 9 INJECTION, SOLUTION INTRAVENOUS at 11:51

## 2023-08-18 RX ADMIN — DEXTROSE, SODIUM CHLORIDE, SODIUM LACTATE, POTASSIUM CHLORIDE, AND CALCIUM CHLORIDE 125 ML/HR: 5; .6; .31; .03; .02 INJECTION, SOLUTION INTRAVENOUS at 15:02

## 2023-08-18 RX ADMIN — LEVOTHYROXINE SODIUM 75 MCG: 75 TABLET ORAL at 05:27

## 2023-08-18 RX ADMIN — IBUPROFEN 600 MG: 100 SUSPENSION ORAL at 20:45

## 2023-08-18 NOTE — OB LABOR/OXYTOCIN SAFETY PROGRESS
Oxytocin Safety Progress Check Note - Sherry Cervantes 35 y.o. female MRN: [id number]    Unit/Bed#: -01 Encounter: [id number]    Dose (pipo-units/min) Oxytocin: 6 pipo-units/min  Contraction Frequency (minutes): irregular  Contraction Quality: Mild  Tachysystole: No   Cervical Dilation: 5        Cervical Effacement: 70  Fetal Station: -2  Baseline Rate: 125 bpm  Fetal Heart Rate: 125 BPM  FHR Category: Category I        Vital Signs:   Vitals:    08/18/23 0725   BP: 105/64   Pulse: 87   Resp: 18   Temp: 97.6 °F (36.4 °C)       Notes/comments:   Patient comfortable with contractions. AROM done for clear fluid.   Will continue with Pitocin titration      Di Patricia DO 8/18/2023 8:54 AM

## 2023-08-18 NOTE — OB LABOR/OXYTOCIN SAFETY PROGRESS
Oxytocin Safety Progress Check Note - Sherry Cervantes 35 y.o. female MRN: [id number]    Unit/Bed#: -01 Encounter: [id number]       Contraction Frequency (minutes): irregular  Contraction Quality: Mild  Tachysystole: No   Cervical Dilation: 5        Cervical Effacement: 0  Fetal Station: -2  Baseline Rate: 120 bpm  Fetal Heart Rate: 125 BPM  FHR Category: Category I               Vital Signs:   Vitals:    08/17/23 2034   BP: 121/75   Pulse: 99   Resp: 18   Temp: 97.7 °F (36.5 °C)       Notes/comments:      To begin pitocin  GBS prophylaxis in process        Juan Manuel Ngo DO 8/18/2023 6:32 AM

## 2023-08-18 NOTE — OB LABOR/OXYTOCIN SAFETY PROGRESS
Oxytocin Safety Progress Check Note - Yolis Cervantes 35 y.o. female MRN: [id number]    Unit/Bed#: -01 Encounter: [id number]       Contraction Frequency (minutes): irregular  Contraction Quality: Mild  Tachysystole: No   Cervical Dilation: Closed        Cervical Effacement: 0  Fetal Station: -2  Baseline Rate: 120 bpm  Fetal Heart Rate: 125 BPM                  Vital Signs:   Vitals:    08/17/23 2034   BP: 121/75   Pulse: 99   Resp: 18   Temp: 97.7 °F (36.5 °C)       Notes/comments:   Balloon just out  Exam deferred as pt desires pitocin after shower and breakfast in AM  pcn begun 4 AM  Cat 1 tracing      Moshe Mcgregor DO 8/18/2023 4:35 AM

## 2023-08-18 NOTE — ANESTHESIA PREPROCEDURE EVALUATION
Procedure:  LABOR ANALGESIA    Relevant Problems   ANESTHESIA (within normal limits)      ENDO   (+) Postoperative hypothyroidism      GI/HEPATIC   (+) Gastroesophageal reflux disease      GYN   (+) 39 weeks gestation of pregnancy      NEURO/PSYCH   (+) Depression, recurrent (HCC)   (+) Generalized anxiety disorder   (+) Pregnancy headache in second trimester      PULMONARY   (+) Mild asthma without complication, unspecified whether persistent (exercise induced)      Other   (+) History of pulmonary embolus (PE) (2017)     BMI 33   36 h since last lovenox 40 SC daily        Lab Results   Component Value Date    HGB 10.7 (L) 08/17/2023     08/17/2023     Anesthesia Plan  ASA Score- 2     Anesthesia Type-     Plan Factors-    Induction-     Postoperative Plan-     Informed Consent-

## 2023-08-18 NOTE — ASSESSMENT & PLAN NOTE
Pt here for cervical ripening followed by elective labor induction. Cervical ripening balloon placed and filled with 60 ml of saline. Tolerated well.

## 2023-08-18 NOTE — L&D DELIVERY NOTE
Vaginal Delivery Summary - OB/GYN   Aldo Brown 35 y.o. female MRN: [id number]  Unit/Bed#: -01 Encounter: [id number]    Predelivery Diagnosis:  1. Pregnancy at 39w5d  2. Term pregnancy  Single fetus     Postdelivery Diagnosis:  1. Same as above  2. Delivery of term   3. 2nd degree perineal laceration    Procedure: Spontaneous Vaginal Delivery, repair of 2nd degree perineal laceration    Attending: Dr. Nigel Sim    Anesthesia: Epidural    QBL: 393cc  Admission Hg: Recent Labs     23   HGB 10.7*     Admission platelets:   Recent Labs     23            Complications: none apparent    Specimens: cord blood, arterial and venous cord blood gasses, placenta to storage    Findings:   1. Viable female at 1, with APGARS of 8 and 9 at 1 and 5 minutes respectively,  2. Spontaneous delivery of intact placenta at 1615  3. 2 degree perineal laceration repaired with 2-0 Vicryl  4. Blood gases:    Umbilical Cord Venous Blood Gas:  Results from last 7 days   Lab Units 23  1608   PH COV  7.384   PCO2 COV mm HG 38.6   HCO3 COV mmol/L 22.5   BASE EXC COV mmol/L -2.2*   O2 CT CD VB mL/dL 14.3   O2 HGB, VENOUS CORD % 00.3     Umbilical Cord Arterial Blood Gas:  Results from last 7 days   Lab Units 23  1608   PH COA  7.231   PCO2 COA  53.4   PO2 COA mm HG 22.5   HCO3 COA mmol/L 21.9   BASE EXC COA mmol/L -6.1*   O2 CONTENT CORD ART ml/dl 7.5   O2 HGB, ARTERIAL CORD % 38.2       Disposition:  Patient tolerated the procedure well and was recovering in labor and delivery room     Brief History and Labor Course:    Aldo Brown is a 35 y.o. [id number] with an CHRISTOPHER of 2023, by Last Menstrual Period at 39w5d gestation who was admitted for Induction of labor. Please see labor timeline for complete labor course. The patient was completely dilated at 1520. She began to push at 1528.      Description of procedure    After pushing for 38 minutes, at 1606 patient delivered a viable female , wt pending, apgars of 8 (1 min) and 9 (5 min). The fetal vertex delivered spontaneously. The baby was palpated for a nuchal cord and It was easily reduced at the perineum. .  The anterior shoulder delivered atraumatically with maternal expulsive forces and the assistance of downward traction. The posterior shoulder delivered with maternal expulsive forces and the assistance of upward traction. The remainder of the fetus delivered spontaneously. Upon delivery, the infant was placed on the maternal abdomen and delayed cord clamping was performed. The umbilical cord was then doubly clamped and cut. The infant was noted to cry spontaneously and was moving all extremities appropriately. Arterial and venous cord blood gases and cord blood was collected for analysis. These were promptly sent to the lab. In the immediate post-partum, 30 units of IV pitocin was administered, and the uterus was noted to contract down well with massage and pitocin. The placenta delivered spontaneously at 1615 and was noted to have a centrally inserted 3 vessel cord. The vagina, cervix, perineum, and rectum were inspected and there was noted to be a 2nd degree perineal laceration. The laceration was repaired with 2.0 vicryl on a CT needle,  Good hemostasis was noted. At the conclusion of the procedure, all needle, sponge, and instrument counts were noted to be correct. Patient tolerated the procedure well and was allowed to recover in labor and delivery room with family and  before being transferred to the post-partum floor.      Hakan Castelan DO

## 2023-08-18 NOTE — ANESTHESIA PROCEDURE NOTES
CSE Block    Patient location during procedure: OB  Start time: 8/18/2023 9:38 AM  Reason for block: procedure for pain and at surgeon's request  Staffing  Performed by: Raúl Bellamy MD  Authorized by: Raúl Bellamy MD    Preanesthetic Checklist  Completed: patient identified, IV checked, site marked, risks and benefits discussed, surgical consent, monitors and equipment checked, pre-op evaluation and timeout performed  CSE  Patient position: sitting  Prep: ChloraPrep  Patient monitoring: heart rate, continuous pulse ox and frequent blood pressure checks  Approach: midline  Spinal Needle  Needle type: pencil-tip   Needle gauge: 25 G  Epidural Needle  Injection technique: HENRRY saline  Needle type: Tuohy   Needle gauge: 17 G  Needle length: 9 cm  Needle insertion depth: 5.5 cm  Location: L3-L4  Catheter  Catheter type: side hole  Catheter size: 19 G  Catheter at skin depth: 10.5 cm  Catheter securement method: stabilization device  Assessment  Sensory level: T4  Injection Assessment:  negative aspiration for heme, no pain on injection, no paresthesia on injection and positive aspiration for clear CSF. Additional Notes  Pt positioned sitting, timeout, sterile prep and drape. Placement 1 attempt at L 3/4 with HENRRY to NS. Needle through needle CSE with + CSF, easy injection, + relief. Cath threaded easily, negative aspiration. Patient laid supine with DEBI, PCEA initiated.

## 2023-08-18 NOTE — H&P
H&P Exam - Obstetrics   Harriet Cheo Cervantes 35 y.o. female MRN: [id number]  Unit/Bed#: -01 Encounter: [id number]    Assessment/Plan     Assessment:  IUP @ 39w 4d for cervical ripening followed by labor induction. GBS positive @ 36 weeks  History of PE  Hypothyroid - on 75 mcg synthroid  Plan:  Cervical ripening balloon placed  PCN ordered for GBS prophylaxis  SCD ordered for thromboembolic prophylaxis    History of Present Illness   Chief Complaint: "I'm here to be induced"    HPI:  Geo Cheung is a 35 y.o.  female with an CHRISTOPHER of 2023, by Last Menstrual Period at 39w4d weeks gestation who is being admitted for labor induction. Her current obstetrical history is significant for GBS colonizer. Contractions: None. Leakage of fluid: None. Bleeding: None. Fetal movement: present. Pregnancy complications: GBS carrier.     Review of Systems    Historical Information   OB History    Para Term  AB Living   3       2 0   SAB IAB Ectopic Multiple Live Births     1 1          # Outcome Date GA Lbr Deniz/2nd Weight Sex Delivery Anes PTL Lv   3 Current            2 Ectopic 22     ECTOPIC         Birth Comments: PUL treated with methotrexate   1 IAB 2016             Baby complications/comments:   Past Medical History:   Diagnosis Date   • Chronic knee pain    • Depression    • Disease of thyroid gland    • Dysphagia 2020   • History of pulmonary embolus (PE) 2017    Result of Knee injury   • Left upper quadrant pain 2020   • Menorrhagia    • Multiple thyroid nodules    • Psychiatric disorder    • Saphenous nerve neuropathy, 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   • Vitamin D deficiency    • Vocal cord paralysis     Following partial thyroidectomy     Past Surgical History:   Procedure Laterality Date   • DILATION AND EVACUATION     • KNEE ARTHROSCOPY W/ PLICA EXCISION Left 3872   • THYROIDECTOMY, PARTIAL Left    • UPPER GASTROINTESTINAL ENDOSCOPY     • US GUIDED THYROID BIOPSY 11/08/2022   • WISDOM TOOTH EXTRACTION       Social History   Social History     Substance and Sexual Activity   Alcohol Use Not Currently    Comment: socially     Social History     Substance and Sexual Activity   Drug Use Not Currently    Comment:  no longer using marijuana     Social History     Tobacco Use   Smoking Status Never   • Passive exposure: Never   Smokeless Tobacco Never   Tobacco Comments     seasonal, only when it's warm out, social     E-Cigarette/Vaping   • E-Cigarette Use Never User      E-Cigarette/Vaping Substances   • Nicotine No    • THC No    • CBD No    • Flavoring No    • Other No    • Unknown No      Family History: non-contributory    Meds/Allergies   all medications and allergies reviewed  No Known Allergies    Objective   Vitals: Blood pressure 121/75, pulse 99, temperature 97.7 °F (36.5 °C), temperature source Oral, resp. rate 18, last menstrual period 11/13/2022. There is no height or weight on file to calculate BMI. Invasive Devices     None                 Physical Exam  Vitals and nursing note reviewed. Constitutional:       General: She is not in acute distress. Appearance: She is well-developed. HENT:      Head: Normocephalic and atraumatic. Eyes:      Conjunctiva/sclera: Conjunctivae normal.   Cardiovascular:      Rate and Rhythm: Normal rate and regular rhythm. Heart sounds: No murmur heard. Pulmonary:      Effort: Pulmonary effort is normal. No respiratory distress. Breath sounds: Normal breath sounds. Abdominal:      Palpations: Abdomen is soft. Tenderness: There is no abdominal tenderness. Comments: EFW 3400 gm   Genitourinary:     Comments: Closed/thick/-2  Musculoskeletal:         General: No swelling. Cervical back: Neck supple. Skin:     General: Skin is warm and dry. Capillary Refill: Capillary refill takes less than 2 seconds. Neurological:      Mental Status: She is alert.    Psychiatric:         Mood and Affect: Mood normal.         Prenatal Labs: I have personally reviewed pertinent reports. Imaging, EKG, Pathology, and Other Studies: I have personally reviewed pertinent reports. 39 weeks gestation of pregnancy  Assessment & Plan  Pt here for cervical ripening followed by elective labor induction. Cervical ripening balloon placed and filled with 60 ml of saline. Tolerated well. Thyroid disease during pregnancy in third trimester  Assessment & Plan  75 mcg QAM    History of pulmonary embolus (PE)  Assessment & Plan  - Reviewed plan for delivery. She is interested in induction of labor at 39 weeks in order to optimize her candidacy for neuraxial analgesia due to logistics of timing of her anticoagulation. She requests induction following her last day of scheduled work on 8/17. Her cervix is unfavorable today. Therefore scheduled for cervical ripening on 8/17 at 20:00. Induction consent reviewed and signed with patient today. - Patient reports pain at injection site with heparin 10,000 units BID. She admits that she is therefore only taking the 10,000 unit dose every-other day and 5,000 units on the in between days. I discussed the patient's anticoagulation plan with Dr. Opal Chadwick by phone today. In order to increase compliance, will transition back to Lovenox 40 mg SC daily. Patient accustomed to taking her Lovenox at night. Will take her final dose on 8/16 in evening and plan to initiate SCDs upon admission for induction on 8/17.     8/17/23 - Last dose of Lovenox was last evening.  SCDs ordered     * GBS (group B Streptococcus carrier), +RV culture, currently pregnant  Assessment & Plan  PCN ordered for prophylaxis

## 2023-08-18 NOTE — OB LABOR/OXYTOCIN SAFETY PROGRESS
Oxytocin Safety Progress Check Note - Raul Cervantes 35 y.o. female MRN: [id number]    Unit/Bed#: -01 Encounter: [id number]    Dose (pipo-units/min) Oxytocin: 10 pipo-units/min  Contraction Frequency (minutes): irregular  Contraction Quality: Mild  Tachysystole: No   Cervical Dilation: 5        Cervical Effacement: 70  Fetal Station: -2  Baseline Rate: 150 bpm  Fetal Heart Rate: 125 BPM  FHR Category: Category I      Vital Signs:   Vitals:    08/18/23 1004   BP: 105/63   Pulse: 79   Resp:    Temp: 97.7 °F (36.5 °C)   SpO2:        Notes/comments:  Patient comfortable with Epidural.  Oates catheter placed in bladder.   Continue with Bee Thomas DO 8/18/2023 11:00 AM

## 2023-08-18 NOTE — OB LABOR/OXYTOCIN SAFETY PROGRESS
Oxytocin Safety Progress Check Note - Sherry Cervantes 35 y.o. female MRN: [id number]    Unit/Bed#: -01 Encounter: [id number]    Dose (pipo-units/min) Oxytocin: 14 pipo-units/min  Contraction Frequency (minutes): 1-3.5  Contraction Quality: Mild  Tachysystole: No   Cervical Dilation: 8        Cervical Effacement: 90  Fetal Station: -1  Baseline Rate: 130 bpm  Fetal Heart Rate: 125 BPM  FHR Category: Category I        Vital Signs:   Vitals:    08/18/23 1313   BP:    Pulse:    Resp:    Temp: 98.1 °F (36.7 °C)   SpO2:        Notes/comments:   Good progress.   Will continue with Bee Younger DO 8/18/2023 1:39 PM

## 2023-08-18 NOTE — PLAN OF CARE
Problem: PAIN - ADULT  Goal: Verbalizes/displays adequate comfort level or baseline comfort level  Description: Interventions:  - Encourage patient to monitor pain and request assistance  - Assess pain using appropriate pain scale  - Administer analgesics based on type and severity of pain and evaluate response  - Implement non-pharmacological measures as appropriate and evaluate response  - Consider cultural and social influences on pain and pain management  - Notify physician/advanced practitioner if interventions unsuccessful or patient reports new pain  Outcome: Progressing     Problem: INFECTION - ADULT  Goal: Absence or prevention of progression during hospitalization  Description: INTERVENTIONS:  - Assess and monitor for signs and symptoms of infection  - Monitor lab/diagnostic results  - Monitor all insertion sites, i.e. indwelling lines, tubes, and drains  - Monitor endotracheal if appropriate and nasal secretions for changes in amount and color  - [place] appropriate cooling/warming therapies per order  - Administer medications as ordered  - Instruct and encourage patient and family to use good hand hygiene technique  - Identify and instruct in appropriate isolation precautions for identified infection/condition  Outcome: Progressing  Goal: Absence of fever/infection during neutropenic period  Description: INTERVENTIONS:  - Monitor WBC    Outcome: Progressing     Problem: SAFETY ADULT  Goal: Patient will remain free of falls  Description: INTERVENTIONS:  - Educate patient/family on patient safety including physical limitations  - Instruct patient to call for assistance with activity   - Consult OT/PT to assist with strengthening/mobility   - Keep Call bell within reach  - Keep bed low and locked with side rails adjusted as appropriate  - Keep care items and personal belongings within reach  - Initiate and maintain comfort rounds  - Make Fall Risk Sign visible to staff  - Apply yellow socks and bracelet for high fall risk patients  - Consider moving patient to room near nurses station  Outcome: Progressing  Goal: Maintain or return to baseline ADL function  Description: INTERVENTIONS:  -  Assess patient's ability to carry out ADLs; assess patient's baseline for ADL function and identify physical deficits which impact ability to perform ADLs (bathing, care of mouth/teeth, toileting, grooming, dressing, etc.)  - Assess/evaluate cause of self-care deficits   - Assess range of motion  - Assess patient's mobility; develop plan if impaired  - Assess patient's need for assistive devices and provide as appropriate  - Encourage maximum independence but intervene and supervise when necessary  - Involve family in performance of ADLs  - Assess for home care needs following discharge   - Consider OT consult to assist with ADL evaluation and planning for discharge  - Provide patient education as appropriate  Outcome: Progressing  Goal: Maintains/Returns to pre admission functional level  Description: INTERVENTIONS:  - Perform BMAT or MOVE assessment daily.   - Set and communicate daily mobility goal to care team and patient/family/caregiver.    - Collaborate with rehabilitation services on mobility goals if consulted  - Out of bed for toileting  - Record patient progress and toleration of activity level   Outcome: Progressing     Problem: DISCHARGE PLANNING  Goal: Discharge to home or other facility with appropriate resources  Description: INTERVENTIONS:  - Identify barriers to discharge w/patient and caregiver  - Arrange for needed discharge resources and transportation as appropriate  - Identify discharge learning needs (meds, wound care, etc.)  - Arrange for interpretive services to assist at discharge as needed  - Refer to Case Management Department for coordinating discharge planning if the patient needs post-hospital services based on physician/advanced practitioner order or complex needs related to functional status, cognitive ability, or social support system  Outcome: Progressing     Problem: POSTPARTUM  Goal: Experiences normal postpartum course  Description: INTERVENTIONS:  - Monitor maternal vital signs  - Assess uterine involution and lochia  Outcome: Progressing  Goal: Appropriate maternal -  bonding  Description: INTERVENTIONS:  - Identify family support  - Assess for appropriate maternal/infant bonding   -Encourage maternal/infant bonding opportunities  - Referral to  or  as needed  Outcome: Progressing  Goal: Establishment of infant feeding pattern  Description: INTERVENTIONS:  - Assess breast/bottle feeding  - Refer to lactation as needed  Outcome: Progressing  Goal: Incision(s), wounds(s) or drain site(s) healing without S/S of infection  Description: INTERVENTIONS  - Assess and document dressing, incision, wound bed, drain sites and surrounding tissue  - Provide patient and family education  Outcome: Progressing

## 2023-08-18 NOTE — PLAN OF CARE
Problem: BIRTH - VAGINAL/ SECTION  Goal: Fetal and maternal status remain reassuring during the birth process  Description: INTERVENTIONS:  - Monitor vital signs  - Monitor fetal heart rate  - Monitor uterine activity  - Monitor labor progression (vaginal delivery)  - DVT prophylaxis  - Antibiotic prophylaxis  Outcome: Progressing  Goal: Emotionally satisfying birthing experience for mother/fetus  Description: Interventions:  - Assess, plan, implement and evaluate the nursing care given to the patient in labor  - Advocate the philosophy that each childbirth experience is a unique experience and support the family's chosen level of involvement and control during the labor process   - Actively participate in both the patient's and family's teaching of the birth process  - Consider cultural, Caodaism and age-specific factors and plan care for the patient in labor  Outcome: Progressing     Problem: PAIN - ADULT  Goal: Verbalizes/displays adequate comfort level or baseline comfort level  Description: Interventions:  - Encourage patient to monitor pain and request assistance  - Assess pain using appropriate pain scale  - Administer analgesics based on type and severity of pain and evaluate response  - Implement non-pharmacological measures as appropriate and evaluate response  - Consider cultural and social influences on pain and pain management  - Notify physician/advanced practitioner if interventions unsuccessful or patient reports new pain  Outcome: Progressing     Problem: INFECTION - ADULT  Goal: Absence or prevention of progression during hospitalization  Description: INTERVENTIONS:  - Assess and monitor for signs and symptoms of infection  - Monitor lab/diagnostic results  - Monitor all insertion sites, i.e. indwelling lines, tubes, and drains  - Monitor endotracheal if appropriate and nasal secretions for changes in amount and color  - [place] appropriate cooling/warming therapies per order  - Administer medications as ordered  - Instruct and encourage patient and family to use good hand hygiene technique  - Identify and instruct in appropriate isolation precautions for identified infection/condition  Outcome: Progressing  Goal: Absence of fever/infection during neutropenic period  Description: INTERVENTIONS:  - Monitor WBC    Outcome: Progressing     Problem: SAFETY ADULT  Goal: Patient will remain free of falls  Description: INTERVENTIONS:  - Educate patient/family on patient safety including physical limitations  - Instruct patient to call for assistance with activity   - Consult OT/PT to assist with strengthening/mobility   - Keep Call bell within reach  - Keep bed low and locked with side rails adjusted as appropriate  - Keep care items and personal belongings within reach  - Initiate and maintain comfort rounds  - Apply yellow socks and bracelet for high fall risk patients  - Consider moving patient to room near nurses station  Outcome: Progressing  Goal: Maintain or return to baseline ADL function  Description: INTERVENTIONS:  -  Assess patient's ability to carry out ADLs; assess patient's baseline for ADL function and identify physical deficits which impact ability to perform ADLs (bathing, care of mouth/teeth, toileting, grooming, dressing, etc.)  - Assess/evaluate cause of self-care deficits   - Assess range of motion  - Assess patient's mobility; develop plan if impaired  - Assess patient's need for assistive devices and provide as appropriate  - Encourage maximum independence but intervene and supervise when necessary  - Involve family in performance of ADLs  - Assess for home care needs following discharge   - Consider OT consult to assist with ADL evaluation and planning for discharge  - Provide patient education as appropriate  Outcome: Progressing  Goal: Maintains/Returns to pre admission functional level  Description: INTERVENTIONS:  - Perform BMAT or MOVE assessment daily.   - Set and communicate daily mobility goal to care team and patient/family/caregiver. - Out of bed for toileting  - Record patient progress and toleration of activity level   Outcome: Progressing     Problem: Knowledge Deficit  Goal: Patient/family/caregiver demonstrates understanding of disease process, treatment plan, medications, and discharge instructions  Description: Complete learning assessment and assess knowledge base. Interventions:  - Provide teaching at level of understanding  - Provide teaching via preferred learning methods  Outcome: Progressing  Goal: Verbalizes understanding of labor plan  Description: Assess patient/family/caregiver's baseline knowledge level and ability to understand information. Provide education via patient/family/caregiver's preferred learning method at appropriate level of understanding. 1. Provide teaching at level of understanding. 2. Provide teaching via preferred learning method(s). Outcome: Progressing     Problem: DISCHARGE PLANNING  Goal: Discharge to home or other facility with appropriate resources  Description: INTERVENTIONS:  - Identify barriers to discharge w/patient and caregiver  - Arrange for needed discharge resources and transportation as appropriate  - Identify discharge learning needs (meds, wound care, etc.)  - Arrange for interpretive services to assist at discharge as needed  - Refer to Case Management Department for coordinating discharge planning if the patient needs post-hospital services based on physician/advanced practitioner order or complex needs related to functional status, cognitive ability, or social support system  Outcome: Progressing     Problem: Labor & Delivery  Goal: Manages discomfort  Description: Assess and monitor for signs and symptoms of discomfort. Assess patient's pain level regularly and per hospital policy. Administer medications as ordered.  Support use of nonpharmacological methods to help control pain such as distraction, imagery, relaxation, and application of heat and cold. Collaborate with interdisciplinary team and patient to determine appropriate pain management plan. 1. Include patient in decisions related to comfort. 2. Offer non-pharmacological pain management interventions. 3. Report ineffective pain management to physician. Outcome: Progressing  Goal: Patient vital signs are stable  Description: 1. Assess vital signs - vaginal delivery.   Outcome: Progressing

## 2023-08-18 NOTE — ANESTHESIA POSTPROCEDURE EVALUATION
Post-Op Assessment Note    CV Status:  Stable  Pain Score: 0    Pain management: adequate     Mental Status:  Alert and awake   Hydration Status:  Euvolemic and stable   PONV Controlled:  None   Airway Patency:  Patent      Post Op Vitals Reviewed: Yes      Staff: Anesthesiologist     Post-op block assessment: site cleaned, catheter intact and no complications      No notable events documented.     BP      Temp     Pulse     Resp      SpO2

## 2023-08-18 NOTE — OB LABOR/OXYTOCIN SAFETY PROGRESS
Oxytocin Safety Progress Check Note - Sherry Cervantes 35 y.o. female MRN: [id number]    Unit/Bed#: -01 Encounter: [id number]    Dose (pipo-units/min) Oxytocin: 14 pipo-units/min  Contraction Frequency (minutes): 2-4  Contraction Quality: Mild  Tachysystole: No   Cervical Dilation: 8        Cervical Effacement: 90  Fetal Station: -1  Baseline Rate: 130 bpm  Fetal Heart Rate: 125 BPM  FHR Category: Category I     Vital Signs:   Vitals:    08/18/23 1434   BP: 100/62   Pulse: 84   Resp:    Temp:    SpO2:        Notes/comments:  Patietn complete, desires to push        Halley Mcwilliams, DO 8/18/2023 3:24 PM

## 2023-08-18 NOTE — PLAN OF CARE
Problem: BIRTH - VAGINAL/ SECTION  Goal: Fetal and maternal status remain reassuring during the birth process  Description: INTERVENTIONS:  - Monitor vital signs  - Monitor fetal heart rate  - Monitor uterine activity  - Monitor labor progression (vaginal delivery)  - DVT prophylaxis  - Antibiotic prophylaxis  Outcome: Progressing  Goal: Emotionally satisfying birthing experience for mother/fetus  Description: Interventions:  - Assess, plan, implement and evaluate the nursing care given to the patient in labor  - Advocate the philosophy that each childbirth experience is a unique experience and support the family's chosen level of involvement and control during the labor process   - Actively participate in both the patient's and family's teaching of the birth process  - Consider cultural, Quaker and age-specific factors and plan care for the patient in labor  Outcome: Progressing     Problem: PAIN - ADULT  Goal: Verbalizes/displays adequate comfort level or baseline comfort level  Description: Interventions:  - Encourage patient to monitor pain and request assistance  - Assess pain using appropriate pain scale  - Administer analgesics based on type and severity of pain and evaluate response  - Implement non-pharmacological measures as appropriate and evaluate response  - Consider cultural and social influences on pain and pain management  - Notify physician/advanced practitioner if interventions unsuccessful or patient reports new pain  Outcome: Progressing     Problem: INFECTION - ADULT  Goal: Absence or prevention of progression during hospitalization  Description: INTERVENTIONS:  - Assess and monitor for signs and symptoms of infection  - Monitor lab/diagnostic results  - Monitor all insertion sites, i.e. indwelling lines, tubes, and drains  - Monitor endotracheal if appropriate and nasal secretions for changes in amount and color  - [place] appropriate cooling/warming therapies per order  - Administer medications as ordered  - Instruct and encourage patient and family to use good hand hygiene technique  - Identify and instruct in appropriate isolation precautions for identified infection/condition  Outcome: Progressing  Goal: Absence of fever/infection during neutropenic period  Description: INTERVENTIONS:  - Monitor WBC    Outcome: Progressing     Problem: SAFETY ADULT  Goal: Patient will remain free of falls  Description: INTERVENTIONS:  - Educate patient/family on patient safety including physical limitations  - Instruct patient to call for assistance with activity   - Consult OT/PT to assist with strengthening/mobility   - Keep Call bell within reach  - Keep bed low and locked with side rails adjusted as appropriate  - Keep care items and personal belongings within reach  - Initiate and maintain comfort rounds  - Make Fall Risk Sign visible to staff  - Apply yellow socks and bracelet for high fall risk patients  - Consider moving patient to room near nurses station  Outcome: Progressing  Goal: Maintain or return to baseline ADL function  Description: INTERVENTIONS:  -  Assess patient's ability to carry out ADLs; assess patient's baseline for ADL function and identify physical deficits which impact ability to perform ADLs (bathing, care of mouth/teeth, toileting, grooming, dressing, etc.)  - Assess/evaluate cause of self-care deficits   - Assess range of motion  - Assess patient's mobility; develop plan if impaired  - Assess patient's need for assistive devices and provide as appropriate  - Encourage maximum independence but intervene and supervise when necessary  - Involve family in performance of ADLs  - Assess for home care needs following discharge   - Consider OT consult to assist with ADL evaluation and planning for discharge  - Provide patient education as appropriate  Outcome: Progressing  Goal: Maintains/Returns to pre admission functional level  Description: INTERVENTIONS:  - Perform BMAT or MOVE assessment daily.   - Set and communicate daily mobility goal to care team and patient/family/caregiver. - Collaborate with rehabilitation services on mobility goals if consulted  - Out of bed for toileting  - Record patient progress and toleration of activity level   Outcome: Progressing     Problem: Knowledge Deficit  Goal: Patient/family/caregiver demonstrates understanding of disease process, treatment plan, medications, and discharge instructions  Description: Complete learning assessment and assess knowledge base. Interventions:  - Provide teaching at level of understanding  - Provide teaching via preferred learning methods  Outcome: Progressing  Goal: Verbalizes understanding of labor plan  Description: Assess patient/family/caregiver's baseline knowledge level and ability to understand information. Provide education via patient/family/caregiver's preferred learning method at appropriate level of understanding. 1. Provide teaching at level of understanding. 2. Provide teaching via preferred learning method(s). Outcome: Progressing     Problem: DISCHARGE PLANNING  Goal: Discharge to home or other facility with appropriate resources  Description: INTERVENTIONS:  - Identify barriers to discharge w/patient and caregiver  - Arrange for needed discharge resources and transportation as appropriate  - Identify discharge learning needs (meds, wound care, etc.)  - Arrange for interpretive services to assist at discharge as needed  - Refer to Case Management Department for coordinating discharge planning if the patient needs post-hospital services based on physician/advanced practitioner order or complex needs related to functional status, cognitive ability, or social support system  Outcome: Progressing     Problem: Labor & Delivery  Goal: Manages discomfort  Description: Assess and monitor for signs and symptoms of discomfort. Assess patient's pain level regularly and per hospital policy. Administer medications as ordered. Support use of nonpharmacological methods to help control pain such as distraction, imagery, relaxation, and application of heat and cold. Collaborate with interdisciplinary team and patient to determine appropriate pain management plan. 1. Include patient in decisions related to comfort. 2. Offer non-pharmacological pain management interventions. 3. Report ineffective pain management to physician. Outcome: Progressing  Goal: Patient vital signs are stable  Description: 1. Assess vital signs - vaginal delivery.   Outcome: Progressing

## 2023-08-19 LAB
ERYTHROCYTE [DISTWIDTH] IN BLOOD BY AUTOMATED COUNT: 12.8 % (ref 11.6–15.1)
HCT VFR BLD AUTO: 29.3 % (ref 34.8–46.1)
HGB BLD-MCNC: 9.5 G/DL (ref 11.5–15.4)
MCH RBC QN AUTO: 27.6 PG (ref 26.8–34.3)
MCHC RBC AUTO-ENTMCNC: 32.4 G/DL (ref 31.4–37.4)
MCV RBC AUTO: 85 FL (ref 82–98)
PLATELET # BLD AUTO: 233 THOUSANDS/UL (ref 149–390)
PMV BLD AUTO: 9.7 FL (ref 8.9–12.7)
RBC # BLD AUTO: 3.44 MILLION/UL (ref 3.81–5.12)
WBC # BLD AUTO: 20.48 THOUSAND/UL (ref 4.31–10.16)

## 2023-08-19 PROCEDURE — 85027 COMPLETE CBC AUTOMATED: CPT | Performed by: OBSTETRICS & GYNECOLOGY

## 2023-08-19 PROCEDURE — 99024 POSTOP FOLLOW-UP VISIT: CPT | Performed by: STUDENT IN AN ORGANIZED HEALTH CARE EDUCATION/TRAINING PROGRAM

## 2023-08-19 RX ORDER — IBUPROFEN 200 MG
600 TABLET ORAL EVERY 6 HOURS PRN
Refills: 0
Start: 2023-08-19

## 2023-08-19 RX ORDER — DOCUSATE SODIUM 100 MG/1
100 CAPSULE, LIQUID FILLED ORAL 2 TIMES DAILY PRN
Refills: 0
Start: 2023-08-19

## 2023-08-19 RX ORDER — ENOXAPARIN SODIUM 100 MG/ML
40 INJECTION SUBCUTANEOUS
Qty: 16.8 ML | Refills: 0 | Status: SHIPPED | OUTPATIENT
Start: 2023-08-20 | End: 2023-10-01

## 2023-08-19 RX ORDER — ACETAMINOPHEN 325 MG/1
650 TABLET ORAL EVERY 6 HOURS PRN
Refills: 0
Start: 2023-08-19

## 2023-08-19 RX ADMIN — IBUPROFEN 600 MG: 100 SUSPENSION ORAL at 19:42

## 2023-08-19 RX ADMIN — IBUPROFEN 600 MG: 100 SUSPENSION ORAL at 09:38

## 2023-08-19 RX ADMIN — ENOXAPARIN SODIUM 40 MG: 100 INJECTION SUBCUTANEOUS at 09:38

## 2023-08-19 RX ADMIN — LEVOTHYROXINE SODIUM 75 MCG: 75 TABLET ORAL at 06:02

## 2023-08-19 RX ADMIN — IBUPROFEN 600 MG: 100 SUSPENSION ORAL at 03:10

## 2023-08-19 NOTE — PROGRESS NOTES
Progress Note - OB/GYN  Post-Partum Physician Note   Adali Henderson 35 y.o. female MRN: [id number]  Unit/Bed#:  207-01 Encounter: [id number]    Assessment:  35y.o. year-old , postpartum day #1 s/p     Plan:   Continue routine postpartum care  Encourage ambulation  Patient is a candidate for expedited discharge, pending disposition of infant. Thyroid disease during pregnancy in third trimester  - Continue levothyroxine 75 mcg PO daily. History of pulmonary embolus (PE)  - Continue Lovenox 40 mg SC daily for 6 weeks postpartum. _________________________________    Subjective:   Pain: Well controlled  Tolerating Oral Intake: Yes  Voiding: Yes  Ambulating: Yes  Breastfeeding: Yes  Chest Pain: No  Shortness of Breath: No  Leg Pain/Discomfort: No  Lochia: Normal    Objective:   Vitals:    23 1834 23 2300 23 0310 23 0900   BP: 105/65 103/71 93/54 94/60   BP Location:  Right arm Right arm Right arm   Pulse: 90 80 86 75   Resp:  18 18 16   Temp:  98.1 °F (36.7 °C) 98.4 °F (36.9 °C) 97.9 °F (36.6 °C)   TempSrc:  Oral Oral Tympanic   SpO2:  100%  98%   Weight:       Height:           Intake/Output Summary (Last 24 hours) at 2023 1151  Last data filed at 2023 2315  Gross per 24 hour   Intake --   Output 2168 ml   Net -2168 ml       Physical Exam:  General: in no apparent distress, alert and oriented times 3  Abdomen: abdomen is soft without significant tenderness, masses, organomegaly or guarding  Fundus: Firm and non-tender, 1 below the umbilicus  Lower extremeties: non-tender, no erythema or calor. Trace symmetric edema.      Labs/Tests:   Lab Results   Component Value Date/Time    HGB 9.5 (L) 2023 06:06 AM    HGB 10.7 (L) 2023 08:56 PM     2023 06:06 AM     2023 08:56 PM    WBC 20.48 (H) 2023 06:06 AM    WBC 12.74 (H) 2023 08:56 PM        Brief OB Lab review:  ABO Grouping   Date Value Ref Range Status   2023 B Final      Rh Factor   Date Value Ref Range Status   08/17/2023 Positive  Final     Rh Type   Date Value Ref Range Status   01/16/2023 Positive  Final     Comment:     Please note: Prior records for this patient's ABO / Rh type are not  available for additional verification.       No results found for: "ANTIBODYSCR"  No results found for: "RUBM"    MEDS:   Current Facility-Administered Medications   Medication Dose Route Frequency   • acetaminophen (TYLENOL) oral suspension 650 mg  650 mg Oral Q4H PRN   • benzocaine-menthol-lanolin-aloe (DERMOPLAST) 20-0.5 % topical spray 1 Application  1 Application Topical Q5B PRN   • bupivacaine (PF) (MARCAINE) 0.25 % injection 30 mL  30 mL Infiltration Once PRN   • calcium carbonate (TUMS) chewable tablet 1,000 mg  1,000 mg Oral Daily PRN   • docusate sodium (COLACE) capsule 100 mg  100 mg Oral BID   • enoxaparin (LOVENOX) subcutaneous injection 40 mg  40 mg Subcutaneous Q24H GLADYS   • hydrocortisone 1 % cream 1 Application  1 Application Topical Daily PRN   • ibuprofen (MOTRIN) oral suspension 600 mg  600 mg Oral Q6H [facility]   • levothyroxine tablet 75 mcg  75 mcg Oral Early Morning   • ondansetron (ZOFRAN) injection 4 mg  4 mg Intravenous Q8H PRN   • oxyCODONE (ROXICODONE) IR tablet 5 mg  5 mg Oral Q3H PRN   • witch hazel-glycerin (TUCKS) topical pad 1 Pad  1 Pad Topical Q4H PRN     Invasive Devices     Peripheral Intravenous Line  Duration           Peripheral IV 08/17/23 Right;Ventral (anterior) Forearm 1 day                  Gary Cortez MD  8/19/2023 11:51 AM

## 2023-08-19 NOTE — PLAN OF CARE
Problem: POSTPARTUM  Goal: Experiences normal postpartum course  Description: INTERVENTIONS:  - Monitor maternal vital signs  - Assess uterine involution and lochia  Outcome: Progressing  Goal: Appropriate maternal -  bonding  Description: INTERVENTIONS:  - Identify family support  - Assess for appropriate maternal/infant bonding   -Encourage maternal/infant bonding opportunities  - Referral to  or  as needed  Outcome: Progressing  Goal: Establishment of infant feeding pattern  Description: INTERVENTIONS:  - Assess breast/bottle feeding  - Refer to lactation as needed  Outcome: Progressing  Goal: Incision(s), wounds(s) or drain site(s) healing without S/S of infection  Description: INTERVENTIONS  - Assess and document dressing, incision, wound bed, drain sites and surrounding tissue  - Provide patient and family education  - Perform skin care/dressing changes every   Outcome: Progressing     Problem: PAIN - ADULT  Goal: Verbalizes/displays adequate comfort level or baseline comfort level  Description: Interventions:  - Encourage patient to monitor pain and request assistance  - Assess pain using appropriate pain scale  - Administer analgesics based on type and severity of pain and evaluate response  - Implement non-pharmacological measures as appropriate and evaluate response  - Consider cultural and social influences on pain and pain management  - Notify physician/advanced practitioner if interventions unsuccessful or patient reports new pain  Outcome: Progressing     Problem: INFECTION - ADULT  Goal: Absence or prevention of progression during hospitalization  Description: INTERVENTIONS:  - Assess and monitor for signs and symptoms of infection  - Monitor lab/diagnostic results  - Monitor all insertion sites, i.e. indwelling lines, tubes, and drains  - Monitor endotracheal if appropriate and nasal secretions for changes in amount and color  - [place] appropriate cooling/warming therapies per order  - Administer medications as ordered  - Instruct and encourage patient and family to use good hand hygiene technique  - Identify and instruct in appropriate isolation precautions for identified infection/condition  Outcome: Progressing  Goal: Absence of fever/infection during neutropenic period  Description: INTERVENTIONS:  - Monitor WBC    Outcome: Progressing     Problem: SAFETY ADULT  Goal: Patient will remain free of falls  Description: INTERVENTIONS:  - Educate patient/family on patient safety including physical limitations  - Instruct patient to call for assistance with activity   - Consult OT/PT to assist with strengthening/mobility   - Keep Call bell within reach  - Keep bed low and locked with side rails adjusted as appropriate  - Keep care items and personal belongings within reach  - Initiate and maintain comfort rounds  - Make Fall Risk Sign visible to staff  - Offer Toileting every  Hours, in advance of need  - Initiate/Maintain alarm  - Obtain necessary fall risk management equipment:   - Apply yellow socks and bracelet for high fall risk patients  - Consider moving patient to room near nurses station  Outcome: Progressing  Goal: Maintain or return to baseline ADL function  Description: INTERVENTIONS:  -  Assess patient's ability to carry out ADLs; assess patient's baseline for ADL function and identify physical deficits which impact ability to perform ADLs (bathing, care of mouth/teeth, toileting, grooming, dressing, etc.)  - Assess/evaluate cause of self-care deficits   - Assess range of motion  - Assess patient's mobility; develop plan if impaired  - Assess patient's need for assistive devices and provide as appropriate  - Encourage maximum independence but intervene and supervise when necessary  - Involve family in performance of ADLs  - Assess for home care needs following discharge   - Consider OT consult to assist with ADL evaluation and planning for discharge  - Provide patient education as appropriate  Outcome: Progressing  Goal: Maintains/Returns to pre admission functional level  Description: INTERVENTIONS:  - Perform BMAT or MOVE assessment daily.   - Set and communicate daily mobility goal to care team and patient/family/caregiver. - Collaborate with rehabilitation services on mobility goals if consulted  - Perform Range of Motion  times a day. - Reposition patient every  hours.   - Dangle patient  times a day  - Stand patient  times a day  - Ambulate patient  times a day  - Out of bed to chair  times a day   - Out of bed for meals times a day  - Out of bed for toileting  - Record patient progress and toleration of activity level   Outcome: Progressing     Problem: DISCHARGE PLANNING  Goal: Discharge to home or other facility with appropriate resources  Description: INTERVENTIONS:  - Identify barriers to discharge w/patient and caregiver  - Arrange for needed discharge resources and transportation as appropriate  - Identify discharge learning needs (meds, wound care, etc.)  - Arrange for interpretive services to assist at discharge as needed  - Refer to Case Management Department for coordinating discharge planning if the patient needs post-hospital services based on physician/advanced practitioner order or complex needs related to functional status, cognitive ability, or social support system  Outcome: Progressing

## 2023-08-19 NOTE — PLAN OF CARE

## 2023-08-19 NOTE — DISCHARGE SUMMARY
Discharge Summary - OB/GYN   Kim Madden 35 y.o. female MRN: [id number]  Unit/Bed#: -01 Encounter: [id number]    Admission Date: 2023     Discharge Date: 23    Principal Diagnosis: Encounter for elective induction of labor [Z34.90], [id number] Pregnancy at 39w5d    Secondary Diagnosis: n/a    Attending - Delivery: Zcakery Kruse, DO         - Discharge: Delmi Patel MD    Procedures: Vaginal, Spontaneous , 2nd degree laceration    Anesthesia: epidural    Complications: none apparent    Hospital Course:      Kim Madden is a 35 y.o. [id number] at 39w5d who was admitted for induction of labor    She delivered a viable  with weight of 3670 grams, apgars of 8 (1 min) and 9 (5 min).  was transferred to  nursery. Patient tolerated the procedure well and was transferred to recovery in stable condition. Her postpartum course was uncomplicated. Admission hemoglobin was 10.7, postpartum was 9.5. On day of discharge, she was ambulating and able to reasonably perform all ADLs. She was voiding and had appropriate bowel function. Pain was well controlled. She was discharged home on postpartum day #2 without complications. Patient was instructed to follow up with her OB as an outpatient and was given appropriate warnings to call provider if she develops signs of infection or uncontrolled pain. Condition at discharge: good     Discharge instructions/Information to patient and family:   See after visit summary for information provided to patient and family. Provisions for Follow-Up Care:  See after visit summary for information related to follow-up care and any pertinent home health orders. Disposition: See After Visit Summary for discharge disposition information. Planned Readmission: No    Discharge Medications: For a complete list of the patient's medications, please refer to her med rec.     Delmi Patel MD  2023 8:22 AM

## 2023-08-20 ENCOUNTER — TELEPHONE (OUTPATIENT)
Dept: LABOR AND DELIVERY | Facility: HOSPITAL | Age: 33
End: 2023-08-20

## 2023-08-20 VITALS
HEART RATE: 84 BPM | RESPIRATION RATE: 16 BRPM | DIASTOLIC BLOOD PRESSURE: 61 MMHG | BODY MASS INDEX: 32.99 KG/M2 | TEMPERATURE: 97.9 F | WEIGHT: 198 LBS | OXYGEN SATURATION: 99 % | HEIGHT: 65 IN | SYSTOLIC BLOOD PRESSURE: 106 MMHG

## 2023-08-20 PROCEDURE — 99024 POSTOP FOLLOW-UP VISIT: CPT | Performed by: STUDENT IN AN ORGANIZED HEALTH CARE EDUCATION/TRAINING PROGRAM

## 2023-08-20 RX ADMIN — ENOXAPARIN SODIUM 40 MG: 100 INJECTION SUBCUTANEOUS at 08:36

## 2023-08-20 RX ADMIN — IBUPROFEN 600 MG: 100 SUSPENSION ORAL at 08:36

## 2023-08-20 RX ADMIN — LEVOTHYROXINE SODIUM 75 MCG: 75 TABLET ORAL at 06:33

## 2023-08-20 RX ADMIN — IBUPROFEN 600 MG: 100 SUSPENSION ORAL at 02:16

## 2023-08-20 NOTE — TELEPHONE ENCOUNTER
Patient is being discharged to home today from [facility] following  on  with Dr. Kvng Bey. Her hospital course was uncomplicated. She should be scheduled for routine postpartum follow up in office.     Melody Aguirre MD  2023 8:23 AM

## 2023-08-20 NOTE — PROGRESS NOTES
Progress Note - OB/GYN  Post-Partum Physician Note   Geo Cheung 35 y.o. female MRN: [id number]  Unit/Bed#:  207-01 Encounter: [id number]    Assessment:  35y.o. year-old , postpartum day #2 s/p     Plan:   Continue routine postpartum care  Encourage ambulation  Patient clinically stable for discharge to home    Thyroid disease during pregnancy in third trimester  - Continue levothyroxine 75 mcg PO daily. History of pulmonary embolus (PE)  - Continue Lovenox 40 mg SC daily for 6 weeks postpartum.     _________________________________    Subjective:   Pain: Well controlled  Tolerating Oral Intake: Yes  Voiding: Yes  Ambulating: Yes  Breastfeeding: Yes  Chest Pain: No  Shortness of Breath: No  Leg Pain/Discomfort: No  Lochia: Normal    Objective:   Vitals:    23 0900 23 1300 23 1630 23 2303   BP: 94/60 98/65 96/64 92/55   BP Location: Right arm Right arm Right arm Right arm   Pulse: 75 78 80 78   Resp: 16 18 16 16   Temp: 97.9 °F (36.6 °C) 98 °F (36.7 °C) 97.7 °F (36.5 °C) 97.8 °F (36.6 °C)   TempSrc: Tympanic Tympanic Tympanic Oral   SpO2: 98% 100% 100% 99%   Weight:       Height:         No intake or output data in the 24 hours ending 23 0820    Physical Exam:  General: in no apparent distress, alert, oriented times 3 and afebrile  Abdomen: abdomen is soft without significant tenderness, masses, organomegaly or guarding  Fundus: Firm and non-tender, 1 below the umbilicus  Lower extremeties: non-tender, trace bilateral edema    Labs/Tests:   Lab Results   Component Value Date/Time    HGB 9.5 (L) 2023 06:06 AM    HGB 10.7 (L) 2023 08:56 PM     2023 06:06 AM     2023 08:56 PM    WBC 20.48 (H) 2023 06:06 AM    WBC 12.74 (H) 2023 08:56 PM        Brief OB Lab review:  ABO Grouping   Date Value Ref Range Status   2023 B  Final      Rh Factor   Date Value Ref Range Status   2023 Positive  Final     Rh Type   Date Value Ref Range Status   01/16/2023 Positive  Final     Comment:     Please note: Prior records for this patient's ABO / Rh type are not  available for additional verification.       No results found for: "ANTIBODYSCR"  No results found for: "RUBM"    MEDS:   Current Facility-Administered Medications   Medication Dose Route Frequency   • acetaminophen (TYLENOL) oral suspension 650 mg  650 mg Oral Q4H PRN   • benzocaine-menthol-lanolin-aloe (DERMOPLAST) 20-0.5 % topical spray 1 Application  1 Application Topical W5I PRN   • bupivacaine (PF) (MARCAINE) 0.25 % injection 30 mL  30 mL Infiltration Once PRN   • calcium carbonate (TUMS) chewable tablet 1,000 mg  1,000 mg Oral Daily PRN   • docusate sodium (COLACE) capsule 100 mg  100 mg Oral BID   • enoxaparin (LOVENOX) subcutaneous injection 40 mg  40 mg Subcutaneous Q24H GLAD[facility]   • hydrocortisone 1 % cream 1 Application  1 Application Topical Daily PRN   • ibuprofen (MOTRIN) oral suspension 600 mg  600 mg Oral Q6H [address]   • levothyroxine tablet 75 mcg  75 mcg Oral Early Morning   • ondansetron (ZOFRAN) injection 4 mg  4 mg Intravenous Q8H PRN   • oxyCODONE (ROXICODONE) IR tablet 5 mg  5 mg Oral Q3H PRN   • witch hazel-glycerin (TUCKS) topical pad 1 Pad  1 Pad Topical Q4H PRN     Invasive Devices     None                   Rama Latif MD  8/20/2023 8:20 AM

## 2023-08-20 NOTE — PLAN OF CARE

## 2023-08-20 NOTE — PLAN OF CARE
Problem: PAIN - ADULT  Goal: Verbalizes/displays adequate comfort level or baseline comfort level  Description: Interventions:  - Encourage patient to monitor pain and request assistance  - Assess pain using appropriate pain scale  - Administer analgesics based on type and severity of pain and evaluate response  - Implement non-pharmacological measures as appropriate and evaluate response  - Consider cultural and social influences on pain and pain management  - Notify physician/advanced practitioner if interventions unsuccessful or patient reports new pain  Outcome: Progressing     Problem: INFECTION - ADULT  Goal: Absence or prevention of progression during hospitalization  Description: INTERVENTIONS:  - Assess and monitor for signs and symptoms of infection  - Monitor lab/diagnostic results  - Monitor all insertion sites, i.e. indwelling lines, tubes, and drains  - Monitor endotracheal if appropriate and nasal secretions for changes in amount and color  - [place] appropriate cooling/warming therapies per order  - Administer medications as ordered  - Instruct and encourage patient and family to use good hand hygiene technique  - Identify and instruct in appropriate isolation precautions for identified infection/condition  Outcome: Progressing  Goal: Absence of fever/infection during neutropenic period  Description: INTERVENTIONS:  - Monitor WBC    Outcome: Progressing     Problem: SAFETY ADULT  Goal: Patient will remain free of falls  Description: INTERVENTIONS:  - Educate patient/family on patient safety including physical limitations  - Instruct patient to call for assistance with activity   - Consult OT/PT to assist with strengthening/mobility   - Keep Call bell within reach  - Keep bed low and locked with side rails adjusted as appropriate  - Keep care items and personal belongings within reach  - Initiate and maintain comfort rounds    Outcome: Progressing  Goal: Maintain or return to baseline ADL function  Description: INTERVENTIONS:  -  Assess patient's ability to carry out ADLs; assess patient's baseline for ADL function and identify physical deficits which impact ability to perform ADLs (bathing, care of mouth/teeth, toileting, grooming, dressing, etc.)  - Assess/evaluate cause of self-care deficits   - Assess range of motion  - Assess patient's mobility; develop plan if impaired  - Assess patient's need for assistive devices and provide as appropriate  - Encourage maximum independence but intervene and supervise when necessary  - Involve family in performance of ADLs  - Assess for home care needs following discharge   - Consider OT consult to assist with ADL evaluation and planning for discharge  - Provide patient education as appropriate  Outcome: Progressing  Goal: Maintains/Returns to pre admission functional level  Description: INTERVENTIONS:  - Perform BMAT or MOVE assessment daily.   - Set and communicate daily mobility goal to care team and patient/family/caregiver.    - Out of bed for toileting  - Record patient progress and toleration of activity level   Outcome: Progressing     Problem: DISCHARGE PLANNING  Goal: Discharge to home or other facility with appropriate resources  Description: INTERVENTIONS:  - Identify barriers to discharge w/patient and caregiver  - Arrange for needed discharge resources and transportation as appropriate  - Identify discharge learning needs (meds, wound care, etc.)  - Arrange for interpretive services to assist at discharge as needed  - Refer to Case Management Department for coordinating discharge planning if the patient needs post-hospital services based on physician/advanced practitioner order or complex needs related to functional status, cognitive ability, or social support system  Outcome: Progressing     Problem: POSTPARTUM  Goal: Experiences normal postpartum course  Description: INTERVENTIONS:  - Monitor maternal vital signs  - Assess uterine involution and lochia  Outcome: Progressing  Goal: Appropriate maternal -  bonding  Description: INTERVENTIONS:  - Identify family support  - Assess for appropriate maternal/infant bonding   -Encourage maternal/infant bonding opportunities  - Referral to  or  as needed  Outcome: Progressing  Goal: Establishment of infant feeding pattern  Description: INTERVENTIONS:  - Assess breast/bottle feeding  - Refer to lactation as needed  Outcome: Progressing  Goal: Incision(s), wounds(s) or drain site(s) healing without S/S of infection  Description: INTERVENTIONS  - Assess and document dressing, incision, wound bed, drain sites and surrounding tissue  - Provide patient and family education    Outcome: Progressing

## 2023-08-20 NOTE — PROGRESS NOTES
Discharge instructions reviewed with patient and  present. Verbalized understanding. Pt discharged to home.

## 2023-08-20 NOTE — PROGRESS NOTES
Maternal discharge education reviewed with pt and partner, educational flyers and Save Your Life magnet provided and discussed. Questions encouraged and answered, questions encouraged throughout remainder of stay. iPad educational videos viewed, PPD screen medium risk.

## 2023-08-24 ENCOUNTER — OFFICE VISIT (OUTPATIENT)
Dept: POSTPARTUM | Facility: CLINIC | Age: 33
End: 2023-08-24

## 2023-08-24 NOTE — PROGRESS NOTES
INITIAL BREAST FEEDING EVALUATION    Informant/Relationship: Mother, Vasu Michelle    Discussion of General Lactation Issues: Weight loss is 10%. Vasu Michelle states she has damaged nipples. She wants to learn how to get the baby to latch deeply and wants her to gain weight. Infant is 10days old today.  History:  Fertility Problem: no  Breast changes: sore, darker  : yes  Full term:yes   labor: no  First nursing/attempt < 1 hour after birth: yes  Skin to skin following delivery: yes  Breast changes after delivery: yes  Rooming in (infant in room with mother with exception of procedures, eg. Circumcision: yes except for 3 hours over night  Blood sugar issues:no  NICU stay:no  Jaundice:no  Phototherapy:no  Supplement given: (list supplement and method used as well as reason(s):none    No past medical history on file. No current outpatient medications on file. No Known Allergies    Social History     Substance and Sexual Activity   Drug Use Not on file       Social History     Interval Breastfeeding History:    Frequency of breast feedin.50-2 hours during the day. Longer periods of sleep at night. Mom tries to wake her but she is difficult to wake  Does mother feel breastfeeding is effective: Yes. She spends 15 minutes on each side  Does infant appear satisfied after nursing:Yes  Stooling pattern normal: yes  Urinating frequently:yes  Using shield or shells: no    Alternative/Artificial Feedings:   Bottle: no  Cup: no  Syringe/Finger: n/a           Formula Type: n/a                     Amount: n/a            Breast Milk:                      Amount: n/a            Frequency Q n/a Hr between feedings  Elimination Problems: No. Stool is brownish/yellowish.        Equipment:  Nipple Shield             Type: n/a             Size: n/a             Frequency of Use:n/a  Pump            Type: Zomee and Mom Cozy            Frequency of Use: hasn't used either yet  Shells            Type: n/a Frequency of use: n/a    Equipment Problems: hasn't used, so unsure    Mom:  Breast: Evenly spaced, soft, but visible milk  Nipple Assessment in General: Some scabbing that is healing. Mother's Awareness of Feeding Cues                 Recognizes: yes                  Verbalizes: yes  Support System: Janice, maternal grandmother  History of Breastfeeding: no  Changes/Stressors/Violence: New baby, overnight baby screams and mom doesn't know what she wants. This has happened twice and it has concerned her. Last night she was much calmer and slept in between feeds. Concerns/Goals: Concerned about weight loss    Problems with Mom: Hypothyroidism. Has been taking Synthroid since 2022      Infant:  Behaviors: awake, alert, fell asleep fairly quickly at the breast, but took "3" breasts stoday  Color: mildly yellow  Birth weight: 8 lb 1.5  Current weight: 7 lbs 3  Problems with infant: Appears to have tight jaw muscles. She doesn't easily have a wide gape    Infant assessment: [place] Assessment for Lingual Frenulum Function    Appearance Items Function Items   Appearance of tongue when lifted  2: Round or square   Lateralization  2: Complete   Elasticity of frenulum  2: Very elastic   Lift of tongue  2: Tip to mid-mouth     Length of lingual frenulum when tongue lifted  lingual frenulum length: 2: > 1cm     Extension of tongue  2:  Tip over lower lip   Attachment of lingual frenulum to tongue  2: Posterior to tip   Spread of anterior tongue  2: Complete   Attachment of lingual frenulum to inferior alveolar ridge  2: Attached to floor of mouth or well below ridge Cupping  2: Entire edge, firm cup   Ankyloglossia Grading:  Class I: mild, 12-16 mm  Class II: moderate, 8-11 mm  Class III: severe, 3-7 mm  ClassIV: complete, less than 3 mm Peristalsis  2: Complete, anterior to posterior       SCORE:    Appearance:  (<8=ankyloglossia)  Function:  (<11=ankyloglossia) Snapback  2: None          Latch:  Efficiency:               Lips Flanged: yes              Depth of latch: She started out really well on the right breast, but continually pulled back or off of the breast.  She did some good drinking for about 2 minutes. She latched much better on the left breast and stayed latched. We moved back to the first breast and she latched well and stayed attached. Audible Swallow:  yes              Visible Milk: yes              Wide Open/ Asymmetrical: its difficult to get her to open wide              Suck Swallow Cycle: Breathing: normal, Coordinated: Yes  Nipple Assessment after latch: Both nipples looked better  Latch Problems: Rain Abad has been having difficulty latching baby deeply. She states she sometimes "fights" with her for 40 minutes before getting her to latch    Position:  Infant's Ergonomics/Body               Body Alignment: yes               Head Supported: yes               Close to Mom's body/ Lifted/ Supported: Rain Abad demonstrated how she had been l               Mom's Ergonomics/Body: yes                           Supported: with pillows                            Sitting Back: laid back                           Brings Baby to her breast: Not before education  Positioning Problems: Rain Abad was leaning forward to latch Jocelynn. It was causing some instability in body. Demonstrated how to gently compress breast tissue to align the nipple with the nose and to bring chin on to the underside of breast to encourage the widest, deepest latch. Demonstrated how to do breast compressions. Education:  Reviewed Latch: chin to skin, nose to nipple.   Discussed asymmetrical latch and gently compressing breast tissue to make it easier for baby to get a mouthful of areola  Reviewed Positioning for Dyad: Encouraged Rain Abad to lean back and allow gravity to aid in keeping baby really close to her body  Reviewed Frequency/Supply & Demand: Encouraged she offer up to 4 breasts per feed if Jocelynn seems sleepy. Suggested she consider pumping once or twice a day and offering any milk she gets with a syringe (she doesn't want to use a bottle yet)  Reviewed Infant:Cues and varied States of Awareness: Encouraged Sherry to  Jocelynn if she sees a stretch and a yawn and not to wait until she gets frantic  Reviewed Infant Elimination: Yes  Reviewed Alternative/Artificial Feedings: yes, written above  Reviewed Mom/Breast care: Recommended coconut oil covered with a small piece of wax paper after each feed for at least the next 48 hours to aid in healing  Reviewed Equipment: Yes. Suggested the smallest flange her pump comes with and to go easy on suction. Only go as high as is comfortable and effective      Plan:  Nano Ruth will observe Jocelynn for very early feeding cues rather than waiting until she is frantic. She will continue to wake her at least every 3 hours if she doesn't wake on her own. Nano Ruth will offer up to 4 breasts per feed to keep Jocelynn stimulated. She will do breast compressions in order to encourage Jocelynn to drink more. She was given encouragement and support today and has a follow up appointment at [facility] for one week. I have spent 45 minutes with family today in which greater than 50% of this time was spent in counseling/coordination of care regarding Patient and family education.

## 2023-08-24 NOTE — PATIENT INSTRUCTIONS
GOOD RESOURCES FOR EDUCATION THAT ARE EVIDENCED BASED AND CURRENT:  [address] - a lot of different types of videos  [employer]/hand expression: watch video while learning how to hand express  [employer]tob[employer]. org : Click on "Exposures" then on pregnancy and breastfeeding. Then click on "baby fact sheets." As a consumer you can do a live chat, email or call. Physicians Guide to Breastfeeding by Dr. Tim Birmingham. You will find information on lymphatic drainage and the latest protocol for dealing with engorgement and mastitis and lymphatic drainage  [employer] - videos and handouts  [employer]- has a video on paced bottle feeding     NIPPLE CARE:May use coconut oil or olive oil on each nipple. Apply after feeding directly or pumping. Cover the nipple area with parchment or wax paper to protect your nipple from rubbing on clothing. Change with each feed/pump. It is also ok to use a nipple balm if you prefer. If symptoms do not improve in a couple of days, please call us. Consider pumping once or twice a day after nursing (   If you get any milk with the pump, offer that to her with the syringe if you prefer not to use a bottle) Use the smallest size flange that comes with your pump. You may offer "4" breasts at each feed. She should be nursing at least 10 times in 24 hours. Undress her down to a diaper. You can also try using a cool washcloth on her face and torso to wake her. Do a lot of skin to skin. Please make a follow up appointment for one week.

## 2023-08-26 DIAGNOSIS — Z86.711 HISTORY OF PULMONARY EMBOLUS (PE): ICD-10-CM

## 2023-08-26 LAB — PLACENTA IN STORAGE: NORMAL

## 2023-08-28 RX ORDER — ENOXAPARIN SODIUM 100 MG/ML
INJECTION SUBCUTANEOUS
Refills: 1 | OUTPATIENT
Start: 2023-08-28

## 2023-08-31 ENCOUNTER — OFFICE VISIT (OUTPATIENT)
Dept: POSTPARTUM | Facility: CLINIC | Age: 33
End: 2023-08-31

## 2023-08-31 VITALS — SYSTOLIC BLOOD PRESSURE: 94 MMHG | DIASTOLIC BLOOD PRESSURE: 64 MMHG

## 2023-08-31 NOTE — PROGRESS NOTES
BREAST FEEDING FOLLOW UP VISIT    Informant/Relationship: Sherry    Discussion of General Lactation Issues: Jocelynn was having trouble gaining weight and breastfeeding was taking a very long time and Jocelynn was never content after feeding. Laya Dobbs began pumping and bottle feeding and now Jocelynn is gaining weight. She does struggle to bottle feed as well and at times feedings are long. Pumping is making Sherry's nipples sore. Infant is 15days old today. Interval Breastfeeding History:  Frequency of breast feeding: Currently once a day  Does mother feel breastfeeding is effective: No  Does infant appear satisfied after nursing:No  Stooling pattern normal:No, stooling once a day   Urinating frequently:Yes  Using shield or shells:No    Alternative/Artificial Feedings:   Bottle: Yes, Jocelynn is exclusively bottle fed at this time. Using an Erika bottle. Not using paced bottle feeding technique  Cup: No  Syringe/Finger: No           Formula Type: Enfamil                     Amount: up to 6 ounces a day when expressed milk is not available. Breast Milk:                      Amount: 3 ounces            Frequency Q 2-4 Hr between feedings  Elimination Problems: Yes       Equipment:  Nipple Shield             Type: none             Size: n/a             Frequency of Use: n/a  Pump            Type: Zomee Z2            Frequency of Use: Every 2 hours during the day and every 4 hours at night  Shells            Type: none            Frequency of use: n/a    Equipment Problems: yes pumping      Mom:  Breast: Medium sized symmetrical breasts. Slightly pendulous. soft  Nipple Assessment in General: Normal: elongated/eraser, no discoloration and no damage noted. Mother's Awareness of Feeding Cues                 Recognizes:  Yes                  Verbalizes: Yes  Support System: FOB, extended family  History of Breastfeeding: none  Changes/Stressors/Violence: Laya Dobbs is concerned that [address] does not latch or feed effectively at the breast.  She is concerned about her milk production  Concerns/Goals: Jose Buchanan desires to [facility]    Problems with Mom: concerns with milk production    Physical Exam  Constitutional:       Appearance: Normal appearance. HENT:      Head: Normocephalic and atraumatic. Pulmonary:      Effort: Pulmonary effort is normal.   Musculoskeletal:         General: Normal range of motion. Cervical back: Normal range of motion and neck supple. Neurological:      Mental Status: She is alert and oriented to person, place, and time. Skin:     General: Skin is warm and dry. Psychiatric:         Mood and Affect: Mood normal.         Behavior: Behavior normal.         Thought Content: Thought content normal.         Judgment: Judgment normal.         Infant:  Behaviors: Alert  Color: Pink  Birth weight: 3670gram  Current weight: 3490gram    Problems with infant: not feeding effectively from the breast or a bottle. General Appearance:  Alert, active, no distress                            Head:  Normocephalic, AFOF, sutures ridged                            Eyes:   Conjunctiva clear, no drainage                            Ears:   Normally placed, no anomolies                           Nose:   no drainage or erythema                          Mouth:  No lesions. Tongue extends to the lower lip, lateralizes only minimally and remains relatively flat when crying. There is a noticeable speed bump felt when sweeping my finger under the tongue. Excellent cupping and peristalsis noted as she sucked on my finger. Neck:  Supple, symmetrical, trachea midline                Respiratory:  No grunting, flaring, retractions, breath sounds clear and equal           Cardiovascular:  Regular rate and rhythm. No murmur. Adequate perfusion/capillary refill.  Femoral pulse present                  Abdomen:    Soft, non-tender, no masses, bowel sounds present, no HSM            Genitourinary:  Normal female genitalia, anus patent                         Spine:   No abnormalities noted       Musculoskeletal:   Full range of motion         Skin/Hair/Nails:   Skin warm, dry, and intact, no rashes or abnormal dyspigmentation or lesions               Neurologic:   No abnormal movement, tone appropriate for gestational age     Latch:  Efficiency:               Lips Flanged: Yes              Depth of latch: good              Audible Swallow: Yes, sustained SSB              Visible Milk: Yes              Wide Open/ Asymmetrical: could be wider              Suck Swallow Cycle: Breathing: unlabored, Coordinated: yes  Nipple Assessment after latch: Normal: elongated/eraser, no discoloration and no damage noted. Latch Problems: Sherry needed help with positioning. After education, she was able to guide Jocelynn to an effective latch and Jocelynn was able to feed effectively on both breasts    Position:  Infant's Ergonomics/Body               Body Alignment: Yes, after education               Head Supported: Yes               Close to Mom's body/ Lifted/ Supported: Yes, after education               Mom's Ergonomics/Body: Yes, after education                           Supported: Yes, after education                           Sitting Back: Yes, after education                           Brings Baby to her breast: Yes, after education  Positioning Problems: Initially, Vasu Michelle leaned over [address] to bring her nipple to Jocelynn's mouth. She shaped her breast opposite the direction of Jocelynn's mouth. There was a large space between their bodies and Jocelynn seemed confused. Handouts:   Paced bottle feeding, Hands on pumping and Hand expression    Education:  Reviewed Latch: Demonstrated how to gently compress the breast and align the baby so that her nose is just above the nipple with her lower lip and chin touching the breast to encourage the deepest, widest, off-center latch.   Reviewed Positioning for Dyad: Demonstrated how to position mom comfortably and support with her baby belly to belly prior to bringing her baby to her breast.  Reviewed Frequency/Supply & Demand: Discussed how milk production is established and maintained and factors that can impact milk production  Reviewed Infant:Cues and varied States of Awareness  Reviewed Alternative/Artificial Feedings: Discussed and demonstrated paced bottle feeding  Reviewed Equipment: discussed how to determine what size flange to use. Plan:   Reassurance and support given. I acknowledged Sherry's concerns about Jocelynn's weight and her ability to breastfeed effectively. I encouraged her to continue to attempt direct breastfeeding as often as she feels comfortable. We worked on positioning to improve her comfort and confidence and Jocelynn's ability to latch and feed effectively,  I recommended that [place] Mas continue to pump when not feeding at the breast.  I suggested that pumping every 3 hours during the day and every 4-5 hours overnight is sufficient and may allow her more time for rest and self care. A follow up appointment was scheduled to check progress. I encouraged her to call with any questions or concerns. I have spent 60 minutes with Patient and family today in which greater than 50% of this time was spent in counseling/coordination of care regarding Instructions for management, Patient and family education and Counseling / Coordination of care.

## 2023-08-31 NOTE — PATIENT INSTRUCTIONS
Continue pumping when a feeding at the breast is missed. When pumping, cycle your pump through stimulation and expression mode several times in a session to stimulate several let downs until you have expressed enough milk to feed the baby and to achieve breast comfort. There is no need to "empty" the breast completely. Use gentle hands on pumping and hand expression   Maintain your pump as recommended. Use flange that fits comfortably and allows the breast to empty effectively. I suggest a 21mm flange or a pump cushion to improve comfort. Follow up as scheduled. Please call with any questions or concerns.

## 2023-09-06 DIAGNOSIS — E89.0 POSTOPERATIVE HYPOTHYROIDISM: ICD-10-CM

## 2023-09-06 RX ORDER — LEVOTHYROXINE SODIUM 0.07 MG/1
75 TABLET ORAL DAILY
Qty: 90 TABLET | Refills: 2 | Status: SHIPPED | OUTPATIENT
Start: 2023-09-06

## 2023-09-09 NOTE — PROGRESS NOTES
I have reviewed the notes, assessments, and/or procedures performed by Britney Shook RN, IBCLC, I concur with her/his documentation of Lorena Hyde MD 09/09/23

## 2023-09-14 ENCOUNTER — OFFICE VISIT (OUTPATIENT)
Dept: POSTPARTUM | Facility: CLINIC | Age: 33
End: 2023-09-14
Payer: COMMERCIAL

## 2023-09-14 PROCEDURE — 99403 PREV MED CNSL INDIV APPRX 45: CPT | Performed by: PEDIATRICS

## 2023-09-14 NOTE — PATIENT INSTRUCTIONS
Continue to offer the breast as often as you feel comfortable. If Jocelynn is latching and feeding well, there is no need to pump. I do recommended that you continue to pump any time a feeding at the breast is missed. Keep your breasts/nipples warm after feeding or pumping by covering with warmed oil/ointment and a breast pad immediately after feeding/pumping. Monitor your symptoms and call if they are worsening. Follow up with Dr Katheryn Varela as scheduled. Please call with any questions or concerns.

## 2023-09-14 NOTE — PROGRESS NOTES
BREAST FEEDING FOLLOW UP VISIT    Informant/Relationship: Sherry    Discussion of General Lactation Issues: Joseph Cr feels that she and Jocelynn are making progress. Infant is 4 weeks old today. Interval Breastfeeding History:  Frequency of breast feeding: Currently once a day  Does mother feel breastfeeding is effective: Yes  Does infant appear satisfied after nursing: sometimes  Stooling pattern normal: Passes one large stool a day  Urinating frequently:Yes  Using shield or shells:No     Alternative/Artificial Feedings:   Bottle: Yes, Jocelynn is almpst exclusively bottle fed at this time. Using an Erika bottle. Cup: No  Syringe/Finger: No           Formula Type: Enfamil                     Amount: up to 6 ounces a day when expressed milk is not available. Breast Milk:                      Amount: 3 ounces            Frequency Q 2-3 Hr between feedings  Elimination Problems: Yes         Equipment:  Nipple Shield             Type: none             Size: n/a             Frequency of Use: n/a  Pump            Type: Zomee Z2            Frequency of Use: Every 3-4 hours ATC. Expresses 2 ounces per session typically and occasionally up to 4 ounces  Shells            Type: none            Frequency of use: n/a     Equipment Problems: none        Mom:  Breast: Medium sized symmetrical breasts. Slightly pendulous. Nipple Assessment in General: Normal: elongated/eraser, no discoloration and no damage noted. Mother's Awareness of Feeding Cues                 Recognizes: Yes                  Verbalizes: Yes  Support System: FOB, extended family  History of Breastfeeding: none  Changes/Stressors/Violence: Joseph Cr is concerned that [address] does not latch or feed effectively at the breast.  She is concerned about her milk production  Concerns/Goals: Joseph Cr desires to [facility]     Problems with Mom: concerns with milk production    Physical Exam  Constitutional:       Appearance: Normal appearance.    HENT:      Head: Normocephalic and atraumatic. Pulmonary:      Effort: Pulmonary effort is normal.   Musculoskeletal:         General: Normal range of motion. Cervical back: Normal range of motion and neck supple. Neurological:      Mental Status: She is alert and oriented to person, place, and time. Skin:     General: Skin is warm and dry. Psychiatric:         Mood and Affect: Mood is anxious. Behavior: Behavior normal.         Thought Content: Thought content normal.         Judgment: Judgment normal.      Comments: Kedar Connelly made many comments during the visit about concerns about Jocelynn's skin, Sherry's nipple changes and other concerns that appeared hyperfocused on small details. Infant:  Behaviors: Alert  Color: Pink  Birth weight: 3670gram  Current weight: 4110gram    Problems with infant: not content after nursing    General Appearance:  Alert, active, no distress                            Head:  Normocephalic, AFOF, sutures ridged                            Eyes:   Conjunctiva clear, no drainage                            Ears:   Normally placed, no anomolies                           Nose:   no drainage or erythema                          Mouth:  No lesions. Tongue extends to the lower lip, lateralizes well and lifts without restriction  There is a noticeable speed bump felt when sweeping my finger under the tongue. Excellent cupping and peristalsis noted as she sucked on my finger. Neck:  Supple, symmetrical, trachea midline                Respiratory:  No grunting, flaring, retractions, breath sounds clear and equal           Cardiovascular:  Regular rate and rhythm. No murmur. Adequate perfusion/capillary refill.  Femoral pulse present                  Abdomen:    Soft, non-tender, no masses, bowel sounds present, no HSM            Genitourinary:  Normal female genitalia, anus patent                         Spine:   No abnormalities noted       Musculoskeletal: Full range of motion         Skin/Hair/Nails:   Skin warm, dry, and intact, no rashes or abnormal dyspigmentation or lesions               Neurologic:   No abnormal movement, tone appropriate for gestational age    [place] Latch:  Efficiency:               Lips Flanged: Yes, after repositioning              Depth of latch: excellent after repositioning              Audible Swallow: Yes, sustained SSB              Visible Milk: Yes              Wide Open/ Asymmetrical: Yes, after repositioning              Suck Swallow Cycle: Breathing: unlabored, Coordinated: yes  Nipple Assessment after latch: Normal: elongated/eraser, no discoloration and no damage noted. Latch Problems: Sherry needed review of positioning. After review, she was able to guide Jocelynn to a deep effective, completely comfortable latch. She fed effectively on both breasts and appeared completely content after feeding. Position:  Infant's Ergonomics/Body               Body Alignment: Yes               Head Supported: Yes               Close to Mom's body/ Lifted/ Supported: Yes               Mom's Ergonomics/Body: Yes                           Supported: Yes                           Sitting Back: Yes                           Brings Baby to her breast: Yes  Positioning Problems: Dorie Gill did well. She just needed a little help shaping her breast in alignment with Jocelynn's mouth and positioning her nipple just below Jocelynn's nose rather than at her lower lip. Handouts:   None    Education:  Reviewed Latch: Demonstrated how to gently compress the breast and align the baby so that her nose is just above the nipple with her lower lip and chin touching the breast to encourage the deepest, widest, off-center latch. Reviewed galactagogues. Plan:  Reassurance and support given. I acknowledged Sherry's commitment to breastfeeding. I encouraged her to continue to directly breastfeed as often as she is comfortable.   We worked on positioning to improve her ability to guide Jocelynn to an effective latch. I reassured her that Jesse Romberg is able to feed effectively and get what she needs from the breast at this time. Laya Dobbs expressed that she is comfortable directly breastfeeding twice a day but desires to be able to quantify most of Jocelynn's feedings. I recommended that she continue pumping whenever a feeding at the breast is missed. I reassured her there is no need to pump when Jocelynn nurses effectively. I offered her additional support from our mental health provider. An appointment was scheduled with Dr Tia Renner for more support with milk production. At this time, Kips milk production is not quite meeting Jocelynn's needs but this may be more related to pump reliance rather than a true deficit. Laya Dobbs was given information about supplements that help increase milk production over time. I encouraged her to call with any questions or concerns. I have spent 60 minutes with Patient and family today in which greater than 50% of this time was spent in counseling/coordination of care regarding Instructions for management and Patient and family education.

## 2023-09-17 NOTE — PROGRESS NOTES
I have reviewed the notes, assessments, and/or procedures performed by Lorena Thomas RN, IBCLC, I concur with her/his documentation of Bridger Guadarrama MD 09/17/23

## 2023-09-18 ENCOUNTER — TELEPHONE (OUTPATIENT)
Dept: PSYCHIATRY | Facility: CLINIC | Age: 33
End: 2023-09-18

## 2023-09-18 NOTE — TELEPHONE ENCOUNTER
Sent Wait List Letter VIA [employer] . Verify patients need of services from wait list after 15 days.  If no communication remove from NON-REFERRAL wait list.

## 2023-09-18 NOTE — LETTER
Dear Milton Hays : We are contacting you because your name is currently included on the [address] [place] [address] wait-list for Talk Therapy and/or Medication Management. (Please [place] which services are needed)     In our efforts to provide the highest quality care, [facility] has begun the process of upgrading our behavioral health systems to increase efficiency and expedite delivery of services. As part of this process, we ask you to please confirm your continued interest in the services above. If you are no longer interested or in need, please tatyana “No” in the area below. If you are still interested and in need, please tatyana “Yes” and provide your most current demographic and insurance information within 15 days. If we do not receive confirmation from you by October 3, 2023 your information will not be included in the system upgrade and your place on the waitlist will be lost.     Thank you in advance for your patience and understanding and we apologize for any inconvenience this may cause. Patient Name and :    Still in need of services: Yes or No     Current Address:     Phone#:     Best time to receive a call: Insurance Carrier:      Policy/ID#: Group#: Insurance Services Phone#:      What is your current presenting problem? Open to virtual talk therapy: Yes or No      We will call you to do an Intake when an appointment becomes available. You can send this information back to us in any of the ways below:    Email: [email or web address]. Josh Lozano  Fax#:  [phone]  Mail:   [facility]             [address] [facility], [address] [place] [address]

## 2023-09-26 ENCOUNTER — TELEPHONE (OUTPATIENT)
Dept: FAMILY MEDICINE CLINIC | Facility: HOSPITAL | Age: 33
End: 2023-09-26

## 2023-09-26 NOTE — TELEPHONE ENCOUNTER
Pt calling, concerned about baby Fili Cano having a cow milk allergy.  Pt sent pictures via my chart and is very concerned, please address asap

## 2023-10-02 ENCOUNTER — POSTPARTUM VISIT (OUTPATIENT)
Dept: OBGYN CLINIC | Facility: CLINIC | Age: 33
End: 2023-10-02

## 2023-10-02 ENCOUNTER — OFFICE VISIT (OUTPATIENT)
Age: 33
End: 2023-10-02
Payer: COMMERCIAL

## 2023-10-02 VITALS
SYSTOLIC BLOOD PRESSURE: 104 MMHG | HEIGHT: 65 IN | WEIGHT: 189 LBS | DIASTOLIC BLOOD PRESSURE: 64 MMHG | BODY MASS INDEX: 31.49 KG/M2

## 2023-10-02 VITALS — SYSTOLIC BLOOD PRESSURE: 124 MMHG | DIASTOLIC BLOOD PRESSURE: 72 MMHG

## 2023-10-02 DIAGNOSIS — Z86.711 HISTORY OF PULMONARY EMBOLUS (PE): ICD-10-CM

## 2023-10-02 DIAGNOSIS — F41.8 POSTPARTUM ANXIETY: Primary | ICD-10-CM

## 2023-10-02 DIAGNOSIS — F33.9 DEPRESSION, RECURRENT (HCC): ICD-10-CM

## 2023-10-02 DIAGNOSIS — Z30.09 ENCOUNTER FOR OTHER GENERAL COUNSELING OR ADVICE ON CONTRACEPTION: ICD-10-CM

## 2023-10-02 DIAGNOSIS — F41.1 GENERALIZED ANXIETY DISORDER: ICD-10-CM

## 2023-10-02 PROBLEM — O99.820 GBS (GROUP B STREPTOCOCCUS CARRIER), +RV CULTURE, CURRENTLY PREGNANT: Status: RESOLVED | Noted: 2023-08-17 | Resolved: 2023-10-02

## 2023-10-02 PROBLEM — R82.71 GBS BACTERIURIA: Status: RESOLVED | Noted: 2023-01-26 | Resolved: 2023-10-02

## 2023-10-02 PROCEDURE — 99215 OFFICE O/P EST HI 40 MIN: CPT | Performed by: PEDIATRICS

## 2023-10-02 PROCEDURE — 99024 POSTOP FOLLOW-UP VISIT: CPT | Performed by: OBSTETRICS & GYNECOLOGY

## 2023-10-02 NOTE — PATIENT INSTRUCTIONS
Nurse on demand: when baby gives hunger cues; when your breasts feel full (if you still have this feeling), or at least every 3 hours during the day and every 5-6 hours at night counting from the beginning of one feeding to the beginning of the next; which ever comes first. When sucking and swallowing slow,  you may gently compress the breast to restart flow. If active suck-swallow does not restart, you may gently remove the baby and offer the other breast; offering up to "four" breasts per feeding. Pump whenever Jocelynn is not fed at the breast (in other words, she gets a bottle) and as needed for comfort. Pumping once daily, in the morning and just after nursing, is a great way to build your "safety net" for planned or unplanned separations. Always feed the freshest breast milk first. When you return to work, the breast milk you pumped over the last day or so before you returned is what Jocelynn gets on Monday, Tuesday she gets what was pumped on Monday, and so on. What's already stored is your "rainy day stash."    Start the Zoloft as prescribed. Call [facility] and [facility] and ask for a Behavioral Health appointment.

## 2023-10-02 NOTE — PROGRESS NOTES
Routine Post Partum Visit  [address], [address], [facility], [address] [facility],[address]    Assessment/Plan:  Areli Pang is a 35y.o. year old  who presents for postpartum visit. Routine Postpartum Care  1. Normal postpartum exam  2. Contraception: none  3. Depression Screen: PPD screen score: 13; positive   4. Feeding:  She is breast feeding with some supplementation. 5. Psychosocial support: present  6. Cervical cancer screening Up to Date  7. Follow up in: 3 months for wellness visit, or as needed. Additional Problems:  1. Postpartum exam    2. Depression, recurrent ([address])  -     sertraline (Zoloft) 50 mg tablet; Take 1 tablet (50 mg total) by mouth daily    3. Encounter for other general counseling or advice on contraception    4. History of pulmonary embolus (PE)    5. Generalized anxiety disorder          -   Patient desires Nexplanon for contraception    Next visit: 3 months Wellness    Subjective:     CC: Postpartum visit    Shantel Mcbride is a 35 y.o. y.o. female [id number] who presents for a postpartum visit. She is 6 weeks postpartum following a spontaneous vaginal delivery on 23 at 39.5 weeks. Outcome: spontaneous vaginal delivery, 2nd degree  Anesthesia: epidural. Postpartum course has been uncomplicated. Baby's course has been uncomplicated. Baby is feeding by She is breast feeding with some supplementation. .     Bleeding no bleeding. Bowel function is normal. Bladder function is normal. Patient is not sexually active since delivery. Contraception method is none. Postpartum depression screening: positive.     The following portions of the patient's history were reviewed and updated as appropriate: allergies, current medications, past family history, past medical history, obstetric history, gynecologic history, past social history, past surgical history and problem list.      Objective:  /64 (BP Location: Right arm, Patient Position: Sitting, Cuff Size: Standard)   Ht 5' 5" (1.651 m)   Wt 85.7 kg (189 lb)   LMP 11/13/2022 (Exact Date)   Breastfeeding Yes Comment: Botte feeding also  BMI 31.45 kg/m²   Pregravid Weight/BMI: 92.1 kg (203 lb) (BMI 33.78)  Current Weight: 85.7 kg (189 lb)   Total Weight Gain: -2.268 kg (-5 lb)     General: Well appearing, no distress. Mood and affect: Appropriate.   Abdomen: Soft, nontender  Vulva: normal  Vagina: normal, no abnormal discharge  Cervix: closed, no bleeding  Uterus: non-tender, normal size  Adnexa: no masses, no tenderness

## 2023-10-02 NOTE — PROGRESS NOTES
BREAST FEEDING FOLLOW UP VISIT    Informant/Relationship: Sherry/self    Discussion of General Lactation Issues: Fredi Nunez initially sought lactation input for knowledge. Fredi Nunez was concerned because she felt that [address] kept losing a lot of weight. Jocelynn was doing a lot of sleeping. She would sleep for 6.5 hours and be difficult to wake. Fredi Nunez was initially seen by lactation when [address] was 10days old. Jocelynn was down by 6% from birth weight and Fredi Nunez had scabbed nipples. Over the next week, Fredi Nunez began pumping and giving expressed breast milk. She needed to be resized for her flanges and that helped. Jocelynn was struggling with both breast and bottle, but pacing was taught and tools to improve attachment and positioning were also taught. Over the last 2 weeks since Fredi Nunez and Jocelynn's last visit, she was exclusively breast feeding. Two days ago, Jocelynn introduced a soy milk formula due to concerns about a cow''s milk allergy. Sherry's mother brought up this concern due to Jocelynn's rash and her own history. Fredi Nunez and her 2 siblings were all fed soy formula due to this history. Infant is 7 weeks old today.     Interval Breastfeeding History:    Frequency of breast feeding: had been every feeding until the last 2 days; just over night the last 2 days  Does mother feel breastfeeding is effective: Yes  Does infant appear satisfied after nursing:Yes  Stooling pattern normal:Yes  Urinating frequently:Yes  Using shield or shells:No    Alternative/Artificial Feedings:   Bottle: Yes, not pacing  Cup: No  Syringe/Finger: N/A           Formula Type: [place] Soy                     Amount: 2-3 oz            Breast Milk:                      Amount: 3 oz            Frequency Q 2 Hr between feedings; up to 5-6 hours at night  Elimination Problems: No      Equipment:  Nipple Shield             Type: n/a             Size: n/a             Frequency of Use: n/a  Pump            Type: Zomee            Frequency of Use: every 4-5 hours, collecting 4-5 oz  Shells            Type: n/a            Frequency of use: n/a    Equipment Problems: no      Mom:  Breast: Normal  Nipple Assessment in General: Normal: elongated/eraser, no discoloration and no damage noted. Mother's Awareness of Feeding Cues                 Recognizes: Yes                  Verbalizes: Yes  Support System: MGGERA GRAF  History of Breastfeeding: none  Changes/Stressors/Violence: concerns about milk production, concerns about infant milk allergy  Concerns/Goals: Merlin Side wishes to primarily provide her milk, she wishes to feed at the breast as much as possible. Problems with Mom: milk production concerns    Physical Exam  Constitutional:       Appearance: Normal appearance. She is well-developed and normal weight. HENT:      Head: Normocephalic and atraumatic. Eyes:      Extraocular Movements: Extraocular movements intact. Neck:      Thyroid: No thyromegaly. Cardiovascular:      Rate and Rhythm: Normal rate and regular rhythm. Pulses: Normal pulses. Heart sounds: Normal heart sounds. No murmur heard. Pulmonary:      Effort: Pulmonary effort is normal.      Breath sounds: Normal breath sounds. Musculoskeletal:      Cervical back: Normal range of motion and neck supple. Lymphadenopathy:      Cervical: No cervical adenopathy. Upper Body:      Right upper body: No pectoral adenopathy. Left upper body: No pectoral adenopathy. Neurological:      General: No focal deficit present. Mental Status: She is alert and oriented to person, place, and time. Psychiatric:         Mood and Affect: Mood normal.         Behavior: Behavior normal.         Thought Content: Thought content normal.         Judgment: Judgment normal.   Vitals and nursing note reviewed.          Infant:  Behaviors: Alert  Color: Healthy  Birth weight: 3.67 kg  Current weight: 4.63 kg    Problems with infant: None      General Appearance:  Alert, active, no distress Head:  Normocephalic, AFOF, sutures opposed                            Eyes:   Conjunctiva clear, no drainage                            Ears:   Normally placed, no anomolies                           Nose:   Septum intact, no drainage or erythema                          Mouth:  No lesions                   Neck:  Supple, symmetrical, trachea midline, no adenopathy; thyroid: no enlargement, symmetric, no tenderness/mass/nodules                Respiratory:  No grunting, flaring, retractions, breath sounds clear and equal           Cardiovascular:  Regular rate and rhythm. No murmur. Adequate perfusion/capillary refill. Femoral pulse present                  Abdomen:    Soft, non-tender, no masses, bowel sounds present, no HSM            Genitourinary:  Normal female genitalia, anus patent                         Spine:   No abnormalities noted       Musculoskeletal:   Full range of motion         Skin/Hair/Nails:   Skin warm, dry, and intact, no rashes or abnormal dyspigmentation or lesions               Neurologic:   No abnormal movement, tone appropriate for gestational age    [place] Latch:  Efficiency:               Lips Flanged: Yes              Depth of latch: Very good              Audible Swallow: Yes              Visible Milk: Yes              Wide Open/ Asymmetrical: Yes              Suck Swallow Cycle: Breathing: Unlabored, Coordinated: Yes  Nipple Assessment after latch: Normal: elongated/eraser, no discoloration and no damage noted. Latch Problems: Sherry easily assists Jocelynn to the breast. Attachment is deeper and more comfortable with some slight adjustment in where she places her hands. Position:  Infant's Ergonomics/Body               Body Alignment: Yes               Head Supported: Yes               Close to Mom's body/ Lifted/ Supported: Yes               Mom's Ergonomics/Body: Yes                           Supported:  Yes                           Sitting Back: Yes Brings Baby to her breast: Yes  Positioning Problems: None      Education:  Reviewed Latch: Reviewed how to gently compress the breast as if offering a sandwich to facilitate a deeper latch. Reviewed Positioning for Dyad: Reviewed how to bring baby to the breast so that her lower lip and chin touch the breast with her nose just above the nipple to encourage a wider, more asymmetric latch. Reviewed Frequency/Supply & Demand: Recommended feeding on demand: when the baby gives hunger cues, when the breasts feel full, every 3 hours during the day and every 5 hours at night counting from the beginning of one feeding to the beginning of the next; whichever comes first.   Reviewed Alternative/Artificial Feedings: Paced bottle feeding  Reviewed Mom/Breast care: Pump whenever Jocelynn is not fed at the breast; as needed for comfort; discussed pumping to build a "safety net"         Plan:  Discussed history and physical exams with Sherry. Reassurance given that the baby is gaining weight well and that much of her early weight loss reflects diuresis. Discussed how well Aniket Cao has gained weight over the past 2 weeks while Liv Chacon was only feeding her at the breast.    Reassurance given that the baby's rash is not secondary to any allergy. Encouraged Liv Chacon to continue nursing on demand. Recommended pumping whenever the baby is fed via bottle and using paced bottle feeding. Discussed pumping in the morning, once daily, to build a "safety net."    Discussed the negative impact that anxiety can have on a breastfeeding journey. Increased anxiety can interfere with confidence needed for breastfeeding, decrease the response to oxytocin, and ultimately lead to increased supplementation and decreased milk production. Encouraged taking the medication prescribed by her primary care physician and seeking a counselor. Information given regarding behavioral health through [facility].  Encouraged continued exclusive breastfeeding with occasional bottles of expressed breast milk. Additional lactation support remains available.

## 2023-10-11 ENCOUNTER — CLINICAL SUPPORT (OUTPATIENT)
Dept: FAMILY MEDICINE CLINIC | Facility: HOSPITAL | Age: 33
End: 2023-10-11
Payer: COMMERCIAL

## 2023-10-11 DIAGNOSIS — Z23 NEED FOR VACCINATION TO PREVENT TUBERCULOSIS: Primary | ICD-10-CM

## 2023-10-11 PROCEDURE — 86580 TB INTRADERMAL TEST: CPT

## 2023-10-13 ENCOUNTER — CLINICAL SUPPORT (OUTPATIENT)
Dept: FAMILY MEDICINE CLINIC | Facility: HOSPITAL | Age: 33
End: 2023-10-13

## 2023-10-13 DIAGNOSIS — Z11.1 PPD SCREENING TEST: Primary | ICD-10-CM

## 2023-10-13 LAB
INDURATION: 0 MM
TB SKIN TEST: NEGATIVE

## 2023-10-13 PROCEDURE — 99024 POSTOP FOLLOW-UP VISIT: CPT

## 2023-10-30 ENCOUNTER — OFFICE VISIT (OUTPATIENT)
Dept: ENDOCRINOLOGY | Facility: HOSPITAL | Age: 33
End: 2023-10-30
Payer: COMMERCIAL

## 2023-10-30 VITALS
HEIGHT: 65 IN | SYSTOLIC BLOOD PRESSURE: 108 MMHG | HEART RATE: 72 BPM | WEIGHT: 197.2 LBS | DIASTOLIC BLOOD PRESSURE: 78 MMHG | BODY MASS INDEX: 32.86 KG/M2

## 2023-10-30 DIAGNOSIS — E89.0 POSTOPERATIVE HYPOTHYROIDISM: Primary | ICD-10-CM

## 2023-10-30 DIAGNOSIS — E04.2 MULTIPLE THYROID NODULES: Chronic | ICD-10-CM

## 2023-10-30 PROCEDURE — 99213 OFFICE O/P EST LOW 20 MIN: CPT | Performed by: INTERNAL MEDICINE

## 2023-10-30 NOTE — PATIENT INSTRUCTIONS
GIVEN THE TYROID BLOOD WORK IS ON THE OVERACTIVE SIDE OF NORMAL AND YOU ARE SWEATING, LET'S SLIGHTLY DECREASE THE LEVOTHYROXINE TO 75 MCG 1 TABLET DAILY BUT 1/2 TABLET ONCE DAY EVERY OTHER WEEK. BIOTIN IN LESS THAN 100 MCG IS OK, ANYTHING OVER 100 MCG BIOTIN DAILY NEEDS TO BE HELD 2-3 DAYS BEFORE THE BLOOD WORK. follow UP IN 3 MONTHS WITH BLOOD WORK.     let'S GET THYROID ULTRASOUND DONE.

## 2023-10-30 NOTE — PROGRESS NOTES
Diagnoses and all orders for this visit:    Postoperative hypothyroidism  -     TSH, 3rd generation Lab Collect; Future  -     T4, free Lab Collect; Future  -     T3, free; Future  -     TSH, 3rd generation Lab Collect  -     T4, free Lab Collect  -     T3, free    Multiple thyroid nodules  -     TSH, 3rd generation Lab Collect; Future  -     T4, free Lab Collect; Future  -     T3, free; Future  -     TSH, 3rd generation Lab Collect  -     T4, free Lab Collect  -     T3, free  -     US thyroid; Future        10/31/2023    Assessment/Plan          1. Hypothyroidism post partial thyroidectomy. Most recent thyroid function studies are normal. She is biochemically euthyroid but clinically has some hyperthyroid symptoms. We will try decreasing her levothyroxine slightly to levothyroxine 75 mcg daily and a half a pill 1 day every other week. This she seemed to tolerate during pregnancy in the past. I will then repeat her blood work down in several months. 2. Multiple thyroid nodules. She is due for thyroid ultrasound and I will order that test.    I have asked her to follow up in 3 months with preceding TSH, free T4, and free T3.    CC: Hypothyroidism with post partial thyroidectomy     HPI: Fabián Jones is a 29-year-old female with history of hypothyroidism  post partial thyroidectomy for a follow-up visit. Multiple thyroid nodules were noted in the past.  She would a partial thyroidectomy of her left thyroid lobe and still has some right-sided thyroid nodules that are stable in size but are T rad 4 and T rad 5. Her right side of her lobe is quite large. She was seen in August 2022 due to significant weight gain. Thyroid function tests were noted to be hypothyroid and she was started on Tirosint SOL 50 mcg daily for thyroid hormone replacement as she can not swallow pill. The rest of her blood work was normal ruling out other causes of her weight issues. PCOS and insulin resistance were ruled out.   She did have a mildly elevated a.m. cortisol 21.3 with normals up to 19.4 which is not remarkable. She has multiple nodules on her thyroid and a T RADS 3 thyroid nodule in the right lower lobe for which biopsy was recommended. She underwent biopsy of this nodule on 11/8/2022 demonstrating [place] class III pathology. The Afirma testing was benign. The patient recently gave birth and is currently nursing. She is taking levothyroxine 75 mcg once a day. She reports profuse sweating with heat insensitivity. She denies any heart palpitations, chest pain, tremors, dyspnea, or blurry vison. She does have mild skin rash infection due to her baby's staphylococcus infection and is alleviated with Aveeno cream. She also has nail breakage and excessive hair loss. She does not have diarrhea, constipation, abdominal pain but still experiencing bleeding due to postpartum. She sleeps well at night, but she reports feeling fatigued all the time even when she gets 8 to 10 hours of sleep, and this has been the case even before she gave birth. She is still experiencing lactational amenorrhea. Her current weight is acceptable. She is also taking Zoloft for her anxiety. Review of Systems  The pertinent positive and negative findings are as noted in the HPI.     Historical Information   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    Vitamin D deficiency     Vocal cord paralysis     Following partial thyroidectomy     Past Surgical History:   Procedure Laterality Date    DILATION AND EVACUATION  2016    KNEE ARTHROSCOPY W/ PLICA EXCISION Left 8302    THYROIDECTOMY, PARTIAL Left 2013    UPPER GASTROINTESTINAL ENDOSCOPY      US GUIDED THYROID BIOPSY  11/08/2022    WISDOM TOOTH EXTRACTION       Social History   Social History     Substance and Sexual Activity   Alcohol Use Not Currently    Comment: socially     Social History     Substance and Sexual Activity   Drug Use Not Currently    Comment:  no longer using marijuana     Social History     Tobacco Use   Smoking Status Never    Passive exposure: Never   Smokeless Tobacco Never   Tobacco Comments     seasonal, only when it's warm out, social     Family History:   Family History   Problem Relation Age of Onset    Thyroid disease Mother         thyroid nodules    No Known Problems Father     Ovarian cancer Sister     Thyroid disease Sister     Anorexia nervosa Sister     Mental illness Sister     No Known Problems Sister     No Known Problems Brother     Other Maternal Aunt         hypoglycemia    Other Maternal Aunt         brain tumor, had siezure    Celiac disease Maternal Grandmother     Hodgkin's lymphoma Maternal Grandfather     Hodgkin's lymphoma Paternal Grandfather     No Known Problems Daughter     Colon cancer Neg Hx     Colon polyps Neg Hx     Substance Abuse Neg Hx        Meds/Allergies   Current Outpatient Medications   Medication Sig Dispense Refill    levothyroxine 75 mcg tablet TAKE 1 TABLET BY MOUTH EVERY DAY 90 tablet 2    Prenatal Vit-Fe Fumarate-FA (prenatal multivitamin) 28-0.8 MG TABS Take 1 tablet by mouth daily Late afternoon      sertraline (Zoloft) 50 mg tablet Take 1 tablet (50 mg total) by mouth daily 30 tablet 11    acetaminophen (TYLENOL) 325 mg tablet Take 2 tablets (650 mg total) by mouth every 6 (six) hours as needed for mild pain (Patient not taking: Reported on 8/31/2023)  0    docusate sodium (COLACE) 100 mg capsule Take 1 capsule (100 mg total) by mouth 2 (two) times a day as needed for constipation (Patient not taking: Reported on 8/31/2023)  0    enoxaparin (LOVENOX) 40 mg/0.4 mL INJECT 0.4 MILLILITERS SUBCUTANEOUSLY EVERY DAY (Patient not taking: Reported on 10/30/2023) 12 mL 0    witch hazel-glycerin (TUCKS) topical pad Apply 1 Pad topically every 4 (four) hours as needed for irritation (Patient not taking: Reported on 8/31/2023)  0     No current facility-administered medications for this visit. No Known Allergies    Objective   Vitals: Blood pressure 108/78, pulse 72, height 5' 5" (1.651 m), weight 89.4 kg (197 lb 3.2 oz), currently breastfeeding. Invasive Devices       None                   Physical Exam  Physical exam, normal with pertinent positives or negatives. Eyes: No lid lags, stare, proptosis, or periorbital edema. Neck: Healed anterior neck scar. Neck exam demonstrates an enlarged thyroid. This nodule in a field with a 2 to 3 cm thyroid nodule palpable in the right lobe of the thyroid. No lymphadenopathy. Respiratory: Lungs are clear. Cardiovascular: Heart regular. Neurological: No tremor of the outstretched hands and patellar, deep tendon reflexes normal.    The history was obtained from the review of the chart and from the patient. Lab Results:          Lab Results   Component Value Date    CREATININE 0.62 02/21/2023    CREATININE 0.86 08/16/2022    CREATININE 0.76 08/08/2022    BUN 7 02/21/2023    K 3.5 02/21/2023     02/21/2023    CO2 23 02/21/2023     eGFR   Date Value Ref Range Status   02/21/2023 118 ml/min/1.73sq m Final         Lab Results   Component Value Date    HDL 70 03/30/2021    TRIG 85 03/30/2021    CHOLHDL 2.3 03/30/2021       Lab Results   Component Value Date    ALT 19 08/16/2022    AST 19 08/16/2022    ALKPHOS 46 06/30/2022       Lab Results   Component Value Date    TSH 0.804 10/28/2023    FREET4 1.10 10/28/2023         Blood work performed on 10/28/2023 showed a TSH of 0.804 with a free T4 of 1.1. No future appointments. Transcribed for Leslie Blood MD, by Donna Ybarra on 10/31/23 at 1:22 PM. Powered by [employer].

## 2023-10-31 ENCOUNTER — TELEPHONE (OUTPATIENT)
Dept: OBGYN CLINIC | Facility: CLINIC | Age: 33
End: 2023-10-31

## 2023-10-31 NOTE — TELEPHONE ENCOUNTER
Melodie Goodson called stating pharmacy informed her with her Nicholas Wilfredkathya script can only have 5 refills. Contacted pharmacy, spoke with Robert Robles who states medicare only covers medication 6 months at a time. The patient filled the first month of the original prescription so pharmacist is unable to adjust the refill amount. New script will need to be issued indicating 5 additional refills. Dr. Aaron Buckner, please provide new script with 5 refills not 11.

## 2023-10-31 NOTE — TELEPHONE ENCOUNTER
[facility] contacted office Sertraline is not covered on pts Rx plan. [facility] pharmacy is requesting alternative medication. This nurse spoke with [facility] Pharmacist could not provide a list of covered medications. Left a message on Sherry's voice mail to contact her Rx plan to obtain a list of covered medications.

## 2023-11-01 DIAGNOSIS — F33.9 DEPRESSION, RECURRENT (HCC): Primary | ICD-10-CM

## 2023-11-04 DIAGNOSIS — F33.9 DEPRESSION, RECURRENT (HCC): ICD-10-CM

## 2023-11-10 ENCOUNTER — OFFICE VISIT (OUTPATIENT)
Dept: URGENT CARE | Facility: CLINIC | Age: 33
End: 2023-11-10
Payer: COMMERCIAL

## 2023-11-10 VITALS
HEIGHT: 65 IN | TEMPERATURE: 97.6 F | HEART RATE: 82 BPM | BODY MASS INDEX: 32.99 KG/M2 | SYSTOLIC BLOOD PRESSURE: 104 MMHG | WEIGHT: 198 LBS | DIASTOLIC BLOOD PRESSURE: 70 MMHG | OXYGEN SATURATION: 99 % | RESPIRATION RATE: 18 BRPM

## 2023-11-10 DIAGNOSIS — J02.9 SORE THROAT: Primary | ICD-10-CM

## 2023-11-10 DIAGNOSIS — R07.0 THROAT PAIN: ICD-10-CM

## 2023-11-10 DIAGNOSIS — L29.9 EAR ITCHING: ICD-10-CM

## 2023-11-10 LAB
S PYO AG THROAT QL: NEGATIVE
SARS-COV-2 AG UPPER RESP QL IA: NEGATIVE
VALID CONTROL: NORMAL

## 2023-11-10 PROCEDURE — 99213 OFFICE O/P EST LOW 20 MIN: CPT

## 2023-11-10 PROCEDURE — 87811 SARS-COV-2 COVID19 W/OPTIC: CPT

## 2023-11-10 PROCEDURE — 87880 STREP A ASSAY W/OPTIC: CPT

## 2023-11-10 NOTE — PROGRESS NOTES
[facility]        NAME: Pascale Ireland is a 35 y.o. female  : 1990    MRN: [id number]  DATE: November 10, 2023  TIME: 5:10 PM    Assessment and Plan   Sore throat [J02.9]  1. Sore throat  Poct Covid 19 Rapid Antigen Test      2. Throat pain  POCT rapid strepA      3. Ear itching            - Rapid strep negative  - Rapid COVID negative    Patient Instructions       Follow up with PCP in 3-5 days. Proceed to  ER if symptoms worsen. Chief Complaint     Chief Complaint   Patient presents with    Sore Throat     Sore throat started 2 days ago. Voice is scratchy and ears itch. History of Present Illness       36 y/o F presents for sore throat x 2 days. Also admits to B/L ear itching. 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 No fevers. IBU PRN. Review of Systems   Review of Systems   Constitutional:  Negative for chills and fever. HENT:  Positive for sore throat. Negative for congestion, ear pain and rhinorrhea. Respiratory:  Negative for cough.           Current Medications       Current Outpatient Medications:     levothyroxine 75 mcg tablet, TAKE 1 TABLET BY MOUTH EVERY DAY, Disp: 90 tablet, Rfl: 2    Prenatal Vit-Fe Fumarate-FA (prenatal multivitamin) 28-0.8 MG TABS, Take 1 tablet by mouth daily Late afternoon, Disp: , Rfl:     sertraline (Zoloft) 50 mg tablet, Take 1 tablet (50 mg total) by mouth daily, Disp: 30 tablet, Rfl: 5    acetaminophen (TYLENOL) 325 mg tablet, Take 2 tablets (650 mg total) by mouth every 6 (six) hours as needed for mild pain (Patient not taking: Reported on 2023), Disp: , Rfl: 0    docusate sodium (COLACE) 100 mg capsule, Take 1 capsule (100 mg total) by mouth 2 (two) times a day as needed for constipation (Patient not taking: Reported on 2023), Disp: , Rfl: 0    enoxaparin (LOVENOX) 40 mg/0.4 mL, INJECT 0.4 MILLILITERS SUBCUTANEOUSLY EVERY DAY (Patient not taking: Reported on 10/30/2023), Disp: 12 mL, Rfl: 0    witch claudine-glycerin (TUCKS) topical pad, Apply 1 Pad topically every 4 (four) hours as needed for irritation (Patient not taking: Reported on 8/31/2023), Disp: , Rfl: 0    Current Allergies     Allergies as of 11/10/2023    (No Known Allergies)            The following portions of the patient's history were reviewed and updated as appropriate: allergies, current medications, past family history, past medical history, past social history, past surgical history and problem list.     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    Vitamin D deficiency     Vocal cord paralysis     Following partial thyroidectomy       Past Surgical History:   Procedure Laterality Date    DILATION AND EVACUATION  2016    KNEE ARTHROSCOPY W/ PLICA EXCISION Left 2852    THYROIDECTOMY, PARTIAL Left 2013    UPPER GASTROINTESTINAL ENDOSCOPY      US GUIDED THYROID BIOPSY  11/08/2022    WISDOM TOOTH EXTRACTION         Family History   Problem Relation Age of Onset    Thyroid disease Mother         thyroid nodules    No Known Problems Father     Ovarian cancer Sister     Thyroid disease Sister     Anorexia nervosa Sister     Mental illness Sister     No Known Problems Sister     No Known Problems Brother     Other Maternal Aunt         hypoglycemia    Other Maternal Aunt         brain tumor, had siezure    Celiac disease Maternal Grandmother     Hodgkin's lymphoma Maternal Grandfather     Hodgkin's lymphoma Paternal Grandfather     No Known Problems Daughter     Colon cancer Neg Hx     Colon polyps Neg Hx     Substance Abuse Neg Hx          Medications have been verified.         Objective   /70   Pulse 82   Temp 97.6 °F (36.4 °C) Resp 18   Ht 5' 5" (1.651 m)   Wt 89.8 kg (198 lb)   SpO2 99%   Breastfeeding Yes   BMI 32.95 kg/m²   No LMP recorded. Physical Exam     Physical Exam  Vitals and nursing note reviewed. Constitutional:       General: She is not in acute distress. Appearance: She is not toxic-appearing. HENT:      Head: Normocephalic and atraumatic. Right Ear: Tympanic membrane and ear canal normal.      Left Ear: Tympanic membrane and ear canal normal.      Nose: No congestion or rhinorrhea. Mouth/Throat:      Mouth: Mucous membranes are moist.      Pharynx: No oropharyngeal exudate or posterior oropharyngeal erythema. Tonsils: No tonsillar exudate. Eyes:      Conjunctiva/sclera: Conjunctivae normal.   Pulmonary:      Effort: Pulmonary effort is normal.   Lymphadenopathy:      Cervical: No cervical adenopathy. Neurological:      Mental Status: She is alert.    Psychiatric:         Mood and Affect: Mood normal.         Behavior: Behavior normal.

## 2023-12-13 ENCOUNTER — PATIENT MESSAGE (OUTPATIENT)
Dept: OBGYN CLINIC | Facility: CLINIC | Age: 33
End: 2023-12-13

## 2023-12-13 NOTE — PATIENT COMMUNICATION
Attempted to speak with Opal Corcoran but got her voice mail. Left a message for her to call back to discuss her message & assist in scheduling an appt. Dimple Hassan

## 2023-12-15 NOTE — PATIENT COMMUNICATION
Called Sherry this morning. She said she received my msg last night but didn't want to call so late. She started Zoloft immediately postpartum and had been doing well at 50mg daily but recently restarted work which has added stress and her anxiety has increased. She worries about things that haven't happened and are unlikely to but can't stop worrying. She denies SI/HI. She is enjoying baby just feeling more stress and anxiety. I discussed increase in Zoloft should help. I recommend increasing to 100mg daily - take 2 instead of 1 daily. She has appointment with me for Nexplanon 12/27/2023 and we can see how she feels then. She is thankful. Told her if worsening before then please call. Forgot to offer SW referral while talking. Will send Gabrielle msg to her and see if she would like [place] to reach out.

## 2023-12-15 NOTE — PATIENT COMMUNICATION
Called patient to discuss how she is feeling and medication changes. Got vm. Left a msg asking her to call me back - I am on call doctor tonight - please call office and ask to talk to me.

## 2023-12-26 ENCOUNTER — TELEPHONE (OUTPATIENT)
Dept: OBGYN CLINIC | Facility: CLINIC | Age: 33
End: 2023-12-26

## 2023-12-26 DIAGNOSIS — F33.9 DEPRESSION, RECURRENT (HCC): ICD-10-CM

## 2023-12-26 DIAGNOSIS — E89.0 POSTOPERATIVE HYPOTHYROIDISM: ICD-10-CM

## 2023-12-26 RX ORDER — LEVOTHYROXINE SODIUM 0.07 MG/1
75 TABLET ORAL DAILY
Qty: 90 TABLET | Refills: 0 | Status: SHIPPED | OUTPATIENT
Start: 2023-12-26

## 2023-12-26 RX ORDER — SERTRALINE HYDROCHLORIDE 100 MG/1
100 TABLET, FILM COATED ORAL DAILY
Qty: 30 TABLET | Refills: 0 | Status: SHIPPED | OUTPATIENT
Start: 2023-12-26 | End: 2024-06-23

## 2023-12-26 NOTE — TELEPHONE ENCOUNTER
Sherry is taking Zoloft 100 mg daily with good effects. Her current Zoloft Rx is 50 mg. She has been doubling current pills & can not get any refills until after January 1st.. Sherry is due to take Zoloft tonight but has no pills remaining. She is requesting a new Zoloft 100 mg Rx be sent to [facility],[place].   Message sent to Dr. Gifford.

## 2023-12-27 ENCOUNTER — PROCEDURE VISIT (OUTPATIENT)
Dept: OBGYN CLINIC | Facility: CLINIC | Age: 33
End: 2023-12-27
Payer: COMMERCIAL

## 2023-12-27 VITALS
BODY MASS INDEX: 32.36 KG/M2 | HEIGHT: 65 IN | SYSTOLIC BLOOD PRESSURE: 96 MMHG | WEIGHT: 194.2 LBS | DIASTOLIC BLOOD PRESSURE: 60 MMHG

## 2023-12-27 DIAGNOSIS — F33.9 DEPRESSION, RECURRENT (HCC): ICD-10-CM

## 2023-12-27 DIAGNOSIS — Z32.02 PREGNANCY EXAMINATION OR TEST, NEGATIVE RESULT: ICD-10-CM

## 2023-12-27 DIAGNOSIS — Z30.017 NEXPLANON INSERTION: Primary | ICD-10-CM

## 2023-12-27 LAB — SL AMB POCT URINE HCG: NEGATIVE

## 2023-12-27 PROCEDURE — 81025 URINE PREGNANCY TEST: CPT | Performed by: OBSTETRICS & GYNECOLOGY

## 2023-12-27 PROCEDURE — 11981 INSERTION DRUG DLVR IMPLANT: CPT | Performed by: OBSTETRICS & GYNECOLOGY

## 2023-12-27 PROCEDURE — 99212 OFFICE O/P EST SF 10 MIN: CPT | Performed by: OBSTETRICS & GYNECOLOGY

## 2023-12-27 NOTE — ASSESSMENT & PLAN NOTE
10 days ago increased Sherry's Zoloft to 100mg from 50mg after talking to her on the phone.  She is feeling much better now and wishes to continue with 100mg Zoloft.    Script for 100mg pills with no refills sent yesterday.  Patient has not picked up yet - plans today.  I will send in refills next week to get her to Wellness f/u in a few months.

## 2023-12-27 NOTE — PROGRESS NOTES
[facility] - [address], [address], [place] [address]    Assessment/Plan:  1. Nexplanon insertion  A/P:   Nexplanon reviewed w/ patient, all questions answered.  Procedure process discussed and pt consented for  placement of Nexplanon.  Discussed most likely side effect of irregular bleeding that can be very unpredictable with Nexplanon.  She is currently amenorrheaic due to nursing.  Call if fever, severe pain not improved w/ OTC analgesia.  Reviewed safe sex recommendation and condom use to decrease risk of STIs.  -     Remove and insert drug implant    2. Pregnancy examination or test, negative result  -     POCT urine HCG    3. Depression, recurrent (HCC)  Assessment & Plan:  10 days ago increased Sherry's Zoloft to 100mg from 50mg after talking to her on the phone.  She is feeling much better now and wishes to continue with 100mg Zoloft.    Script for 100mg pills with no refills sent yesterday.  Patient has not picked up yet - plans today.  I will send in refills next week to get her to Wellness f/u in a few months.          Subjective:   Sherry Cervantes is a 33 y.o.  female.    HPI:   Here for Nexplanon and f/u change in zoloft on 12/15/2023.    Sherry is pumping and not had a period yet postpartum.    She was started on Zoloft 50mg immediately pp for h/o depression.  She called office and asked for increase.  After speaking with her 12/15/2023 I increased to 100mg qd.  She states she is feeling much better now and wishes to continue.      Gyn History  No LMP recorded.       Last pap smear: 2022    She  reports being sexually active and has had partner(s) who are male. She reports using the following method of birth control/protection: Condom Male.       OB History      Past Medical History:  No date: Chronic knee pain  No date: Depression  No date: Disease of thyroid gland  2020: Dysphagia  2017: History of pulmonary embolus (PE)      Comment:  Result of  "Knee injury  03/02/2020: Left upper quadrant pain  No date: Menorrhagia  No date: Multiple thyroid nodules  No date: Psychiatric disorder  No date: Saphenous nerve neuropathy, left      Comment:  After an injury  No date: Vitamin D deficiency  No date: Vocal cord paralysis      Comment:  Following partial thyroidectomy     Past Surgical History:  2016: DILATION AND EVACUATION  2019: KNEE ARTHROSCOPY W/ PLICA EXCISION; Left  2013: THYROIDECTOMY, PARTIAL; Left  No date: UPPER GASTROINTESTINAL ENDOSCOPY  11/08/2022: US GUIDED THYROID BIOPSY  No date: WISDOM TOOTH EXTRACTION     Social History     Tobacco Use    Smoking status: Never     Passive exposure: Never    Smokeless tobacco: Never    Tobacco comments:      seasonal, only when it's warm out, social   Vaping Use    Vaping status: Never Used   Substance Use Topics    Alcohol use: Yes     Comment: socially    Drug use: Not Currently     Comment:  no longer using marijuana          Current Outpatient Medications:     levothyroxine 75 mcg tablet, TAKE 1 TABLET BY MOUTH EVERY DAY, Disp: 90 tablet, Rfl: 0    Prenatal Vit-Fe Fumarate-FA (prenatal multivitamin) 28-0.8 MG TABS, Take 1 tablet by mouth daily Late afternoon, Disp: , Rfl:     sertraline (Zoloft) 100 mg tablet, Take 1 tablet (100 mg total) by mouth daily, Disp: 30 tablet, Rfl: 0    Current Facility-Administered Medications:     etonogestrel (NEXPLANON) subdermal implant 68 mg, 68 mg, Subdermal, Once, Yue Gifford MD    She has No Known Allergies..    ROS: Review of Systems   Constitutional: Negative.    Gastrointestinal: Negative.    Genitourinary: Negative.    Psychiatric/Behavioral: Negative.         Objective:  BP 96/60 (BP Location: Left arm, Patient Position: Sitting, Cuff Size: Large)   Ht 5' 5.25\" (1.657 m)   Wt 88.1 kg (194 lb 3.2 oz)   Breastfeeding Yes Comment: pumping  BMI 32.07 kg/m²      Physical Exam  Constitutional:       Appearance: Normal appearance.   Neurological:      Mental Status: " She is alert and oriented to person, place, and time.   Psychiatric:         Behavior: Behavior normal.           Universal Protocol:  Consent: Verbal consent obtained. Written consent obtained.  Risks and benefits: risks, benefits and alternatives were discussed  Consent given by: patient  Patient understanding: patient states understanding of the procedure being performed  Patient identity confirmed: verbally with patient  Remove and insert drug implant    Date/Time: 12/27/2023 11:00 AM    Performed by: Yue Gifford MD  Authorized by: Yue Gifford MD    Indication:     Indication: Insertion of non-biodegradable drug delivery implant    Pre-procedure:     Prepped with: alcohol 70%      Local anesthetic:  Lidocaine 1%  Procedure:     Procedure:  Insertion    Small stab incision was made in arm: no      Left/right:  Right    Preloaded contraceptive capsule trocar was placed subdermally: yes      Visualization of implant was obtained: yes      Contraceptive capsule was inserted and trocar removed: yes      Visualization of notch in stylet and palpation of device: yes      Palpation confirms placement by provider and patient: yes      Site was closed with steri-strips and pressure bandage applied: yes

## 2023-12-27 NOTE — PATIENT INSTRUCTIONS
- leave compression bandage on until tomorrow  - okay to shower  - remove steristrips after 7 days  - no heavy lifting for a few days  - Motrin or Tylenol for soreness or pain  - call office if any signs of infection - redness, drainage, increase in pain  - continue to use condoms to protect against sexually transmitted infections, as appropriate

## 2023-12-29 ENCOUNTER — HOSPITAL ENCOUNTER (OUTPATIENT)
Dept: ULTRASOUND IMAGING | Facility: HOSPITAL | Age: 33
End: 2023-12-29
Attending: INTERNAL MEDICINE
Payer: COMMERCIAL

## 2023-12-29 DIAGNOSIS — E04.2 MULTIPLE THYROID NODULES: Chronic | ICD-10-CM

## 2023-12-29 PROCEDURE — 76536 US EXAM OF HEAD AND NECK: CPT

## 2024-01-11 ENCOUNTER — PATIENT OUTREACH (OUTPATIENT)
Dept: OBGYN CLINIC | Facility: CLINIC | Age: 34
End: 2024-01-11

## 2024-01-11 NOTE — PROGRESS NOTES
SW received in-basket message from SOG MAKAYLA Blunt on 12/15/23 inquiring about SW f/u - Dr. Gifford was awaiting a response on from patient on whether or not she was interested in  services for PPD support/resources ([employer] message sent 12/15/23). Patient responded on 1/9/24 stating she was interested, however Dr. Gifford is [place] this week so message had not yet been seen.     LAMAR placed a referral directly and placed a call to patient to check in - no answer. LAMAR left VM requesting a call back. LAMAR sent in-basket message to Dr. Gifford to update her. Patient's next appointment with SO is scheduled for 3/28/24.

## 2024-01-14 ENCOUNTER — TELEPHONE (OUTPATIENT)
Dept: LABOR AND DELIVERY | Facility: HOSPITAL | Age: 34
End: 2024-01-14

## 2024-01-15 ENCOUNTER — HOSPITAL ENCOUNTER (EMERGENCY)
Facility: HOSPITAL | Age: 34
Discharge: HOME | End: 2024-01-15
Attending: EMERGENCY MEDICINE
Payer: COMMERCIAL

## 2024-01-15 ENCOUNTER — APPOINTMENT (EMERGENCY)
Dept: RADIOLOGY | Facility: HOSPITAL | Age: 34
End: 2024-01-15
Payer: COMMERCIAL

## 2024-01-15 VITALS
HEART RATE: 69 BPM | RESPIRATION RATE: 18 BRPM | SYSTOLIC BLOOD PRESSURE: 125 MMHG | OXYGEN SATURATION: 98 % | TEMPERATURE: 97.5 F | DIASTOLIC BLOOD PRESSURE: 74 MMHG

## 2024-01-15 DIAGNOSIS — M54.41 ACUTE RIGHT-SIDED LOW BACK PAIN WITH RIGHT-SIDED SCIATICA: Primary | ICD-10-CM

## 2024-01-15 LAB
AMORPH URATE CRY URNS QL MICRO: ABNORMAL
BACTERIA UR QL AUTO: ABNORMAL /HPF
BILIRUB UR QL STRIP: NEGATIVE
CLARITY UR: CLEAR
COLOR UR: YELLOW
EXT PREGNANCY TEST URINE: NEGATIVE
EXT. CONTROL: NORMAL
GLUCOSE UR STRIP-MCNC: NEGATIVE MG/DL
HGB UR QL STRIP.AUTO: NEGATIVE
KETONES UR STRIP-MCNC: NEGATIVE MG/DL
LEUKOCYTE ESTERASE UR QL STRIP: NEGATIVE
MUCOUS THREADS UR QL AUTO: ABNORMAL
NITRITE UR QL STRIP: NEGATIVE
NON-SQ EPI CELLS URNS QL MICRO: ABNORMAL /HPF
PH UR STRIP.AUTO: 5.5 [PH]
PROT UR STRIP-MCNC: ABNORMAL MG/DL
RBC #/AREA URNS AUTO: ABNORMAL /HPF
SP GR UR STRIP.AUTO: >=1.03 (ref 1–1.03)
UROBILINOGEN UR STRIP-ACNC: <2 MG/DL
WBC #/AREA URNS AUTO: ABNORMAL /HPF

## 2024-01-15 PROCEDURE — 96372 THER/PROPH/DIAG INJ SC/IM: CPT

## 2024-01-15 PROCEDURE — 99284 EMERGENCY DEPT VISIT MOD MDM: CPT

## 2024-01-15 PROCEDURE — 72110 X-RAY EXAM L-2 SPINE 4/>VWS: CPT

## 2024-01-15 PROCEDURE — 81001 URINALYSIS AUTO W/SCOPE: CPT | Performed by: EMERGENCY MEDICINE

## 2024-01-15 PROCEDURE — 81025 URINE PREGNANCY TEST: CPT | Performed by: EMERGENCY MEDICINE

## 2024-01-15 PROCEDURE — 99284 EMERGENCY DEPT VISIT MOD MDM: CPT | Performed by: EMERGENCY MEDICINE

## 2024-01-15 RX ORDER — PREDNISOLONE SODIUM PHOSPHATE 15 MG/5ML
SOLUTION ORAL DAILY
Qty: 50 ML | Refills: 0 | Status: SHIPPED | OUTPATIENT
Start: 2024-01-15 | End: 2024-01-25

## 2024-01-15 RX ORDER — KETOROLAC TROMETHAMINE 30 MG/ML
15 INJECTION, SOLUTION INTRAMUSCULAR; INTRAVENOUS ONCE
Status: COMPLETED | OUTPATIENT
Start: 2024-01-15 | End: 2024-01-15

## 2024-01-15 RX ORDER — ACETAMINOPHEN 160 MG/5ML
650 SUSPENSION ORAL ONCE
Status: COMPLETED | OUTPATIENT
Start: 2024-01-15 | End: 2024-01-15

## 2024-01-15 RX ORDER — PREDNISOLONE SODIUM PHOSPHATE 15 MG/5ML
40 SOLUTION ORAL ONCE
Status: COMPLETED | OUTPATIENT
Start: 2024-01-15 | End: 2024-01-15

## 2024-01-15 RX ADMIN — ACETAMINOPHEN 650 MG: 650 SUSPENSION ORAL at 18:27

## 2024-01-15 RX ADMIN — PREDNISOLONE SODIUM PHOSPHATE 40 MG: 15 SOLUTION ORAL at 18:27

## 2024-01-15 RX ADMIN — KETOROLAC TROMETHAMINE 15 MG: 30 INJECTION, SOLUTION INTRAMUSCULAR; INTRAVENOUS at 18:29

## 2024-01-15 NOTE — ED PROVIDER NOTES
History  Chief Complaint   Patient presents with    Back Pain     Pt reports back pain on right side that radiates down her leg, pt reports it was throbbing but is now dull, pt tried motrin and lidocaine patch at home which helped but started to wear off, pt denies injury to area     Patient is a 34 year old female who presents with right low back pain since Friday that is starting in the right low back, radiarting down the back of the leg to the foot, worse with movement. 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 She does not recall a specific incident, but think that may have prompted this pain. Lidocaine patch and motrin helped, but symptoms got worse again when medications wearing off. Denies any numbness, tingling, weakness, loss of bowel or bladder control, fever, chills. No known trauma. No h/o IVDU.         Prior to Admission Medications   Prescriptions Last Dose Informant Patient Reported? Taking?   Prenatal Vit-Fe Fumarate-FA (prenatal multivitamin) 28-0.8 MG TABS  Self Yes No   Sig: Take 1 tablet by mouth daily Late afternoon   levothyroxine 75 mcg tablet   No No   Sig: TAKE 1 TABLET BY MOUTH EVERY DAY   sertraline (Zoloft) 100 mg tablet   No No   Sig: Take 1 tablet (100 mg total) by mouth daily      Facility-Administered Medications: None       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    Vitamin D deficiency     Vocal cord paralysis     Following partial thyroidectomy       Past Surgical History:   Procedure Laterality Date    DILATION AND EVACUATION  2016    KNEE ARTHROSCOPY W/ PLICA EXCISION Left 2019    THYROIDECTOMY, PARTIAL  Left 2013    UPPER GASTROINTESTINAL ENDOSCOPY      US GUIDED THYROID BIOPSY  11/08/2022    WISDOM TOOTH EXTRACTION         Family History   Problem Relation Age of Onset    Thyroid disease Mother         thyroid nodules    No Known Problems Father     Ovarian cancer Sister     Thyroid disease Sister     Anorexia nervosa Sister     Mental illness Sister     No Known Problems Sister     No Known Problems Brother     Other Maternal Aunt         hypoglycemia    Other Maternal Aunt         brain tumor, had siezure    Celiac disease Maternal Grandmother     Hodgkin's lymphoma Maternal Grandfather     Hodgkin's lymphoma Paternal Grandfather     No Known Problems Daughter     Colon cancer Neg Hx     Colon polyps Neg Hx     Substance Abuse Neg Hx      I have reviewed and agree with the history as documented.    E-Cigarette/Vaping    E-Cigarette Use Never User      E-Cigarette/Vaping Substances    Nicotine No     THC No     CBD No     Flavoring No     Other No     Unknown No      Social History     Tobacco Use    Smoking status: Never     Passive exposure: Never    Smokeless tobacco: Never    Tobacco comments:      seasonal, only when it's warm out, social   Vaping Use    Vaping status: Never Used   Substance Use Topics    Alcohol use: Yes     Comment: socially    Drug use: Not Currently     Comment:  no longer using marijuana       Review of Systems   Constitutional:  Negative for chills and fever.   Gastrointestinal:  Negative for abdominal pain, nausea and vomiting.   Genitourinary:  Negative for dysuria, flank pain, hematuria and urgency.   Musculoskeletal:  Positive for back pain.   Neurological:  Negative for weakness and numbness.       Physical Exam  Physical Exam  Vitals and nursing note reviewed.   Constitutional:       General: She is not in acute distress.     Appearance: Normal appearance. She is not ill-appearing, toxic-appearing or diaphoretic.   HENT:      Head: Normocephalic and atraumatic.      Mouth/Throat:       Mouth: Mucous membranes are moist.   Eyes:      Conjunctiva/sclera: Conjunctivae normal.      Pupils: Pupils are equal, round, and reactive to light.   Cardiovascular:      Rate and Rhythm: Normal rate and regular rhythm.      Pulses: Normal pulses.      Heart sounds: Normal heart sounds. No murmur heard.  Pulmonary:      Effort: Pulmonary effort is normal. No respiratory distress.      Breath sounds: Normal breath sounds. No stridor. No wheezing, rhonchi or rales.   Chest:      Chest wall: No tenderness.   Abdominal:      General: Bowel sounds are normal. There is no distension.      Palpations: Abdomen is soft.      Tenderness: There is no abdominal tenderness. There is no right CVA tenderness, left CVA tenderness, guarding or rebound.   Musculoskeletal:      Cervical back: Normal.      Thoracic back: Normal.      Lumbar back: Spasms and tenderness present. No swelling, edema, deformity, signs of trauma, lacerations or bony tenderness. Normal range of motion. Positive right straight leg raise test. Negative left straight leg raise test. No scoliosis.        Back:    Skin:     General: Skin is warm and dry.   Neurological:      General: No focal deficit present.      Mental Status: She is alert and oriented to person, place, and time. Mental status is at baseline.      Sensory: Sensation is intact.      Motor: Motor function is intact.      Gait: Gait is intact.   Psychiatric:         Mood and Affect: Mood normal.         Behavior: Behavior normal.         Vital Signs  ED Triage Vitals [01/15/24 1722]   Temperature Pulse Respirations Blood Pressure SpO2   97.5 °F (36.4 °C) 69 18 125/74 98 %      Temp src Heart Rate Source Patient Position - Orthostatic VS BP Location FiO2 (%)   -- Monitor -- -- --      Pain Score       --           Vitals:    01/15/24 1722   BP: 125/74   Pulse: 69         Visual Acuity      ED Medications  Medications   prednisoLONE (ORAPRED) oral solution 40 mg (40 mg Oral Given 1/15/24 6854)    acetaminophen (TYLENOL) oral suspension 650 mg (650 mg Oral Given 1/15/24 1827)   ketorolac (TORADOL) injection 15 mg (15 mg Intramuscular Given 1/15/24 1829)       Diagnostic Studies  Results Reviewed       Procedure Component Value Units Date/Time    UA w Reflex to Microscopic w Reflex to Culture [[id number]] Collected: 01/15/24 1823    Lab Status: In process Specimen: Urine, Clean Catch Updated: 01/15/24 1827    POCT pregnancy, urine [[id number]]  (Normal) Resulted: 01/15/24 1824    Lab Status: Final result Updated: 01/15/24 1824     EXT Preg Test, Ur Negative     Control Valid                   XR spine lumbar minimum 4 views non injury   ED Interpretation by Meg Roper DO (01/15 1821)   No acute fracture as interpreted by me independently                  Procedures  Procedures         ED Course  ED Course as of 01/15/24 1840   Mon Yusef 15, 2024   1826 Getting medications presently.                               SBIRT 22yo+      Flowsheet Row Most Recent Value   Initial Alcohol Screen: US AUDIT-C     1. How often do you have a drink containing alcohol? 1 Filed at: 01/15/2024 1725   2. How many drinks containing alcohol do you have on a typical day you are drinking?  0 Filed at: 01/15/2024 1725   3a. Male UNDER 65: How often do you have five or more drinks on one occasion? 0 Filed at: 01/15/2024 1725   3b. FEMALE Any Age, or MALE 65+: How often do you have 4 or more drinks on one occassion? 0 Filed at: 01/15/2024 1725   Audit-C Score 1 Filed at: 01/15/2024 1725   JERRY: How many times in the past year have you...    Used an illegal drug or used a prescription medication for non-medical reasons? Never Filed at: 01/15/2024 1725                      Medical Decision Making  Assessment and Plan:  Acute right sided back pain with radiation consistent with sciatica. XR lumbar spine to rule out fracture, treat symptomatically (patient breast feeding so declines muscle relaxer). Patient cannot swallow pills, so  prescribed solutions. Test for UTI and preg.       Reviewed strict return precautions and DC instructions,. Patient verbalized understanding.     Amount and/or Complexity of Data Reviewed  Labs: ordered.  Radiology: ordered and independent interpretation performed.    Risk  OTC drugs.  Prescription drug management.             Disposition  Final diagnoses:   Acute right-sided low back pain with right-sided sciatica     Time reflects when diagnosis was documented in both MDM as applicable and the Disposition within this note       Time User Action Codes Description Comment    1/15/2024  5:38 PM Meg Roper Add [M54.41] Acute right-sided low back pain with right-sided sciatica           ED Disposition       ED Disposition   Discharge    Condition   Stable    Date/Time   Mon Yusef 15, 2024 3704    Comment   Sherry Cervantes discharge to home/self care.                   Follow-up Information       Follow up With Specialties Details Why Contact Info Additional Information     [facility] Emergency Department Emergency Medicine Go to  As needed, If symptoms worsen, for re-evaluation [address] [facility] [id number] [phone] [facility] Emergency Department, [address] [place] [id number]    Tatiana Hughes DO Family Medicine Schedule an appointment as soon as possible for a visit in 3 days for re-evaluation [address] [place] [address]  [phone]               Patient's Medications   Discharge Prescriptions    PREDNISOLONE (ORAPRED) 15 MG/5 ML ORAL SOLUTION    Take 6.7 mL (20 mg total) by mouth daily for 5 days, THEN 3.3 mL (10 mg total) daily for 5 days.       Start Date: 1/15/2024 End Date: 1/25/2024       Order Dose: --       Quantity: 50 mL    Refills: 0           PDMP Review       None            ED Provider  Electronically Signed by             Meg Roper DO  01/15/24 3112

## 2024-01-15 NOTE — Clinical Note
Sherry Cervantes was seen and treated in our emergency department on 1/15/2024.                Diagnosis:     Sherry  .    She may return on this date: 01/18/2024         If you have any questions or concerns, please don't hesitate to call.      Meg Roper, DO    ______________________________           _______________          _______________  Hospital Representative                              Date                                Time

## 2024-01-15 NOTE — TELEPHONE ENCOUNTER
Patient called on-call line to discuss new-onset of right-sided back pain, which radiates down her right leg. She had Nexplanon inserted in her right arm on 12/27 and is wondering if her pain could be related. She also has a history of PE during her recent pregnancy. States back pain is constant and has gradually worsened throughout the day. Worse with movement. Describes pain as shooting. Denies redness or swelling of her leg. Denies fever or chills, dysuria, hematuria. Reviewed unlikely related to her Nexplanon. Recommend OTC analgesics, heating pad, and rest. If symptoms fail to improve, recommend following up with PCP for further evaluation and management. If pain severe, she should proceed to ER for further evaluation.     Sampson Pierre MD  1/14/2024 7:59 PM

## 2024-01-16 ENCOUNTER — NURSE TRIAGE (OUTPATIENT)
Dept: PHYSICAL THERAPY | Facility: OTHER | Age: 34
End: 2024-01-16

## 2024-01-16 DIAGNOSIS — M54.41 ACUTE RIGHT-SIDED LOW BACK PAIN WITH RIGHT-SIDED SCIATICA: Primary | ICD-10-CM

## 2024-01-16 NOTE — TELEPHONE ENCOUNTER
Additional Information   Negative: Has the patient had unexplained weight loss?   Negative: Does the patient have a fever?   Negative: Is the patient experiencing blood in sputum?   Negative: Is the patient experiencing urine retention?   Negative: Is the patient experiencing acute drop foot or paralysis?   Negative: Has the patient experienced major trauma? (fall from height, high speed collision, direct blow to spine) and is also experiencing nausea, light-headedness, or loss of consciousness?   Negative: Is this a chronic condition?    Protocols used: [facility] Protocol    This RN did review in detail the [facility] and what we can provide for their back pain.  Patient is agreeable to being triaged by this RN and would like to proceed with Physical Therapy.    Referral was placed for Physical Therapy at the [facility] site. Patients information was sent to the  to make evaluation appointment. Patient made aware that the PT office  will be calling to schedule the appointment.  Patient was provided with the phone number to the PT office.    No further questions and/or concerns were voiced by the patient at this time. Patient states understanding of the referral that was placed.    Referral Closed.

## 2024-01-16 NOTE — TELEPHONE ENCOUNTER
Additional Information   Negative: Is this related to a work injury?   Negative: Is this related to an MVA?   Negative: Are you currently recieving homecare services?    Background - Initial Assessment  Clinical complaint: Pain is right low back radiates down the back of the leg to the foot. No numbness or tingling. Started 1/12/24 by 1/14/24 became worse. NKI. No prior back pain or surgery. Pain is constant worse with movements such as getting up or bending over. Pain is a dull ache all the time. Was seen in ED 1/15/24  Date of onset: 1/12/24  Frequency of pain: constant  Quality of pain: aching and dull    Protocols used: [facility] Protocol

## 2024-01-17 ENCOUNTER — OFFICE VISIT (OUTPATIENT)
Dept: FAMILY MEDICINE CLINIC | Facility: HOSPITAL | Age: 34
End: 2024-01-17
Payer: COMMERCIAL

## 2024-01-17 VITALS
OXYGEN SATURATION: 97 % | HEART RATE: 90 BPM | DIASTOLIC BLOOD PRESSURE: 64 MMHG | HEIGHT: 65 IN | SYSTOLIC BLOOD PRESSURE: 102 MMHG | WEIGHT: 204 LBS | BODY MASS INDEX: 33.99 KG/M2

## 2024-01-17 DIAGNOSIS — G57.01 PIRIFORMIS SYNDROME OF RIGHT SIDE: ICD-10-CM

## 2024-01-17 DIAGNOSIS — M62.830 LUMBAR PARASPINAL MUSCLE SPASM: ICD-10-CM

## 2024-01-17 DIAGNOSIS — M54.41 ACUTE RIGHT-SIDED BACK PAIN WITH SCIATICA: Primary | ICD-10-CM

## 2024-01-17 PROCEDURE — 99214 OFFICE O/P EST MOD 30 MIN: CPT | Performed by: STUDENT IN AN ORGANIZED HEALTH CARE EDUCATION/TRAINING PROGRAM

## 2024-01-17 RX ORDER — CYCLOBENZAPRINE HCL 5 MG
2.5 TABLET ORAL 3 TIMES DAILY PRN
Qty: 30 TABLET | Refills: 0 | Status: SHIPPED | OUTPATIENT
Start: 2024-01-17

## 2024-01-17 NOTE — PATIENT INSTRUCTIONS
Lower Back Exercises   AMBULATORY CARE:   Lower back exercises  help heal and strengthen your back muscles to prevent another injury. Ask your healthcare provider if you need to see a physical therapist for more advanced exercises.  Seek care immediately if:   You have severe pain that prevents you from moving.       Call your doctor if:   Your pain becomes worse.    You have new pain.    You have questions or concerns about your condition or care.    Do lower back exercises safely:   Do the exercises on a mat or firm surface (not on a bed).  A firm surface will support your spine and prevent low back pain.    Move slowly and smoothly.  Avoid fast or jerky motions.    Breathe normally.  Do not hold your breath.    Stop if you feel pain.  It is normal to feel some discomfort at first, but you should not feel pain. Regular exercise will help decrease your discomfort over time.    Lower back exercises:  Your healthcare provider may recommend that you do back exercises 10 to 30 minutes each day. He or she may also recommend that you do exercises 1 to 3 times each day. Ask your provider which exercises are best for you and how often to do them.  Ankle pumps:  Lie on your back. Move your foot up (with your toes pointing toward your head). Then, move your foot down (with your toes pointing away from you). Repeat this exercise 10 times on each side.         Heel slides:  Lie on your back. Slowly bend one leg and then straighten it. Next, bend the other leg and then straighten it. Repeat 10 times on each side.         Pelvic tilt:  Lie on your back with your knees bent and feet flat on the floor. Place your arms in a relaxed position beside your body. Tighten the muscles of your abdomen and flatten your back against the floor. Hold for 5 seconds. Repeat 5 times.         Back stretch:  Lie on your back with your hands behind your head. Bend your knees and turn the lower half of your body to one side. Hold this position for 10  seconds. Repeat 3 times on each side.         Straight leg raises:  Lie on your back with one leg straight. Bend the other knee. Tighten your abdomen and then slowly lift the straight leg up about 6 to 12 inches off the floor. Hold for 1 to 5 seconds. Lower your leg slowly. Repeat 10 times on each leg.         Knee-to-chest:  Lie on your back with your knees bent and feet flat on the floor. Pull one of your knees toward your chest and hold it there for 5 seconds. Return your leg to the starting position. Lift the other knee toward your chest and hold for 5 seconds. Do this 5 times on each side.         Cat and camel:  Place your hands and knees on the floor. Arch your back upward toward the ceiling and lower your head. Round out your spine as much as you can. Hold for 5 seconds. Lift your head upward and push your chest downward toward the floor. Hold for 5 seconds. Do 3 sets or as directed.         Wall squats:  Stand with your back against a wall. Tighten the muscles of your abdomen. Slowly lower your body until your knees are bent at a 45 degree angle. Hold this position for 5 seconds. Slowly move back up to a standing position. Repeat 10 times.         Curl up:  Lie on your back with your knees bent and feet flat on the floor. Place your hands, palms down, underneath the curve in your lower back. Next, with your elbows on the floor, lift your shoulders and chest 2 to 3 inches. Keep your head in line with your shoulders. Hold this position for 5 seconds. When you can do this exercise without pain for 10 to 15 seconds, you may add a rotation. While your shoulders and chest are lifted off the ground, turn slightly to the left and hold. Repeat on the other side.         Bird dog:  Place your hands and knees on the floor. Keep your wrists directly below your shoulders and your knees directly below your hips. Pull your belly button in toward your spine. Do not flatten or arch your back. Tighten your abdominal muscles.  Raise one arm straight out so that it is aligned with your head. Next, raise the leg opposite your arm. Hold this position for 15 seconds. Lower your arm and leg slowly and change sides. Do 5 sets.       Follow up with your doctor as directed:  Write down your questions so you remember to ask them during your visits.© Copyright [employer] 2023 Information is for End User's use only and may not be sold, redistributed or otherwise used for commercial purposes.  The above information is an  only. It is not intended as medical advice for individual conditions or treatments. Talk to your doctor, nurse or pharmacist before following any medical regimen to see if it is safe and effective for you.

## 2024-01-17 NOTE — PROGRESS NOTES
[facility]   Tatiana Hughes DO    Assessment/Plan:      Diagnosis ICD-10-CM Associated Orders   1. Acute right-sided back pain with sciatica  M54.41 cyclobenzaprine (FLEXERIL) 5 mg tablet      2. Lumbar paraspinal muscle spasm  M62.830 cyclobenzaprine (FLEXERIL) 5 mg tablet      3. Piriformis syndrome of right side  G57.01 cyclobenzaprine (FLEXERIL) 5 mg tablet        Trial low dose flexeril when fiance available to watch Jocelynn. No operating heavy machinery.   Can consider MRI if PT doesn't help.   Avoiding further toradol given transfer to milk.   Return in about 8 weeks (around 3/13/2024) for F/U after trial of physical therapy.  Patient may call or return to office with any questions or concerns.   ______________________________________________________________________  Subjective:     Patient ID: Sherry Cervantes is a 34 y.o. female.  HPI  Sherry Cervantes  Chief Complaint   Patient presents with    Fatigue    Headache    Back Pain     Sciatic  [facility] ED Follow up      Sick last week, fatigued, exhausted.     Started feeling stiff & painful from Friday / Saturday.   Saturday did a take nap.   Sunday couldn't move.     ER Monday - Gave toradol 15 mg no major help. 3 days ago.   Ordered prednisone.   Needed caffeine today to stay awake.   XR lumbar negative.     Using lido patches 4%, not helping.   Has to be back to work tomorrow.   Did have a little R sided pain in leg.     Doesn't have help, mom in [place]. MIL not helping.     Pumping 3-4x a day normally.      The following portions of the patient's history were reviewed and updated as appropriate: allergies, current medications, past medical history, and problem list.    Review of Systems   Constitutional:  Positive for fatigue. Negative for chills and fever.   Respiratory:  Negative for cough and shortness of breath.    Cardiovascular:  Negative for chest pain and palpitations.   Gastrointestinal:  Negative for diarrhea, nausea and vomiting.  "  Genitourinary:  Negative for dysuria and pelvic pain.   Musculoskeletal:  Positive for arthralgias, back pain and myalgias.   Neurological:  Positive for dizziness (since prednisone). Negative for light-headedness and headaches.       Objective:      Vitals:    01/17/24 1659   BP: 102/64   Pulse: 90   SpO2: 97%      Physical Exam  Vitals and nursing note reviewed.   Constitutional:       General: She is not in acute distress.     Appearance: Normal appearance. She is not ill-appearing.   HENT:      Head: Normocephalic and atraumatic.   Eyes:      General: No scleral icterus.        Right eye: No discharge.         Left eye: No discharge.   Cardiovascular:      Rate and Rhythm: Normal rate and regular rhythm.      Pulses: Normal pulses.      Heart sounds: Normal heart sounds. No murmur heard.  Pulmonary:      Effort: Pulmonary effort is normal. No respiratory distress.      Breath sounds: Normal breath sounds. No stridor. No wheezing.   Musculoskeletal:         General: Tenderness (R SI & piriformis, b/l lumbar paraspinals) present. Normal range of motion.      Cervical back: Normal range of motion and neck supple. No rigidity.      Right lower leg: No edema.      Left lower leg: No edema.      Comments: SLR mild on R in buttocks, neg on L   Normal BLE & strength    Neurological:      Mental Status: She is alert and oriented to person, place, and time.      Gait: Gait normal.   Psychiatric:         Mood and Affect: Mood normal.         Behavior: Behavior normal.         Thought Content: Thought content normal.         Judgment: Judgment normal.         Portions of the record may have been created with voice recognition software. Occasional wrong word or \"sound alike\" substitutions may have occurred due to the inherent limitations of voice recognition software. Please review the chart carefully and recognize, using context, where substitutions/typographical errors may have occurred.     " 407

## 2024-01-18 ENCOUNTER — TELEPHONE (OUTPATIENT)
Dept: PSYCHIATRY | Facility: CLINIC | Age: 34
End: 2024-01-18

## 2024-01-18 ENCOUNTER — PATIENT OUTREACH (OUTPATIENT)
Dept: OBGYN CLINIC | Facility: CLINIC | Age: 34
End: 2024-01-18

## 2024-01-18 NOTE — PROGRESS NOTES
"LAMAR called patient again today to discuss PPD support/resources. Patient was available to talk.    Patient reports her first month postpartum was very challenging. Her cat also ran away during that time, which added some stress. She states things  has improved since, and baby is doing very well. Patient's Zoloft was increased which helped, but now there are things she wants to work on that are different from postpartum. She would like to start therapy to do a deeper dive into how she is feeling and why.    She denies SI/HI, however she does have distressing \"what if\" thoughts relating to her loves ones before she goes to bed (I.e. what if something bad happens to my fiance? My family? Etc.) which make it hard to fall asleep and causes nightmares. She denies she is the one acting out these fears, she just worries something could happen to the people she cares about. She uses white noise to help distract her to fall asleep. SW suggested trying mindfulness/guided meditation videos to help distract from these thoughts. Patient otherwise gets good sleep. She eats breakfast/dinner, but does not always have time for lunch during her break due to pumping. She reports baby is doing very well and is happy and healthy.    Patient indicates good support from her mom and fiance. 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 Patient feels she is having trouble focusing on these things lately, even if she does have a few minutes for them.     Patient reached out to [employer] a long time ago but never heard back - interested in being added back on. She also was on the wait list with [facility], but did not hear back from them either. She was informed their wait list was at least 6 months. LAMAR offered " to call both on patient's behalf - patient agreeable. LAMAR spoke with Zhou at [facility] - patient added to wait list however Zhou is unsure how long it will be. LAMAR also called [facility] requesting a call back. LAMAR also sent patient information for [employer] via email for online support groups.     No additional needs identified at this time, SW to follow up with patient next week.

## 2024-01-18 NOTE — TELEPHONE ENCOUNTER
[facility]  called to request a patient be added to the wait list for therapy service. Writer added patient to the list.

## 2024-01-23 ENCOUNTER — PATIENT OUTREACH (OUTPATIENT)
Dept: OBGYN CLINIC | Facility: CLINIC | Age: 34
End: 2024-01-23

## 2024-01-23 NOTE — PROGRESS NOTES
SW called patient to discuss [facility]. Patient reports she is doing okay this week. She was contacted by [facility] but missed their call due to being at work. Plans to call them back today. Patient agreeable to LAMAR sending her the list of other [facility] therapists via email to look into while LAMAR is OOO.     Patient then called [facility] again, spoke with  who stated they do not take [facility], which is why she will need to continue with [facility].  confirmed patient is on the wait list with [facility].     SW to follow up with patient next week.

## 2024-01-29 ENCOUNTER — EVALUATION (OUTPATIENT)
Dept: PHYSICAL THERAPY | Facility: CLINIC | Age: 34
End: 2024-01-29
Payer: COMMERCIAL

## 2024-01-29 VITALS — SYSTOLIC BLOOD PRESSURE: 102 MMHG | DIASTOLIC BLOOD PRESSURE: 64 MMHG

## 2024-01-29 DIAGNOSIS — M54.41 ACUTE RIGHT-SIDED LOW BACK PAIN WITH RIGHT-SIDED SCIATICA: ICD-10-CM

## 2024-01-29 PROCEDURE — 97110 THERAPEUTIC EXERCISES: CPT | Performed by: PHYSICAL THERAPIST

## 2024-01-29 PROCEDURE — 97161 PT EVAL LOW COMPLEX 20 MIN: CPT | Performed by: PHYSICAL THERAPIST

## 2024-01-29 NOTE — PROGRESS NOTES
PT Evaluation     Today's date: 2024  Patient name: Sherry Cervantes  : 1990  MRN: [id number]  Referring provider: Elio Sacnhez PT  Dx:   Encounter Diagnosis     ICD-10-CM    1. Acute right-sided low back pain with right-sided sciatica  M54.41 Ambulatory referral to PT spine                     Assessment  Assessment details: Sherry Cervantes is a 34 y.o. female presenting to outpatient physical therapy at [facility] with complaints of R LB and posterior LE pain to her foot.  She is having the most difficulty when lifting due to pain.  Patient was referred to PT through [facility].  She would benefit from skilled PT services with focus on manual treatment to address her hypomobility issues, stretching and core/postural strengthening in order to return to all normal activities and reach maximum level of function.      No further referral appears necessary at this time based upon examination results.    Primary movement impairment diagnosis of hypomobility resulting in pathoanatomical symptoms of lumbar dysfunction likely due to lumbar DDD.     Impairments include:    1.  Pain in endrange lumbar extension - addressing with mobs and flexibility  2.  Decreased postural and core strength - addressing with HEP strengthening  3.  Limited flexibility - addressing with HEP  4.  Poor postural awareness and body mechanics - addressing with education    Impairments: abnormal or restricted ROM, activity intolerance, impaired physical strength, lacks appropriate home exercise program, pain with function, poor posture  and poor body mechanics  Barriers to therapy: None  Understanding of Dx/Px/POC: excellent  Goals  ST.  Independent with HEP in 2 weeks  2.  Increase lumbar AROM to WNL all motions in 3 weeks     LT.  Achieve FOTO score of 78/100 in 4 weeks   2.  Able to lift up to 30# without LBP in 4 weeks  3.  Strength abdominals = 4+/5 in 4 weeks      Plan  Patient would benefit  from: skilled PT and PT eval  Planned modality interventions: cryotherapy, TENS and thermotherapy: hydrocollator packs  Planned therapy interventions: abdominal trunk stabilization, ADL retraining, balance/weight bearing training, body mechanics training, flexibility, functional ROM exercises, home exercise program, joint mobilization, manual therapy, neuromuscular re-education, postural training, strengthening, stretching, therapeutic activities and therapeutic exercise  Frequency: 2x week  Duration in weeks: 4  Plan of Care beginning date: 2024  Plan of Care expiration date: 2024  Treatment plan discussed with: patient      Subjective Evaluation    History of Present Illness  Mechanism of injury: Pt reports having R LBP with R posterior LE pain to her foot.  No numbness or tingling reported.  Pain started on 24 and by 24 it had worsened where she went to the ED on 1/15/24.  She has a hx of depression.  Prednisone taper and muscle relaxants with moderate pain relief.  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  She has a L knee injury that limits her somewhat with squatting and lunging.              Not a recurrent problem   Quality of life: excellent    Patient Goals  Patient goals for therapy: decreased pain, increased motion, increased strength, independence with ADLs/IADLs and return to sport/leisure activities    Pain  Current pain ratin  At best pain ratin  At worst pain rating: 10  Quality: dull ache  Relieving factors: medications  Aggravating factors: lifting  Progression: improved    Social Support  Steps to enter house: yes  Stairs in house: yes   Lives in: multiple-level home  Lives with: young children and significant other    Employment status: working    Diagnostic Tests  X-ray: normal  Treatments  Previous treatment: medication  Current treatment: medication        Objective     Concurrent Complaints  Negative for disturbed sleep,  "bladder dysfunction and bowel dysfunction    Static Posture     Lumbar Spine   Flattened.     Postural Observations  Seated posture: fair  Standing posture: fair  Correction of posture: makes symptoms better      Palpation   Left   No palpable tenderness to the erector spinae.     Right   No palpable tenderness to the erector spinae.   Hypertonic in the erector spinae.     Tenderness     Lumbar Spine  No tenderness in the spinous process and facet joint.     Neurological Testing     Sensation     Lumbar   Left   Intact: light touch    Right   Intact: light touch  Paresthesia: light touch    Comments   Right light touch: posterior R LE    Reflexes   Left   Patellar (L4): normal (2+)    Right   Patellar (L4): normal (2+)    Active Range of Motion     Lumbar   Flexion:  Restriction level: minimal  Extension:  with pain Restriction level: minimal  Left lateral flexion:  Restriction level: minimal  Right lateral flexion:  Restriction level: minimal  Left rotation:  Restriction level: minimal  Right rotation:  WFL    Passive Range of Motion     Additional Passive Range of Motion Details  Min tightness R H/S and piriformis.     Strength/Myotome Testing     Lumbar   Left   Normal strength    Right   Normal strength    Additional Strength Details  Abdominals = 3+/5    Tests     Lumbar   Negative prone instability .     Left   Negative passive SLR and slump test.     Right   Negative passive SLR and slump test.     Ambulation     Observational Gait   Gait: within functional limits         POC EXPIRES On:  2/28/24  PRECAUTIONS:  None  CO-MORBIDITES:  Depression  PERSONAL FACTORS:  None  Access Code [email or web address]    Manuals HEP 1/29                                                               Neuro Re-Ed     Supine PPT 1/29 5\" 20           Supine PPT with marching L/R             TB Rows B             Bird-dogs L/R             Planks                                       Ther Ex    Supine piriformis stretch R 1/29 10\" 10         "   Supine R nerve sliders with 5 ankle pumps 1/29 10           Bike                                                                              Ther Activity                              Gait Training                              Modalities

## 2024-02-01 ENCOUNTER — PATIENT OUTREACH (OUTPATIENT)
Dept: OBGYN CLINIC | Facility: CLINIC | Age: 34
End: 2024-02-01

## 2024-02-01 NOTE — PROGRESS NOTES
SW called patient regarding therapy options - no answer. SW left  requesting a call back. Patient has an upcoming appointment scheduled for 3/28, however per note from [facility] [place], patient was contacted to reschedule due to Dr. Gifford being OOO.

## 2024-02-05 ENCOUNTER — OFFICE VISIT (OUTPATIENT)
Dept: PHYSICAL THERAPY | Facility: CLINIC | Age: 34
End: 2024-02-05
Payer: COMMERCIAL

## 2024-02-05 DIAGNOSIS — M54.41 ACUTE RIGHT-SIDED LOW BACK PAIN WITH RIGHT-SIDED SCIATICA: Primary | ICD-10-CM

## 2024-02-05 PROCEDURE — 97112 NEUROMUSCULAR REEDUCATION: CPT | Performed by: PHYSICAL THERAPIST

## 2024-02-05 PROCEDURE — 97110 THERAPEUTIC EXERCISES: CPT | Performed by: PHYSICAL THERAPIST

## 2024-02-05 PROCEDURE — 97140 MANUAL THERAPY 1/> REGIONS: CPT | Performed by: PHYSICAL THERAPIST

## 2024-02-05 NOTE — PROGRESS NOTES
"Daily Note     Today's date: 2024  Patient name: Sherry Cervantes  : 1990  MRN: [id number]  Referring provider: Elio Sanchez PT  Dx:   Encounter Diagnosis     ICD-10-CM    1. Acute right-sided low back pain with right-sided sciatica  M54.41                      Subjective:  Pt reports having L sided LB and buttock pain since 24 without cause.  Pain is worse with quick motions, getting up and bending.  R sided pain is much less.        Objective:  See treatment diary below      Assessment:  Pt presented to outpatient physical therapy at [facility] with complaints of R LB and posterior LE pain to her foot.  She is having the most difficulty when lifting due to pain.  Patient was referred to PT through [facility].  She will continue to benefit from skilled PT services with focus on manual treatment to address her hypomobility issues, stretching and core/postural strengthening in order to return to all normal activities and reach maximum level of function.  Mod less tightness post manual tx with more R vs L sided tightness to start the session.       Plan:  Add bird-dogs.  Last scheduled visit on 24.       POC EXPIRES On:  24  PRECAUTIONS:  None  CO-MORBIDITES:  Depression  PERSONAL FACTORS:  None  Access Code [email or web address]    Manuals HEP  2          STM B lumbar paraspinals prone   8'                                                 Neuro Re-Ed     Supine PPT  5\" 20 5\" 10          Supine PPT with marching L/R   15 ea          TB Rows B   [place] 20          Bird-dogs L/R   NV          Planks                                       Ther Ex    Supine piriformis stretch R  10\" 10 10\" 5 L+R          Supine R nerve sliders with 5 ankle pumps  10 10 L+R          Bike   L1 6'          Supine B hip ball adduction   5\" 20          Supine TB B hip ER   Blue 3\" 20          Cat/cow   5\" 10 ea                                    Ther Activity                              Gait " Training                              Modalities

## 2024-02-08 ENCOUNTER — PATIENT OUTREACH (OUTPATIENT)
Dept: OBGYN CLINIC | Facility: CLINIC | Age: 34
End: 2024-02-08

## 2024-02-08 NOTE — PROGRESS NOTES
Second attempt to reach patient regarding therapy follow up - no answer. SW left VM requesting a call back. SW sent [employer] message requesting same. No upcoming appointments scheduled with [facility].

## 2024-02-10 DIAGNOSIS — F33.9 DEPRESSION, RECURRENT (HCC): ICD-10-CM

## 2024-02-12 ENCOUNTER — OFFICE VISIT (OUTPATIENT)
Dept: PHYSICAL THERAPY | Facility: CLINIC | Age: 34
End: 2024-02-12
Payer: COMMERCIAL

## 2024-02-12 ENCOUNTER — TELEPHONE (OUTPATIENT)
Dept: FAMILY MEDICINE CLINIC | Facility: HOSPITAL | Age: 34
End: 2024-02-12

## 2024-02-12 DIAGNOSIS — M54.41 ACUTE RIGHT-SIDED LOW BACK PAIN WITH RIGHT-SIDED SCIATICA: Primary | ICD-10-CM

## 2024-02-12 PROCEDURE — 97140 MANUAL THERAPY 1/> REGIONS: CPT | Performed by: PHYSICAL THERAPIST

## 2024-02-12 PROCEDURE — 97110 THERAPEUTIC EXERCISES: CPT | Performed by: PHYSICAL THERAPIST

## 2024-02-12 PROCEDURE — 97112 NEUROMUSCULAR REEDUCATION: CPT | Performed by: PHYSICAL THERAPIST

## 2024-02-12 NOTE — TELEPHONE ENCOUNTER
Patient has been trying to get zoloft refilled by prisc[facility] w/ no luck.    Patient has been out of it for several days and is not feeling well due to being out.    Asking if dr johnson could send to pharm for her?

## 2024-02-12 NOTE — PROGRESS NOTES
Daily Note     Today's date: 2024  Patient name: Sherry Cervantes  : 1990  MRN: [id number]  Referring provider: Elio Sanchez, SRIDHAR  Dx:   Encounter Diagnosis     ICD-10-CM    1. Acute right-sided low back pain with right-sided sciatica  M54.41                        Subjective:  Pt reports having continued L sided LB and buttock pain.  Felt better after last PT session.  Even getting some pain into L cervical region.  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  Lifting increases her pain.  Wondering if an easier job for a few weeks would be helpful.         Objective:  See treatment diary below      Assessment:  Pt presented to outpatient physical therapy at [facility] with complaints of R LB and posterior LE pain to her foot.  She is having the most difficulty when lifting due to pain.  Patient was referred to PT through [facility].  She will continue to benefit from skilled PT services with focus on manual treatment to address her hypomobility issues, stretching and core/postural strengthening in order to return to all normal activities and reach maximum level of function.  Mod less tightness post manual tx today with less pain.  She would likely have much less pain if she was able to modify her work environment even if it was for a few weeks to lessen her pain so that she could progress to the next strengthening phase of PT.  Her current job requires, running, chasing, bending, pivoting, blocking (hits/kicks), restraints, sitting, walking.  Most of these tasks should be fine for her to perform, but the higher impact tasks such as running, chasing, blocking and applying restraints will likely delay healing time.         Plan:  Continue to progress core strength.        POC EXPIRES On:  24  PRECAUTIONS:  None  CO-MORBIDITES:  Depression  PERSONAL FACTORS:  None  Access Code [email or web address]    Manuals HEP          STM B lumbar paraspinals prone    "8' 8'         STM L upper glut in R S/L    4'                                   Neuro Re-Ed     Supine PPT 1/29 5\" 20 5\" 10 5\" 15         Supine PPT with marching L/R   15 ea 15 ea         TB Rows B   [place] 20 [place] 20         Bird-dogs L/R 2/12  NV 10 ea         Planks                                       Ther Ex    Supine piriformis stretch R 1/29 10\" 10 10\" 5 L+R 10\" 5 ea         Supine R nerve sliders with 5 ankle pumps 1/29 10 10 L+R 10 L+R         Bike   L1 6' L1 6'         Supine B hip ball adduction   5\" 20 5\" 20         Supine TB B hip ER   Blue 3\" 20 Blue 3\" 20         Cat/cow 2/5  5\" 10 ea 5\" 10                                   Ther Activity                              Gait Training                              Modalities                                         "

## 2024-02-13 RX ORDER — SERTRALINE HYDROCHLORIDE 100 MG/1
100 TABLET, FILM COATED ORAL DAILY
Qty: 30 TABLET | Refills: 1 | Status: SHIPPED | OUTPATIENT
Start: 2024-02-13

## 2024-02-14 ENCOUNTER — OFFICE VISIT (OUTPATIENT)
Dept: FAMILY MEDICINE CLINIC | Facility: HOSPITAL | Age: 34
End: 2024-02-14
Payer: COMMERCIAL

## 2024-02-14 VITALS
BODY MASS INDEX: 34.16 KG/M2 | WEIGHT: 205 LBS | OXYGEN SATURATION: 98 % | HEART RATE: 102 BPM | DIASTOLIC BLOOD PRESSURE: 68 MMHG | HEIGHT: 65 IN | SYSTOLIC BLOOD PRESSURE: 90 MMHG

## 2024-02-14 DIAGNOSIS — M62.830 LUMBAR PARASPINAL MUSCLE SPASM: ICD-10-CM

## 2024-02-14 DIAGNOSIS — M62.89 HAMSTRING TIGHTNESS OF LEFT LOWER EXTREMITY: ICD-10-CM

## 2024-02-14 DIAGNOSIS — M54.42 ACUTE LEFT-SIDED LOW BACK PAIN WITH LEFT-SIDED SCIATICA: Primary | ICD-10-CM

## 2024-02-14 DIAGNOSIS — M54.41 ACUTE RIGHT-SIDED BACK PAIN WITH SCIATICA: ICD-10-CM

## 2024-02-14 DIAGNOSIS — M70.62 TROCHANTERIC BURSITIS, LEFT HIP: ICD-10-CM

## 2024-02-14 DIAGNOSIS — G57.02 PIRIFORMIS SYNDROME, LEFT: ICD-10-CM

## 2024-02-14 DIAGNOSIS — G57.01 PIRIFORMIS SYNDROME OF RIGHT SIDE: ICD-10-CM

## 2024-02-14 PROCEDURE — 99214 OFFICE O/P EST MOD 30 MIN: CPT | Performed by: STUDENT IN AN ORGANIZED HEALTH CARE EDUCATION/TRAINING PROGRAM

## 2024-02-14 NOTE — LETTER
February 14, 2024     Patient: Sherry Cervantes  YOB: 1990  Date of Visit: 2/14/2024      To Whom it May Concern:    Sherry Cervantes is under my professional care.  Sherry was seen in my office on 2/14/2024. Sherry may return to work with limitations at this time due to acute low back strain, and pain on standing on left leg. 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 This is temporary, and she will be re-assessed in 4 -6 weeks, with MRI if still needed.     If you have any questions or concerns, please don't hesitate to call.         Sincerely,          Tatiana Hughes, DO

## 2024-02-15 ENCOUNTER — PATIENT OUTREACH (OUTPATIENT)
Dept: OBGYN CLINIC | Facility: CLINIC | Age: 34
End: 2024-02-15

## 2024-02-15 NOTE — PROGRESS NOTES
Third attempt to reach patient regarding therapy services - no answer. SW left VM requesting a return call.     Due to lack of response from patient, SW sent UTR letter via [employer]. SW closing current referral, please re-refer as needed.

## 2024-02-15 NOTE — PROGRESS NOTES
[facility]   Tatiana Hughes DO    Assessment/Plan:      Diagnosis ICD-10-CM Associated Orders   1. Acute left-sided low back pain with left-sided sciatica  M54.42       2. Lumbar paraspinal muscle spasm  M62.830       3. Acute right-sided back pain with sciatica  M54.41       4. Piriformis syndrome of right side  G57.01       5. Piriformis syndrome, left  G57.02       6. Trochanteric bursitis, left hip  M70.62       7. Hamstring tightness of left lower extremity  M62.89         Work note given.   C/W PT & HEP.   C/W flexeril & heat & stretching.   Consider MRI Lumbar spine if not improving.   May need to rpt prednisone, but had recently due to flare on the R side.   I have spent a total time of 30 minutes on 02/14/24 in caring for this patient including Instructions for management and Documenting in the medical record.  Return if symptoms worsen or fail to improve.  Patient may call or return to office with any questions or concerns.   ______________________________________________________________________  Subjective:     Patient ID: Sherry Cervantes is a 34 y.o. female.  Back Pain  Pertinent negatives include no chest pain or fever.     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 Pain occurred within 24 hours of episode. It is not going away. Acoprss her low back on the left.     Has been going to PT, and using muscle relaxers & heating pads.     2 times last week had episodes of Left leg giving out/buckling underneath her, when trying to stand.   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     Left leg is throbbing all night.     Prior sciatica was on the right side & had resolved.   This feels different than that.   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        The following portions of  "the patient's history were reviewed and updated as appropriate: allergies, current medications, past medical history, and problem list.    Review of Systems   Constitutional:  Negative for chills and fever.   Respiratory:  Negative for cough and shortness of breath.    Cardiovascular:  Negative for chest pain and palpitations.   Musculoskeletal:  Positive for arthralgias, back pain and gait problem.       Objective:      Vitals:    02/14/24 1855   BP: 90/68   Pulse: 102   SpO2: 98%      Physical Exam  Vitals reviewed.   Constitutional:       Appearance: Normal appearance. She is well-developed.   HENT:      Head: Normocephalic and atraumatic.   Eyes:      General: No scleral icterus.        Right eye: No discharge.         Left eye: No discharge.   Cardiovascular:      Rate and Rhythm: Normal rate.   Pulmonary:      Effort: Pulmonary effort is normal. No respiratory distress.      Breath sounds: No stridor.   Musculoskeletal:         General: Tenderness (L SIJt & L troch bursa, L mid back paraspinals) present.      Cervical back: Normal range of motion.      Comments: Normal Muscle strength BLE   Skin:     General: Skin is warm.   Neurological:      Mental Status: She is alert and oriented to person, place, and time.      Sensory: Sensory deficit (diminished on L than RLE) present.      Gait: Gait abnormal (antalgic).   Psychiatric:         Mood and Affect: Mood normal.         Behavior: Behavior normal.         Thought Content: Thought content normal.         Judgment: Judgment normal.         Portions of the record may have been created with voice recognition software. Occasional wrong word or \"sound alike\" substitutions may have occurred due to the inherent limitations of voice recognition software. Please review the chart carefully and recognize, using context, where substitutions/typographical errors may have occurred.     "

## 2024-02-19 ENCOUNTER — OFFICE VISIT (OUTPATIENT)
Dept: PHYSICAL THERAPY | Facility: CLINIC | Age: 34
End: 2024-02-19
Payer: COMMERCIAL

## 2024-02-19 DIAGNOSIS — M54.41 ACUTE RIGHT-SIDED LOW BACK PAIN WITH RIGHT-SIDED SCIATICA: Primary | ICD-10-CM

## 2024-02-19 PROCEDURE — 97110 THERAPEUTIC EXERCISES: CPT | Performed by: PHYSICAL THERAPIST

## 2024-02-19 PROCEDURE — 97112 NEUROMUSCULAR REEDUCATION: CPT | Performed by: PHYSICAL THERAPIST

## 2024-02-19 PROCEDURE — 97140 MANUAL THERAPY 1/> REGIONS: CPT | Performed by: PHYSICAL THERAPIST

## 2024-02-19 NOTE — PROGRESS NOTES
"Daily Note     Today's date: 2024  Patient name: Sherry Cervantes  : 1990  MRN: [id number]  Referring provider: Elio Sanchez, PT  Dx:   Encounter Diagnosis     ICD-10-CM    1. 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  Should be less physical work.  L LB and thigh hurts up stairs.  Some pain relief after adding STM to upper L glut last week.       Objective:  See treatment diary below      Assessment:  Pt presented to outpatient physical therapy at [facility] with complaints of R LB and posterior LE pain to her foot.  She is having the most difficulty when lifting due to pain.  Patient was referred to PT through [facility].  She will continue to benefit from skilled PT services with focus on manual treatment to address her hypomobility issues, stretching and core/postural strengthening in order to return to all normal activities and reach maximum level of function.  Mod less tightness post manual tx again today with less pain.  She will likely have much less pain once she modifies her work environment tomorrow to a less physical job.  As pain lessens she will be able to progress to the next strengthening phase of PT.  Her current job requires, running, chasing, bending, pivoting, blocking (hits/kicks), restraints, sitting, walking.  Good tolerance to all added exercises today aside from mod fatigue with TB hip PREs.          Plan:  Continue to progress core strength.  POC needed next 1-2 sessions.        POC EXPIRES On:  24  PRECAUTIONS:  None  CO-MORBIDITES:  Depression  PERSONAL FACTORS:  None  Access Code [id number]    Manuals HEP         STM B lumbar paraspinals prone   8' 8' 6'        STM L upper glut in R S/L    4' 4'                                  Neuro Re-Ed     Supine PPT  5\" 20 5\" 10 5\" 15 5\" 15        Supine " "PPT with marching L/R   15 ea 15 ea 15 ea with OH black fit [place]        TB Rows B   [place] 20 [place] 20 [place] 20        Bird-dogs L/R 2/12  NV 10 ea 10 ea        Planks             TB rows wide  B     [place] 20        Seated physioball knee ext L/R     Blue 15 ea        Seated physioball hip flex L/R     Blue 15 ea                     Ther Ex    Supine piriformis stretch R 1/29 10\" 10 10\" 5 L+R 10\" 5 ea 10\" 5 ea        Supine R nerve sliders with 5 ankle pumps 1/29 10 10 L+R 10 L+R 10 L+R        Bike   L1 6' L1 6' L4 6'        Supine B hip ball adduction   5\" 20 5\" 20 5\" 20        Supine TB B hip ER   Blue 3\" 20 Blue 3\" 20 Blue 3\" 20        Cat/cow 2/5  5\" 10 ea 5\" 10 5\" 10        Standing TB hip ext L/R     Green 20 ea        Standing hip abd L/R     Green 20 ea        Ther Activity                              Gait Training                              Modalities                                         "

## 2024-02-26 ENCOUNTER — OFFICE VISIT (OUTPATIENT)
Dept: PHYSICAL THERAPY | Facility: CLINIC | Age: 34
End: 2024-02-26
Payer: COMMERCIAL

## 2024-02-26 DIAGNOSIS — M54.41 ACUTE RIGHT-SIDED LOW BACK PAIN WITH RIGHT-SIDED SCIATICA: Primary | ICD-10-CM

## 2024-02-26 PROCEDURE — 97112 NEUROMUSCULAR REEDUCATION: CPT | Performed by: PHYSICAL THERAPIST

## 2024-02-26 PROCEDURE — 97110 THERAPEUTIC EXERCISES: CPT | Performed by: PHYSICAL THERAPIST

## 2024-02-26 NOTE — PROGRESS NOTES
PT Re-evaluation    Today's date: 2024  Patient name: Sherry Cervantes  : 1990  MRN: [id number]  Referring provider: Elio Sanchez PT  Dx:   Encounter Diagnosis     ICD-10-CM    1. Acute right-sided low back pain with right-sided sciatica  M54.41                      Assessment  Assessment details: Sherry Cervantes is a 34 y.o. female who presented to outpatient physical therapy at [facility] with complaints of R LB and posterior LE pain to her foot.  She was having the most difficulty when lifting due to pain.  Patient was referred to PT through [facility].  She has progressed well since starting PT especially since recently changing her job to a less physical one.  She will be returning to her normal job in about 1 more week.  She is able to carry laundry now due to lessening pain.  She will continue to benefit from skilled PT services with focus on manual treatment to address her hypomobility issues, stretching and core/postural strengthening in order to return to all normal activities and reach maximum level of function.    Impairments: abnormal or restricted ROM, activity intolerance, impaired physical strength and pain with function  Barriers to therapy: None  Understanding of Dx/Px/POC: excellent  Goals  ST.  Independent with HEP in 2 weeks - Met  2.  Increase lumbar AROM to WNL all motions in 3 weeks - Met     LT.  Achieve FOTO score of 78/100 in 4 weeks - Mostly Met  2.  Able to lift up to 30# without LBP in 4 weeks - Mostly Met  3.  Strength abdominals = 4+/5 in 4 weeks - Partially Met  4.  Able to run x 5 min without LBP in 6 weeks (4/15/24)    Plan  Patient would benefit from: skilled PT  Planned modality interventions: cryotherapy, TENS and thermotherapy: hydrocollator packs  Planned therapy interventions: abdominal trunk stabilization, ADL retraining, balance/weight bearing training, body mechanics training, flexibility, functional ROM exercises, home  exercise program, joint mobilization, manual therapy, neuromuscular re-education, postural training, strengthening, stretching, therapeutic activities and therapeutic exercise  Frequency: 2x week  Duration in weeks: 6  Treatment plan discussed with: patient      Subjective Evaluation    History of Present Illness  Mechanism of injury: Pt reports having R LBP with R posterior LE pain to her foot.  No numbness or tingling reported.  Pain started on 24 and by 24 it had worsened where she went to the ED on 1/15/24.  She has a hx of depression.  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 Not carrying heavy loads such as laundry when she started PT due to the pain but now able to.  She has a L knee injury that limits her somewhat with squatting and lunging.              Not a recurrent problem   Quality of life: excellent    Patient Goals  Patient goals for therapy: decreased pain, increased motion, increased strength, independence with ADLs/IADLs and return to sport/leisure activities    Pain  Current pain ratin  At best pain ratin  At worst pain ratin  Quality: dull ache and tight  Relieving factors: medications  Aggravating factors: lifting  Progression: improved    Social Support  Steps to enter house: yes  Stairs in house: yes   Lives in: multiple-level home  Lives with: young children and significant other    Employment status: working    Diagnostic Tests  X-ray: normal  Treatments  Previous treatment: medication  Current treatment: medication and physical therapy        Objective     Concurrent Complaints  Negative for disturbed sleep, bladder dysfunction and bowel dysfunction    Static Posture     Lumbar Spine   Flattened.     Postural Observations  Seated posture: fair  Standing posture: good  Correction of posture: makes symptoms better      Palpation   Left   No palpable tenderness to the erector spinae.     Right   No palpable tenderness to the erector spinae.     Tenderness     Lumbar  "Spine  No tenderness in the spinous process and facet joint.     Neurological Testing     Sensation     Lumbar   Left   Intact: light touch    Right   Intact: light touch    Reflexes   Left   Patellar (L4): normal (2+)    Right   Patellar (L4): normal (2+)    Active Range of Motion     Lumbar   Flexion:  WFL  Extension:  Restriction level: minimal  Left lateral flexion:  WFL  Right lateral flexion:  WFL  Left rotation:  WFL  Right rotation:  WFL    Passive Range of Motion     Additional Passive Range of Motion Details  Min tightness R H/S, none R piriformis.     Strength/Myotome Testing     Lumbar   Left   Normal strength    Right   Normal strength    Additional Strength Details  Abdominals = 4-/5    Tests     Lumbar   Negative prone instability .     Left   Negative passive SLR and slump test.     Right   Negative passive SLR and slump test.     Ambulation     Observational Gait   Gait: within functional limits       POC EXPIRES On:  4/15/24  PRECAUTIONS:  None  CO-MORBIDITES:  Depression  PERSONAL FACTORS:  None  Access Code [email or web address]    Manuals HEP 1/29 2/5 2/12 2/19 2/26       STM B lumbar paraspinals prone   8' 8' 6' 4'       STM L upper glut in R S/L    4' 4' 2'                                 Neuro Re-Ed     Supine PPT 1/29 5\" 20 5\" 10 5\" 15 5\" 15 5\" 20       Supine PPT with marching L/R   15 ea 15 ea 15 ea with OH black fit [place] 15 ea with OH black fit [place]       TB Rows B   [place] 20 [place] 20 Plum 20 Plum 30       Bird-dogs L/R 2/12  NV 10 ea 10 ea 10 ea       Planks      NV       TB rows wide  B     [place] 20 [place] 20       Seated physioball knee ext L/R     Blue 15 ea Blue 20 ea       Seated physioball hip flex L/R     Blue 15 ea Blue 20 ea                    Ther Ex    Supine piriformis stretch R 1/29 10\" 10 10\" 5 L+R 10\" 5 ea 10\" 5 ea 10\" 5 ea       Supine R nerve sliders with 5 ankle pumps 1/29 10 10 L+R 10 L+R 10 L+R 10 L+R       Bike   L1 6' L1 6' L4 6' L4 6'       Supine B hip ball adduction   5\" " "20 5\" 20 5\" 20 5\" 20       Supine TB B hip ER   Blue 3\" 20 Blue 3\" 20 Blue 3\" 20 Blue 3\" 20       Cat/cow 2/5  5\" 10 ea 5\" 10 5\" 10 5\" 10       TB anti-rotational walk outs L/R      [place] 10 ea - 2 steps       Standing TB hip ext L/R     Green 20 ea Green 20 ea       Standing hip abd L/R     Green 20 ea Green 20 ea       Ther Activity                              Gait Training                              Modalities                                         "

## 2024-02-26 NOTE — PROGRESS NOTES
"Daily Note     Today's date: 2024  Patient name: Sherry Cervantes  : 1990  MRN: [id number]  Referring provider: Elio Sanchez, PT  Dx:   No diagnosis found.                     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  Should be less physical work.  L LB and thigh hurts up stairs.  Some pain relief after adding STM to upper L glut last week.       Objective:  See treatment diary below      Assessment:  Pt presented to outpatient physical therapy at [facility] with complaints of R LB and posterior LE pain to her foot.  She is having the most difficulty when lifting due to pain.  Patient was referred to PT through [facility].  She will continue to benefit from skilled PT services with focus on manual treatment to address her hypomobility issues, stretching and core/postural strengthening in order to return to all normal activities and reach maximum level of function.  Mod less tightness post manual tx again today with less pain.  She will likely have much less pain once she modifies her work environment tomorrow to a less physical job.  As pain lessens she will be able to progress to the next strengthening phase of PT.  Her current job requires, running, chasing, bending, pivoting, blocking (hits/kicks), restraints, sitting, walking.  Good tolerance to all added exercises today aside from mod fatigue with TB hip PREs.          Plan:  Continue to progress core strength.  POC needed next 1-2 sessions.        POC EXPIRES On:  24  PRECAUTIONS:  None  CO-MORBIDITES:  Depression  PERSONAL FACTORS:  None  Access Code [email or web address]    Manuals HEP         STM B lumbar paraspinals prone   8' 8' 6'        STM L upper glut in R S/L    4' 4'                                  Neuro Re-Ed     Supine PPT  5\" 20 5\" 10 5\" 15 5\" 15        Supine PPT with marching L/R   15 ea 15 ea 15 ea with OH black fit [place]        TB Rows B   [place] " "20 [place] 20 [place] 20        Bird-dogs L/R 2/12  [place] 10 ea 10 ea        Planks             TB rows wide  B     [place] 20        Seated physioball knee ext L/R     Blue 15 ea        Seated physioball hip flex L/R     Blue 15 ea                     Ther Ex    Supine piriformis stretch R 1/29 10\" 10 10\" 5 L+R 10\" 5 ea 10\" 5 ea        Supine R nerve sliders with 5 ankle pumps 1/29 10 10 L+R 10 L+R 10 L+R        Bike   L1 6' L1 6' L4 6'        Supine B hip ball adduction   5\" 20 5\" 20 5\" 20        Supine TB B hip ER   Blue 3\" 20 Blue 3\" 20 Blue 3\" 20        Cat/cow 2/5  5\" 10 ea 5\" 10 5\" 10        Standing TB hip ext L/R     Green 20 ea        Standing hip abd L/R     Green 20 ea        Ther Activity                              Gait Training                              Modalities                                         "

## 2024-03-14 ENCOUNTER — OFFICE VISIT (OUTPATIENT)
Dept: URGENT CARE | Facility: CLINIC | Age: 34
End: 2024-03-14
Payer: COMMERCIAL

## 2024-03-14 VITALS
RESPIRATION RATE: 14 BRPM | HEIGHT: 65 IN | SYSTOLIC BLOOD PRESSURE: 112 MMHG | WEIGHT: 204 LBS | HEART RATE: 86 BPM | OXYGEN SATURATION: 98 % | BODY MASS INDEX: 33.99 KG/M2 | TEMPERATURE: 98.2 F | DIASTOLIC BLOOD PRESSURE: 69 MMHG

## 2024-03-14 DIAGNOSIS — J02.9 SORE THROAT: ICD-10-CM

## 2024-03-14 DIAGNOSIS — J02.0 STREP PHARYNGITIS: Primary | ICD-10-CM

## 2024-03-14 LAB — S PYO AG THROAT QL: NEGATIVE

## 2024-03-14 PROCEDURE — 87880 STREP A ASSAY W/OPTIC: CPT | Performed by: FAMILY MEDICINE

## 2024-03-14 PROCEDURE — 99213 OFFICE O/P EST LOW 20 MIN: CPT | Performed by: FAMILY MEDICINE

## 2024-03-14 RX ORDER — AMOXICILLIN 400 MG/5ML
500 POWDER, FOR SUSPENSION ORAL 2 TIMES DAILY
Qty: 126 ML | Refills: 0 | Status: SHIPPED | OUTPATIENT
Start: 2024-03-14 | End: 2024-03-24

## 2024-03-14 NOTE — PROGRESS NOTES
[facility]        NAME: Sherry Cervantes is a 34 y.o. female  : 1990    MRN: [id number]  DATE: 2024  TIME: 5:28 PM    Assessment and Plan   Strep pharyngitis [J02.0]  1. Strep pharyngitis  amoxicillin (AMOXIL) 400 MG/5ML suspension      2. Sore throat  POCT rapid strepA            Patient Instructions       Follow up with PCP in 3-5 days.  Proceed to  ER if symptoms worsen.    If tests have been performed at [facility], our office will contact you with results if changes need to be made to the care plan discussed with you at the visit.  [facility]    Chief Complaint     Chief Complaint   Patient presents with    Sore Throat     Sore throat x 5 days, fever last Sat/Sun, headache, neck swollen         History of Present Illness       34-year-old female with 5-day history of sore throat and painful swallowing.  She also reports frequent fevers and chills at home.  Denies nausea or vomiting.    Sore Throat         Review of Systems   Review of Systems   Constitutional:  Positive for fever.   HENT:  Positive for sore throat.    Eyes: Negative.    Respiratory: Negative.     Cardiovascular: Negative.    Gastrointestinal: Negative.    Genitourinary: Negative.    Musculoskeletal:  Positive for myalgias.   Skin: Negative.    Allergic/Immunologic: Negative.    Neurological: Negative.    Hematological: Negative.    Psychiatric/Behavioral: Negative.           Current Medications       Current Outpatient Medications:     amoxicillin (AMOXIL) 400 MG/5ML suspension, Take 6.3 mL (500 mg total) by mouth 2 (two) times a day for 10 days, Disp: 126 mL, Rfl: 0    cyclobenzaprine (FLEXERIL) 5 mg tablet, Take 0.5 tablets (2.5 mg total) by mouth 3 (three) times a day as needed for muscle spasms, Disp: 30 tablet, Rfl: 0    levothyroxine 75 mcg tablet, TAKE 1 TABLET BY MOUTH EVERY DAY, Disp: 90 tablet, Rfl: 0    Prenatal Vit-Fe Fumarate-FA (prenatal multivitamin) 28-0.8  MG TABS, Take 1 tablet by mouth daily Late afternoon, Disp: , Rfl:     sertraline (Zoloft) 100 mg tablet, Take 1 tablet (100 mg total) by mouth daily, Disp: 30 tablet, Rfl: 1    Current Allergies     Allergies as of 03/14/2024    (No Known Allergies)            The following portions of the patient's history were reviewed and updated as appropriate: allergies, current medications, past family history, past medical history, past social history, past surgical history and problem list.     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    Vitamin D deficiency     Vocal cord paralysis     Following partial thyroidectomy       Past Surgical History:   Procedure Laterality Date    DILATION AND EVACUATION  2016    KNEE ARTHROSCOPY W/ PLICA EXCISION Left 2019    THYROIDECTOMY, PARTIAL Left 2013    UPPER GASTROINTESTINAL ENDOSCOPY      US GUIDED THYROID BIOPSY  11/08/2022    WISDOM TOOTH EXTRACTION         Family History   Problem Relation Age of Onset    Thyroid disease Mother         thyroid nodules    No Known Problems Father     Ovarian cancer Sister     Thyroid disease Sister     Anorexia nervosa Sister     Mental illness Sister     No Known Problems Sister     No Known Problems Brother     Other Maternal Aunt         hypoglycemia    Other Maternal Aunt         brain tumor, had siezure    Celiac disease Maternal Grandmother     Hodgkin's lymphoma Maternal Grandfather     Hodgkin's lymphoma Paternal Grandfather     No Known Problems Daughter     Colon cancer Neg Hx     Colon polyps Neg Hx     Substance Abuse Neg Hx          Medications have been verified.        Objective   /69   Pulse 86   Temp 98.2 °F  "(36.8 °C)   Resp 14   Ht 5' 5\" (1.651 m)   Wt 92.5 kg (204 lb)   SpO2 98%   BMI 33.95 kg/m²   No LMP recorded.       Physical Exam     Physical Exam  Vitals and nursing note reviewed.   Constitutional:       Appearance: She is well-developed.   HENT:      Head: Normocephalic.      Nose: Nose normal.      Mouth/Throat:      Pharynx: Pharyngeal swelling and posterior oropharyngeal erythema present. No oropharyngeal exudate.   Eyes:      Pupils: Pupils are equal, round, and reactive to light.   Cardiovascular:      Rate and Rhythm: Normal rate.   Pulmonary:      Effort: Pulmonary effort is normal.   Abdominal:      General: Abdomen is flat.   Musculoskeletal:         General: Normal range of motion.      Cervical back: Normal range of motion.   Skin:     General: Skin is warm and dry.   Neurological:      Mental Status: She is alert and oriented to person, place, and time.                   "

## 2024-03-15 DIAGNOSIS — R63.5 WEIGHT GAIN: Primary | ICD-10-CM

## 2024-03-17 LAB — B-HEM STREP SPEC QL CULT: NEGATIVE

## 2024-03-22 DIAGNOSIS — E89.0 POSTOPERATIVE HYPOTHYROIDISM: ICD-10-CM

## 2024-03-22 RX ORDER — LEVOTHYROXINE SODIUM 0.07 MG/1
75 TABLET ORAL DAILY
Qty: 90 TABLET | Refills: 1 | Status: SHIPPED | OUTPATIENT
Start: 2024-03-22

## 2024-04-02 LAB — CORTIS SAL-MCNC: 0.07 UG/DL

## 2024-04-18 DIAGNOSIS — F33.9 DEPRESSION, RECURRENT (HCC): ICD-10-CM

## 2024-04-18 RX ORDER — SERTRALINE HYDROCHLORIDE 100 MG/1
100 TABLET, FILM COATED ORAL DAILY
Qty: 30 TABLET | Refills: 1 | Status: SHIPPED | OUTPATIENT
Start: 2024-04-18

## 2024-04-29 ENCOUNTER — ANNUAL EXAM (OUTPATIENT)
Dept: OBGYN CLINIC | Facility: CLINIC | Age: 34
End: 2024-04-29
Payer: COMMERCIAL

## 2024-04-29 VITALS
HEIGHT: 65 IN | SYSTOLIC BLOOD PRESSURE: 102 MMHG | BODY MASS INDEX: 34.82 KG/M2 | WEIGHT: 209 LBS | DIASTOLIC BLOOD PRESSURE: 64 MMHG

## 2024-04-29 DIAGNOSIS — Z30.46 ENCOUNTER FOR SURVEILLANCE OF NEXPLANON SUBDERMAL CONTRACEPTIVE: ICD-10-CM

## 2024-04-29 DIAGNOSIS — Z01.419 ENCOUNTER FOR ANNUAL ROUTINE GYNECOLOGICAL EXAMINATION: Primary | ICD-10-CM

## 2024-04-29 DIAGNOSIS — F33.9 DEPRESSION, RECURRENT (HCC): ICD-10-CM

## 2024-04-29 PROBLEM — Z97.5 NEXPLANON IN PLACE: Status: ACTIVE | Noted: 2024-04-29

## 2024-04-29 PROCEDURE — 99395 PREV VISIT EST AGE 18-39: CPT | Performed by: STUDENT IN AN ORGANIZED HEALTH CARE EDUCATION/TRAINING PROGRAM

## 2024-04-29 RX ORDER — SERTRALINE HYDROCHLORIDE 100 MG/1
100 TABLET, FILM COATED ORAL DAILY
Qty: 90 TABLET | Refills: 4 | Status: SHIPPED | OUTPATIENT
Start: 2024-04-29

## 2024-04-29 NOTE — PROGRESS NOTES
[facility] - [place]  [address] [place] [address]    ASSESSMENT/PLAN: Sherry Cervantes is a 34 y.o.  who presents for annual gynecologic exam.    Encounter for routine gynecologic examination  - Routine well woman exam completed today.  - Cervical Cancer Screening: Current ASCCP Guidelines reviewed. Last Pap: 2022 . History of abnormal: None  - HPV Vaccination status: Immunization series complete  - STI screening offered including HIV testing: offered, pt declined  - Contraceptive counseling discussed.  Current contraception: Nexplanon, satisfied.    - The following were reviewed in today's visit: breast self exam, exercise, and healthy diet    Additional problems addressed during this visit:  1. Encounter for annual routine gynecological examination    2. Encounter for surveillance of Nexplanon subdermal contraceptive    3. Depression, recurrent (HCC)  -     sertraline (ZOLOFT) 100 mg tablet; Take 1 tablet (100 mg total) by mouth daily        CC:  Annual Gynecologic Examination    HPI: Sherry Cervantes is a 34 y.o.  who presents for annual gynecologic examination. She is without complaint today. Satisfied with Nexplanon for contraception. Mood stable on Zoloft 100 mg PO daily and desires to continue.     ROS: Negative except as noted in HPI    No LMP recorded (lmp unknown). Patient has had an implant.       She  reports being sexually active and has had partner(s) who are male. She reports using the following method of birth control/protection: Implant.       The following portions of the patient's history were reviewed and updated as appropriate:   Past Medical History:   Diagnosis Date    Chronic knee pain     Depression     Disease of thyroid gland     Dysphagia 2020    History of pulmonary embolus (PE) 2017    Result of Knee injury    Left upper quadrant pain 2020    Menorrhagia     Multiple thyroid nodules     Psychiatric disorder     Saphenous nerve  neuropathy, 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    Vitamin D deficiency     Vocal cord paralysis     Following partial thyroidectomy     Past Surgical History:   Procedure Laterality Date    DILATION AND EVACUATION  2016    KNEE ARTHROSCOPY W/ PLICA EXCISION Left 2019    THYROIDECTOMY, PARTIAL Left 2013    UPPER GASTROINTESTINAL ENDOSCOPY      US GUIDED THYROID BIOPSY  11/08/2022    WISDOM TOOTH EXTRACTION       Family History   Problem Relation Age of Onset    Thyroid disease Mother         thyroid nodules    No Known Problems Father     Thyroid disease Sister     Anorexia nervosa Sister     Mental illness Sister     No Known Problems Sister     No Known Problems Brother     Celiac disease Maternal Grandmother     Hodgkin's lymphoma Maternal Grandfather     Hodgkin's lymphoma Paternal Grandfather     No Known Problems Daughter     Other Maternal Aunt         hypoglycemia    Other Maternal Aunt         brain tumor, had siezure    Colon cancer Neg Hx     Colon polyps Neg Hx     Substance Abuse Neg Hx     Uterine cancer Neg Hx      Social History     Socioeconomic History    Marital status: Single     Spouse name: None    Number of children: None    Years of education: None    Highest education level: None   Occupational History    None   Tobacco Use    Smoking status: Never     Passive exposure: Never    Smokeless tobacco: Never    Tobacco comments:      seasonal, only when it's warm out, social   Vaping Use    Vaping status: Never Used   Substance and Sexual Activity    Alcohol use: Yes     Comment: socially    Drug use: Not Currently     Comment:  no longer using marijuana    Sexual activity: Yes     Partners: Male     Birth control/protection: Implant     Comment: no new partner in past year   Other Topics Concern    None   Social History Narrative    Single    1 c/caffeine /day    Wears seatbelt    Regular exercise    No cigarette smoke exposure home/work    Employed    Lives with roommate    No living will in place  "    Social Determinants of Health     Financial Resource Strain: Not on file   Food Insecurity: Not on file   Transportation Needs: No Transportation Needs (6/2/2021)    PRAPARE - Transportation     Lack of Transportation (Medical): No     Lack of Transportation (Non-Medical): No   Physical Activity: Sufficiently Active (6/2/2021)    Exercise Vital Sign     Days of Exercise per Week: 4 days     Minutes of Exercise per Session: 60 min   Stress: Stress Concern Present (6/2/2021)    [employer] [place] [employer] - Occupational Stress Questionnaire     Feeling of Stress : Rather much   Social Connections: Not on file   Intimate Partner Violence: Not on file   Housing Stability: Not on file     Outpatient Medications Marked as Taking for the 4/29/24 encounter (Annual Exam) with Sampson Pierre MD   Medication    cyclobenzaprine (FLEXERIL) 5 mg tablet    etonogestrel (NEXPLANON) subdermal implant    levothyroxine 75 mcg tablet    sertraline (ZOLOFT) 100 mg tablet    [DISCONTINUED] sertraline (ZOLOFT) 100 mg tablet     No Known Allergies        Objective:  /64 (BP Location: Left arm, Patient Position: Sitting, Cuff Size: Standard)   Ht 5' 5\" (1.651 m)   Wt 94.8 kg (209 lb)   LMP  (LMP Unknown)   Breastfeeding No   BMI 34.78 kg/m²        Chaperone present? Yes: Darrel Good MA.    General Appearance: alert and oriented, in no acute distress.   Neck/Thyroid: No thyromegaly, no thyroid nodules.  Respiratory: Unlabored breathing.  Cardiovascular: Regular rate, no peripheral edema.  Abdomen: Soft, non-tender, non-distended, no masses, no rebound or guarding.  Breast Exam: No dimpling, nipple retraction or discharge. No lumps or masses. No axillary or supraclavicular nodes.   Pelvic:       External genitalia: Normal appearance, no abnormal pigmentation, no lesions or masses. Normal Bartholin's and [place]'s.      Urinary system: Urethral meatus normal, bladder non-tender.      Vaginal: normal mucosa " without prolapse or lesions. Normal-appearing physiologic discharge.      Cervix: Normal-appearing, well-epithelialized, no gross lesions or masses. No cervical motion tenderness.      Adnexa: No adnexal masses or tenderness noted.      Uterus: Normal-sized, regular contour, midline, mobile, no uterine tenderness.  Extremities: Nexplanon in place in RUE. Insertion site well-healed. Normal range of motion. Warm, well-perfused, non-tender.   Skin: normal, no rash or abnormalities  Neurologic: alert, oriented x3  Psychiatric: Appropriate affect, mood stable, cooperative with exam.        Sampson Pierre MD  4/29/2024 1:37 PM

## 2024-05-14 ENCOUNTER — APPOINTMENT (OUTPATIENT)
Dept: RADIOLOGY | Facility: HOSPITAL | Age: 34
End: 2024-05-14
Payer: COMMERCIAL

## 2024-05-14 ENCOUNTER — HOSPITAL ENCOUNTER (EMERGENCY)
Facility: HOSPITAL | Age: 34
Discharge: HOME | End: 2024-05-14
Attending: EMERGENCY MEDICINE
Payer: COMMERCIAL

## 2024-05-14 ENCOUNTER — APPOINTMENT (EMERGENCY)
Dept: CT IMAGING | Facility: HOSPITAL | Age: 34
End: 2024-05-14
Payer: COMMERCIAL

## 2024-05-14 VITALS
OXYGEN SATURATION: 99 % | TEMPERATURE: 98 F | SYSTOLIC BLOOD PRESSURE: 110 MMHG | DIASTOLIC BLOOD PRESSURE: 67 MMHG | HEART RATE: 63 BPM | RESPIRATION RATE: 16 BRPM

## 2024-05-14 DIAGNOSIS — R93.89 ABNORMAL CT OF THE CHEST: ICD-10-CM

## 2024-05-14 DIAGNOSIS — R06.00 DYSPNEA: Primary | ICD-10-CM

## 2024-05-14 LAB
ALBUMIN SERPL BCP-MCNC: 4.6 G/DL (ref 3.5–5)
ALP SERPL-CCNC: 57 U/L (ref 34–104)
ALT SERPL W P-5'-P-CCNC: 31 U/L (ref 7–52)
ANION GAP SERPL CALCULATED.3IONS-SCNC: 7 MMOL/L (ref 4–13)
AST SERPL W P-5'-P-CCNC: 16 U/L (ref 13–39)
BASOPHILS # BLD AUTO: 0.03 THOUSANDS/ÂΜL (ref 0–0.1)
BASOPHILS NFR BLD AUTO: 0 % (ref 0–1)
BILIRUB SERPL-MCNC: 0.33 MG/DL (ref 0.2–1)
BUN SERPL-MCNC: 19 MG/DL (ref 5–25)
CALCIUM SERPL-MCNC: 9.7 MG/DL (ref 8.4–10.2)
CARDIAC TROPONIN I PNL SERPL HS: <2 NG/L
CARDIAC TROPONIN I PNL SERPL HS: <2 NG/L
CHLORIDE SERPL-SCNC: 106 MMOL/L (ref 96–108)
CO2 SERPL-SCNC: 27 MMOL/L (ref 21–32)
CREAT SERPL-MCNC: 0.85 MG/DL (ref 0.6–1.3)
EOSINOPHIL # BLD AUTO: 0.18 THOUSAND/ÂΜL (ref 0–0.61)
EOSINOPHIL NFR BLD AUTO: 2 % (ref 0–6)
ERYTHROCYTE [DISTWIDTH] IN BLOOD BY AUTOMATED COUNT: 13.5 % (ref 11.6–15.1)
GFR SERPL CREATININE-BSD FRML MDRD: 89 ML/MIN/1.73SQ M
GLUCOSE SERPL-MCNC: 77 MG/DL (ref 65–140)
HCT VFR BLD AUTO: 41.4 % (ref 34.8–46.1)
HGB BLD-MCNC: 13.2 G/DL (ref 11.5–15.4)
IMM GRANULOCYTES # BLD AUTO: 0.03 THOUSAND/UL (ref 0–0.2)
IMM GRANULOCYTES NFR BLD AUTO: 0 % (ref 0–2)
LYMPHOCYTES # BLD AUTO: 4 THOUSANDS/ÂΜL (ref 0.6–4.47)
LYMPHOCYTES NFR BLD AUTO: 40 % (ref 14–44)
MCH RBC QN AUTO: 27.2 PG (ref 26.8–34.3)
MCHC RBC AUTO-ENTMCNC: 31.9 G/DL (ref 31.4–37.4)
MCV RBC AUTO: 85 FL (ref 82–98)
MONOCYTES # BLD AUTO: 0.69 THOUSAND/ÂΜL (ref 0.17–1.22)
MONOCYTES NFR BLD AUTO: 7 % (ref 4–12)
NEUTROPHILS # BLD AUTO: 5.02 THOUSANDS/ÂΜL (ref 1.85–7.62)
NEUTS SEG NFR BLD AUTO: 51 % (ref 43–75)
NRBC BLD AUTO-RTO: 0 /100 WBCS
PLATELET # BLD AUTO: 299 THOUSANDS/UL (ref 149–390)
PMV BLD AUTO: 9.7 FL (ref 8.9–12.7)
POTASSIUM SERPL-SCNC: 3.6 MMOL/L (ref 3.5–5.3)
PROT SERPL-MCNC: 8.1 G/DL (ref 6.4–8.4)
RBC # BLD AUTO: 4.86 MILLION/UL (ref 3.81–5.12)
SODIUM SERPL-SCNC: 140 MMOL/L (ref 135–147)
WBC # BLD AUTO: 9.95 THOUSAND/UL (ref 4.31–10.16)

## 2024-05-14 PROCEDURE — 71046 X-RAY EXAM CHEST 2 VIEWS: CPT

## 2024-05-14 PROCEDURE — 80053 COMPREHEN METABOLIC PANEL: CPT

## 2024-05-14 PROCEDURE — 84484 ASSAY OF TROPONIN QUANT: CPT

## 2024-05-14 PROCEDURE — 99285 EMERGENCY DEPT VISIT HI MDM: CPT | Performed by: EMERGENCY MEDICINE

## 2024-05-14 PROCEDURE — 85025 COMPLETE CBC W/AUTO DIFF WBC: CPT

## 2024-05-14 PROCEDURE — 93005 ELECTROCARDIOGRAM TRACING: CPT

## 2024-05-14 PROCEDURE — 71275 CT ANGIOGRAPHY CHEST: CPT

## 2024-05-14 PROCEDURE — 36415 COLL VENOUS BLD VENIPUNCTURE: CPT

## 2024-05-14 PROCEDURE — 99285 EMERGENCY DEPT VISIT HI MDM: CPT

## 2024-05-14 RX ADMIN — IOHEXOL 85 ML: 350 INJECTION, SOLUTION INTRAVENOUS at 21:15

## 2024-05-15 ENCOUNTER — CONSULT (OUTPATIENT)
Age: 34
End: 2024-05-15
Payer: COMMERCIAL

## 2024-05-15 VITALS
SYSTOLIC BLOOD PRESSURE: 98 MMHG | TEMPERATURE: 99.1 F | HEART RATE: 83 BPM | DIASTOLIC BLOOD PRESSURE: 60 MMHG | BODY MASS INDEX: 34.26 KG/M2 | HEIGHT: 65 IN | OXYGEN SATURATION: 100 % | WEIGHT: 205.6 LBS

## 2024-05-15 DIAGNOSIS — Z86.711 HISTORY OF PULMONARY EMBOLUS (PE): Primary | ICD-10-CM

## 2024-05-15 DIAGNOSIS — R06.00 DYSPNEA: ICD-10-CM

## 2024-05-15 DIAGNOSIS — R93.89 ABNORMAL CT OF THE CHEST: ICD-10-CM

## 2024-05-15 DIAGNOSIS — J45.909 MILD ASTHMA WITHOUT COMPLICATION, UNSPECIFIED WHETHER PERSISTENT: ICD-10-CM

## 2024-05-15 PROCEDURE — 99244 OFF/OP CNSLTJ NEW/EST MOD 40: CPT | Performed by: INTERNAL MEDICINE

## 2024-05-15 RX ORDER — FLUTICASONE PROPIONATE AND SALMETEROL 250; 50 UG/1; UG/1
1 POWDER RESPIRATORY (INHALATION) 2 TIMES DAILY
Qty: 60 BLISTER | Refills: 3 | Status: SHIPPED | OUTPATIENT
Start: 2024-05-15 | End: 2024-09-12
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
## 2024-05-15 NOTE — ASSESSMENT & PLAN NOTE
Patient had a PE surrounding a DVT after an injury 7 years ago and is maintaining off anticoagulation.  Most recent CT of the chest showed no PE

## 2024-05-15 NOTE — ASSESSMENT & PLAN NOTE
Spirometry on today's visit was unreliable but suggestive of some obstruction.  Will start her on Advair 250, 1 puff twice a day and do full pulmonary function tests with bronchodilator response prior to the next visit in 3 months.  Hopeful this will help with her chest discomfort.

## 2024-05-16 LAB
ATRIAL RATE: 57 BPM
ATRIAL RATE: 74 BPM
P AXIS: 60 DEGREES
P AXIS: 75 DEGREES
PR INTERVAL: 126 MS
PR INTERVAL: 144 MS
QRS AXIS: 79 DEGREES
QRS AXIS: 85 DEGREES
QRSD INTERVAL: 90 MS
QRSD INTERVAL: 90 MS
QT INTERVAL: 388 MS
QT INTERVAL: 414 MS
QTC INTERVAL: 402 MS
QTC INTERVAL: 430 MS
T WAVE AXIS: 49 DEGREES
T WAVE AXIS: 59 DEGREES
VENTRICULAR RATE: 57 BPM
VENTRICULAR RATE: 74 BPM

## 2024-05-16 PROCEDURE — 93010 ELECTROCARDIOGRAM REPORT: CPT | Performed by: INTERNAL MEDICINE

## 2024-07-03 ENCOUNTER — TELEPHONE (OUTPATIENT)
Age: 34
End: 2024-07-03

## 2024-07-03 NOTE — TELEPHONE ENCOUNTER
Pt called to make appt for sinus infection, no appts until Aug for Dr Hughes.  [facility] Dr Metz does not accept [place]. I made her the appt for Friday with Dr Metz, unsuccessful transfer to clerical. Pt will call back on Friday to clarify

## 2024-07-05 ENCOUNTER — OFFICE VISIT (OUTPATIENT)
Dept: FAMILY MEDICINE CLINIC | Facility: HOSPITAL | Age: 34
End: 2024-07-05
Payer: COMMERCIAL

## 2024-07-05 ENCOUNTER — TELEPHONE (OUTPATIENT)
Dept: FAMILY MEDICINE CLINIC | Facility: HOSPITAL | Age: 34
End: 2024-07-05

## 2024-07-05 ENCOUNTER — TELEPHONE (OUTPATIENT)
Age: 34
End: 2024-07-05

## 2024-07-05 VITALS
WEIGHT: 203 LBS | TEMPERATURE: 99.3 F | OXYGEN SATURATION: 97 % | DIASTOLIC BLOOD PRESSURE: 70 MMHG | HEART RATE: 100 BPM | BODY MASS INDEX: 33.82 KG/M2 | HEIGHT: 65 IN | SYSTOLIC BLOOD PRESSURE: 114 MMHG

## 2024-07-05 DIAGNOSIS — R06.00 DYSPNEA: ICD-10-CM

## 2024-07-05 DIAGNOSIS — J45.909 MILD ASTHMA WITHOUT COMPLICATION, UNSPECIFIED WHETHER PERSISTENT: ICD-10-CM

## 2024-07-05 DIAGNOSIS — J01.00 ACUTE NON-RECURRENT MAXILLARY SINUSITIS: Primary | ICD-10-CM

## 2024-07-05 PROCEDURE — 99214 OFFICE O/P EST MOD 30 MIN: CPT | Performed by: INTERNAL MEDICINE

## 2024-07-05 RX ORDER — FLUTICASONE PROPIONATE 50 MCG
1 SPRAY, SUSPENSION (ML) NASAL DAILY
Qty: 18 ML | Refills: 2 | Status: SHIPPED | OUTPATIENT
Start: 2024-07-05

## 2024-07-05 RX ORDER — FLUTICASONE PROPIONATE AND SALMETEROL 250; 50 UG/1; UG/1
1 POWDER RESPIRATORY (INHALATION) 2 TIMES DAILY
Qty: 60 BLISTER | Refills: 3 | Status: CANCELLED | OUTPATIENT
Start: 2024-07-05 | End: 2024-11-02

## 2024-07-05 RX ORDER — PREDNISONE 10 MG/1
TABLET ORAL
Qty: 18 TABLET | Refills: 0 | Status: SHIPPED | OUTPATIENT
Start: 2024-07-05

## 2024-07-05 NOTE — TELEPHONE ENCOUNTER
Received call from patient regarding script that were ordered for her today. States they were not sent. Scripts have not been released to pharmacy. Attempted to call office to get clarification. Spoke with Sheila. Sheila is going to call pharmacy and then call patient back

## 2024-07-05 NOTE — TELEPHONE ENCOUNTER
Spoke to pharmacy, they do not have the augmentin in tablets, asking for a different script be sent in.  Also asking if we can resend the script for the nasal spray be sent. PCP was made aware to send in scripts.

## 2024-07-05 NOTE — TELEPHONE ENCOUNTER
Called and left detailed message that yes Dr. Metz does take [facility] - it as an error on her insurance company's side - jania can see anyone in the office with her insurance

## 2024-07-05 NOTE — PROGRESS NOTES
Assessment/Plan:     Diagnosis ICD-10-CM Associated Orders   1. Acute non-recurrent maxillary sinusitis  J01.00 amoxicillin-clavulanate (AUGMENTIN) 400-57 MG per chewable tablet     fluticasone (FLONASE) 50 mcg/act nasal spray     predniSONE 10 mg tablet      2. Mild asthma without complication, unspecified whether persistent  J45.909 fluticasone (FLONASE) 50 mcg/act nasal spray     predniSONE 10 mg tablet          Problem List Items Addressed This Visit        Respiratory    Mild asthma without complication, unspecified whether persistent     As teenager had used advair for activity induced asthma   Had ed  ct - has bullae   Seen by Pulmonary- had  pft and put on Advair         Relevant Medications    fluticasone (FLONASE) 50 mcg/act nasal spray    predniSONE 10 mg tablet   Other Visit Diagnoses     Acute non-recurrent maxillary sinusitis    -  Primary    Relevant Medications    amoxicillin-clavulanate (AUGMENTIN) 400-57 MG per chewable tablet    fluticasone (FLONASE) 50 mcg/act nasal spray    predniSONE 10 mg tablet            No follow-ups on file.      Subjective:    Patient ID: Sherry Cervantes is a 34 y.o. female    Daughter was in Ed on June 22 and then dx with bronchiolitis then seen by [facility] physicians who said that she had viral infection and to run it course  Now patient has congestion since 25 and some sore throat - now with sinus headache and   pressure   and temp to 101- having neon green  driange and felt worse today prompting her to call for appt    Sinus Problem  Associated symptoms include congestion and coughing.       The following portions of the patient's history were reviewed and updated as appropriate: allergies, current medications and problem list.     Review of Systems   Constitutional:  Negative for fatigue.   HENT:  Positive for congestion and postnasal drip.    Respiratory:  Positive for cough.    Cardiovascular:  Negative for chest pain and palpitations.  "        Objective:      Current Outpatient Medications:   •  amoxicillin-clavulanate (AUGMENTIN) 400-57 MG per chewable tablet, Chew 2 tablets every 12 (twelve) hours for 7 days, Disp: 28 tablet, Rfl: 0  •  cyclobenzaprine (FLEXERIL) 5 mg tablet, Take 0.5 tablets (2.5 mg total) by mouth 3 (three) times a day as needed for muscle spasms, Disp: 30 tablet, Rfl: 0  •  etonogestrel (NEXPLANON) subdermal implant, 68 mg by Subdermal route Once every 3 years, Disp: , Rfl:   •  fluticasone (FLONASE) 50 mcg/act nasal spray, 1 spray into each nostril daily, Disp: 18 mL, Rfl: 2  •  Fluticasone-Salmeterol (Advair Diskus) 250-50 mcg/dose inhaler, Inhale 1 puff 2 (two) times a day Rinse mouth after use., Disp: 60 blister, Rfl: 3  •  levothyroxine 75 mcg tablet, TAKE 1 TABLET BY MOUTH EVERY DAY, Disp: 90 tablet, Rfl: 1  •  predniSONE 10 mg tablet, 30 mg by mouth daily for 3 days, then 20 mg by mouth daily for 3 days, then 10 mg by mouth daily for 3 days, then stop, Disp: 18 tablet, Rfl: 0  •  sertraline (ZOLOFT) 100 mg tablet, Take 1 tablet (100 mg total) by mouth daily (Patient not taking: Reported on 5/15/2024), Disp: 90 tablet, Rfl: 4    Blood pressure 114/70, pulse 100, temperature 99.3 °F (37.4 °C), height 5' 5\" (1.651 m), weight 92.1 kg (203 lb), SpO2 97%, not currently breastfeeding.     Physical Exam  Vitals and nursing note reviewed.   Constitutional:       General: She is not in acute distress.  HENT:      Head: Normocephalic.      Right Ear: There is no impacted cerumen.      Left Ear: There is no impacted cerumen.      Nose: Congestion present.      Mouth/Throat:      Pharynx: No posterior oropharyngeal erythema.   Cardiovascular:      Rate and Rhythm: Normal rate and regular rhythm.      Heart sounds: No murmur heard.  Pulmonary:      Breath sounds: Wheezing present.      Comments: Upper airway dry cough  Abdominal:      General: There is no distension.      Palpations: Abdomen is soft.   Musculoskeletal:      Cervical " back: No rigidity.   Neurological:      Mental Status: She is alert.   Psychiatric:         Mood and Affect: Mood normal.         Thought Content: Thought content normal.

## 2024-07-05 NOTE — TELEPHONE ENCOUNTER
Patient calling regarding amoxicillin is not covered by insurance and in need of a prior authorization.     Contracted pharmacy stated was given a discount card for the price of $34. Pt advised will be picking up prescription.

## 2024-07-05 NOTE — TELEPHONE ENCOUNTER
Pharmacy is out of stock on the chewable  amoxicillin-clavulanate (AUGMENTIN) 400-57 MG per chewable tablet     [facility], I called in a verbal     Pt aware and will

## 2024-07-05 NOTE — ASSESSMENT & PLAN NOTE
As teenager had used advair for activity induced asthma   Had ed  ct - has bullae   Seen by Pulmonary- had  pft and put on Advair

## 2024-07-09 ENCOUNTER — TELEPHONE (OUTPATIENT)
Age: 34
End: 2024-07-09

## 2024-07-09 NOTE — TELEPHONE ENCOUNTER
PA for fluticasone (FLONASE)     Submitted via    []CMM-KEY    [x][employer]-Case ID # [id number]   []Faxed to plan   []Other website    []Phone call Case ID #      Office notes sent, clinical questions answered. Awaiting determination    Turnaround time for your insurance to make a decision on your Prior Authorization can take 7-21 business days.

## 2024-07-09 NOTE — TELEPHONE ENCOUNTER
PA for amoxicillin-clavulanate (AUGMENTIN)     Submitted via    []CMM-KEY    [x][employer]-Case ID # [id number]   []Faxed to plan   []Other website    []Phone call Case ID #      Office notes sent, clinical questions answered. Awaiting determination    Turnaround time for your insurance to make a decision on your Prior Authorization can take 7-21 business days.

## 2024-08-07 ENCOUNTER — TELEPHONE (OUTPATIENT)
Age: 34
End: 2024-08-07

## 2024-08-14 ENCOUNTER — HOSPITAL ENCOUNTER (OUTPATIENT)
Dept: PULMONOLOGY | Facility: HOSPITAL | Age: 34
Discharge: HOME | End: 2024-08-14
Attending: INTERNAL MEDICINE
Payer: COMMERCIAL

## 2024-08-14 DIAGNOSIS — R06.00 DYSPNEA: ICD-10-CM

## 2024-08-14 PROCEDURE — 94729 DIFFUSING CAPACITY: CPT | Performed by: INTERNAL MEDICINE

## 2024-08-14 PROCEDURE — 94760 N-INVAS EAR/PLS OXIMETRY 1: CPT

## 2024-08-14 PROCEDURE — 94060 EVALUATION OF WHEEZING: CPT

## 2024-08-14 PROCEDURE — 94726 PLETHYSMOGRAPHY LUNG VOLUMES: CPT | Performed by: INTERNAL MEDICINE

## 2024-08-14 PROCEDURE — 94726 PLETHYSMOGRAPHY LUNG VOLUMES: CPT

## 2024-08-14 PROCEDURE — 94729 DIFFUSING CAPACITY: CPT

## 2024-08-14 PROCEDURE — 94060 EVALUATION OF WHEEZING: CPT | Performed by: INTERNAL MEDICINE

## 2024-08-14 RX ORDER — ALBUTEROL SULFATE 0.83 MG/ML
2.5 SOLUTION RESPIRATORY (INHALATION) ONCE
Status: COMPLETED | OUTPATIENT
Start: 2024-08-14 | End: 2024-08-14

## 2024-08-14 RX ADMIN — ALBUTEROL SULFATE 2.5 MG: 2.5 SOLUTION RESPIRATORY (INHALATION) at 11:02

## 2024-08-15 DIAGNOSIS — J45.909 MILD ASTHMA WITHOUT COMPLICATION, UNSPECIFIED WHETHER PERSISTENT: ICD-10-CM

## 2024-08-15 DIAGNOSIS — J01.00 ACUTE NON-RECURRENT MAXILLARY SINUSITIS: ICD-10-CM

## 2024-08-16 ENCOUNTER — TELEPHONE (OUTPATIENT)
Age: 34
End: 2024-08-16

## 2024-08-16 RX ORDER — FLUTICASONE PROPIONATE 50 MCG
SPRAY, SUSPENSION (ML) NASAL
Qty: 24 ML | Refills: 2 | Status: SHIPPED | OUTPATIENT
Start: 2024-08-16 | End: 2024-08-19

## 2024-08-16 NOTE — TELEPHONE ENCOUNTER
PA for Fluticasone (FLONASE) 50 mcg/act nasal spray SUBMITTED     via    []CMM-KEY:   [x][employer]-Case ID #: not provided  []Faxed to plan   []Other website   []Phone call Case ID #     Office notes sent, clinical questions answered. Awaiting determination    Turnaround time for your insurance to make a decision on your Prior Authorization can take 7-21 business days.

## 2024-08-19 ENCOUNTER — HOSPITAL ENCOUNTER (EMERGENCY)
Facility: HOSPITAL | Age: 34
Discharge: HOME | End: 2024-08-19
Attending: EMERGENCY MEDICINE
Payer: COMMERCIAL

## 2024-08-19 ENCOUNTER — HOSPITAL ENCOUNTER (OUTPATIENT)
Dept: ULTRASOUND IMAGING | Facility: HOSPITAL | Age: 34
Discharge: HOME | End: 2024-08-19
Attending: STUDENT IN AN ORGANIZED HEALTH CARE EDUCATION/TRAINING PROGRAM
Payer: COMMERCIAL

## 2024-08-19 ENCOUNTER — TELEPHONE (OUTPATIENT)
Age: 34
End: 2024-08-19

## 2024-08-19 ENCOUNTER — APPOINTMENT (OUTPATIENT)
Dept: LAB | Facility: HOSPITAL | Age: 34
End: 2024-08-19
Payer: COMMERCIAL

## 2024-08-19 ENCOUNTER — OFFICE VISIT (OUTPATIENT)
Dept: FAMILY MEDICINE CLINIC | Facility: HOSPITAL | Age: 34
End: 2024-08-19
Payer: COMMERCIAL

## 2024-08-19 VITALS
BODY MASS INDEX: 33.78 KG/M2 | WEIGHT: 203 LBS | OXYGEN SATURATION: 99 % | TEMPERATURE: 98.1 F | SYSTOLIC BLOOD PRESSURE: 116 MMHG | DIASTOLIC BLOOD PRESSURE: 63 MMHG | HEART RATE: 90 BPM | RESPIRATION RATE: 18 BRPM

## 2024-08-19 VITALS
HEART RATE: 75 BPM | BODY MASS INDEX: 33.78 KG/M2 | DIASTOLIC BLOOD PRESSURE: 86 MMHG | WEIGHT: 203 LBS | SYSTOLIC BLOOD PRESSURE: 130 MMHG | OXYGEN SATURATION: 99 %

## 2024-08-19 DIAGNOSIS — R11.0 NAUSEA WITHOUT VOMITING: ICD-10-CM

## 2024-08-19 DIAGNOSIS — R10.11 RUQ PAIN: ICD-10-CM

## 2024-08-19 DIAGNOSIS — K80.20 GALLBLADDER COLIC: Primary | ICD-10-CM

## 2024-08-19 DIAGNOSIS — R10.84 GENERALIZED ABDOMINAL PAIN: ICD-10-CM

## 2024-08-19 DIAGNOSIS — R10.84 GENERALIZED ABDOMINAL PAIN: Primary | ICD-10-CM

## 2024-08-19 LAB
ALBUMIN SERPL BCG-MCNC: 4.4 G/DL (ref 3.5–5)
ALP SERPL-CCNC: 56 U/L (ref 34–104)
ALT SERPL W P-5'-P-CCNC: 13 U/L (ref 7–52)
AMYLASE SERPL-CCNC: 21 IU/L (ref 29–103)
ANION GAP SERPL CALCULATED.3IONS-SCNC: 9 MMOL/L (ref 4–13)
AST SERPL W P-5'-P-CCNC: 15 U/L (ref 13–39)
BASOPHILS # BLD AUTO: 0.02 THOUSANDS/ÂΜL (ref 0–0.1)
BASOPHILS NFR BLD AUTO: 0 % (ref 0–1)
BILIRUB SERPL-MCNC: 0.61 MG/DL (ref 0.2–1)
BUN SERPL-MCNC: 9 MG/DL (ref 5–25)
CALCIUM SERPL-MCNC: 9.3 MG/DL (ref 8.4–10.2)
CHLORIDE SERPL-SCNC: 104 MMOL/L (ref 96–108)
CO2 SERPL-SCNC: 24 MMOL/L (ref 21–32)
CREAT SERPL-MCNC: 0.65 MG/DL (ref 0.6–1.3)
EOSINOPHIL # BLD AUTO: 0.19 THOUSAND/ÂΜL (ref 0–0.61)
EOSINOPHIL NFR BLD AUTO: 2 % (ref 0–6)
ERYTHROCYTE [DISTWIDTH] IN BLOOD BY AUTOMATED COUNT: 13.5 % (ref 11.6–15.1)
EXT PREGNANCY TEST URINE: NEGATIVE
EXT. CONTROL: NORMAL
GFR SERPL CREATININE-BSD FRML MDRD: 116 ML/MIN/1.73SQ M
GLUCOSE SERPL-MCNC: 87 MG/DL (ref 65–140)
HCT VFR BLD AUTO: 40.2 % (ref 34.8–46.1)
HGB BLD-MCNC: 13.2 G/DL (ref 11.5–15.4)
IMM GRANULOCYTES # BLD AUTO: 0.04 THOUSAND/UL (ref 0–0.2)
IMM GRANULOCYTES NFR BLD AUTO: 0 % (ref 0–2)
LIPASE SERPL-CCNC: 23 U/L (ref 11–82)
LYMPHOCYTES # BLD AUTO: 2.02 THOUSANDS/ÂΜL (ref 0.6–4.47)
LYMPHOCYTES NFR BLD AUTO: 21 % (ref 14–44)
MCH RBC QN AUTO: 28.8 PG (ref 26.8–34.3)
MCHC RBC AUTO-ENTMCNC: 32.8 G/DL (ref 31.4–37.4)
MCV RBC AUTO: 88 FL (ref 82–98)
MONOCYTES # BLD AUTO: 0.71 THOUSAND/ÂΜL (ref 0.17–1.22)
MONOCYTES NFR BLD AUTO: 7 % (ref 4–12)
NEUTROPHILS # BLD AUTO: 6.69 THOUSANDS/ÂΜL (ref 1.85–7.62)
NEUTS SEG NFR BLD AUTO: 70 % (ref 43–75)
NRBC BLD AUTO-RTO: 0 /100 WBCS
PLATELET # BLD AUTO: 240 THOUSANDS/UL (ref 149–390)
PMV BLD AUTO: 9.7 FL (ref 8.9–12.7)
POTASSIUM SERPL-SCNC: 3.9 MMOL/L (ref 3.5–5.3)
PROT SERPL-MCNC: 7.8 G/DL (ref 6.4–8.4)
RBC # BLD AUTO: 4.59 MILLION/UL (ref 3.81–5.12)
SODIUM SERPL-SCNC: 137 MMOL/L (ref 135–147)
WBC # BLD AUTO: 9.67 THOUSAND/UL (ref 4.31–10.16)

## 2024-08-19 PROCEDURE — 96375 TX/PRO/DX INJ NEW DRUG ADDON: CPT

## 2024-08-19 PROCEDURE — 80053 COMPREHEN METABOLIC PANEL: CPT

## 2024-08-19 PROCEDURE — 85025 COMPLETE CBC W/AUTO DIFF WBC: CPT

## 2024-08-19 PROCEDURE — 82150 ASSAY OF AMYLASE: CPT

## 2024-08-19 PROCEDURE — 99214 OFFICE O/P EST MOD 30 MIN: CPT | Performed by: STUDENT IN AN ORGANIZED HEALTH CARE EDUCATION/TRAINING PROGRAM

## 2024-08-19 PROCEDURE — 36415 COLL VENOUS BLD VENIPUNCTURE: CPT

## 2024-08-19 PROCEDURE — 99284 EMERGENCY DEPT VISIT MOD MDM: CPT | Performed by: EMERGENCY MEDICINE

## 2024-08-19 PROCEDURE — 83690 ASSAY OF LIPASE: CPT

## 2024-08-19 PROCEDURE — 81025 URINE PREGNANCY TEST: CPT | Performed by: EMERGENCY MEDICINE

## 2024-08-19 PROCEDURE — 76700 US EXAM ABDOM COMPLETE: CPT

## 2024-08-19 PROCEDURE — 96374 THER/PROPH/DIAG INJ IV PUSH: CPT

## 2024-08-19 PROCEDURE — 99283 EMERGENCY DEPT VISIT LOW MDM: CPT

## 2024-08-19 RX ORDER — KETOROLAC TROMETHAMINE 30 MG/ML
30 INJECTION, SOLUTION INTRAMUSCULAR; INTRAVENOUS ONCE
Status: COMPLETED | OUTPATIENT
Start: 2024-08-19 | End: 2024-08-19

## 2024-08-19 RX ORDER — ONDANSETRON 2 MG/ML
4 INJECTION INTRAMUSCULAR; INTRAVENOUS ONCE
Status: COMPLETED | OUTPATIENT
Start: 2024-08-19 | End: 2024-08-19

## 2024-08-19 RX ORDER — OXYCODONE AND ACETAMINOPHEN 5; 325 MG/1; MG/1
1 TABLET ORAL EVERY 6 HOURS PRN
Qty: 20 TABLET | Refills: 0 | Status: SHIPPED | OUTPATIENT
Start: 2024-08-19

## 2024-08-19 RX ORDER — ONDANSETRON 4 MG/1
4 TABLET, ORALLY DISINTEGRATING ORAL EVERY 8 HOURS PRN
Qty: 20 TABLET | Refills: 0 | Status: SHIPPED | OUTPATIENT
Start: 2024-08-19

## 2024-08-19 RX ADMIN — ONDANSETRON 4 MG: 2 INJECTION, SOLUTION INTRAMUSCULAR; INTRAVENOUS at 20:26

## 2024-08-19 RX ADMIN — KETOROLAC TROMETHAMINE 30 MG: 30 INJECTION, SOLUTION INTRAMUSCULAR; INTRAVENOUS at 20:26

## 2024-08-19 NOTE — TELEPHONE ENCOUNTER
Contacted on call provider to reach out to patient for stat review.Had to message Dr. Charles as Dr. Fabian was not coming up in epic secure chat

## 2024-08-19 NOTE — TELEPHONE ENCOUNTER
Returned call to patient per Dr Charles's request, advised patient US showed gallstones, labs did not show any acute infection. Patient states she is in extreme pain, to the point ot tears, Per Dr Charles, ER evaluation recommended due to extreme pain. Patient verbalized understanding, will go to ER.

## 2024-08-19 NOTE — TELEPHONE ENCOUNTER
Patient would like her STAT US and BW results. Please review and call her accordingly. Patient is not sure whether she should go to ER or not.

## 2024-08-19 NOTE — PROGRESS NOTES
[facility]   Tatiana Hughes DO    Assessment/Plan:      Diagnosis ICD-10-CM Associated Orders   1. Generalized abdominal pain  R10.84 US abdomen complete     CBC and differential     Comprehensive metabolic panel     Lipase     Amylase     CBC and differential     Comprehensive metabolic panel     Lipase     Amylase      2. RUQ pain  R10.11 US abdomen complete     CBC and differential     Comprehensive metabolic panel     Lipase     Amylase     CBC and differential     Comprehensive metabolic panel     Lipase     Amylase      3. Nausea without vomiting  R11.0 US abdomen complete     CBC and differential     Comprehensive metabolic panel     Lipase     Amylase     CBC and differential     Comprehensive metabolic panel     Lipase     Amylase        Lamb Diet information provided to patient on handout.  Stat blood work ordered as above, will reach out with results.  Possible etiology related to gallbladder, stat Abdominal ultrasound ordered.  Next steps to to be determined by results of STAT US.  Return in about 3 months (around 11/19/2024) for Annual physical.  Patient may call or return to office with any questions or concerns.   ____________________________________________________  Subjective:     Patient ID: Sherry Cervantes is a 34 y.o. female.  Sherry Cervantes  Chief Complaint   Patient presents with    Abdominal Pain     Woke pt in the middle of the night     Medication Management     Discuss Paxil      Back pain last night, woke up at 2:30 am, central abdomen, & epigastric.   Very nauseous, no vomiting or diarrhea.   No known sick contacts.     Has GB & appendix intact. Pain not that low.   Never had this before.   No kidney stone hx.     Has nexplanon in, shouldn't be pregnant. No spotting.     Using Claude happy juice, with vitamins. Stress better, biting her nails less.      Depression Screening and Follow-up Plan: Patient's depression screening was positive with a PHQ-9 score of 8.  Patient with underlying depression and was advised to continue current medications as prescribed.     The following portions of the patient's history were reviewed and updated as appropriate: allergies, current medications, past medical history, and problem list.    Review of Systems   Constitutional:  Positive for appetite change (dec). Negative for chills, diaphoresis and fever.   Respiratory:  Negative for cough and shortness of breath.    Cardiovascular:  Negative for chest pain and palpitations.   Gastrointestinal:  Positive for abdominal pain and nausea. Negative for blood in stool, constipation, diarrhea and vomiting.   Genitourinary:  Negative for dysuria, frequency and hematuria.   Neurological:  Positive for headaches (no desire to eat or drink, so no caffiene.). Negative for dizziness and light-headedness.       Objective:      Vitals:    08/19/24 1214   BP: 130/86   Pulse: 75   SpO2: 99%      Physical Exam  Vitals and nursing note reviewed.   Constitutional:       General: She is not in acute distress.     Appearance: Normal appearance. She is well-developed. She is not ill-appearing.   HENT:      Head: Normocephalic and atraumatic.   Eyes:      General: No scleral icterus.        Right eye: No discharge.         Left eye: No discharge.   Cardiovascular:      Rate and Rhythm: Normal rate and regular rhythm.      Pulses: Normal pulses.      Heart sounds: Normal heart sounds. No murmur heard.  Pulmonary:      Effort: Pulmonary effort is normal. No respiratory distress.      Breath sounds: Normal breath sounds. No stridor. No wheezing.   Abdominal:      General: Abdomen is flat. Bowel sounds are normal. There is no distension.      Palpations: Abdomen is soft. There is no mass.      Tenderness: There is abdominal tenderness (Mild diffuse, slightly worse over the right upper quadrant.). There is no right CVA tenderness or left CVA tenderness.   Musculoskeletal:      Cervical back: Normal range of motion and  "neck supple. No rigidity or tenderness.      Right lower leg: No edema.      Left lower leg: No edema.   Lymphadenopathy:      Cervical: No cervical adenopathy.   Skin:     General: Skin is warm.   Neurological:      Mental Status: She is alert and oriented to person, place, and time.      Gait: Gait normal.   Psychiatric:         Mood and Affect: Mood normal.         Behavior: Behavior normal.         Thought Content: Thought content normal.         Judgment: Judgment normal.           Portions of the record may have been created with voice recognition software. Occasional wrong word or \"sound alike\" substitutions may have occurred due to the inherent limitations of voice recognition software. Please review the chart carefully and recognize, using context, where substitutions/typographical errors may have occurred.     "

## 2024-08-20 ENCOUNTER — APPOINTMENT (EMERGENCY)
Dept: CT IMAGING | Facility: HOSPITAL | Age: 34
End: 2024-08-20
Payer: COMMERCIAL

## 2024-08-20 ENCOUNTER — HOSPITAL ENCOUNTER (OUTPATIENT)
Facility: HOSPITAL | Age: 34
Setting detail: OUTPATIENT SURGERY
Discharge: HOME | End: 2024-08-21
Attending: EMERGENCY MEDICINE | Admitting: SURGERY
Payer: COMMERCIAL

## 2024-08-20 DIAGNOSIS — K80.00 CHOLELITHIASIS WITH ACUTE CHOLECYSTITIS WITHOUT OBSTRUCTION: Primary | ICD-10-CM

## 2024-08-20 LAB
ALBUMIN SERPL BCG-MCNC: 4.3 G/DL (ref 3.5–5)
ALP SERPL-CCNC: 54 U/L (ref 34–104)
ALT SERPL W P-5'-P-CCNC: 12 U/L (ref 7–52)
ANION GAP SERPL CALCULATED.3IONS-SCNC: 4 MMOL/L (ref 4–13)
AST SERPL W P-5'-P-CCNC: 13 U/L (ref 13–39)
BACTERIA UR QL AUTO: ABNORMAL /HPF
BASOPHILS # BLD AUTO: 0.03 THOUSANDS/ÂΜL (ref 0–0.1)
BASOPHILS NFR BLD AUTO: 1 % (ref 0–1)
BILIRUB SERPL-MCNC: 0.52 MG/DL (ref 0.2–1)
BILIRUB UR QL STRIP: NEGATIVE
BUN SERPL-MCNC: 16 MG/DL (ref 5–25)
CALCIUM SERPL-MCNC: 9.1 MG/DL (ref 8.4–10.2)
CHLORIDE SERPL-SCNC: 103 MMOL/L (ref 96–108)
CLARITY UR: ABNORMAL
CO2 SERPL-SCNC: 27 MMOL/L (ref 21–32)
COLOR UR: YELLOW
CREAT SERPL-MCNC: 0.85 MG/DL (ref 0.6–1.3)
EOSINOPHIL # BLD AUTO: 0.07 THOUSAND/ÂΜL (ref 0–0.61)
EOSINOPHIL NFR BLD AUTO: 1 % (ref 0–6)
ERYTHROCYTE [DISTWIDTH] IN BLOOD BY AUTOMATED COUNT: 13.4 % (ref 11.6–15.1)
EXT PREGNANCY TEST URINE: NEGATIVE
EXT. CONTROL: NORMAL
GFR SERPL CREATININE-BSD FRML MDRD: 89 ML/MIN/1.73SQ M
GLUCOSE SERPL-MCNC: 87 MG/DL (ref 65–140)
GLUCOSE UR STRIP-MCNC: NEGATIVE MG/DL
GRAN CASTS #/AREA URNS LPF: ABNORMAL /[LPF]
HCT VFR BLD AUTO: 41.1 % (ref 34.8–46.1)
HGB BLD-MCNC: 13.2 G/DL (ref 11.5–15.4)
HGB UR QL STRIP.AUTO: ABNORMAL
IMM GRANULOCYTES # BLD AUTO: 0.02 THOUSAND/UL (ref 0–0.2)
IMM GRANULOCYTES NFR BLD AUTO: 0 % (ref 0–2)
KETONES UR STRIP-MCNC: ABNORMAL MG/DL
LEUKOCYTE ESTERASE UR QL STRIP: ABNORMAL
LIPASE SERPL-CCNC: 31 U/L (ref 11–82)
LYMPHOCYTES # BLD AUTO: 1.55 THOUSANDS/ÂΜL (ref 0.6–4.47)
LYMPHOCYTES NFR BLD AUTO: 24 % (ref 14–44)
MCH RBC QN AUTO: 28.2 PG (ref 26.8–34.3)
MCHC RBC AUTO-ENTMCNC: 32.1 G/DL (ref 31.4–37.4)
MCV RBC AUTO: 88 FL (ref 82–98)
MONOCYTES # BLD AUTO: 0.83 THOUSAND/ÂΜL (ref 0.17–1.22)
MONOCYTES NFR BLD AUTO: 13 % (ref 4–12)
MUCOUS THREADS UR QL AUTO: ABNORMAL
NEUTROPHILS # BLD AUTO: 3.87 THOUSANDS/ÂΜL (ref 1.85–7.62)
NEUTS SEG NFR BLD AUTO: 61 % (ref 43–75)
NITRITE UR QL STRIP: NEGATIVE
NON-SQ EPI CELLS URNS QL MICRO: ABNORMAL /HPF
NRBC BLD AUTO-RTO: 0 /100 WBCS
PH UR STRIP.AUTO: 6.5 [PH]
PLATELET # BLD AUTO: 200 THOUSANDS/UL (ref 149–390)
PMV BLD AUTO: 9.7 FL (ref 8.9–12.7)
POTASSIUM SERPL-SCNC: 3.9 MMOL/L (ref 3.5–5.3)
PROT SERPL-MCNC: 7.6 G/DL (ref 6.4–8.4)
PROT UR STRIP-MCNC: ABNORMAL MG/DL
RBC # BLD AUTO: 4.68 MILLION/UL (ref 3.81–5.12)
RBC #/AREA URNS AUTO: ABNORMAL /HPF
SODIUM SERPL-SCNC: 134 MMOL/L (ref 135–147)
SP GR UR STRIP.AUTO: 1.02 (ref 1–1.03)
UROBILINOGEN UR STRIP-ACNC: <2 MG/DL
WBC # BLD AUTO: 6.37 THOUSAND/UL (ref 4.31–10.16)
WBC #/AREA URNS AUTO: ABNORMAL /HPF

## 2024-08-20 PROCEDURE — 99223 1ST HOSP IP/OBS HIGH 75: CPT | Performed by: SURGERY

## 2024-08-20 PROCEDURE — 74177 CT ABD & PELVIS W/CONTRAST: CPT

## 2024-08-20 PROCEDURE — 99285 EMERGENCY DEPT VISIT HI MDM: CPT | Performed by: EMERGENCY MEDICINE

## 2024-08-20 PROCEDURE — 85025 COMPLETE CBC W/AUTO DIFF WBC: CPT | Performed by: EMERGENCY MEDICINE

## 2024-08-20 PROCEDURE — 87086 URINE CULTURE/COLONY COUNT: CPT | Performed by: EMERGENCY MEDICINE

## 2024-08-20 PROCEDURE — 36415 COLL VENOUS BLD VENIPUNCTURE: CPT

## 2024-08-20 PROCEDURE — 99284 EMERGENCY DEPT VISIT MOD MDM: CPT

## 2024-08-20 PROCEDURE — 83690 ASSAY OF LIPASE: CPT | Performed by: EMERGENCY MEDICINE

## 2024-08-20 PROCEDURE — 80053 COMPREHEN METABOLIC PANEL: CPT | Performed by: EMERGENCY MEDICINE

## 2024-08-20 PROCEDURE — 96374 THER/PROPH/DIAG INJ IV PUSH: CPT

## 2024-08-20 PROCEDURE — 81001 URINALYSIS AUTO W/SCOPE: CPT | Performed by: EMERGENCY MEDICINE

## 2024-08-20 PROCEDURE — 96361 HYDRATE IV INFUSION ADD-ON: CPT

## 2024-08-20 PROCEDURE — 81025 URINE PREGNANCY TEST: CPT | Performed by: EMERGENCY MEDICINE

## 2024-08-20 PROCEDURE — 71260 CT THORAX DX C+: CPT

## 2024-08-20 RX ORDER — FLUTICASONE FUROATE AND VILANTEROL 200; 25 UG/1; UG/1
1 POWDER RESPIRATORY (INHALATION) DAILY
Status: DISCONTINUED | OUTPATIENT
Start: 2024-08-21 | End: 2024-08-20

## 2024-08-20 RX ORDER — HYDROMORPHONE HCL IN WATER/PF 6 MG/30 ML
0.2 PATIENT CONTROLLED ANALGESIA SYRINGE INTRAVENOUS
Status: DISCONTINUED | OUTPATIENT
Start: 2024-08-20 | End: 2024-08-21

## 2024-08-20 RX ORDER — ONDANSETRON 2 MG/ML
4 INJECTION INTRAMUSCULAR; INTRAVENOUS EVERY 6 HOURS PRN
Status: DISCONTINUED | OUTPATIENT
Start: 2024-08-20 | End: 2024-08-21 | Stop reason: HOSPADM

## 2024-08-20 RX ORDER — ACETAMINOPHEN 10 MG/ML
1000 INJECTION, SOLUTION INTRAVENOUS EVERY 6 HOURS PRN
Status: DISCONTINUED | OUTPATIENT
Start: 2024-08-20 | End: 2024-08-21

## 2024-08-20 RX ORDER — KETOROLAC TROMETHAMINE 30 MG/ML
15 INJECTION, SOLUTION INTRAMUSCULAR; INTRAVENOUS ONCE
Status: COMPLETED | OUTPATIENT
Start: 2024-08-20 | End: 2024-08-20

## 2024-08-20 RX ORDER — SODIUM CHLORIDE, SODIUM LACTATE, POTASSIUM CHLORIDE, CALCIUM CHLORIDE 600; 310; 30; 20 MG/100ML; MG/100ML; MG/100ML; MG/100ML
125 INJECTION, SOLUTION INTRAVENOUS CONTINUOUS
Status: DISCONTINUED | OUTPATIENT
Start: 2024-08-20 | End: 2024-08-21 | Stop reason: HOSPADM

## 2024-08-20 RX ORDER — LEVOTHYROXINE SODIUM 75 UG/1
75 TABLET ORAL
Status: DISCONTINUED | OUTPATIENT
Start: 2024-08-21 | End: 2024-08-20

## 2024-08-20 RX ORDER — ENOXAPARIN SODIUM 100 MG/ML
40 INJECTION SUBCUTANEOUS DAILY
Status: DISCONTINUED | OUTPATIENT
Start: 2024-08-22 | End: 2024-08-21 | Stop reason: HOSPADM

## 2024-08-20 RX ADMIN — HYDROMORPHONE HYDROCHLORIDE 0.2 MG: 0.2 INJECTION, SOLUTION INTRAMUSCULAR; INTRAVENOUS; SUBCUTANEOUS at 23:33

## 2024-08-20 RX ADMIN — KETOROLAC TROMETHAMINE 15 MG: 30 INJECTION, SOLUTION INTRAMUSCULAR; INTRAVENOUS at 20:47

## 2024-08-20 RX ADMIN — SODIUM CHLORIDE 1000 ML: 0.9 INJECTION, SOLUTION INTRAVENOUS at 20:47

## 2024-08-20 RX ADMIN — SODIUM CHLORIDE, SODIUM LACTATE, POTASSIUM CHLORIDE, AND CALCIUM CHLORIDE 125 ML/HR: .6; .31; .03; .02 INJECTION, SOLUTION INTRAVENOUS at 23:29

## 2024-08-20 RX ADMIN — IOHEXOL 100 ML: 350 INJECTION, SOLUTION INTRAVENOUS at 21:07

## 2024-08-20 NOTE — ED PROVIDER NOTES
History  Chief Complaint   Patient presents with    Pain     Pt in ED for pain. Wants pain meds. +gall stones.      34-year-old female.  She presents to the emergency room with upper abdominal pain that started at 2 AM today.  She is nauseated but has not vomited.  No fever or chills.  Outpatient ultrasound was done that showed gallstones but no signs of acute cholecystitis.  Laboratory evaluation was nonspecific.  LFTs were normal.  Lipase was normal.  White blood cell count was normal.  She presents to the emergency room for pain relief.        Prior to Admission Medications   Prescriptions Last Dose Informant Patient Reported? Taking?   Fluticasone-Salmeterol (Advair Diskus) 250-50 mcg/dose inhaler   No No   Sig: Inhale 1 puff 2 (two) times a day Rinse mouth after use.   cyclobenzaprine (FLEXERIL) 5 mg tablet   No No   Sig: Take 0.5 tablets (2.5 mg total) by mouth 3 (three) times a day as needed for muscle spasms   etonogestrel (NEXPLANON) subdermal implant   Yes No   Si mg by Subdermal route Once every 3 years   levothyroxine 75 mcg tablet   No No   Sig: TAKE 1 TABLET BY MOUTH EVERY DAY      Facility-Administered Medications: None       Past Medical History:   Diagnosis Date    Asthma     Chronic knee pain     Depression     Disease of thyroid gland     Dysphagia 2020    History of pulmonary embolus (PE) 2017    Result of Knee injury    Left upper quadrant pain 2020    Menorrhagia     Multiple thyroid nodules     Psychiatric disorder     Saphenous nerve neuropathy, 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    Vitamin D deficiency     Vocal cord paralysis     Following partial thyroidectomy       Past Surgical History:   Procedure Laterality Date    DILATION AND EVACUATION  2016    KNEE ARTHROSCOPY W/ PLICA EXCISION Left 2019    THYROIDECTOMY, PARTIAL Left 2013    THYROIDECTOMY, PARTIAL Left     UPPER GASTROINTESTINAL ENDOSCOPY      US GUIDED THYROID BIOPSY  2022    WISDOM TOOTH EXTRACTION         Family  History   Problem Relation Age of Onset    Thyroid disease Mother         thyroid nodules    No Known Problems Father     Thyroid disease Sister     Anorexia nervosa Sister     Mental illness Sister     No Known Problems Sister     No Known Problems Brother     Celiac disease Maternal Grandmother     Hodgkin's lymphoma Maternal Grandfather     Hodgkin's lymphoma Paternal Grandfather     No Known Problems Daughter     Other Maternal Aunt         hypoglycemia    Other Maternal Aunt         brain tumor, had siezure    Colon cancer Neg Hx     Colon polyps Neg Hx     Substance Abuse Neg Hx     Uterine cancer Neg Hx      I have reviewed and agree with the history as documented.    E-Cigarette/Vaping    E-Cigarette Use Former User      E-Cigarette/Vaping Substances    Nicotine No     THC No     CBD No     Flavoring No     Other No     Unknown No      Social History     Tobacco Use    Smoking status: Former     Current packs/day: 0.25     Average packs/day: 0.3 packs/day for 7.2 years (1.8 ttl pk-yrs)     Types: Cigarettes     Start date: 6/15/2017     Quit date: 2/9/2012     Passive exposure: Never    Smokeless tobacco: Never    Tobacco comments:      seasonal, only when it's warm out, social   Vaping Use    Vaping status: Former   Substance Use Topics    Alcohol use: Yes     Comment: socially    Drug use: Not Currently     Comment:  no longer using marijuana       Review of Systems   Constitutional:  Negative for chills and fever.   HENT:  Negative for rhinorrhea and sore throat.    Eyes:  Negative for pain, redness and visual disturbance.   Respiratory:  Negative for cough and shortness of breath.    Cardiovascular:  Negative for chest pain and leg swelling.   Gastrointestinal:  Positive for abdominal pain and nausea. Negative for diarrhea and vomiting.   Endocrine: Negative for polydipsia and polyuria.   Genitourinary:  Negative for dysuria, frequency, hematuria, vaginal bleeding and vaginal discharge.    Musculoskeletal:  Negative for back pain and neck pain.   Skin:  Negative for rash and wound.   Allergic/Immunologic: Negative for immunocompromised state.   Neurological:  Negative for weakness, numbness and headaches.   Hematological:  Does not bruise/bleed easily.   Psychiatric/Behavioral:  Negative for hallucinations and suicidal ideas.        Physical Exam  Physical Exam  Vitals reviewed.   Constitutional:       General: She is not in acute distress.     Appearance: She is obese.   HENT:      Head: Normocephalic and atraumatic.      Nose: Nose normal.      Mouth/Throat:      Mouth: Mucous membranes are moist.   Eyes:      General:         Right eye: No discharge.         Left eye: No discharge.      Conjunctiva/sclera: Conjunctivae normal.   Cardiovascular:      Rate and Rhythm: Normal rate and regular rhythm.      Pulses: Normal pulses.      Heart sounds: Normal heart sounds. No murmur heard.     No friction rub. No gallop.   Pulmonary:      Effort: Pulmonary effort is normal. No respiratory distress.      Breath sounds: Normal breath sounds. No stridor. No wheezing, rhonchi or rales.   Abdominal:      General: Bowel sounds are normal. There is no distension.      Palpations: Abdomen is soft.      Tenderness: There is abdominal tenderness. There is no right CVA tenderness, left CVA tenderness, guarding or rebound.      Comments: There is right upper quadrant tenderness on examination.   Musculoskeletal:         General: No swelling, tenderness, deformity or signs of injury. Normal range of motion.      Cervical back: Normal range of motion and neck supple. No rigidity.      Right lower leg: No edema.      Left lower leg: No edema.      Comments: No calf tenderness or unilateral leg swelling.   Skin:     General: Skin is warm and dry.      Coloration: Skin is not jaundiced.      Findings: No rash.   Neurological:      General: No focal deficit present.      Mental Status: She is alert and oriented to person,  place, and time.      Sensory: No sensory deficit.      Motor: Motor function is intact.   Psychiatric:         Mood and Affect: Mood normal.         Behavior: Behavior normal.         Vital Signs  ED Triage Vitals   Temperature Pulse Respirations Blood Pressure SpO2   08/19/24 1852 08/19/24 1852 08/19/24 1852 08/19/24 1854 08/19/24 1852   98.1 °F (36.7 °C) 90 18 116/63 99 %      Temp Source Heart Rate Source Patient Position - Orthostatic VS BP Location FiO2 (%)   08/19/24 1852 08/19/24 1852 -- -- --   Temporal Monitor         Pain Score       08/19/24 1852       8           Vitals:    08/19/24 1852 08/19/24 1854   BP:  116/63   Pulse: 90          Visual Acuity      ED Medications  Medications   ondansetron (ZOFRAN) injection 4 mg (4 mg Intravenous Given 8/19/24 2026)   ketorolac (TORADOL) injection 30 mg (30 mg Intravenous Given 8/19/24 2026)       Diagnostic Studies  Results Reviewed       Procedure Component Value Units Date/Time    POCT pregnancy, urine [[id number]]  (Normal) Resulted: 08/19/24 2020    Lab Status: Final result Updated: 08/19/24 2026     EXT Preg Test, Ur Negative     Control Valid                   No orders to display              Procedures  Procedures         ED Course                                               Medical Decision Making  Differential diagnosis includes acute cholecystitis, gallbladder colic, kidney stone, pancreatitis, peptic ulcer disease, appendicitis, esophagitis, and other causes of abdominal pain.  Ultrasound did show gallstones but no acute cholecystitis.  There is no pancreatitis.  Physical examination does suggest that her pain is gallbladder related.  Will administer analgesia and antiemetic and reevaluate.  If pain improved, should be appropriate for discharge and outpatient management.    Improved after Toradol and Zofran.  Appropriate for discharge and outpatient management.    Amount and/or Complexity of Data Reviewed  External Data Reviewed: labs and  radiology.  Labs: ordered. Decision-making details documented in ED Course.     Details: Pregnancy test negative    Risk  Prescription drug management.  Decision regarding hospitalization.                 Disposition  Final diagnoses:   Gallbladder colic     Time reflects when diagnosis was documented in both MDM as applicable and the Disposition within this note       Time User Action Codes Description Comment    8/19/2024  8:55 PM Chepe Katz Add [K80.20] Gallbladder colic           ED Disposition       ED Disposition   Discharge    Condition   Stable    Date/Time   Mon Aug 19, 2024  8:55 PM    Comment   Sherry Joaquindomitila discharge to home/self care.                   Follow-up Information       Follow up With Specialties Details Why Contact Info Additional Information    [facility] In 2 days  [address]  [address]  [facility] [id number] [phone] [facility], [address], [place], [place], [id number] [phone]            Patient's Medications   Discharge Prescriptions    ONDANSETRON (ZOFRAN-ODT) 4 MG DISINTEGRATING TABLET    Take 1 tablet (4 mg total) by mouth every 8 (eight) hours as needed for nausea or vomiting       Start Date: 8/19/2024 End Date: --       Order Dose: 4 mg       Quantity: 20 tablet    Refills: 0    OXYCODONE-ACETAMINOPHEN (PERCOCET) 5-325 MG PER TABLET    Take 1 tablet by mouth every 6 (six) hours as needed for severe pain Max Daily Amount: 4 tablets       Start Date: 8/19/2024 End Date: --       Order Dose: 1 tablet       Quantity: 20 tablet    Refills: 0       No discharge procedures on file.    PDMP Review       None            ED Provider  Electronically Signed by             Chepe Katz MD  08/19/24 2058

## 2024-08-21 ENCOUNTER — ANESTHESIA (OUTPATIENT)
Dept: PERIOP | Facility: HOSPITAL | Age: 34
End: 2024-08-21
Payer: COMMERCIAL

## 2024-08-21 ENCOUNTER — ANESTHESIA EVENT (OUTPATIENT)
Dept: PERIOP | Facility: HOSPITAL | Age: 34
End: 2024-08-21
Payer: COMMERCIAL

## 2024-08-21 VITALS
HEART RATE: 65 BPM | TEMPERATURE: 97.9 F | OXYGEN SATURATION: 97 % | RESPIRATION RATE: 12 BRPM | WEIGHT: 203 LBS | HEIGHT: 66 IN | DIASTOLIC BLOOD PRESSURE: 76 MMHG | BODY MASS INDEX: 32.62 KG/M2 | SYSTOLIC BLOOD PRESSURE: 126 MMHG

## 2024-08-21 PROBLEM — K80.50 BILIARY COLIC: Status: ACTIVE | Noted: 2024-08-21

## 2024-08-21 LAB
ALBUMIN SERPL BCG-MCNC: 3.8 G/DL (ref 3.5–5)
ALP SERPL-CCNC: 47 U/L (ref 34–104)
ALT SERPL W P-5'-P-CCNC: 9 U/L (ref 7–52)
ANION GAP SERPL CALCULATED.3IONS-SCNC: 5 MMOL/L (ref 4–13)
AST SERPL W P-5'-P-CCNC: 11 U/L (ref 13–39)
BILIRUB SERPL-MCNC: 0.41 MG/DL (ref 0.2–1)
BUN SERPL-MCNC: 11 MG/DL (ref 5–25)
CALCIUM SERPL-MCNC: 8.6 MG/DL (ref 8.4–10.2)
CHLORIDE SERPL-SCNC: 106 MMOL/L (ref 96–108)
CO2 SERPL-SCNC: 26 MMOL/L (ref 21–32)
CREAT SERPL-MCNC: 0.74 MG/DL (ref 0.6–1.3)
ERYTHROCYTE [DISTWIDTH] IN BLOOD BY AUTOMATED COUNT: 13.5 % (ref 11.6–15.1)
GFR SERPL CREATININE-BSD FRML MDRD: 106 ML/MIN/1.73SQ M
GLUCOSE SERPL-MCNC: 97 MG/DL (ref 65–140)
HCT VFR BLD AUTO: 39.2 % (ref 34.8–46.1)
HGB BLD-MCNC: 12.7 G/DL (ref 11.5–15.4)
MAGNESIUM SERPL-MCNC: 2.2 MG/DL (ref 1.9–2.7)
MCH RBC QN AUTO: 28.5 PG (ref 26.8–34.3)
MCHC RBC AUTO-ENTMCNC: 32.4 G/DL (ref 31.4–37.4)
MCV RBC AUTO: 88 FL (ref 82–98)
PHOSPHATE SERPL-MCNC: 3.4 MG/DL (ref 2.7–4.5)
PLATELET # BLD AUTO: 185 THOUSANDS/UL (ref 149–390)
PMV BLD AUTO: 9.4 FL (ref 8.9–12.7)
POTASSIUM SERPL-SCNC: 3.7 MMOL/L (ref 3.5–5.3)
PROT SERPL-MCNC: 7 G/DL (ref 6.4–8.4)
RBC # BLD AUTO: 4.45 MILLION/UL (ref 3.81–5.12)
SODIUM SERPL-SCNC: 137 MMOL/L (ref 135–147)
WBC # BLD AUTO: 4.15 THOUSAND/UL (ref 4.31–10.16)

## 2024-08-21 PROCEDURE — 85027 COMPLETE CBC AUTOMATED: CPT | Performed by: PHYSICIAN ASSISTANT

## 2024-08-21 PROCEDURE — 80053 COMPREHEN METABOLIC PANEL: CPT | Performed by: PHYSICIAN ASSISTANT

## 2024-08-21 PROCEDURE — 99233 SBSQ HOSP IP/OBS HIGH 50: CPT | Performed by: PHYSICIAN ASSISTANT

## 2024-08-21 PROCEDURE — 47562 LAPAROSCOPIC CHOLECYSTECTOMY: CPT | Performed by: SURGERY

## 2024-08-21 PROCEDURE — 88304 TISSUE EXAM BY PATHOLOGIST: CPT | Performed by: PATHOLOGY

## 2024-08-21 PROCEDURE — 84100 ASSAY OF PHOSPHORUS: CPT | Performed by: PHYSICIAN ASSISTANT

## 2024-08-21 PROCEDURE — 83735 ASSAY OF MAGNESIUM: CPT | Performed by: PHYSICIAN ASSISTANT

## 2024-08-21 PROCEDURE — 47562 LAPAROSCOPIC CHOLECYSTECTOMY: CPT | Performed by: PHYSICIAN ASSISTANT

## 2024-08-21 RX ORDER — METRONIDAZOLE 500 MG/100ML
500 INJECTION, SOLUTION INTRAVENOUS ONCE
Status: COMPLETED | OUTPATIENT
Start: 2024-08-21 | End: 2024-08-21

## 2024-08-21 RX ORDER — FENTANYL CITRATE 50 UG/ML
INJECTION, SOLUTION INTRAMUSCULAR; INTRAVENOUS AS NEEDED
Status: DISCONTINUED | OUTPATIENT
Start: 2024-08-21 | End: 2024-08-21

## 2024-08-21 RX ORDER — OXYCODONE HYDROCHLORIDE 5 MG/1
5 TABLET ORAL EVERY 4 HOURS PRN
Status: DISCONTINUED | OUTPATIENT
Start: 2024-08-21 | End: 2024-08-21 | Stop reason: HOSPADM

## 2024-08-21 RX ORDER — ONDANSETRON 2 MG/ML
INJECTION INTRAMUSCULAR; INTRAVENOUS AS NEEDED
Status: DISCONTINUED | OUTPATIENT
Start: 2024-08-21 | End: 2024-08-21

## 2024-08-21 RX ORDER — HYDROMORPHONE HCL/PF 1 MG/ML
SYRINGE (ML) INJECTION AS NEEDED
Status: DISCONTINUED | OUTPATIENT
Start: 2024-08-21 | End: 2024-08-21

## 2024-08-21 RX ORDER — PROPOFOL 10 MG/ML
INJECTION, EMULSION INTRAVENOUS AS NEEDED
Status: DISCONTINUED | OUTPATIENT
Start: 2024-08-21 | End: 2024-08-21

## 2024-08-21 RX ORDER — OXYCODONE HYDROCHLORIDE 10 MG/1
10 TABLET ORAL EVERY 4 HOURS PRN
Status: DISCONTINUED | OUTPATIENT
Start: 2024-08-21 | End: 2024-08-21 | Stop reason: HOSPADM

## 2024-08-21 RX ORDER — ACETAMINOPHEN 500 MG
500 TABLET ORAL EVERY 6 HOURS PRN
Status: CANCELLED
Start: 2024-08-21

## 2024-08-21 RX ORDER — HYDROMORPHONE HCL/PF 1 MG/ML
0.5 SYRINGE (ML) INJECTION
Status: DISCONTINUED | OUTPATIENT
Start: 2024-08-21 | End: 2024-08-21 | Stop reason: HOSPADM

## 2024-08-21 RX ORDER — NEOSTIGMINE METHYLSULFATE 1 MG/ML
INJECTION INTRAVENOUS AS NEEDED
Status: DISCONTINUED | OUTPATIENT
Start: 2024-08-21 | End: 2024-08-21

## 2024-08-21 RX ORDER — ONDANSETRON 2 MG/ML
4 INJECTION INTRAMUSCULAR; INTRAVENOUS ONCE AS NEEDED
Status: DISCONTINUED | OUTPATIENT
Start: 2024-08-21 | End: 2024-08-21 | Stop reason: HOSPADM

## 2024-08-21 RX ORDER — KETOROLAC TROMETHAMINE 30 MG/ML
INJECTION, SOLUTION INTRAMUSCULAR; INTRAVENOUS AS NEEDED
Status: DISCONTINUED | OUTPATIENT
Start: 2024-08-21 | End: 2024-08-21

## 2024-08-21 RX ORDER — ROCURONIUM BROMIDE 10 MG/ML
INJECTION, SOLUTION INTRAVENOUS AS NEEDED
Status: DISCONTINUED | OUTPATIENT
Start: 2024-08-21 | End: 2024-08-21

## 2024-08-21 RX ORDER — FENTANYL CITRATE/PF 50 MCG/ML
50 SYRINGE (ML) INJECTION
Status: DISCONTINUED | OUTPATIENT
Start: 2024-08-21 | End: 2024-08-21 | Stop reason: HOSPADM

## 2024-08-21 RX ORDER — MIDAZOLAM HYDROCHLORIDE 2 MG/2ML
INJECTION, SOLUTION INTRAMUSCULAR; INTRAVENOUS AS NEEDED
Status: DISCONTINUED | OUTPATIENT
Start: 2024-08-21 | End: 2024-08-21

## 2024-08-21 RX ORDER — DEXAMETHASONE SODIUM PHOSPHATE 10 MG/ML
INJECTION, SOLUTION INTRAMUSCULAR; INTRAVENOUS AS NEEDED
Status: DISCONTINUED | OUTPATIENT
Start: 2024-08-21 | End: 2024-08-21

## 2024-08-21 RX ORDER — OXYCODONE HYDROCHLORIDE 5 MG/1
5 TABLET ORAL EVERY 4 HOURS PRN
Qty: 8 TABLET | Refills: 0 | Status: CANCELLED | OUTPATIENT
Start: 2024-08-21 | End: 2024-08-31

## 2024-08-21 RX ORDER — LIDOCAINE HYDROCHLORIDE 10 MG/ML
INJECTION, SOLUTION EPIDURAL; INFILTRATION; INTRACAUDAL; PERINEURAL AS NEEDED
Status: DISCONTINUED | OUTPATIENT
Start: 2024-08-21 | End: 2024-08-21

## 2024-08-21 RX ORDER — CEFAZOLIN SODIUM 2 G/50ML
2000 SOLUTION INTRAVENOUS ONCE
Status: COMPLETED | OUTPATIENT
Start: 2024-08-21 | End: 2024-08-21

## 2024-08-21 RX ORDER — METOCLOPRAMIDE HYDROCHLORIDE 5 MG/ML
10 INJECTION INTRAMUSCULAR; INTRAVENOUS ONCE AS NEEDED
Status: DISCONTINUED | OUTPATIENT
Start: 2024-08-21 | End: 2024-08-21 | Stop reason: HOSPADM

## 2024-08-21 RX ORDER — ACETAMINOPHEN 325 MG/1
975 TABLET ORAL EVERY 6 HOURS SCHEDULED
Status: DISCONTINUED | OUTPATIENT
Start: 2024-08-21 | End: 2024-08-21 | Stop reason: HOSPADM

## 2024-08-21 RX ORDER — GLYCOPYRROLATE 0.2 MG/ML
INJECTION INTRAMUSCULAR; INTRAVENOUS AS NEEDED
Status: DISCONTINUED | OUTPATIENT
Start: 2024-08-21 | End: 2024-08-21

## 2024-08-21 RX ORDER — SODIUM CHLORIDE, SODIUM LACTATE, POTASSIUM CHLORIDE, CALCIUM CHLORIDE 600; 310; 30; 20 MG/100ML; MG/100ML; MG/100ML; MG/100ML
INJECTION, SOLUTION INTRAVENOUS CONTINUOUS PRN
Status: DISCONTINUED | OUTPATIENT
Start: 2024-08-21 | End: 2024-08-21

## 2024-08-21 RX ADMIN — ONDANSETRON 4 MG: 2 INJECTION, SOLUTION INTRAMUSCULAR; INTRAVENOUS at 18:08

## 2024-08-21 RX ADMIN — HYDROMORPHONE HYDROCHLORIDE 0.5 MG: 1 INJECTION, SOLUTION INTRAMUSCULAR; INTRAVENOUS; SUBCUTANEOUS at 14:50

## 2024-08-21 RX ADMIN — METRONIDAZOLE: 500 INJECTION, SOLUTION INTRAVENOUS at 14:46

## 2024-08-21 RX ADMIN — NEOSTIGMINE METHYLSULFATE 3 MG: 1 INJECTION INTRAVENOUS at 15:20

## 2024-08-21 RX ADMIN — MIDAZOLAM 2 MG: 1 INJECTION INTRAMUSCULAR; INTRAVENOUS at 14:25

## 2024-08-21 RX ADMIN — HYDROMORPHONE HYDROCHLORIDE 0.2 MG: 0.2 INJECTION, SOLUTION INTRAMUSCULAR; INTRAVENOUS; SUBCUTANEOUS at 09:52

## 2024-08-21 RX ADMIN — HYDROMORPHONE HYDROCHLORIDE 0.2 MG: 0.2 INJECTION, SOLUTION INTRAMUSCULAR; INTRAVENOUS; SUBCUTANEOUS at 04:48

## 2024-08-21 RX ADMIN — DEXAMETHASONE SODIUM PHOSPHATE 10 MG: 10 INJECTION, SOLUTION INTRAMUSCULAR; INTRAVENOUS at 14:44

## 2024-08-21 RX ADMIN — SODIUM CHLORIDE, SODIUM LACTATE, POTASSIUM CHLORIDE, AND CALCIUM CHLORIDE 125 ML/HR: .6; .31; .03; .02 INJECTION, SOLUTION INTRAVENOUS at 07:09

## 2024-08-21 RX ADMIN — KETOROLAC TROMETHAMINE 15 MG: 30 INJECTION, SOLUTION INTRAMUSCULAR; INTRAVENOUS at 15:30

## 2024-08-21 RX ADMIN — LIDOCAINE HYDROCHLORIDE 50 MG: 10 INJECTION, SOLUTION EPIDURAL; INFILTRATION; INTRACAUDAL; PERINEURAL at 14:33

## 2024-08-21 RX ADMIN — PROPOFOL 200 MG: 10 INJECTION, EMULSION INTRAVENOUS at 14:33

## 2024-08-21 RX ADMIN — CEFAZOLIN SODIUM 2000 MG: 2 SOLUTION INTRAVENOUS at 14:41

## 2024-08-21 RX ADMIN — FENTANYL CITRATE 100 MCG: 50 INJECTION INTRAMUSCULAR; INTRAVENOUS at 14:33

## 2024-08-21 RX ADMIN — HYDROMORPHONE HYDROCHLORIDE 0.5 MG: 1 INJECTION, SOLUTION INTRAMUSCULAR; INTRAVENOUS; SUBCUTANEOUS at 15:00

## 2024-08-21 RX ADMIN — GLYCOPYRROLATE 0.4 MG: 0.2 INJECTION INTRAMUSCULAR; INTRAVENOUS at 15:20

## 2024-08-21 RX ADMIN — SODIUM CHLORIDE, SODIUM LACTATE, POTASSIUM CHLORIDE, AND CALCIUM CHLORIDE: .6; .31; .03; .02 INJECTION, SOLUTION INTRAVENOUS at 14:18

## 2024-08-21 RX ADMIN — ONDANSETRON 4 MG: 2 INJECTION, SOLUTION INTRAMUSCULAR; INTRAVENOUS at 14:44

## 2024-08-21 RX ADMIN — ROCURONIUM BROMIDE 50 MG: 10 INJECTION INTRAVENOUS at 14:35

## 2024-08-21 RX ADMIN — HYDROMORPHONE HYDROCHLORIDE 0.5 MG: 1 INJECTION, SOLUTION INTRAMUSCULAR; INTRAVENOUS; SUBCUTANEOUS at 16:35

## 2024-08-21 NOTE — H&P
H&P Exam - General Surgery   Sherry Cervantes 34 y.o. female MRN: [id number]  Unit/Bed#: -01 Encounter: [id number]    Assessment & Plan     Assessment:  33yo female returning today with fevers at home after being seen in the ED yesterday for sudden onset of upper abdominal pain.   Cholelithiasis without concern for acute cholecystitis confirmed on ultrasound yesterday. However CT imaging today reveals mild GB wall thickening and some surrounding inflammatory changes.  Lipase, LFTs, and WBC count all within normal limits      Plan:  Admit to surgical service  NPO, sips with meds  Urine HCG negative   Plan for OR tomorrow for laparoscopic cholecystectomy possible open  Patient not taking any of her prescribed meds at home        History of Present Illness     HPI:  Sherry Cervantes is a 34 y.o. female with history of provoked DVT and partial thyroidectomy who presents with sudden onset of upper abdominal pain yesterday that awoke her from sleep. Patient states in the middle of the night she experienced sharp stabbing upper abdominal pain in LUQ and epigastrium however she is most tender in the RUQ. She states she threw a birthday party for her 1 year old daughter this past weekend and ate of a lot of food. She denies reflux symptoms or vomiting.    Patient does not have menstrual cycles. She has a Nexplanon implant in right arm. No history of prior abdominal surgeries. Reports using advair inhaler for history of mold in her basement causing wheezing at home. Denies any dysuria, hematuria, pelvic discomfort, flank pain.  ROS as below      Review of Systems   Constitutional:  Positive for fever. Negative for appetite change, chills and fatigue.   HENT: Negative.     Respiratory:  Negative for cough, chest tightness, shortness of breath and wheezing.    Cardiovascular:  Negative for chest pain, palpitations and leg swelling.   Gastrointestinal:  Positive for abdominal pain and nausea. Negative for blood in  stool, diarrhea and vomiting.   Genitourinary:  Negative for decreased urine volume, difficulty urinating, dysuria, flank pain and hematuria.   Musculoskeletal: Negative.    Skin:  Negative for rash and wound.   Allergic/Immunologic: Negative for immunocompromised state.   Neurological:  Positive for headaches. Negative for dizziness, syncope, weakness and light-headedness.   Hematological: Negative.    Psychiatric/Behavioral: Negative.         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    Vitamin D deficiency     Vocal cord paralysis     Following partial thyroidectomy     Past Surgical History:   Procedure Laterality Date    DILATION AND EVACUATION  2016    KNEE ARTHROSCOPY W/ PLICA EXCISION Left 2019    THYROIDECTOMY, PARTIAL Left 2013    THYROIDECTOMY, PARTIAL Left     UPPER GASTROINTESTINAL ENDOSCOPY      US GUIDED THYROID BIOPSY  11/08/2022    WISDOM TOOTH EXTRACTION       Social History   Social History     Substance and Sexual Activity   Alcohol Use Yes    Comment: socially     Social History     Substance and Sexual Activity   Drug Use Not Currently    Comment:  no longer using marijuana     Social History     Tobacco Use   Smoking Status Former    Current packs/day: 0.25    Average packs/day: 0.3 packs/day for 7.2 years (1.8 ttl pk-yrs)    Types: Cigarettes    Start date: 6/15/2017    Quit date: 2/9/2012    Passive exposure: Never   Smokeless Tobacco Never   Tobacco Comments     seasonal, only when it's warm out, social     E-Cigarette/Vaping    E-Cigarette Use Former User      E-Cigarette/Vaping Substances    Nicotine No     THC No     CBD No     Flavoring  "No     Other No     Unknown No      Family History: non-contributory    Meds/Allergies   all medications and allergies reviewed  No Known Allergies    Objective   First Vitals:   Blood Pressure: 110/80 (08/20/24 1933)  Pulse: 97 (08/20/24 1933)  Temperature: 98.8 °F (37.1 °C) (08/20/24 1933)  Temp Source: Oral (08/20/24 1933)  Respirations: 18 (08/20/24 1933)  Height: 5' 6\" (167.6 cm) (08/20/24 1933)  Weight - Scale: 92.1 kg (203 lb) (08/20/24 1933)  SpO2: 100 % (08/20/24 1933)    Current Vitals:   Blood Pressure: 96/64 (08/20/24 2230)  Pulse: 90 (08/20/24 2230)  Temperature: 98.8 °F (37.1 °C) (08/20/24 1933)  Temp Source: Oral (08/20/24 1933)  Respirations: 18 (08/20/24 2145)  Height: 5' 6\" (167.6 cm) (08/20/24 1933)  Weight - Scale: 92.1 kg (203 lb) (08/20/24 1933)  SpO2: 98 % (08/20/24 2230)    No intake or output data in the 24 hours ending 08/20/24 2320    Invasive Devices       Peripheral Intravenous Line  Duration             Peripheral IV 08/20/24 Right Antecubital <1 day                    Physical Exam  Vitals reviewed.   Constitutional:       General: She is awake. She is not in acute distress.     Appearance: Normal appearance. She is well-developed and well-groomed. She is not ill-appearing, toxic-appearing or diaphoretic.      Interventions: She is not intubated.  HENT:      Head: Normocephalic and atraumatic. Not macrocephalic and not microcephalic. No raccoon eyes, Isaac's sign, right periorbital erythema or left periorbital erythema.      Right Ear: External ear normal.      Left Ear: External ear normal.      Nose: Nose normal.   Eyes:      General: No scleral icterus.        Right eye: No discharge.         Left eye: No discharge.      Conjunctiva/sclera: Conjunctivae normal.      Right eye: Right conjunctiva is not injected. No hemorrhage.     Left eye: Left conjunctiva is not injected. No hemorrhage.     Pupils: Pupils are equal, round, and reactive to light.   Cardiovascular:      Rate and " Rhythm: Normal rate.   Pulmonary:      Effort: Pulmonary effort is normal. No tachypnea, bradypnea, respiratory distress or apnea. She is not intubated.      Breath sounds: No stridor.   Abdominal:      General: There is no distension.      Palpations: Abdomen is soft. Abdomen is not rigid.      Tenderness: There is abdominal tenderness. There is no guarding or rebound.      Hernia: A hernia is present. Hernia is present in the ventral area.   Musculoskeletal:      Right lower leg: No edema.      Left lower leg: No edema.   Skin:     General: Skin is warm, dry and intact.      Capillary Refill: Capillary refill takes less than 2 seconds.      Coloration: Skin is not cyanotic, jaundiced or mottled.      Findings: No rash.   Neurological:      General: No focal deficit present.      Mental Status: She is alert and oriented to person, place, and time. She is not disoriented.      GCS: GCS eye subscore is 4. GCS verbal subscore is 5. GCS motor subscore is 6.      Cranial Nerves: Cranial nerves 2-12 are intact. No cranial nerve deficit.      Sensory: Sensation is intact.      Motor: Motor function is intact.   Psychiatric:         Attention and Perception: Attention normal.         Mood and Affect: Mood and affect and mood normal.         Speech: Speech normal.         Behavior: Behavior normal. Behavior is cooperative.         Thought Content: Thought content normal.         Lab Results: I have personally reviewed pertinent lab results.  , CBC:   Lab Results   Component Value Date    WBC 6.37 08/20/2024    HGB 13.2 08/20/2024    HCT 41.1 08/20/2024    MCV 88 08/20/2024     08/20/2024    RBC 4.68 08/20/2024    MCH 28.2 08/20/2024    MCHC 32.1 08/20/2024    RDW 13.4 08/20/2024    MPV 9.7 08/20/2024    NRBC 0 08/20/2024   , CMP:   Lab Results   Component Value Date    SODIUM 134 (L) 08/20/2024    K 3.9 08/20/2024     08/20/2024    CO2 27 08/20/2024    BUN 16 08/20/2024    CREATININE 0.85 08/20/2024    CALCIUM  "9.1 08/20/2024    AST 13 08/20/2024    ALT 12 08/20/2024    ALKPHOS 54 08/20/2024    EGFR 89 08/20/2024   , Coagulation: No results found for: \"PT\", \"INR\", \"APTT\", Urinalysis:   Lab Results   Component Value Date    COLORU Yellow 08/20/2024    CLARITYU Cloudy 08/20/2024    SPECGRAV 1.020 08/20/2024    PHUR 6.5 08/20/2024    LEUKOCYTESUR Moderate (A) 08/20/2024    NITRITE Negative 08/20/2024    GLUCOSEU Negative 08/20/2024    KETONESU Trace (A) 08/20/2024    BILIRUBINUR Negative 08/20/2024    BLOODU Small (A) 08/20/2024   , Lipase:   Lab Results   Component Value Date    LIPASE 31 08/20/2024     Imaging: I have personally reviewed pertinent reports.   and I have personally reviewed pertinent films in PACS  EKG, Pathology, and Other Studies: I have personally reviewed pertinent reports.      Code Status: Level 1 - Full Code  Advance Directive and Living Will:      Power of :    POLST:      Counseling / Coordination of Care  Total floor / unit time spent today 40 minutes.  Greater than 50% of total time was spent with the patient and / or family counseling and / or coordination of care.  A description of the counseling / coordination of care: Obtaining patient history, performing physical exam, reviewing pertinent labs and imaging, discussed management with attending physician.      "

## 2024-08-21 NOTE — ED PROVIDER NOTES
History  Chief Complaint   Patient presents with    Fever     Patient came in reporting she has gallstones and was told if she has a fever to come back. Also stating she has a pounding HA that started this morning with no relief. Denies burred vision. Took temp at home around 1800 reading 101.6. Took tylenol and percocet for pain at 0800. Denies N/V/D.      34-year-old female has had upper abdominal pain for greater than 24 hours.  Outpatient ultrasound yesterday showed biliary calculi without acute cholecystitis.  Labs were normal.  States that she continues to have some upper abdominal pain, worse on the left upper quadrant that seems to radiate towards the right.  Movements makes the pain sharper and worse.  She has been anorexic today.  Only eat a little bit and urinated only once.  This afternoon developed a pounding headache and fever to one 1.6.  Feels a bit better after Percocet for pain and Tylenol.        Prior to Admission Medications   Prescriptions Last Dose Informant Patient Reported? Taking?   Fluticasone-Salmeterol (Advair Diskus) 250-50 mcg/dose inhaler 2024  No Yes   Sig: Inhale 1 puff 2 (two) times a day Rinse mouth after use.   cyclobenzaprine (FLEXERIL) 5 mg tablet More than a month  No No   Sig: Take 0.5 tablets (2.5 mg total) by mouth 3 (three) times a day as needed for muscle spasms   etonogestrel (NEXPLANON) subdermal implant   Yes No   Si mg by Subdermal route Once every 3 years   levothyroxine 75 mcg tablet Past Week  No Yes   Sig: TAKE 1 TABLET BY MOUTH EVERY DAY   ondansetron (ZOFRAN-ODT) 4 mg disintegrating tablet 2024  No Yes   Sig: Take 1 tablet (4 mg total) by mouth every 8 (eight) hours as needed for nausea or vomiting   oxyCODONE-acetaminophen (Percocet) 5-325 mg per tablet 2024  No Yes   Sig: Take 1 tablet by mouth every 6 (six) hours as needed for severe pain Max Daily Amount: 4 tablets      Facility-Administered Medications: None       Past Medical History:    Diagnosis Date    Asthma     Chronic knee pain     Depression     Disease of thyroid gland     Dysphagia 03/02/2020    History of pulmonary embolus (PE) 2017    Result of Knee injury    Left upper quadrant pain 03/02/2020    Menorrhagia     Multiple thyroid nodules     Psychiatric disorder     Saphenous nerve neuropathy, 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    Vitamin D deficiency     Vocal cord paralysis     Following partial thyroidectomy       Past Surgical History:   Procedure Laterality Date    CHOLECYSTECTOMY LAPAROSCOPIC N/A 8/21/2024    Procedure: CHOLECYSTECTOMY LAPAROSCOPIC;  Surgeon: Cheikh Swain MD;  Location:  MAIN OR;  Service: General    DILATION AND EVACUATION  2016    KNEE ARTHROSCOPY W/ PLICA EXCISION Left 2019    THYROIDECTOMY, PARTIAL Left 2013    THYROIDECTOMY, PARTIAL Left     UPPER GASTROINTESTINAL ENDOSCOPY      US GUIDED THYROID BIOPSY  11/08/2022    WISDOM TOOTH EXTRACTION         Family History   Problem Relation Age of Onset    Thyroid disease Mother         thyroid nodules    No Known Problems Father     Thyroid disease Sister     Anorexia nervosa Sister     Mental illness Sister     No Known Problems Sister     No Known Problems Brother     Celiac disease Maternal Grandmother     Hodgkin's lymphoma Maternal Grandfather     Hodgkin's lymphoma Paternal Grandfather     No Known Problems Daughter     Other Maternal Aunt         hypoglycemia    Other Maternal Aunt         brain tumor, had siezure    Colon cancer Neg Hx     Colon polyps Neg Hx     Substance Abuse Neg Hx     Uterine cancer Neg Hx      I have reviewed and agree with the history as documented.    E-Cigarette/Vaping    E-Cigarette Use Former User      E-Cigarette/Vaping Substances    Nicotine No     THC No     CBD No     Flavoring No     Other No     Unknown No      Social History     Tobacco Use    Smoking status: Former     Current packs/day: 0.25     Average packs/day: 0.3 packs/day for 7.2 years (1.8 ttl pk-yrs)     Types:  Cigarettes     Start date: 6/15/2017     Quit date: 2/9/2012     Passive exposure: Never    Smokeless tobacco: Never    Tobacco comments:      seasonal, only when it's warm out, social   Vaping Use    Vaping status: Former   Substance Use Topics    Alcohol use: Yes     Comment: socially    Drug use: Not Currently     Comment:  no longer using marijuana       Review of Systems   Constitutional:  Positive for appetite change and fever. Negative for chills.   HENT:  Negative for rhinorrhea, sinus pressure and sore throat.    Eyes:  Negative for photophobia.   Respiratory:  Negative for cough.    Cardiovascular:  Negative for chest pain and palpitations.   Gastrointestinal:  Positive for abdominal pain and nausea. Negative for blood in stool, constipation, diarrhea and vomiting.   Genitourinary:  Negative for dysuria, flank pain, menstrual problem and vaginal discharge.   Musculoskeletal:  Negative for myalgias.   Skin:  Negative for rash and wound.   Neurological:  Positive for headaches. Negative for syncope.       Physical Exam  Physical Exam  Vitals and nursing note reviewed.   Constitutional:       General: She is not in acute distress.     Appearance: She is well-developed. She is not ill-appearing or diaphoretic.   HENT:      Head: Normocephalic and atraumatic.      Right Ear: External ear normal.      Left Ear: External ear normal.      Nose: Nose normal.      Mouth/Throat:      Mouth: Mucous membranes are moist.      Pharynx: Oropharynx is clear. No posterior oropharyngeal erythema.   Eyes:      General: No scleral icterus.     Conjunctiva/sclera: Conjunctivae normal.      Pupils: Pupils are equal, round, and reactive to light.   Cardiovascular:      Rate and Rhythm: Normal rate and regular rhythm.      Pulses: Normal pulses.      Heart sounds: Normal heart sounds. No murmur heard.  Pulmonary:      Effort: Pulmonary effort is normal.      Breath sounds: Normal breath sounds.   Abdominal:      General: Bowel  sounds are normal. There is no distension.      Palpations: Abdomen is soft. There is no mass.      Tenderness: There is abdominal tenderness (Right upper and right lower quadrants). There is no right CVA tenderness, left CVA tenderness, guarding or rebound.      Hernia: No hernia is present.   Musculoskeletal:         General: Normal range of motion.      Cervical back: Normal range of motion and neck supple.   Skin:     General: Skin is warm and dry.      Capillary Refill: Capillary refill takes less than 2 seconds.      Coloration: Skin is not jaundiced.      Findings: No rash.   Neurological:      General: No focal deficit present.      Mental Status: She is alert and oriented to person, place, and time. Mental status is at baseline.      Cranial Nerves: No cranial nerve deficit.      Coordination: Coordination normal.      Deep Tendon Reflexes: Reflexes are normal and symmetric.   Psychiatric:         Mood and Affect: Mood normal.         Behavior: Behavior normal.         Vital Signs  ED Triage Vitals [08/20/24 1933]   Temperature Pulse Respirations Blood Pressure SpO2   98.8 °F (37.1 °C) 97 18 110/80 100 %      Temp Source Heart Rate Source Patient Position - Orthostatic VS BP Location FiO2 (%)   Oral Monitor Sitting Right arm --      Pain Score       8           Vitals:    08/21/24 1540 08/21/24 1550 08/21/24 1600 08/21/24 1610   BP: 120/71 116/71 115/72 126/76   Pulse: 66 60 60 65   Patient Position - Orthostatic VS:             Visual Acuity      ED Medications  Medications   sodium chloride 0.9 % bolus 1,000 mL (0 mL Intravenous Stopped 8/20/24 2147)   ketorolac (TORADOL) injection 15 mg (15 mg Intravenous Given 8/20/24 2047)   iohexol (OMNIPAQUE) 350 MG/ML injection (MULTI-DOSE) 100 mL (100 mL Intravenous Given 8/20/24 2107)   ceFAZolin (ANCEF) IVPB (premix in dextrose) 2,000 mg 50 mL (2,000 mg Intravenous Given 8/21/24 1441)   metroNIDAZOLE (FLAGYL) IVPB (premix) 500 mg 100 mL ( Intravenous Stopped  8/21/24 1515)       Diagnostic Studies  Results Reviewed       Procedure Component Value Units Date/Time    Urine culture [[id number]] Collected: 08/20/24 2047    Lab Status: Final result Specimen: Urine, Clean Catch Updated: 08/22/24 0803     Urine Culture 20,000-29,000 cfu/ml    Urine Microscopic [[id number]]  (Abnormal) Collected: 08/20/24 2047    Lab Status: Final result Specimen: Urine, Clean Catch Updated: 08/20/24 2111     RBC, UA 10-20 /hpf      WBC, UA 20-30 /hpf      Epithelial Cells Innumerable /hpf      Bacteria, UA Innumerable /hpf      MUCUS THREADS Innumerable     Granular Casts, UA 5-10    UA w Reflex to Microscopic w Reflex to Culture [[id number]]  (Abnormal) Collected: 08/20/24 2047    Lab Status: Final result Specimen: Urine, Clean Catch Updated: 08/20/24 2110     Color, UA Yellow     Clarity, UA Cloudy     Specific Gravity, UA 1.020     pH, UA 6.5     Leukocytes, UA Moderate     Nitrite, UA Negative     Protein, UA Trace mg/dl      Glucose, UA Negative mg/dl      Ketones, UA Trace mg/dl      Urobilinogen, UA <2.0 mg/dl      Bilirubin, UA Negative     Occult Blood, UA Small    POCT pregnancy, urine [[id number]]  (Normal) Resulted: 08/20/24 2047    Lab Status: Final result Updated: 08/20/24 2047     EXT Preg Test, Ur Negative     Control Valid    Comprehensive metabolic panel [[id number]]  (Abnormal) Collected: 08/20/24 1940    Lab Status: Final result Specimen: Blood from Arm, Right Updated: 08/20/24 2012     Sodium 134 mmol/L      Potassium 3.9 mmol/L      Chloride 103 mmol/L      CO2 27 mmol/L      ANION GAP 4 mmol/L      BUN 16 mg/dL      Creatinine 0.85 mg/dL      Glucose 87 mg/dL      Calcium 9.1 mg/dL      AST 13 U/L      ALT 12 U/L      Alkaline Phosphatase 54 U/L      Total Protein 7.6 g/dL      Albumin 4.3 g/dL      Total Bilirubin 0.52 mg/dL      eGFR 89 ml/min/1.73sq m     Narrative:      National Kid[employer] guidelines for Chronic Kidney Disease (CKD):     Stage 1 with normal  or high GFR (GFR > 90 mL/min/1.73 square meters)    Stage 2 Mild CKD (GFR = 60-89 mL/min/1.73 square meters)    Stage 3A Moderate CKD (GFR = 45-59 mL/min/1.73 square meters)    Stage 3B Moderate CKD (GFR = 30-44 mL/min/1.73 square meters)    Stage 4 Severe CKD (GFR = 15-29 mL/min/1.73 square meters)    Stage 5 End Stage CKD (GFR <15 mL/min/1.73 square meters)  Note: GFR calculation is accurate only with a steady state creatinine    Lipase [[id number]]  (Normal) Collected: 08/20/24 1940    Lab Status: Final result Specimen: Blood from Arm, Right Updated: 08/20/24 2012     Lipase 31 u/L     CBC and differential [[id number]]  (Abnormal) Collected: 08/20/24 1940    Lab Status: Final result Specimen: Blood from Arm, Right Updated: 08/20/24 1946     WBC 6.37 Thousand/uL      RBC 4.68 Million/uL      Hemoglobin 13.2 g/dL      Hematocrit 41.1 %      MCV 88 fL      MCH 28.2 pg      MCHC 32.1 g/dL      RDW 13.4 %      MPV 9.7 fL      Platelets 200 Thousands/uL      nRBC 0 /100 WBCs      Segmented % 61 %      Immature Grans % 0 %      Lymphocytes % 24 %      Monocytes % 13 %      Eosinophils Relative 1 %      Basophils Relative 1 %      Absolute Neutrophils 3.87 Thousands/µL      Absolute Immature Grans 0.02 Thousand/uL      Absolute Lymphocytes 1.55 Thousands/µL      Absolute Monocytes 0.83 Thousand/µL      Eosinophils Absolute 0.07 Thousand/µL      Basophils Absolute 0.03 Thousands/µL                    CT chest abdomen pelvis w contrast   Final Result by Bryn Alcantar MD (08/20 2132)      Gallbladder wall thickening and mild pericholecystic inflammation. No calcified gallstones are seen but acute cholecystitis cannot be excluded. This can be further evaluated with ultrasound.      Partially visualized heterogeneous right lobe thyroid nodule which can be further evaluated with ultrasound.      Small amount of free fluid within the pelvis.                     Workstation performed: [id number]                     Procedures  Procedures         ED Course  ED Course as of 08/24/24 1955   Tue Aug 20, 2024   2228 CT results reviewed; gen surg consult ordered. Patient appears comfortable after ketorolac   2228 UA contaminated                                 SBIRT 22yo+      Flowsheet Row Most Recent Value   Initial Alcohol Screen: US AUDIT-C     1. How often do you have a drink containing alcohol? 0 Filed at: 08/20/2024 2010   2. How many drinks containing alcohol do you have on a typical day you are drinking?  0 Filed at: 08/20/2024 2010   3a. Male UNDER 65: How often do you have five or more drinks on one occasion? 0 Filed at: 08/20/2024 2010   3b. FEMALE Any Age, or MALE 65+: How often do you have 4 or more drinks on one occassion? 0 Filed at: 08/20/2024 2010   Audit-C Score 0 Filed at: 08/20/2024 2010   JERRY: How many times in the past year have you...    Used an illegal drug or used a prescription medication for non-medical reasons? Never Filed at: 08/20/2024 2010                      Medical Decision Making  34-year-old female with Agnes lithiasis, abdominal pain and fever.  Concern for acute cholecystitis, cholangitis, choledocholithiasis.  Also at risk for dehydration, electrolyte abnormality, rule out UTI, right lower lobe pneumonia, pancreatitis, ileitis, appendicitis, pyelonephritis.  Check labs and imaging.  Will give IV fluids, analgesia and reassess.    Amount and/or Complexity of Data Reviewed  Labs: ordered.  Radiology: ordered.    Risk  Prescription drug management.  Decision regarding hospitalization.                 Disposition  Final diagnoses:   Cholelithiasis with acute cholecystitis without obstruction     Time reflects when diagnosis was documented in both MDM as applicable and the Disposition within this note       Time User Action Codes Description Comment    8/20/2024 10:26 PM Aime Gates Add [K80.00] Cholelithiasis with acute cholecystitis without obstruction     8/21/2024  2:59 PM  Lida Wagner Modify [K80.00] Cholelithiasis with acute cholecystitis without obstruction           ED Disposition       ED Disposition   Admit    Condition   Stable    Date/Time   Tue Aug 20, 2024 2538    Comment   Case was discussed with general surgery AP and the patient's admission status was agreed to be Admission Status: observation status to the service of Dr. Swain.               Follow-up Information       Follow up With Specialties Details Why Contact Info    Cheikh Swain MD General Surgery Follow up in 2 week(s)  [facility] [address]  [phone]              Discharge Medication List as of 8/21/2024  7:26 PM        START taking these medications    Details   amoxicillin-clavulanate (AUGMENTIN) 875-125 mg per tablet Take 1 tablet by mouth every 12 (twelve) hours for 5 days, Starting Wed 8/21/2024, Until Mon 8/26/2024, Normal           CONTINUE these medications which have NOT CHANGED    Details   Fluticasone-Salmeterol (Advair Diskus) 250-50 mcg/dose inhaler Inhale 1 puff 2 (two) times a day Rinse mouth after use., Starting Wed 5/15/2024, Until Thu 9/12/2024, Normal      levothyroxine 75 mcg tablet TAKE 1 TABLET BY MOUTH EVERY DAY, Starting Fri 3/22/2024, Normal      ondansetron (ZOFRAN-ODT) 4 mg disintegrating tablet Take 1 tablet (4 mg total) by mouth every 8 (eight) hours as needed for nausea or vomiting, Starting Mon 8/19/2024, Normal      oxyCODONE-acetaminophen (Percocet) 5-325 mg per tablet Take 1 tablet by mouth every 6 (six) hours as needed for severe pain Max Daily Amount: 4 tablets, Starting Mon 8/19/2024, Normal      cyclobenzaprine (FLEXERIL) 5 mg tablet Take 0.5 tablets (2.5 mg total) by mouth 3 (three) times a day as needed for muscle spasms, Starting Wed 1/17/2024, Normal      etonogestrel (NEXPLANON) subdermal implant 68 mg by Subdermal route Once every 3 years, Starting Wed 12/27/2023, Until Sun 12/27/2026, Historical Med             Outpatient  Discharge Orders   Discharge Diet     Activity as tolerated     Lifting restrictions     No strenuous exercise     Shower Daily     No driving until     No dressing needed     Call provider for:  persistent nausea or vomiting     Call provider for:  severe uncontrolled pain     Call provider for:  redness, tenderness, or signs of infection (pain, swelling, redness, odor or green/yellow discharge around incision site)     Call provider for: active or persistent bleeding       PDMP Review         Value Time User    PDMP Reviewed  Yes 8/21/2024  3:36 PM Malorie Gomez PA-C            ED Provider  Electronically Signed by             Aime Gates DO  08/24/24 1955

## 2024-08-21 NOTE — UTILIZATION REVIEW
Initial Clinical Review    Admission: Date/Time/Statement: 8/20/24 2253 observation and CHANGED 8/21/24 1406 OUTPATIENT NO CHARGE BED WITH SUSPICION OF CHOLECYSTITIS VS BILARY COLIC AND FOR LAP TELMA TODAY.   Start   Ordered   08/21/24 1407  Outpatient No Charge Bed  Once        Transfer Service: Surgery-General    08/21/24 1406     Admission Orders (From admission, onward)       Ordered        08/20/24 2253  Place in Observation  Once                          ED Arrival Information       Expected   -    Arrival   8/20/2024 19:08    Acuity   Urgent              Means of arrival   Walk-In    Escorted by   Self    Service   Surgery-General    Admission type   Emergency              Arrival complaint   Fever             Chief Complaint   Patient presents with    Fever     Patient came in reporting she has gallstones and was told if she has a fever to come back. Also stating she has a pounding HA that started this morning with no relief. Denies burred vision. Took temp at home around 1800 reading 101.6. Took tylenol and percocet for pain at 0800. Denies N/V/D.        Initial Presentation: 34 y.o. female  to ED via walk in from home.    Admitted to observation AND CONVERTED TO OUTPATIENT NO CHARGE BED with Dx: Abdominal pain .  Presented to ED with upper abdominal pain starting more than 24 hours prior to arrival.  Intake poor.  On afternoon of arrival temp to 101.6, headache,  pain worse on left and radiates to right  Seen in ED yesterday for same and US showed cholelithiasis.  Not taking prescribed medications.   PMHx: provoked DVT and partial thyroidectomy.   Nexplanon implant in right arm.  Advair use due to mold in basement. On exam:  abdominal tenderness right upper and right lower quadrants.   Imaging shows  mild GB wall thickening and some surrounding inflammatory changes. .ED treatment: IVF bolus of 1 liter, Toradol.    Plan includes to keep NPO.  IVF.  Plan for OR tomorrow for laparoscopic cholecystectomy  possible open.  Antiemetics and analgesia as needed.     8/21/24  CHANGED TO OUTPATIENT NO CHARGE BED :   this am upper/mid abdominal pain.  Rates 7/10.   On exam: hypoactive bowel sounds.  Abdomen tender.   Dx: cholecystitis vs Colic.  Continue pain control and if pain resolves then OP lap marii, if continues then lap marii this admission.     Procedure 8/21/24 CHOLECYSTECTOMY LAPAROSCOPIC   Findings mild cholecystitis.       ED Triage Vitals [08/20/24 1933]   Temperature Pulse Respirations Blood Pressure SpO2 Pain Score   98.8 °F (37.1 °C) 97 18 110/80 100 % 8     Weight (last 2 days)       Date/Time Weight    08/21/24 1411 92.1 (203)    08/20/24 2333 92.1 (203)    08/20/24 1933 92.1 (203)            Vital Signs (last 3 days)       Date/Time Temp Pulse Resp BP MAP (mmHg) SpO2 O2 Device Patient Position - Orthostatic VS Chen Coma Scale Score Pain    08/21/24 1411 98.6 °F (37 °C) 74 16 114/73 -- 99 % None (Room air) -- -- 6    08/21/24 0952 -- -- -- -- -- -- -- -- -- 7    08/21/24 0726 97.8 °F (36.6 °C) -- -- -- -- -- -- -- -- --    08/21/24 07:24:54 99 °F (37.2 °C) 77 22 117/72 87 95 % None (Room air) Lying -- --    08/21/24 0716 -- -- -- -- -- -- None (Room air) -- 15 4    08/21/24 0448 -- -- -- -- -- -- -- -- -- 6    08/20/24 2333 -- -- -- -- -- -- -- -- 15 7 08/20/24 2324 -- -- -- -- -- -- -- -- -- 8    08/20/24 2323 97.9 °F (36.6 °C) -- -- -- -- -- -- -- -- --    08/20/24 23:21:53 98.4 °F (36.9 °C) 85 17 116/65 82 96 % None (Room air) Lying -- --    08/20/24 2230 -- 90 -- 96/64 75 98 % -- Lying -- --    08/20/24 2145 -- 85 18 102/56 73 94 % -- -- -- --    08/20/24 2052 -- -- -- -- -- -- None (Room air) -- -- --    08/20/24 2051 -- -- -- -- -- -- -- -- 15 No Pain    08/20/24 1933 98.8 °F (37.1 °C) 97 18 110/80 -- 100 % None (Room air) Sitting -- 8              Pertinent Labs/Diagnostic Test Results:   Radiology:  CT chest abdomen pelvis w contrast   Final Interpretation by Bryn Alcantar MD (08/20 2132)       Gallbladder wall thickening and mild pericholecystic inflammation. No calcified gallstones are seen but acute cholecystitis cannot be excluded. This can be further evaluated with ultrasound.      Partially visualized heterogeneous right lobe thyroid nodule which can be further evaluated with ultrasound.      Small amount of free fluid within the pelvis.                     Workstation performed: [id number]             8/19/24 US of abdomen - Cholelithiasis without sonographic findings of acute cholecystitis.     Results from last 7 days   Lab Units 08/21/24 0434 08/20/24 1940 08/19/24  1636   WBC Thousand/uL 4.15* 6.37 9.67   HEMOGLOBIN g/dL 12.7 13.2 13.2   HEMATOCRIT % 39.2 41.1 40.2   PLATELETS Thousands/uL 185 200 240   TOTAL NEUT ABS Thousands/µL  --  3.87 6.69     Results from last 7 days   Lab Units 08/21/24 0434 08/20/24 1940 08/19/24  1636   SODIUM mmol/L 137 134* 137   POTASSIUM mmol/L 3.7 3.9 3.9   CHLORIDE mmol/L 106 103 104   CO2 mmol/L 26 27 24   ANION GAP mmol/L 5 4 9   BUN mg/dL 11 16 9   CREATININE mg/dL 0.74 0.85 0.65   EGFR ml/min/1.73sq m 106 89 116   CALCIUM mg/dL 8.6 9.1 9.3   MAGNESIUM mg/dL 2.2  --   --    PHOSPHORUS mg/dL 3.4  --   --      Results from last 7 days   Lab Units 08/21/24 0434 08/20/24 1940 08/19/24  1636   AST U/L 11* 13 15   ALT U/L 9 12 13   ALK PHOS U/L 47 54 56   TOTAL PROTEIN g/dL 7.0 7.6 7.8   ALBUMIN g/dL 3.8 4.3 4.4   TOTAL BILIRUBIN mg/dL 0.41 0.52 0.61     Results from last 7 days   Lab Units 08/21/24 0434 08/20/24 1940 08/19/24  1636   GLUCOSE RANDOM mg/dL 97 87 87     Results from last 7 days   Lab Units 08/20/24 1940 08/19/24  1636   LIPASE u/L 31 23   AMYLASE IU/L  --  21*     Results from last 7 days   Lab Units 08/20/24 2047   CLARITY UA  Cloudy   COLOR UA  Yellow   SPEC GRAV UA  1.020   PH UA  6.5   GLUCOSE UA mg/dl Negative   KETONES UA mg/dl Trace*   BLOOD UA  Small*   PROTEIN UA mg/dl Trace*   NITRITE UA  Negative   BILIRUBIN UA  Negative    UROBILINOGEN UA (BE) mg/dl <2.0   LEUKOCYTES UA  Moderate*   WBC UA /hpf 20-30*   RBC UA /hpf 10-20*   BACTERIA UA /hpf Innumerable*   EPITHELIAL CELLS WET PREP /hpf Innumerable*   MUCUS THREADS  Innumerable*       ED Treatment-Medication Administration from 08/20/2024 1908 to 08/20/2024 2312         Date/Time Order Dose Route Action     08/20/2024 2047 sodium chloride 0.9 % bolus 1,000 mL 1,000 mL Intravenous New Bag     08/20/2024 2047 ketorolac (TORADOL) injection 15 mg 15 mg Intravenous Given            Past Medical History:   Diagnosis Date    Asthma     Chronic knee pain     Depression     Disease of thyroid gland     Dysphagia 03/02/2020    History of pulmonary embolus (PE) 2017    Result of Knee injury    Left upper quadrant pain 03/02/2020    Menorrhagia     Multiple thyroid nodules     Psychiatric disorder     Saphenous nerve neuropathy, 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 female  Admission Orders:  Scheduled Medications:  ceFAZolin, 2,000 mg, Intravenous, Once  [START ON 8/22/2024] enoxaparin, 40 mg, Subcutaneous, Daily  metroNIDAZOLE, 500 mg, Intravenous, Once      Continuous IV Infusions:  lactated ringers, 125 mL/hr, Intravenous, Continuous      PRN Meds:  acetaminophen, 1,000 mg, Intravenous, Q6H PRN  HYDROmorphone, 0.2 mg, Intravenous, Q3H PRN x 1 8/20.  X 1 8/21  ondansetron, 4 mg, Intravenous, Q6H PRN    Bilateral SCDs  Incentive spirometry  NPO      Network Utilization Review Department  ATTENTION: Please call with any questions or concerns to [phone] and carefully listen to the prompts so that you are directed to the right person. All voicemails are confidential.   For Discharge needs, contact Care Management DC Support Team at [phone] opt. [phone]  Send  all requests for admission clinical reviews, approved or denied determinations and any other requests to dedicated fax number below belonging to the campus where the patient is receiving treatment. List of dedicated fax numbers for the Facilities:  FACILITY NAME UR FAX NUMBER   ADMISSION DENIALS (Administrative/Medical Necessity) [phone]   DISCHARGE SUPPORT TEAM (NETWORK) [phone]   PARENT CHILD HEALTH (Maternity/NICU/Pediatrics) [phone]   [facility] [phone]   [facility] [phone]   Counts include 234 beds at the [facility] [phone]   [facility] [phone]   [facility] [phone]   [facility] [phone]   [facility] [phone]   [facility] [phone]   [facility] [phone]   [facility] [phone]   [facility] [phone]   [facility] [phone]

## 2024-08-21 NOTE — DISCHARGE INSTR - AVS FIRST PAGE
[facility] General Surgery [facility]  Post-Operative Care Instructions  Dr. Cheikh Swain MD, [facility]  [phone]    1. General: You will feel pulling sensations around the wound or funny aches and pains around the incisions. This is normal. Even minor surgery is a change in your body and this is your body's way of reacting to it. If you have had abdominal surgery, it may help to support the incision with a small pillow or blanket for comfort when moving or coughing.    2. Wound care:  Okay to shower.  The glue will fall off over the next week or 2.   Use ice for the first 5 days after surgery.  Do not use for longer than 20 minutes at a time. Use ice 5 times per day.    3. Water: You may shower over the wounds. Do not bathe or use a pool or hot tub until cleared by the physician.   If you were discharged with a drain, make sure drain site is covered with plastic wrap before showering.    4. Activity: You may go up and down stairs, walk as much as you are comfortable, but walk at least 3 times each day. If you have had abdominal surgery, do not lift anything heavier than 15 lbs for the 1st 2 weeks and 25 lbs for weeks 3 and 4.     5. Diet: You may resume a regular low-fat diet. If you had a same-day surgery or overnight stay surgery, you may wish to eat lightly for a few days: soups, crackers, and sandwiches. You may resume a regular diet when ready.    6. Medications: Resume all of your previous medications, unless told otherwise by the doctor. Avoid aspirin products for 2-3 days after the date of surgery. You may, at that time, begin to take them again. Use Tylenol and Ibuprofen for pain control.  You may alternate these medications every 3 hours.  For example: you may take Tylenol at noon, Ibuprofen at 3:00 p.m., and Tylenol again at 6:00 p.m., etc.  You should use ice to assist with pain control as above.  You do not need to take narcotic pain medication unless you are having significant pain.   If you were  prescribed a narcotic pain medication containing Tylenol, such as Percocet or Norco, do not use supplemental Tylenol.    7. Driving: You will need someone to drive you home on the day of surgery or discharge. Do not drive or make any important decisions while on narcotic pain medication or 24 hours and after anesthesia or sedation for surgery. Generally, you may drive when your off all narcotic pain medications and you are comfortable.     8. Upset Stomach: You may take Maalox, Tums, or similar items for an upset stomach. If your narcotic pain medication causes an upset stomach, do not take it on an empty stomach. Try taking it with at least some crackers or toast.     9. Constipation: Patients often experience constipation after surgery. You may take over-the-counter medication for this, such as Metamucil, Senokot, Dulcolax, milk of magnesia, etc. You may take a suppository unless you have had anorectal surgery such as a procedure on your hemorrhoids. If you experience significant nausea or vomiting after abdominal surgery, call the office before trying any of these medications.    10. Call the office: If you are experiencing any of the following: fevers above 101.5°, significant nausea or vomiting, if the wound develops drainage and/or there is excessive redness around the wound, or if you have significant diarrhea or other worsening symptoms.    11. Pain: You may be given a prescription for pain medication.  This will be sent to your pharmacy prior to discharge.    12. Sexual Activity: You may resume sexual activity when you feel ready and comfortable and your incision is sealed and healed without apparent infection risk.    13. Urination: If you have not urinated in 6 hours, go directly to the ER for evaluation for urinary retention.     14. Follow-up in 2 weeks.

## 2024-08-21 NOTE — QUICK NOTE
"Post Op Check     Subjective: Pt seen and examined bedside post op. Pt states she is feeling well, complaints of incisional pain. Denies fevers, chills, vomiting. Admits to some nausea. Tolerated turkey sandwich for dinner. Anticipating discharge to home this evening.     Objective: /76   Pulse 65   Temp 97.9 °F (36.6 °C)   Resp 12   Ht 5' 6\" (1.676 m)   Wt 92.1 kg (203 lb)   SpO2 97%   BMI 32.77 kg/m²     Physical exam:   General: Alert, oriented   Heart: RRR   Lungs: CTA   Abdomen: Appropriate TTP over incision sites. Incision sites C/D/I.   Skin: Warm, dry     Assessment / Plan  POD 0 s/p laparoscopic cholecystectomy     Plan for discharge to home this evening   Discharge instructions reviewed with pt and    Discharge to home with 5 days PO Augmentin   Follow up with surgeon within 2 weeks.   All questions answered     Tish Joyner           "

## 2024-08-21 NOTE — ANESTHESIA POSTPROCEDURE EVALUATION
Post-Op Assessment Note    CV Status:  Stable  Pain Score: 0    Pain management: adequate       Mental Status:  Sleepy and arousable   Hydration Status:  Euvolemic   PONV Controlled:  Controlled   Airway Patency:  Patent     Post Op Vitals Reviewed: Yes    No anethesia notable event occurred.    Staff: CRNA               BP   121/60   Temp   97.9   Pulse 60   Resp 16   SpO2 96

## 2024-08-21 NOTE — INTERIM OP NOTE
CHOLECYSTECTOMY LAPAROSCOPIC  Postoperative Note  PATIENT NAME: Sherry Cervantes  : 1990  MRN: [id number]  [facility] OR ROOM 04    Surgery Date: 2024    Preop Diagnosis:  Cholelithiasis with acute cholecystitis without obstruction [K80.00]    Post-Op Diagnosis Codes:     * Cholelithiasis with acute cholecystitis without obstruction [K80.00]    Procedure(s) (LRB):  CHOLECYSTECTOMY LAPAROSCOPIC (N/A)    Surgeons and Role:     * Cheikh Swain MD - Primary     * Latrice Palafox PA-C - Assisting    Specimens:  ID Type Source Tests Collected by Time Destination   1 : gallbladder Tissue Gallbladder TISSUE EXAM Cheikh Swain MD 2024 7099        Estimated Blood Loss:   Minimal    Anesthesia Type:   General eta    Findings:   Mild cholecystitis    Complications:   None      SIGNATURE: Malorie Gomez PA-C   DATE: 2024   TIME: 3:30 PM

## 2024-08-21 NOTE — PLAN OF CARE
Problem: PAIN - ADULT  Goal: Verbalizes/displays adequate comfort level or baseline comfort level  Description: Interventions:  - Encourage patient to monitor pain and request assistance  - Assess pain using appropriate pain scale  - Administer analgesics based on type and severity of pain and evaluate response  - Implement non-pharmacological measures as appropriate and evaluate response  - Consider cultural and social influences on pain and pain management  - Notify physician/advanced practitioner if interventions unsuccessful or patient reports new pain  Outcome: Progressing     Problem: INFECTION - ADULT  Goal: Absence or prevention of progression during hospitalization  Description: INTERVENTIONS:  - Assess and monitor for signs and symptoms of infection  - Monitor lab/diagnostic results  - Monitor all insertion sites, i.e. indwelling lines, tubes, and drains  - Monitor endotracheal if appropriate and nasal secretions for changes in amount and color  - Bath appropriate cooling/warming therapies per order  - Administer medications as ordered  - Instruct and encourage patient and family to use good hand hygiene technique  - Identify and instruct in appropriate isolation precautions for identified infection/condition  Outcome: Progressing  Goal: Absence of fever/infection during neutropenic period  Description: INTERVENTIONS:  - Monitor WBC    Outcome: Progressing     Problem: SAFETY ADULT  Goal: Patient will remain free of falls  Description: INTERVENTIONS:  - Educate patient/family on patient safety including physical limitations  - Instruct patient to call for assistance with activity   - Consult OT/PT to assist with strengthening/mobility   - Keep Call bell within reach  - Keep bed low and locked with side rails adjusted as appropriate  - Keep care items and personal belongings within reach  - Initiate and maintain comfort rounds  - Make Fall Risk Sign visible to staff  - Offer Toileting every 2 Hours,  in advance of need  - Initiate/Maintain alarm  - Obtain necessary fall risk management equipment:   - Apply yellow socks and bracelet for high fall risk patients  - Consider moving patient to room near nurses station  Outcome: Progressing  Goal: Maintain or return to baseline ADL function  Description: INTERVENTIONS:  -  Assess patient's ability to carry out ADLs; assess patient's baseline for ADL function and identify physical deficits which impact ability to perform ADLs (bathing, care of mouth/teeth, toileting, grooming, dressing, etc.)  - Assess/evaluate cause of self-care deficits   - Assess range of motion  - Assess patient's mobility; develop plan if impaired  - Assess patient's need for assistive devices and provide as appropriate  - Encourage maximum independence but intervene and supervise when necessary  - Involve family in performance of ADLs  - Assess for home care needs following discharge   - Consider OT consult to assist with ADL evaluation and planning for discharge  - Provide patient education as appropriate  Outcome: Progressing  Goal: Maintains/Returns to pre admission functional level  Description: INTERVENTIONS:  - Perform AM-PAC 6 Click Basic Mobility/ Daily Activity assessment daily.  - Set and communicate daily mobility goal to care team and patient/family/caregiver.   - Collaborate with rehabilitation services on mobility goals if consulted  - Perform Range of Motion 3 times a day.  - Reposition patient every 2 hours.  - Dangle patient 3 times a day  - Stand patient 3 times a day  - Ambulate patient 3 times a day  - Out of bed to chair 3 times a day   - Out of bed for meals 3 times a day  - Out of bed for toileting  - Record patient progress and toleration of activity level   Outcome: Progressing     Problem: GASTROINTESTINAL - ADULT  Goal: Minimal or absence of nausea and/or vomiting  Description: INTERVENTIONS:  - Administer IV fluids if ordered to ensure adequate hydration  - Maintain  NPO status until nausea and vomiting are resolved  - Nasogastric tube if ordered  - Administer ordered antiemetic medications as needed  - Provide nonpharmacologic comfort measures as appropriate  - Advance diet as tolerated, if ordered  - Consider nutrition services referral to assist patient with adequate nutrition and appropriate food choices  Outcome: Progressing  Goal: Maintains or returns to baseline bowel function  Description: INTERVENTIONS:  - Assess bowel function  - Encourage oral fluids to ensure adequate hydration  - Administer IV fluids if ordered to ensure adequate hydration  - Administer ordered medications as needed  - Encourage mobilization and activity  - Consider nutritional services referral to assist patient with adequate nutrition and appropriate food choices  Outcome: Progressing  Goal: Maintains adequate nutritional intake  Description: INTERVENTIONS:  - Monitor percentage of each meal consumed  - Identify factors contributing to decreased intake, treat as appropriate  - Assist with meals as needed  - Monitor I&O, weight, and lab values if indicated  - Obtain nutrition services referral as needed  Outcome: Progressing

## 2024-08-21 NOTE — PROGRESS NOTES
"Progress Note - General Surgery   Sherry Cervantes 34 y.o. female MRN: [id number]  Unit/Bed#: -01 Encounter: [id number]    Assessment:  34 y.o. female with history of provoked DVT and partial thyroidectomy who presented to the ED last evening with sudden onset of upper abdominal pain.     RUQ U/S: 8/19  Cholelithiasis without sonographic findings of acute cholecystitis.     CT A/P:8/20  Gallbladder wall thickening and mild pericholecystic inflammation. No calcified gallstones are seen but acute cholecystitis cannot be excluded     WBC 4.15 (6.37)  HGB 12.7    LFT WNL  Afeb, VSS    Intractable RUQ abdominal pain  Cholelithiasis  Biliary colic vs acute cholecystitis  -patient continues with RUQ pain this AM  -fever at home, AFeb since admit  plan for OR today for laparoscopic cholecystectomy  Procedure discussed in detail with patient as well as possible risks and complications and written consent has been obtained.  Continue n.p.o.  Pain control and antiemetics as needed  Possible discharge later today based on intra-op findings      Right thyroid lobe nodule  -incidental finding on CT scan  Outpt US recommended, follow-up recommended      Subjective/Objective   Chief Complaint: pain    Subjective: Continues with right upper quadrant pain.  No improvement since admission.  No current nausea.    Objective:     Blood pressure 117/72, pulse 77, temperature 97.8 °F (36.6 °C), temperature source Oral, resp. rate 22, height 5' 6\" (1.676 m), weight 92.1 kg (203 lb), SpO2 95%, not currently breastfeeding.,Body mass index is 32.77 kg/m².      Intake/Output Summary (Last 24 hours) at 8/21/2024 1220  Last data filed at 8/21/2024 0957  Gross per 24 hour   Intake 0 ml   Output 500 ml   Net -500 ml       Invasive Devices       Peripheral Intravenous Line  Duration             Peripheral IV 08/20/24 Right Antecubital <1 day                  Physical Exam:   General appearance: alert and oriented, in no acute " distress  Head: Normocephalic, without obvious abnormality, atraumatic, sclerae anicteric, mucous membranes moist  Neck: no JVD and supple, symmetrical, trachea midline  Lungs: clear to auscultation, no wheezes or rales  Heart:   Regular rate and rhythm, S1-S2 normal, no murmur  Abdomen:   Flat, soft, tenderness over RUQ, no Avitia's sign, no rebound or guarding, few BS  Extremities:   No edema, redness or tenderness in the calves or thighs  Skin: Warm, dry; no jaundice  Nursing notes and vital signs reviewed      Lab, Imaging and other studies:I have personally reviewed pertinent lab results.  , CBC:   Lab Results   Component Value Date    WBC 4.15 (L) 08/21/2024    HGB 12.7 08/21/2024    HCT 39.2 08/21/2024    MCV 88 08/21/2024     08/21/2024    RBC 4.45 08/21/2024    MCH 28.5 08/21/2024    MCHC 32.4 08/21/2024    RDW 13.5 08/21/2024    MPV 9.4 08/21/2024    NRBC 0 08/20/2024   , CMP:   Lab Results   Component Value Date    SODIUM 137 08/21/2024    K 3.7 08/21/2024     08/21/2024    CO2 26 08/21/2024    BUN 11 08/21/2024    CREATININE 0.74 08/21/2024    CALCIUM 8.6 08/21/2024    AST 11 (L) 08/21/2024    ALT 9 08/21/2024    ALKPHOS 47 08/21/2024    EGFR 106 08/21/2024     VTE Pharmacologic Prophylaxis: Heparin  VTE Mechanical Prophylaxis: sequential compression device    Malorie Astl-Padmini

## 2024-08-21 NOTE — PLAN OF CARE
Problem: PAIN - ADULT  Goal: Verbalizes/displays adequate comfort level or baseline comfort level  Description: Interventions:  - Encourage patient to monitor pain and request assistance  - Assess pain using appropriate pain scale  - Administer analgesics based on type and severity of pain and evaluate response  - Implement non-pharmacological measures as appropriate and evaluate response  - Consider cultural and social influences on pain and pain management  - Notify physician/advanced practitioner if interventions unsuccessful or patient reports new pain  Outcome: Progressing     Problem: INFECTION - ADULT  Goal: Absence or prevention of progression during hospitalization  Description: INTERVENTIONS:  - Assess and monitor for signs and symptoms of infection  - Monitor lab/diagnostic results  - Monitor all insertion sites, i.e. indwelling lines, tubes, and drains  - Monitor endotracheal if appropriate and nasal secretions for changes in amount and color  - [place] appropriate cooling/warming therapies per order  - Administer medications as ordered  - Instruct and encourage patient and family to use good hand hygiene technique  - Identify and instruct in appropriate isolation precautions for identified infection/condition  Outcome: Progressing  Goal: Absence of fever/infection during neutropenic period  Description: INTERVENTIONS:  - Monitor WBC    Outcome: Progressing     Problem: SAFETY ADULT  Goal: Patient will remain free of falls  Description: INTERVENTIONS:  - Educate patient/family on patient safety including physical limitations  - Instruct patient to call for assistance with activity   - Consult OT/PT to assist with strengthening/mobility   - Keep Call bell within reach  - Keep bed low and locked with side rails adjusted as appropriate  - Keep care items and personal belongings within reach  - Initiate and maintain comfort rounds  - Make Fall Risk Sign visible to staff  - Offer Toileting every x Hours,  in advance of need  - Initiate/Maintain xalarm  - Obtain necessary fall risk management equipment: x  - Apply yellow socks and bracelet for high fall risk patients  - Consider moving patient to room near nurses station  Outcome: Progressing  Goal: Maintain or return to baseline ADL function  Description: INTERVENTIONS:  -  Assess patient's ability to carry out ADLs; assess patient's baseline for ADL function and identify physical deficits which impact ability to perform ADLs (bathing, care of mouth/teeth, toileting, grooming, dressing, etc.)  - Assess/evaluate cause of self-care deficits   - Assess range of motion  - Assess patient's mobility; develop plan if impaired  - Assess patient's need for assistive devices and provide as appropriate  - Encourage maximum independence but intervene and supervise when necessary  - Involve family in performance of ADLs  - Assess for home care needs following discharge   - Consider OT consult to assist with ADL evaluation and planning for discharge  - Provide patient education as appropriate  Outcome: Progressing  Goal: Maintains/Returns to pre admission functional level  Description: INTERVENTIONS:  - Perform AM-PAC 6 Click Basic Mobility/ Daily Activity assessment daily.  - Set and communicate daily mobility goal to care team and patient/family/caregiver.   - Collaborate with rehabilitation services on mobility goals if consulted  - Perform Range of Motion x times a day.  - Reposition patient every x hours.  - Dangle patient x times a day  - Stand patient x times a day  - Ambulate patient x times a day  - Out of bed to chair x times a day   - Out of bed for meals x times a day  - Out of bed for toileting  - Record patient progress and toleration of activity level   Outcome: Progressing     Problem: GASTROINTESTINAL - ADULT  Goal: Minimal or absence of nausea and/or vomiting  Description: INTERVENTIONS:  - Administer IV fluids if ordered to ensure adequate hydration  - Maintain  NPO status until nausea and vomiting are resolved  - Nasogastric tube if ordered  - Administer ordered antiemetic medications as needed  - Provide nonpharmacologic comfort measures as appropriate  - Advance diet as tolerated, if ordered  - Consider nutrition services referral to assist patient with adequate nutrition and appropriate food choices  Outcome: Progressing  Goal: Maintains or returns to baseline bowel function  Description: INTERVENTIONS:  - Assess bowel function  - Encourage oral fluids to ensure adequate hydration  - Administer IV fluids if ordered to ensure adequate hydration  - Administer ordered medications as needed  - Encourage mobilization and activity  - Consider nutritional services referral to assist patient with adequate nutrition and appropriate food choices  Outcome: Progressing  Goal: Maintains adequate nutritional intake  Description: INTERVENTIONS:  - Monitor percentage of each meal consumed  - Identify factors contributing to decreased intake, treat as appropriate  - Assist with meals as needed  - Monitor I&O, weight, and lab values if indicated  - Obtain nutrition services referral as needed  Outcome: Progressing

## 2024-08-21 NOTE — CASE MANAGEMENT
Case Management Assessment & Discharge Planning Note    Patient name Sherry Cervantes  Location /-01 MRN [id number]  : 1990 Date 2024       Current Admission Date: 2024  Current Admission Diagnosis:Cholelithiasis with acute cholecystitis without obstruction, Fever   Patient Active Problem List    Diagnosis Date Noted Date Diagnosed    Dyspnea 2024     Abnormal CT of the chest 05/15/2024     Nexplanon inserted 2023     Strep pharyngitis 2024     History of pulmonary embolus (PE) 2023     History of ectopic pregnancy 2022     Postoperative hypothyroidism 2022     H/O partial thyroidectomy 08/15/2022     Weight gain 08/15/2022     Generalized anxiety disorder 2021     Mild asthma without complication, unspecified whether persistent 2021     Depression, recurrent (HCC)      Gastroesophageal reflux disease 2020     Chronic pain of left knee 2020     Multiple thyroid nodules 2019     Paralysis of left vocal cord 2019     Chronic hoarseness 2019       LOS (days): 0  Geometric Mean LOS (GMLOS) (days):   Days to GMLOS:     OBJECTIVE:              Current admission status: Observation       Preferred Pharmacy:   [facility] [id number] [place] [facility] [place]  [address]  [address]  [facility] [place] [address]  Phone: [phone] Fax: [phone]    [facility] [place], [place] [address]  [place] [address]  Phone: [phone] Fax: [phone]    Primary Care Provider: Tatiana Hughes DO    Primary Insurance: [employer]  Secondary Insurance:     ASSESSMENT:  Active Health Care Proxies    There are no active Health Care Proxies on file.       Advance Directives  Does patient have a Health Care POA?: No  Was patient offered paperwork?: Yes (declined)  Does patient currently have a Health Care decision maker?: Yes, please see Health Care  Proxy section  Does patient have Advance Directives?: No  Was patient offered paperwork?: Yes  Primary Contact: Valarie Conn dtr         Readmission Root Cause  30 Day Readmission: No    Patient Information  Admitted from:: Home  Mental Status: Alert  During Assessment patient was accompanied by: Not accompanied during assessment  Assessment information provided by:: Patient  Primary Caregiver: Self  Support Systems: Self, Spouse/significant other, Parent  County of Residence: [place]  What city do you live in?: [place]  Home entry access options. Select all that apply.: Stairs  Number of steps to enter home.: 2  Type of Current Residence: 2 story home  Upon entering residence, is there a bedroom on the main floor (no further steps)?: No  A bedroom is located on the following floor levels of residence (select all that apply):: 2nd Floor  Upon entering residence, is there a bathroom on the main floor (no further steps)?: Yes  Number of steps to 2nd floor from main floor: One Flight  Living Arrangements: Lives w/ Spouse/significant other    Activities of Daily Living Prior to Admission  Functional Status: Independent  Completes ADLs independently?: Yes  Ambulates independently?: Yes  Does patient use assisted devices?: No  Does patient currently own DME?: No  Does patient have a history of Outpatient Therapy (PT/OT)?: No  Does the patient have a history of Short-Term Rehab?: No  Does patient have a history of HHC?: No  Does patient currently have HHC?: No         Patient Information Continued  Income Source: Employed  Does patient have prescription coverage?: Yes  Does patient receive dialysis treatments?: No  Does patient have a history of substance abuse?: No  Does patient have a history of Mental Health Diagnosis?: Yes (depression, anxiety)  Is patient receiving treatment for mental health?: Yes  Has patient received inpatient treatment related to mental health in the last 2 years?: No         Means of  Transportation  Means of Transport to Appts:: Drives Self      Social Determinants of Health (SDOH)      Flowsheet Row Most Recent Value   Housing Stability    In the last 12 months, was there a time when you were not able to pay the mortgage or rent on time? N   In the past 12 months, how many times have you moved where you were living? 0   At any time in the past 12 months, were you homeless or living in a shelter (including now)? N   Transportation Needs    In the past 12 months, has lack of transportation kept you from medical appointments or from getting medications? no   In the past 12 months, has lack of transportation kept you from meetings, work, or from getting things needed for daily living? No   Food Insecurity    Within the past 12 months, you worried that your food would run out before you got the money to buy more. Never true   Within the past 12 months, the food you bought just didn't last and you didn't have money to get more. Never true   Utilities    In the past 12 months has the electric, gas, oil, or water company threatened to shut off services in your home? No            DISCHARGE DETAILS:    Discharge planning discussed with:: Patient  Freedom of Choice: Yes  Comments - Freedom of Choice: Patient is alert and oriented  CM contacted family/caregiver?: No- see comments (Pt is alert and oriented)  Were Treatment Team discharge recommendations reviewed with patient/caregiver?: Yes  Did patient/caregiver verbalize understanding of patient care needs?: Yes  Were patient/caregiver advised of the risks associated with not following Treatment Team discharge recommendations?: Yes         Requested Home Health Care         Is the patient interested in HHC at discharge?: No    DME Referral Provided  Referral made for DME?: No              Treatment Team Recommendation: Home  Discharge Destination Plan:: Home  Transport at Discharge : Family                                      Additional Comments: CM met  with pt to discuss role of CM and any needs prior to discharge. Pt resides w/ SO in 2[place] w/ 2STE. Indp PTA. No DME. No hx STR/HH/OP PT/DA. Pt is employed and drives. Pt prefers [facility] [place]. Mom or fiance will transport at discharge.

## 2024-08-22 ENCOUNTER — TELEPHONE (OUTPATIENT)
Age: 34
End: 2024-08-22

## 2024-08-22 DIAGNOSIS — K81.9 CHOLECYSTITIS: Primary | ICD-10-CM

## 2024-08-22 LAB — BACTERIA UR CULT: NORMAL

## 2024-08-22 RX ORDER — AMOXICILLIN AND CLAVULANATE POTASSIUM 400; 57 MG/5ML; MG/5ML
800 POWDER, FOR SUSPENSION ORAL 2 TIMES DAILY
Qty: 100 ML | Refills: 0 | Status: SHIPPED | OUTPATIENT
Start: 2024-08-22 | End: 2024-08-27

## 2024-08-22 NOTE — TELEPHONE ENCOUNTER
Mario Alberto from [facility] called in regards to the Amoxicillin that was prescribed for patient upon discharge after Lap Choley surgery on 08/21/24 by Dr. Pruitt.  The patient cannot swallow pills. The pharmacy asked if you could send over a new script for liquid amoxicillin.

## 2024-08-26 ENCOUNTER — TELEPHONE (OUTPATIENT)
Age: 34
End: 2024-08-26

## 2024-08-26 PROCEDURE — 88304 TISSUE EXAM BY PATHOLOGIST: CPT | Performed by: PATHOLOGY

## 2024-08-26 NOTE — TELEPHONE ENCOUNTER
Pt called in to ask office to send her a message for her to be able to reply to.   Completed this task. Pt will be sending in photo.

## 2024-08-26 NOTE — TELEPHONE ENCOUNTER
Patient calling s/p antonette colvin with Dr lu on 8/21.  Patient has concerns about 2 of her incision sites. States they have a small area of redness around them and appear yellow and scabby. Denies drainage but states they look yellow and scabby under the glue.  Patient unsure if she has fever but states she has noticed some hot flashes.  She states she has a decreased appetite but is maintaining fluid intake and denies n/v.    Patient is taking liquid Augmentin BID x 5 days post op. Will finish tomorrow.    Requested patient submit images to [employer] for review. Patient agreeable.    Please advise  #[phone]

## 2024-08-28 NOTE — OP NOTE
CHOLECYSTECTOMY LAPAROSCOPIC  Postoperative Note  PATIENT NAME: Sherry Cervantes  : 1990  MRN: [id number]  [facility] OR ROOM 04    Surgery Date: 2024    Pre-op Dx:  Cholelithiasis with acute cholecystitis without obstruction [K80.00]    Post-Op Diagnosis Codes:     * Cholelithiasis with acute cholecystitis without obstruction [K80.00]    Procedure(s):  CHOLECYSTECTOMY LAPAROSCOPIC    Surgeons and Role:     * Cheikh Swain MD - Primary     * Latrice Palafox PA-C - Assisting    The Physician Assistant was medically necessary for surgical safety the case including suturing, retraction, and hemostasis.    Specimens:  ID Type Source Tests Collected by Time Destination   1 : gallbladder Tissue Gallbladder TISSUE EXAM Cheikh Swain MD 2024 2051        Estimated Blood Loss:   Minimal    Anesthesia Type:   General     Procedure:  The patient was seen again in the Holding Room. The risks, benefits, complications, treatment options, and expected outcomes were discussed with the patient. The possibilities of reaction to medication, pulmonary aspiration, perforation of viscus, bleeding, recurrent infection, finding a normal gallbladder, the need for additional procedures, failure to diagnose a condition, the possible need to convert to an open procedure, and creating a complication requiring transfusion or operation were discussed with the patient. The patient and/or family concurred with the proposed plan, giving informed consent. The site of surgery properly noted/marked. The patient was taken to Operating Room, identified as Sherry Cervantes  and the procedure verified as Laparoscopic Cholecystectomy with possible Intraoperative Cholangiogram. A Time Out was held after prepping and draping in sterile fashion.  The above information was confirmed.    Prior to the induction of general anesthesia, antibiotic prophylaxis was administered. General endotracheal anesthesia was then administered and tolerated  well. After the induction, the abdomen was prepped in the usual sterile fashion. The patient was positioned in the supine position, along with some reverse Trendelenburg.    Local anesthetic agent was injected into the skin near the umbilicus and an incision made. The midline fascia was incised and the open technique was used to introduce a  port under direct vision.  Pneumoperitoneum was then created with CO2 and tolerated well without any adverse changes in the patient's vital signs. Additional trocars were introduced under direct vision. All skin incisions were infiltrated with a local anesthetic agent before making the incision and placing the trocars.  The patient was placed in the head up, left side down position.     The gallbladder was identified, the fundus grasped and retracted cephalad and laterally. Adhesions were lysed bluntly and with the electrocautery or harmonic scapel  where indicated, taking care not to injure any adjacent organs or viscus. The infundibulum was grasped and retracted laterally, exposing the peritoneum overlying the triangle of [place]ot. This was then dissected anteriorily and posteriorly from the gallbladder,  and exposed in a blunt fashion or using cautery or harmonic scapel where appropriate. The cystic duct was clearly identified and  dissected circumferentially, as was the cystic artery, as the only two tubular structures leading into the gallbladder .  The critical view of the [place] of [place] was identified and opened. The posterior aspect of the gallbladder was lifted off the cystic plate, to insure that there were no posterior structres behind the gallbladder or leading into the liver.    Once this was all clearly identified, the cystic duct was then doubly ligated with surgical clips and/or Endoloop suture on the patient side and singly clipped on the gallbladder side and divided. The cystic artery was re-identified,  ligated with clips and divided as well. If necessary, the  "cystic duct was \"miked\" back of any stones felt in the cystic duct, prior to clipping the patient side of the duct.     The gallbladder was dissected from the liver bed in retrograde fashion with the electrocautery and/or harmonic scalpel where appropriate. The gallbladder was removed, via an endopouch, through the umbilical port.  The liver bed was irrigated and inspected. Hemostasis was achieved with the electrocautery. Copious irrigation was utilized and was repeatedly aspirated until clear.    Pneumoperitoneum was completely reduced after viewing removal of the trocars under direct vision. The wound was thoroughly irrigated and any fascia defect was then closed with a figure of eight suture 0-vicryl; the skin was then closed with 4-0 monocryl and a sterile dressings were applied.    Instrument, sponge, and needle counts were correct at closure and at the conclusion of the case.     This text is generated with voice recognition software. There may be translation, syntax,  or grammatical errors. If you have any questions, please contact the dictating provider.     Complications: None    Condition: Stable to PACU    SIGNATURE: Cheikh Swain MD   DATE: August 28, 2024   TIME: 9:38 AM  "

## 2024-09-05 ENCOUNTER — OFFICE VISIT (OUTPATIENT)
Dept: SURGERY | Facility: HOSPITAL | Age: 34
End: 2024-09-05

## 2024-09-05 VITALS
HEIGHT: 66 IN | HEART RATE: 57 BPM | TEMPERATURE: 97.7 F | DIASTOLIC BLOOD PRESSURE: 70 MMHG | BODY MASS INDEX: 32.21 KG/M2 | WEIGHT: 200.4 LBS | SYSTOLIC BLOOD PRESSURE: 104 MMHG

## 2024-09-05 DIAGNOSIS — Z09 POSTOP CHECK: Primary | ICD-10-CM

## 2024-09-05 PROCEDURE — 99024 POSTOP FOLLOW-UP VISIT: CPT | Performed by: SURGERY

## 2024-09-20 ENCOUNTER — TELEPHONE (OUTPATIENT)
Dept: BEHAVIORAL/MENTAL HEALTH CLINIC | Facility: CLINIC | Age: 34
End: 2024-09-20

## 2024-09-20 NOTE — TELEPHONE ENCOUNTER
One week follow up call for New Patient appointment with   Candelaria Cruz   On 11/12/2024 was made on 09/20/2024. Writer informed patient of New Patient paperwork needing to be completed 5 days prior to the appointment. Writer confirmed paperwork has been sent via [employer].    Appointment was made on: 09/13/2024

## 2024-09-25 NOTE — PROGRESS NOTES
Seen and examined doing well  Avss afebrile  Soft +BS ND NT Incisions cdi   S/p lap marii  Cont light duty for two weeks, F/u prn, path reviewed and benign

## 2024-10-10 ENCOUNTER — OFFICE VISIT (OUTPATIENT)
Dept: ENDOCRINOLOGY | Facility: HOSPITAL | Age: 34
End: 2024-10-10
Payer: COMMERCIAL

## 2024-10-10 VITALS
BODY MASS INDEX: 32.43 KG/M2 | HEIGHT: 66 IN | SYSTOLIC BLOOD PRESSURE: 122 MMHG | HEART RATE: 72 BPM | WEIGHT: 201.8 LBS | DIASTOLIC BLOOD PRESSURE: 78 MMHG

## 2024-10-10 DIAGNOSIS — E89.0 POSTOPERATIVE HYPOTHYROIDISM: Primary | ICD-10-CM

## 2024-10-10 DIAGNOSIS — E04.2 MULTIPLE THYROID NODULES: Chronic | ICD-10-CM

## 2024-10-10 DIAGNOSIS — R53.82 CHRONIC FATIGUE: ICD-10-CM

## 2024-10-10 PROCEDURE — 99214 OFFICE O/P EST MOD 30 MIN: CPT | Performed by: INTERNAL MEDICINE

## 2024-10-10 RX ORDER — CLONAZEPAM 0.5 MG/1
0.5 TABLET ORAL AS NEEDED
COMMUNITY
End: 2024-10-11 | Stop reason: SDUPTHER

## 2024-10-10 NOTE — PROGRESS NOTES
10/10/2024    Assessment & Plan      Diagnoses and all orders for this visit:    Postoperative hypothyroidism  -     T4, free; Future  -     T3, free  -     TSH, 3rd generation; Future  -     Comprehensive metabolic panel; Future  -     CBC and differential  -     T4, free  -     TSH, 3rd generation  -     Comprehensive metabolic panel    Multiple thyroid nodules  -     T4, free; Future  -     T3, free  -     TSH, 3rd generation; Future  -     Comprehensive metabolic panel; Future  -     CBC and differential  -     T4, free  -     TSH, 3rd generation  -     Comprehensive metabolic panel    Chronic fatigue  -     T4, free; Future  -     T3, free  -     TSH, 3rd generation; Future  -     Comprehensive metabolic panel; Future  -     CBC and differential  -     T4, free  -     TSH, 3rd generation  -     Comprehensive metabolic panel    Other orders  -     clonazePAM (KlonoPIN) 0.5 mg tablet; Take 0.5 mg by mouth as needed for seizures        Assessment & Plan  1. Hypothyroidism post partial thyroidectomy.  She just resumed her levothyroxine about 6 weeks ago. She is experiencing anxiety, which may be related to too much thyroid hormone or restarting the thyroid hormone after having been off it for 6 months, causing a relative hyperthyroidism without actual abnormal levels. Thyroid function studies will be checked now. She will continue the same levothyroxine 75 mcg daily. She is also going to be seeing her primary care physician regarding her anxiety.    2. Multiple thyroid nodules.  She has had thyroid nodules which were biopsied benign in the past. The last thyroid ultrasound was in December 2023 with no significant changes. These will be followed over time.    3. Fatigue.  She has significant fatigue. Thyroid function studies will be repeated, and a CBC and CMP will be checked.    She will get blood work performed now consisting of a TSH with free T4 and free T3, and CBC with CMP.    Follow-up will be determined  based on the studies      CC: Hypothyroid follow-up    History of Present Illness    HPI: Sherry Cervantes is a 34-year-old female with a history of hypothyroidism post partial thyroidectomy, here for a follow-up visit.    Multiple thyroid nodules were noted in the past.  She underwent a partial thyroidectomy of her left thyroid lobe and still has some right-sided thyroid nodules that are stable in size but are T rad 4 and T rad 5.  Her right side of her lobe is quite large.  She was seen in August 2022 due to significant weight gain.  Thyroid function tests were noted to be hypothyroid and she was started on Tirosint SOL 50 mcg daily for thyroid hormone replacement as she can not swallow pill. She has multiple nodules on her thyroid and a T RADS 3 thyroid nodule in the right lower lobe for which biopsy was recommended.  She underwent biopsy of this nodule on 11/8/2022 demonstrating Jordan class III pathology.  The Afirma testing was benign.     She has had a challenging year, including an emergency room visit due to chest pain and gallbladder issues. A clot in her lung was also a concern. Her gallbladder was removed approximately 1.5 months ago, and she is currently recovering well.    She discontinued her levothyroxine medication for about 6 or 7 months as she felt it was not beneficial. However, upon starting a new job at [employer], she resumed the medication.  She is currently taking levothyroxine 75 mcg daily.      Since then, she has been experiencing emotional instability, including episodes of crying without apparent cause and panic attacks, with four occurring in the past two weeks. She reports no general anxiety but does feel hot, which she attributes to physical activity. She does not endorse any bowel irregularities such as diarrhea or constipation. She has noticed patches on her arms and around her left eye, which she believes are due to mold exposure in her basement. She reports no significant  hair loss. During a recent panic attack, she experienced shaky hands and a racing heart, but these symptoms are not present otherwise. She reports constant fatigue, even though she can consume caffeine and still fall asleep.    Her weight has been fluctuating, dropping from 208 pounds at the time of her gallbladder surgery to 195 pounds, and currently at 200 pounds. She has lost 11 inches since May 2024. She experiences difficulty swallowing certain foods like potatoes and French fries, which she attributes to her thyroid surgery. She has consulted a gastroenterologist regarding this issue.    She has a Nexplanon implant and reports no breast leakage. She takes her levothyroxine medication in the morning on an empty stomach and waits half an hour before eating.        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    Vitamin D deficiency     Vocal cord paralysis     Following partial thyroidectomy     Past Surgical History:   Procedure Laterality Date    CHOLECYSTECTOMY LAPAROSCOPIC N/A 8/21/2024    Procedure: CHOLECYSTECTOMY LAPAROSCOPIC;  Surgeon: Cheikh Swain MD;  Location:  MAIN OR;  Service: General    DILATION AND EVACUATION  2016    KNEE ARTHROSCOPY W/ PLICA EXCISION Left 2019    THYROIDECTOMY, PARTIAL Left 2013    THYROIDECTOMY, PARTIAL Left     UPPER GASTROINTESTINAL ENDOSCOPY      US GUIDED THYROID BIOPSY  11/08/2022    WISDOM TOOTH EXTRACTION       Social History   Social History     Substance and Sexual Activity   Alcohol Use Yes    Comment: socially     Social History     Substance and Sexual Activity   Drug Use Not Currently    Comment:  no longer  using marijuana     Social History     Tobacco Use   Smoking Status Former    Current packs/day: 0.25    Average packs/day: 0.3 packs/day for 7.3 years (1.8 ttl pk-yrs)    Types: Cigarettes    Start date: 6/15/2017    Quit date: 2/9/2012    Passive exposure: Never   Smokeless Tobacco Never   Tobacco Comments     seasonal, only when it's warm out, social     Family History:   Family History   Problem Relation Age of Onset    Thyroid disease Mother         thyroid nodules    No Known Problems Father     Thyroid disease Sister     Anorexia nervosa Sister     Mental illness Sister     No Known Problems Sister     No Known Problems Brother     Celiac disease Maternal Grandmother     Hodgkin's lymphoma Maternal Grandfather     Hodgkin's lymphoma Paternal Grandfather     No Known Problems Daughter     Other Maternal Aunt         hypoglycemia    Other Maternal Aunt         brain tumor, had siezure    Colon cancer Neg Hx     Colon polyps Neg Hx     Substance Abuse Neg Hx     Uterine cancer Neg Hx        Meds/Allergies   Current Outpatient Medications   Medication Sig Dispense Refill    clonazePAM (KlonoPIN) 0.5 mg tablet Take 0.5 mg by mouth as needed for seizures      cyclobenzaprine (FLEXERIL) 5 mg tablet Take 0.5 tablets (2.5 mg total) by mouth 3 (three) times a day as needed for muscle spasms (Patient taking differently: Take 2.5 mg by mouth 3 (three) times a day as needed for muscle spasms As needed) 30 tablet 0    etonogestrel (NEXPLANON) subdermal implant 68 mg by Subdermal route Once every 3 years      Fluticasone-Salmeterol (Advair Diskus) 250-50 mcg/dose inhaler Inhale 1 puff 2 (two) times a day Rinse mouth after use. 60 blister 3    levothyroxine 75 mcg tablet TAKE 1 TABLET BY MOUTH EVERY DAY 90 tablet 1    ondansetron (ZOFRAN-ODT) 4 mg disintegrating tablet Take 1 tablet (4 mg total) by mouth every 8 (eight) hours as needed for nausea or vomiting (Patient not taking: Reported on 9/5/2024) 20 tablet 0     "oxyCODONE-acetaminophen (Percocet) 5-325 mg per tablet Take 1 tablet by mouth every 6 (six) hours as needed for severe pain Max Daily Amount: 4 tablets (Patient not taking: Reported on 9/5/2024) 20 tablet 0     No current facility-administered medications for this visit.     No Known Allergies    Objective   Vitals: Blood pressure 122/78, pulse 72, height 5' 6\" (1.676 m), weight 91.5 kg (201 lb 12.8 oz), not currently breastfeeding.  Invasive Devices       Peripheral Intravenous Line  Duration             Peripheral IV 08/20/24 Right Antecubital 50 days                    Physical Exam    Eyes show no lid lag, stare, proptosis or periorbital edema.  Anterior neck scar is healed. No palpable thyroid tissue on the left. Right-sided tissue is palpable and irregular in feel, but no discrete nodules are palpable. No lymphadenopathy.  Lungs are clear to auscultation.  Heart has a regular rate and rhythm. No murmurs.  No tremor in the outstretched hands. Patellar deep tendon reflexes are normal.      The history was obtained from the review of the chart and from the patient.    Lab Results:      I have no recent blood work.     Lab Results   Component Value Date    CREATININE 0.74 08/21/2024    CREATININE 0.85 08/20/2024    CREATININE 0.65 08/19/2024    BUN 11 08/21/2024    K 3.7 08/21/2024     08/21/2024    CO2 26 08/21/2024     eGFR   Date Value Ref Range Status   08/21/2024 106 ml/min/1.73sq m Final         Lab Results   Component Value Date    HDL 70 03/30/2021    TRIG 85 03/30/2021    CHOLHDL 2.3 03/30/2021       Lab Results   Component Value Date    ALT 9 08/21/2024    AST 11 (L) 08/21/2024    ALKPHOS 47 08/21/2024       Lab Results   Component Value Date    TSH 0.804 10/28/2023    FREET4 1.10 10/28/2023             Future Appointments   Date Time Provider Department Center   10/11/2024  8:40 AM Tatiana Hughes DO [facility]   11/12/2024 10:00 AM Candelaria COLLADO [facility]   11/22/2024 12:00 " [facility]

## 2024-10-10 NOTE — PATIENT INSTRUCTIONS
Let's check blood work now.     For now, continue he same levothyroxine 75 mcg daily.     Follow up to be determined.

## 2024-10-11 ENCOUNTER — OFFICE VISIT (OUTPATIENT)
Dept: FAMILY MEDICINE CLINIC | Facility: HOSPITAL | Age: 34
End: 2024-10-11
Payer: COMMERCIAL

## 2024-10-11 ENCOUNTER — TELEPHONE (OUTPATIENT)
Age: 34
End: 2024-10-11

## 2024-10-11 VITALS
DIASTOLIC BLOOD PRESSURE: 74 MMHG | BODY MASS INDEX: 32.37 KG/M2 | OXYGEN SATURATION: 98 % | WEIGHT: 201.4 LBS | HEIGHT: 66 IN | SYSTOLIC BLOOD PRESSURE: 110 MMHG | HEART RATE: 90 BPM

## 2024-10-11 DIAGNOSIS — F41.0 PANIC ATTACK: ICD-10-CM

## 2024-10-11 DIAGNOSIS — F41.1 GENERALIZED ANXIETY DISORDER: Primary | ICD-10-CM

## 2024-10-11 DIAGNOSIS — Z86.711 HISTORY OF PULMONARY EMBOLUS (PE): ICD-10-CM

## 2024-10-11 DIAGNOSIS — R53.82 CHRONIC FATIGUE: ICD-10-CM

## 2024-10-11 DIAGNOSIS — F33.9 DEPRESSION, RECURRENT (HCC): ICD-10-CM

## 2024-10-11 DIAGNOSIS — M54.2 NECK PAIN: ICD-10-CM

## 2024-10-11 PROCEDURE — 99214 OFFICE O/P EST MOD 30 MIN: CPT | Performed by: STUDENT IN AN ORGANIZED HEALTH CARE EDUCATION/TRAINING PROGRAM

## 2024-10-11 RX ORDER — CLONAZEPAM 0.5 MG/1
0.5 TABLET ORAL AS NEEDED
Qty: 30 TABLET | Refills: 0 | Status: SHIPPED | OUTPATIENT
Start: 2024-10-11

## 2024-10-11 NOTE — PROGRESS NOTES
[facility]   Tatiana Hughes DO    Assessment/Plan:      Diagnosis ICD-10-CM Associated Orders   1. Generalized anxiety disorder  F41.1       2. Depression, recurrent (HCC)  F33.9       3. Panic attack  F41.0       4. Chronic fatigue  R53.82 Vitamin B12     Vitamin D 25 hydroxy     Fe+TIBC+Aman     Vitamin B12     Vitamin D 25 hydroxy     Fe+TIBC+Aman      5. History of pulmonary embolus (PE)  Z86.711 Vitamin B12     Fe+TIBC+Aman     Vitamin B12     Fe+TIBC+Aman      6. Neck pain  M54.2 Ambulatory Referral to Chiropractic        Have labs from endo & myself completed, will assess concerns for thyroid being the wrong dose. Consider possibly resuming a daily JEWELS/ MDD medication like paxil.   Can be emotional simply due to life with a new child, whom she lvoes desperately.   Starting to see [facility] therapist next month.   Ref placed for chiro.   Return if symptoms worsen or fail to improve - scheduled in Nov.   Patient may call or return to office with any questions or concerns.   _____________________________________________________________________  Subjective:     Patient ID: Sherry Cervantes is a 34 y.o. female.  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   Working 3-11:30 5x a week.   A lot to remember.     Started getting panic attacks, all of a sudden.     Responds very anxious about changes in plans.   Can be crying for any reason.   Very short tempered.   Taking klonopin prn, does help her sleep & shut down racing thoughts/panic.   No SI or HI.   Zoloft didn't help her. Paxil a little bit more.     On levothyroxine in the past during pregnancy. Has been off for 7 months, then restarted 1 month ago.     No periods since giving birth, on nexplanon since Dec, 10 months.   Will need nexplanon put before next child.        The following portions of the patient's history were reviewed and updated as appropriate:  "allergies, current medications, past medical history, and problem list.    Review of Systems   Constitutional:  Negative for chills and fever.   Respiratory:  Negative for cough and shortness of breath.    Cardiovascular:  Negative for chest pain and palpitations.   Neurological:  Negative for dizziness, light-headedness and headaches.       Objective:      Vitals:    10/11/24 0854   BP: 110/74   Pulse: 90   SpO2: 98%      Physical Exam  Vitals and nursing note reviewed.   Constitutional:       General: She is not in acute distress.     Appearance: Normal appearance. She is obese. She is not ill-appearing.   HENT:      Head: Normocephalic and atraumatic.   Eyes:      General: No scleral icterus.        Right eye: No discharge.         Left eye: No discharge.   Neck:      Comments: No thyroid nodules or thyromegaly.  Cardiovascular:      Rate and Rhythm: Normal rate and regular rhythm.      Pulses: Normal pulses.      Heart sounds: Normal heart sounds. No murmur heard.  Pulmonary:      Effort: Pulmonary effort is normal. No respiratory distress.      Breath sounds: Normal breath sounds. No stridor. No wheezing.   Musculoskeletal:      Cervical back: Normal range of motion and neck supple. Tenderness present. No rigidity.      Right lower leg: No edema.      Left lower leg: No edema.   Lymphadenopathy:      Cervical: No cervical adenopathy.   Neurological:      Mental Status: She is alert and oriented to person, place, and time.      Gait: Gait normal.   Psychiatric:         Mood and Affect: Mood normal.         Behavior: Behavior normal.         Thought Content: Thought content normal.         Judgment: Judgment normal.       Portions of the record may have been created with voice recognition software. Occasional wrong word or \"sound alike\" substitutions may have occurred due to the inherent limitations of voice recognition software. Please review the chart carefully and recognize, using context, where " substitutions/typographical errors may have occurred.

## 2024-10-11 NOTE — TELEPHONE ENCOUNTER
Patient called in regarding clonazePAM (KlonoPIN) 0.5 mg tablet . She said that she was prescribed medication by Samira Cheng PA-C in the [place] [id number] office. Pharmacy preference is [facility] [id number] in [facility]. She said she has one pill left.

## 2024-10-11 NOTE — TELEPHONE ENCOUNTER
Pt called in regarding her Klonopin 0.5mg stating she talked to Dr Hughes, DO today about needing a refill of this. When reviewing chart it comes up as a historical provider, would need a new prescription for this medication to be filled. Please review and advise.

## 2024-10-12 LAB
25(OH)D3+25(OH)D2 SERPL-MCNC: 28.6 NG/ML (ref 30–100)
ALBUMIN SERPL-MCNC: 4.5 G/DL (ref 3.9–4.9)
ALP SERPL-CCNC: 76 IU/L (ref 44–121)
ALT SERPL-CCNC: 10 IU/L (ref 0–32)
AST SERPL-CCNC: 13 IU/L (ref 0–40)
BASOPHILS # BLD AUTO: 0 X10E3/UL (ref 0–0.2)
BASOPHILS NFR BLD AUTO: 0 %
BILIRUB SERPL-MCNC: 0.4 MG/DL (ref 0–1.2)
BUN SERPL-MCNC: 11 MG/DL (ref 6–20)
BUN/CREAT SERPL: 14 (ref 9–23)
CALCIUM SERPL-MCNC: 9.8 MG/DL (ref 8.7–10.2)
CHLORIDE SERPL-SCNC: 102 MMOL/L (ref 96–106)
CO2 SERPL-SCNC: 24 MMOL/L (ref 20–29)
CREAT SERPL-MCNC: 0.79 MG/DL (ref 0.57–1)
EGFR: 101 ML/MIN/1.73
EOSINOPHIL # BLD AUTO: 0.2 X10E3/UL (ref 0–0.4)
EOSINOPHIL NFR BLD AUTO: 3 %
ERYTHROCYTE [DISTWIDTH] IN BLOOD BY AUTOMATED COUNT: 13.1 % (ref 11.7–15.4)
FERRITIN SERPL-MCNC: 58 NG/ML (ref 15–150)
GLOBULIN SER-MCNC: 3 G/DL (ref 1.5–4.5)
GLUCOSE SERPL-MCNC: 85 MG/DL (ref 70–99)
HCT VFR BLD AUTO: 41.8 % (ref 34–46.6)
HGB BLD-MCNC: 13.5 G/DL (ref 11.1–15.9)
IMM GRANULOCYTES # BLD: 0 X10E3/UL (ref 0–0.1)
IMM GRANULOCYTES NFR BLD: 0 %
IRON SATN MFR SERPL: 12 % (ref 15–55)
IRON SERPL-MCNC: 41 UG/DL (ref 27–159)
LYMPHOCYTES # BLD AUTO: 3 X10E3/UL (ref 0.7–3.1)
LYMPHOCYTES NFR BLD AUTO: 42 %
MCH RBC QN AUTO: 28.7 PG (ref 26.6–33)
MCHC RBC AUTO-ENTMCNC: 32.3 G/DL (ref 31.5–35.7)
MCV RBC AUTO: 89 FL (ref 79–97)
MONOCYTES # BLD AUTO: 0.6 X10E3/UL (ref 0.1–0.9)
MONOCYTES NFR BLD AUTO: 9 %
NEUTROPHILS # BLD AUTO: 3.2 X10E3/UL (ref 1.4–7)
NEUTROPHILS NFR BLD AUTO: 46 %
PLATELET # BLD AUTO: 281 X10E3/UL (ref 150–450)
POTASSIUM SERPL-SCNC: 4.3 MMOL/L (ref 3.5–5.2)
PROT SERPL-MCNC: 7.5 G/DL (ref 6–8.5)
RBC # BLD AUTO: 4.7 X10E6/UL (ref 3.77–5.28)
SODIUM SERPL-SCNC: 139 MMOL/L (ref 134–144)
T3FREE SERPL-MCNC: 3.3 PG/ML (ref 2–4.4)
T4 FREE SERPL-MCNC: 1.26 NG/DL (ref 0.82–1.77)
TIBC SERPL-MCNC: 337 UG/DL (ref 250–450)
TSH SERPL DL<=0.005 MIU/L-ACNC: 1.46 UIU/ML (ref 0.45–4.5)
UIBC SERPL-MCNC: 296 UG/DL (ref 131–425)
VIT B12 SERPL-MCNC: 494 PG/ML (ref 232–1245)
WBC # BLD AUTO: 7.1 X10E3/UL (ref 3.4–10.8)

## 2024-10-30 ENCOUNTER — TELEPHONE (OUTPATIENT)
Age: 34
End: 2024-10-30

## 2024-10-30 NOTE — TELEPHONE ENCOUNTER
Patient had to take insurance through her job and she is only allowed to be seen at [place] provider offices. She will only be with them for about a year and then would like to come back to Dr Hughes as her PCP.    If there are any issues with this please let patient know.     She will also be stopping by to drop off a records release form to get her records so that [place] has them.

## 2024-11-01 DIAGNOSIS — E55.9 VITAMIN D DEFICIENCY: Primary | ICD-10-CM

## 2024-11-01 RX ORDER — ERGOCALCIFEROL 1.25 MG/1
50000 CAPSULE, LIQUID FILLED ORAL WEEKLY
Qty: 12 CAPSULE | Refills: 0 | Status: SHIPPED | OUTPATIENT
Start: 2024-11-01

## 2024-11-05 ENCOUNTER — TELEPHONE (OUTPATIENT)
Dept: PSYCHIATRY | Facility: CLINIC | Age: 34
End: 2024-11-05

## 2024-12-05 NOTE — TELEPHONE ENCOUNTER
Patient call about getting a copy of the immunization record to an employer. Please fax to [facility] at [phone]. Thank you    
faxed  
normal...

## 2025-01-14 ENCOUNTER — OFFICE VISIT (OUTPATIENT)
Dept: OBGYN CLINIC | Facility: CLINIC | Age: 35
End: 2025-01-14
Payer: COMMERCIAL

## 2025-01-14 VITALS — SYSTOLIC BLOOD PRESSURE: 102 MMHG | DIASTOLIC BLOOD PRESSURE: 60 MMHG | BODY MASS INDEX: 32.6 KG/M2 | WEIGHT: 202 LBS

## 2025-01-14 DIAGNOSIS — N92.6 IRREGULAR MENSES: Primary | ICD-10-CM

## 2025-01-14 DIAGNOSIS — R53.82 CHRONIC FATIGUE: ICD-10-CM

## 2025-01-14 LAB — SL AMB POCT URINE HCG: NORMAL

## 2025-01-14 PROCEDURE — 81025 URINE PREGNANCY TEST: CPT | Performed by: OBSTETRICS & GYNECOLOGY

## 2025-01-14 PROCEDURE — 99214 OFFICE O/P EST MOD 30 MIN: CPT | Performed by: OBSTETRICS & GYNECOLOGY

## 2025-01-14 NOTE — PROGRESS NOTES
PROBLEM GYNECOLOGICAL VISIT    Sherry Cervantes is a 35 y.o. 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   • Vitamin D deficiency    • Vocal cord paralysis     Following partial thyroidectomy     Past Surgical History:   Procedure Laterality Date   • CHOLECYSTECTOMY LAPAROSCOPIC N/A 2024    Procedure: CHOLECYSTECTOMY LAPAROSCOPIC;  Surgeon: Cheikh Swain MD;  Location:  MAIN OR;  Service: General   • DILATION AND EVACUATION     • KNEE ARTHROSCOPY W/ PLICA EXCISION Left    • THYROIDECTOMY, PARTIAL Left    • THYROIDECTOMY, PARTIAL Left    • UPPER GASTROINTESTINAL ENDOSCOPY     • US GUIDED THYROID BIOPSY  2022   • WISDOM TOOTH EXTRACTION       OB History        3    Para   1    Term   1            AB   2    Living   1       SAB        IAB   1    Ectopic   1    Multiple   0    Live Births   1               Social History     Tobacco Use   • Smoking status: Former     Current packs/day: 0.25     Average packs/day: 0.3 packs/day for 7.6 years (1.9 ttl pk-yrs)     Types: Cigarettes     Start date: 6/15/2017     Quit date: 2012     Passive exposure: Never   • Smokeless tobacco: Never   • Tobacco comments:      seasonal, only when it's warm out, social   Vaping Use   • Vaping status: Former   Substance Use Topics   • Alcohol use: Yes     Comment: socially   • Drug use: Not Currently     Comment:  no longer using marijuana       Current Outpatient Medications   Medication Instructions    • clonazePAM (KLONOPIN) 0.5 mg, Oral, As needed   • cyclobenzaprine (FLEXERIL) 2.5 mg, Oral, 3 times daily PRN   • ergocalciferol (VITAMIN D2) 50,000 Units, Oral, Weekly   • etonogestrel (NEXPLANON) 68 mg, Once every 3 years -  Implant and IUD   • Fluticasone-Salmeterol (Advair Diskus) 250-50 mcg/dose inhaler 1 puff, Inhalation, 2 times daily, Rinse mouth after use.   • levothyroxine 75 mcg, Oral, Daily       History of Present Illness:    Pt w  Nexaplon for > 1 year  11/2024  menses for 3 d  prior no  bleeding.  Neg preg  test .  +  no libido  .   + dry skin and hair .  + 3 d ago started w menstruallike cramps and bleeding.      Review of Systems:  Review of Systems   Constitutional:  Positive for fatigue. Negative for activity change, appetite change and unexpected weight change.   Genitourinary:  Positive for menstrual problem and vaginal bleeding. Negative for pelvic pain, vaginal discharge and vaginal pain.   Musculoskeletal: Negative.    Allergic/Immunologic: Negative.    Neurological: Negative.    Hematological: Negative.    Psychiatric/Behavioral: Negative.         Physical Exam:  /60   Wt 91.6 kg (202 lb)   LMP 01/12/2025   BMI 32.60 kg/m²   Physical Exam  Constitutional:       Appearance: Normal appearance.   Genitourinary:      Vulva, bladder, rectum and urethral meatus normal.      No lesions in the vagina.      Right Labia: No rash, tenderness, lesions or skin changes.     Left Labia: No tenderness, lesions, skin changes or rash.     No inguinal adenopathy present in the right or left side.     Vaginal bleeding present.      No vaginal discharge, erythema, tenderness, ulceration or granulation tissue.      No vaginal prolapse present.     No vaginal atrophy present.       Right Adnexa: not tender, not full and no mass present.     Left Adnexa: not tender, not full and no mass present.     No cervical motion tenderness or polyp.      Uterus is not enlarged or tender.      No urethral prolapse,  tenderness, mass or discharge present.      Pelvic Floor: Levator muscle strength is 4/5.     Pelvic floor neuro is intact.     Pelvic exam was performed with patient in the lithotomy position.   Abdominal:      Palpations: Abdomen is soft.      Hernia: There is no hernia in the left inguinal area or right inguinal area.   Musculoskeletal:         General: Normal range of motion.   Lymphadenopathy:      Lower Body: No right inguinal adenopathy. No left inguinal adenopathy.   Neurological:      Mental Status: She is alert and oriented to person, place, and time.   Skin:     General: Skin is warm and dry.      Comments: Nexaplon palpable    Psychiatric:         Mood and Affect: Mood normal.         Behavior: Behavior normal.         Thought Content: Thought content normal.         Judgment: Judgment normal.   Vitals and nursing note reviewed.         Neg urine preg test      Assessment:  Plan    1. Probable menses on nexaplo call if bleeding > 10 days.  Tsh , CBC,  can  schedule for  Nexaplon removal.  Fu after labwork  +stress works at Crisis   I have spent a total time of 35 minutes in caring for this patient on the day of the visit/encounter including Prognosis, Risks and benefits of tx options, Instructions for management, Patient and family education, Importance of tx compliance, Risk factor reductions, Documenting in the medical record, Reviewing / ordering tests, medicine, procedures  , and Obtaining or reviewing history  .         Reviewed with patient that test results are available in MyChart immediately, but that they will not necessarily be reviewed by me immediately.  Explained that I will review results at my earliest opportunity and contact patient appropriately.

## 2025-01-15 ENCOUNTER — RESULTS FOLLOW-UP (OUTPATIENT)
Dept: OBGYN CLINIC | Facility: CLINIC | Age: 35
End: 2025-01-15

## 2025-01-23 ENCOUNTER — TELEPHONE (OUTPATIENT)
Age: 35
End: 2025-01-23

## 2025-01-23 NOTE — TELEPHONE ENCOUNTER
Patient was seen in the office 1/13.  She had been bleeding for 3 days at that time. She was advised to come back if bleeding is longer than 10 days.  She reports she is still bleeding.  Appt made for tomorrow.  No further questions or concerns at this time.

## 2025-01-24 ENCOUNTER — OFFICE VISIT (OUTPATIENT)
Dept: OBGYN CLINIC | Facility: CLINIC | Age: 35
End: 2025-01-24
Payer: COMMERCIAL

## 2025-01-24 VITALS
DIASTOLIC BLOOD PRESSURE: 70 MMHG | HEIGHT: 66 IN | WEIGHT: 196 LBS | SYSTOLIC BLOOD PRESSURE: 112 MMHG | BODY MASS INDEX: 31.5 KG/M2

## 2025-01-24 DIAGNOSIS — Z97.5 BREAKTHROUGH BLEEDING ON NEXPLANON: Primary | ICD-10-CM

## 2025-01-24 DIAGNOSIS — N92.1 BREAKTHROUGH BLEEDING ON NEXPLANON: Primary | ICD-10-CM

## 2025-01-24 DIAGNOSIS — Z80.41 FAMILY HISTORY OF OVARIAN CANCER: ICD-10-CM

## 2025-01-24 PROCEDURE — 99213 OFFICE O/P EST LOW 20 MIN: CPT | Performed by: PHYSICIAN ASSISTANT

## 2025-01-24 NOTE — PROGRESS NOTES
[facility] [address], [address], [place] [address]    ASSESSMENT/PLAN:     1. Breakthrough bleeding on Nexplanon  Assessment & Plan:  Reviewed common bleeding patterns on Nexplanon. Very common to have irregular bleeding and prolonged episodes of bleeding while on Nexplanon. Recent labs normal. Reviewed options including removing Nexplanon and considering a different progesterone only method such as levonorgestrel IUD or progesterone only pill vs continuing to monitor. Will plan pelvic ultrasound to evaluate bleeding as well as family hx of ovarian cancer. If normal patient will decide if she would like to continue to monitor or have removed and discuss other options.   Orders:  -     US pelvis complete w transvaginal; Future; Expected date: 2025  2. Family history of ovarian cancer  -     US pelvis complete w transvaginal; Future; Expected date: 2025      CC:  irregular bleeding on Nexplanon.     HPI: Sherry Cervantes is a 35 y.o.  who presents for irregular bleeding on Nexplanon. Had Nexplanon inserted 2023.   Sometimes just spotting, other times, sometimes old brown blood other times brighter. Some cramping.   Currently bleeding for 12+ days.  Denies bowel or bladder issues. No vaginal discharge, odors, itching, fever, chills.   Hx significant for PE following knee arthroscopy.     Recent labs done 2025 showed normal TFTS and Hgb: 12.6.     Family hx significant for ovarian cancer.     ROS: Negative except as noted in HPI    Patient's last menstrual period was 2025.       She  reports being sexually active and has had partner(s) who are male. She reports using the following method of birth control/protection: Implant.       The following portions of the patient's history were reviewed and updated as appropriate:   Past Medical History:   Diagnosis Date    Asthma     Chronic knee pain     Depression     Disease of thyroid gland     Dysphagia 2020     History of pulmonary embolus (PE) 2017    Result of Knee injury    Left upper quadrant pain 03/02/2020    Menorrhagia     Multiple thyroid nodules     Psychiatric disorder     Saphenous nerve neuropathy, 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    Vitamin D deficiency     Vocal cord paralysis     Following partial thyroidectomy     Past Surgical History:   Procedure Laterality Date    CHOLECYSTECTOMY LAPAROSCOPIC N/A 8/21/2024    Procedure: CHOLECYSTECTOMY LAPAROSCOPIC;  Surgeon: Cheikh Swain MD;  Location:  MAIN OR;  Service: General    DILATION AND EVACUATION  2016    KNEE ARTHROSCOPY W/ PLICA EXCISION Left 2019    THYROIDECTOMY, PARTIAL Left 2013    THYROIDECTOMY, PARTIAL Left     UPPER GASTROINTESTINAL ENDOSCOPY      US GUIDED THYROID BIOPSY  11/08/2022    WISDOM TOOTH EXTRACTION       Family History   Problem Relation Age of Onset    Thyroid disease Mother         thyroid nodules    No Known Problems Father     Thyroid disease Sister     Anorexia nervosa Sister     Mental illness Sister     No Known Problems Sister     No Known Problems Brother     Celiac disease Maternal Grandmother     Hodgkin's lymphoma Maternal Grandfather     Hodgkin's lymphoma Paternal Grandfather     No Known Problems Daughter     Other Maternal Aunt         hypoglycemia    Other Maternal Aunt         brain tumor, had siezure    Colon cancer Neg Hx     Colon polyps Neg Hx     Substance Abuse Neg Hx     Uterine cancer Neg Hx      Social History     Socioeconomic History    Marital status: Single     Spouse name: Not on file    Number of children: Not on file    Years of education: Not on file    Highest education level: Not on file   Occupational History    Not on file   Tobacco Use    Smoking status: Former     Current packs/day: 0.25     Average packs/day: 0.3 packs/day for 7.6 years (1.9 ttl pk-yrs)     Types: Cigarettes     Start date: 6/15/2017     Quit date: 2/9/2012     Passive exposure: Never    Smokeless tobacco: Never    Tobacco  comments:      seasonal, only when it's warm out, social   Vaping Use    Vaping status: Former   Substance and Sexual Activity    Alcohol use: Yes     Comment: socially    Drug use: Not Currently     Comment:  no longer using marijuana    Sexual activity: Yes     Partners: Male     Birth control/protection: Implant     Comment: no new partner in past year   Other Topics Concern    Not on file   Social History Narrative    Single    1 c/caffeine /day    Wears seatbelt    Regular exercise    No cigarette smoke exposure home/work    Employed    Lives with roommate    No living will in place     Social Drivers of Health     Financial Resource Strain: Not on file   Food Insecurity: No Food Insecurity (8/21/2024)    Nursing - Inadequate Food Risk Classification     Worried About Running Out of Food in the Last Year: Never true     Ran Out of Food in the Last Year: Never true     Ran Out of Food in the Last Year: Not on file   Transportation Needs: No Transportation Needs (8/21/2024)    PRAPARE - Transportation     Lack of Transportation (Medical): No     Lack of Transportation (Non-Medical): No   Physical Activity: Sufficiently Active (6/2/2021)    Exercise Vital Sign     Days of Exercise per Week: 4 days     Minutes of Exercise per Session: 60 min   Stress: Stress Concern Present (6/2/2021)    [employer] - Occupational Stress Questionnaire     Feeling of Stress : Rather much   Social Connections: Unknown (6/18/2024)    Received from [employer]    [employer]     How often do you feel lonely or isolated from those around you? (Adult - for ages 18 years and over): Not on file   Intimate Partner Violence: Not on file   Housing Stability: Low Risk  (8/21/2024)    Housing Stability Vital Sign     Unable to Pay for Housing in the Last Year: No     Number of Times Moved in the Last Year: 0     Homeless in the Last Year: No     Outpatient Medications Marked as Taking for the 1/24/25 encounter  "(Office Visit) with Zeina Villalobos PA-C   Medication    clonazePAM (KlonoPIN) 0.5 mg tablet    cyclobenzaprine (FLEXERIL) 5 mg tablet    ergocalciferol (VITAMIN D2) 50,000 units    etonogestrel (NEXPLANON) subdermal implant    levothyroxine 75 mcg tablet    Prenatal MV-Min-Fe Fum-FA-DHA (PRENATAL 1 PO)     No Known Allergies        Objective:  /70 (BP Location: Left arm, Patient Position: Sitting, Cuff Size: Standard)   Ht 5' 6\" (1.676 m)   Wt 88.9 kg (196 lb)   LMP 01/12/2025   BMI 31.64 kg/m²        Chaperone present? Yes: Nilda [employer] Student    Physical Exam  Constitutional:       Appearance: Normal appearance. She is well-developed.   Genitourinary:      Vulva and bladder normal.      No lesions in the vagina.      Right Labia: No rash, tenderness, lesions or skin changes.     Left Labia: No tenderness, lesions, skin changes or rash.     No labial fusion noted.      No inguinal adenopathy present in the right or left side.     No vaginal discharge, erythema, tenderness or bleeding.        Right Adnexa: not tender, not full and no mass present.     Left Adnexa: not tender, not full and no mass present.     No cervical motion tenderness, discharge or lesion.      Uterus is not enlarged, tender or irregular.      No uterine mass detected.     No urethral prolapse, tenderness or mass present.      Bladder is not tender.    HENT:      Head: Normocephalic and atraumatic.   Neck:      Thyroid: No thyromegaly.   Cardiovascular:      Rate and Rhythm: Normal rate and regular rhythm.      Heart sounds: Normal heart sounds. No murmur heard.     No friction rub. No gallop.   Pulmonary:      Effort: Pulmonary effort is normal. No respiratory distress.      Breath sounds: Normal breath sounds. No wheezing.   Abdominal:      General: There is no distension.      Palpations: Abdomen is soft. There is no mass.      Tenderness: There is no abdominal tenderness. There is no guarding or rebound.      Hernia: No " hernia is present.   Lymphadenopathy:      Cervical: No cervical adenopathy.      Upper Body:      Right upper body: No pectoral adenopathy.      Left upper body: No pectoral adenopathy.      Lower Body: No right inguinal adenopathy. No left inguinal adenopathy.   Neurological:      Mental Status: She is alert and oriented to person, place, and time.   Skin:     General: Skin is warm and dry.      Comments: Nexplanon in place in left arm.   Psychiatric:         Behavior: Behavior normal.             Zeina Villalobos PA-C  2/4/2025 3:26 PM

## 2025-01-24 NOTE — PATIENT INSTRUCTIONS
- We discussed that there are significant  risks associated with untreated and undertreated depression and other  mental illnesses in pregnancy.    - The [facility] is an additional resource for screening and treatment of depression.  760-030-SOKF (9)  - Resources for a mental health crisis include the emergency department and the National Suicide Prevention Hotline at 5-314-703-XWLK.   - We discussed that Sertraline  (Zoloft) is associated with the lowest risk of PPHN.  PPHN is a rare condition resulting in failure of normal relaxation of fetal vascular bed during the transition period after birth resulting in increased resistance in the pulmonary blood vessels; it  occurs in 2-6 cases per 1000 live births. Probability of PPHN as follows,  in order of increasing risk of PPHN: sertraline (Zoloft), escitalopram (Lexapro), Paroxetine (Paxil; discouraged), Citalopram (Celexa), followed by Fluoxetine (Prozac).   The risk of PPHN also exists in the general population not on SSRIs.   - The use of selective serotonin reuptake inhibitors (SSRIs) in pregnancy is also associated with a risk of transient  withdrawal, which is typically mild, self-limited, and does not usually require  intensive care.    - We discussed that SSRIs are compatible with breastfeeding (ACOG PB #92)  - If desired, neonatology consultation is available in the third trimester to further discuss these  complications.

## 2025-01-24 NOTE — TELEPHONE ENCOUNTER
Pt calling back after receiving voicemail advising that labs are normal and if looking to discuss Nexplanon concerns with bleeding and possible removal should schedule with provider that is proficient in Nexplanon removal/insertion. Pt not necessarily interested in removal of Nexplanon, however, would like to discuss with a provider regarding bleeding, even though her lab work returned normal. States prolonged bleeding is abnormal for her. Has not had any bleeding like this since before getting pregnancy. RN rescheduled patient with CRISSY Pastrana, for further discussion. Advised if pt decides after discussion to remove nexplanon, can schedule a removal appointment with CRISSY. Pt agreeable to plan. No further questions.

## 2025-02-04 PROBLEM — N92.1 BREAKTHROUGH BLEEDING ON NEXPLANON: Status: ACTIVE | Noted: 2025-02-04

## 2025-02-04 PROBLEM — Z97.5 BREAKTHROUGH BLEEDING ON NEXPLANON: Status: ACTIVE | Noted: 2025-02-04

## 2025-02-04 NOTE — ASSESSMENT & PLAN NOTE
Reviewed common bleeding patterns on Nexplanon. Very common to have irregular bleeding and prolonged episodes of bleeding while on Nexplanon. Recent labs normal. Reviewed options including removing Nexplanon and considering a different progesterone only method such as levonorgestrel IUD or progesterone only pill vs continuing to monitor. Will plan pelvic ultrasound to evaluate bleeding as well as family hx of ovarian cancer. If normal patient will decide if she would like to continue to monitor or have removed and discuss other options.

## 2025-02-19 NOTE — TELEPHONE ENCOUNTER
LVM with patient informing that medical assistance has lapsed. Caller gave option of self-pay and informed patient that they can call the access center for more information regarding self-pay if interested. Caller also requested that if patient has a new insurance to call and provide the new insurance information   
Patient called in to cancel their new patient talk therapy appointment due to insurance and losing their past insurance and knowing [facility]'s is not in network with their new one.    Writer stated the appointment would be cancelled.  Patient stated they could be removed from the wait list for future appointments because they will be looking elsewhere at in network facilities.  
[facility]

## 2025-06-20 ENCOUNTER — NURSE TRIAGE (OUTPATIENT)
Age: 35
End: 2025-06-20

## 2025-06-20 NOTE — TELEPHONE ENCOUNTER
"REASON FOR CONVERSATION: Breast Problem    SYMPTOMS: Bump on left nipple that is a white-brian color, milky nipple discharge and pain that started 5-6 months ago.  Denies any lumps, redness, fevers or other symptoms.    OTHER HEALTH INFORMATION: Has not breast fed in 1.5 years.    PROTOCOL DISPOSITION: See Within 2 Weeks in Office    CARE ADVICE PROVIDED: Call back if you develop any redness or fevers.  Appointment made for next Friday.  She verbalized understanding, no further questions or concerns at this time.       PRACTICE FOLLOW-UP: None          Reason for Disposition   Nipple discharge and not bloody (e.g., clear, white, yellow, brown, green)    Answer Assessment - Initial Assessment Questions  1. SYMPTOM: \"What's the main symptom you're concerned about?\"  (e.g., lump, nipple discharge, pain, rash )      Smaller than an eraser on nipple, white-brian  2. LOCATION: \"Where is this located?\"      left  3. ONSET: \"When did this  start?\"      5-6 months ago  4. PRIOR HISTORY: \"Do you have any history of prior problems with your breasts?\" (e.g., breast cancer, breast implant, fibrocystic breast disease)      denies  5. CAUSE: \"What do you think is causing this symptom?\"      unsure  6. OTHER SYMPTOMS: \"Do you have any other symptoms?\" (e.g., fever, breast pain, nipple discharge, redness or rash)      Milky nipple discharge.  7. PREGNANCY-BREASTFEEDING: \"Is there any chance you are pregnant?\" \"When was your last menstrual period?\" \"Are you breastfeeding?\"      Has not breast fed since Feb 2024, 4/28 - has Nexplanon    Protocols used: Breast Symptoms-Adult-[place]    "

## 2025-06-27 ENCOUNTER — OFFICE VISIT (OUTPATIENT)
Dept: OBGYN CLINIC | Facility: CLINIC | Age: 35
End: 2025-06-27
Payer: COMMERCIAL

## 2025-06-27 VITALS
SYSTOLIC BLOOD PRESSURE: 96 MMHG | WEIGHT: 188.6 LBS | BODY MASS INDEX: 30.31 KG/M2 | HEIGHT: 66 IN | DIASTOLIC BLOOD PRESSURE: 60 MMHG

## 2025-06-27 DIAGNOSIS — N60.82 SEBACEOUS CYST OF BREAST, LEFT: ICD-10-CM

## 2025-06-27 DIAGNOSIS — N64.52 DISCHARGE FROM LEFT NIPPLE: Primary | ICD-10-CM

## 2025-06-27 PROCEDURE — 99214 OFFICE O/P EST MOD 30 MIN: CPT | Performed by: OBSTETRICS & GYNECOLOGY

## 2025-06-30 NOTE — PROGRESS NOTES
"[facility] - [address], [place] [address]    Assessment & Plan  Discharge from left nipple        -  informed pt of findings c/w sebaceous cyst in left nipple       -  breast imaging ordered to further reassure     Orders:    US breast left limited (diagnostic); Future    Mammo diagnostic left w 3d and cad; Future    Sebaceous cyst of breast, left     -  advised pt to do warm compressions prior to expressing the sebaceous collection from the left nipple to decrease discomfort/pain with squeezing out the sebaceous collection     -  consult breast specialist (Dr. Patrick at [facility]) for to review management or treatment options for the cyst.      Orders:    US breast left limited (diagnostic); Future    Mammo diagnostic left w 3d and cad; Future      I have spent a total time of 30 minutes in caring for this patient on the day of the visit/encounter including Prognosis, Risks and benefits of tx options, Instructions for management, Patient and family education, Importance of tx compliance, Risk factor reductions, Impressions, Counseling / Coordination of care, Documenting in the medical record, Reviewing/placing orders in the medical record (including tests, medications, and/or procedures), and Obtaining or reviewing history  .      Subjective:   Sherry Cervantes is a 35 y.o.  female.    HPI:   Pt presents with concern about discharge from left nipple with manual (via fingers) compression of \"bump\" on the nipple.  Pt has pain in nipple during and after expression of yellowish-white discharge.  After expression of discharge the \"bump\" decreases in size and then gradually increases over the next few weeks.  Denies having bloody discharge, erythema or fever/chills.        Gyn History  Patient's last menstrual period was 2025 (exact date).       Last pap smear: 2022    She  reports being sexually active and has had partner(s) who are male. She reports using the " "following method of birth control/protection: Implant.       OB History      Past Medical History:  No date: Asthma  No date: Chronic knee pain  No date: Depression  No date: Disease of thyroid gland  2020: Dysphagia  2017: History of pulmonary embolus (PE)      Comment:  Result of Knee injury  2020: Left upper quadrant pain  No date: Menorrhagia  No date: Multiple thyroid nodules  No date: Psychiatric disorder  No date: Saphenous nerve neuropathy, left      Comment:  After an injury  No date: Vitamin D deficiency  No date: Vocal cord paralysis      Comment:  Following partial thyroidectomy     Past Surgical History:  2024: CHOLECYSTECTOMY LAPAROSCOPIC; N/A      Comment:  Procedure: CHOLECYSTECTOMY LAPAROSCOPIC;  Surgeon:                Cheikh Swain MD;  Location: [facility];  Service:                General  2016: DILATION AND EVACUATION  2019: KNEE ARTHROSCOPY W/ PLICA EXCISION; Left  2013: THYROIDECTOMY, PARTIAL; Left  No date: THYROIDECTOMY, PARTIAL; Left  No date: UPPER GASTROINTESTINAL ENDOSCOPY  2022: US GUIDED THYROID BIOPSY  No date: WISDOM TOOTH EXTRACTION     Social History[1]     Current Medications[2]    She has no known allergies..    ROS: Review of Systems   Constitutional:  Negative for activity change, appetite change, chills and fever.   Skin:  Negative for color change, pallor, rash and wound.       Objective:  BP 96/60 (BP Location: Left arm, Patient Position: Sitting, Cuff Size: Standard)   Ht 5' 6\" (1.676 m)   Wt 85.5 kg (188 lb 9.6 oz)   LMP 2025 (Exact Date)   Breastfeeding No   BMI 30.44 kg/m²      Physical Exam  Constitutional:       Appearance: Normal appearance.   HENT:      Head: Normocephalic.   Chest:   Breasts:     Right: No inverted nipple, mass, nipple discharge, skin change or tenderness.      Left: No inverted nipple, mass, nipple discharge, skin change or tenderness.      Comments: 5 mm sebaceous cyst at 12 o'clock in left nipple.  " Sebaceous collection expressed without any blood or liquid discharge  Lymphadenopathy:      Upper Body:      Right upper body: No axillary adenopathy.      Left upper body: No axillary adenopathy.     Neurological:      General: No focal deficit present.      Mental Status: She is alert.              [1]   Social History  Tobacco Use    Smoking status: Former     Current packs/day: 0.25     Average packs/day: 0.3 packs/day for 8.0 years (2.0 ttl pk-yrs)     Types: Cigarettes     Start date: 6/15/2017     Quit date: 2/9/2012     Passive exposure: Never    Smokeless tobacco: Never    Tobacco comments:      seasonal, only when it's warm out, social   Vaping Use    Vaping status: Former   Substance Use Topics    Alcohol use: Yes     Comment: socially    Drug use: Not Currently     Comment:  no longer using marijuana   [2]   Current Outpatient Medications:     clonazePAM (KlonoPIN) 0.5 mg tablet, Take 1 tablet (0.5 mg total) by mouth as needed for anxiety, Disp: 30 tablet, Rfl: 0    cyclobenzaprine (FLEXERIL) 5 mg tablet, Take 0.5 tablets (2.5 mg total) by mouth 3 (three) times a day as needed for muscle spasms, Disp: 30 tablet, Rfl: 0    ergocalciferol (VITAMIN D2) 50,000 units, Take 1 capsule (50,000 Units total) by mouth once a week, Disp: 12 capsule, Rfl: 0    etonogestrel (NEXPLANON) subdermal implant, 68 mg by Subdermal route Once every 3 years, Disp: , Rfl:     levothyroxine 75 mcg tablet, TAKE 1 TABLET BY MOUTH EVERY DAY, Disp: 90 tablet, Rfl: 1    Prenatal MV-Min-Fe Fum-FA-DHA (PRENATAL 1 PO), Take by mouth, Disp: , Rfl:     Fluticasone-Salmeterol (Advair Diskus) 250-50 mcg/dose inhaler, Inhale 1 puff 2 (two) times a day Rinse mouth after use., Disp: 60 blister, Rfl: 3

## 2025-07-16 NOTE — TELEPHONE ENCOUNTER
Received a phone call from Nuria Pan in [place] concerning significant findings on the [address] thyroid done 8/19/22  No

## (undated) DEVICE — TROCARS: Brand: KII® BALLOON BLUNT TIP SYSTEM

## (undated) DEVICE — GLOVE SRG BIOGEL ECLIPSE 8

## (undated) DEVICE — ELECTRODE BLADE MOD E-Z CLEAN  2.75IN 7CM -0012AM

## (undated) DEVICE — STIRRUP STRAP DISPOSABLE

## (undated) DEVICE — TUBING PATIENT ARTHROSCOPY REDEUCE

## (undated) DEVICE — CHLORAPREP HI-LITE 26ML ORANGE

## (undated) DEVICE — TROCAR: Brand: KII FIOS FIRST ENTRY

## (undated) DEVICE — SUT VICRYL 0 UR-6 27 IN J603H

## (undated) DEVICE — MATS FLOOR ABSORBE YELLOW 36IN X 40IN

## (undated) DEVICE — TROCAR: Brand: KII® SLEEVE

## (undated) DEVICE — GLOVE INDICATOR PI UNDERGLOVE SZ 6.5 BLUE

## (undated) DEVICE — ALLENTOWN LAP CHOLE APP PACK: Brand: CARDINAL HEALTH

## (undated) DEVICE — GLOVE SZ 8 LINER PROTEXIS PI BL

## (undated) DEVICE — SCD SEQUENTIAL COMPRESSION COMFORT SLEEVE MEDIUM KNEE LENGTH: Brand: KENDALL SCD

## (undated) DEVICE — GLOVE SURG PROTEXIS PF 7.5

## (undated) DEVICE — GOWN SURG X-LARGE MICROCOOL

## (undated) DEVICE — BLADE SHAVER AGGRESSIVE PLUS 4.0MM

## (undated) DEVICE — EXOFIN PRECISION PEN HIGH VISCOSITY TOPICAL SKIN ADHESIVE: Brand: EXOFIN PRECISION PEN, 1G

## (undated) DEVICE — INTENDED FOR TISSUE SEPARATION, AND OTHER PROCEDURES THAT REQUIRE A SHARP SURGICAL BLADE TO PUNCTURE OR CUT.: Brand: BARD-PARKER SAFETY BLADES SIZE 11, STERILE

## (undated) DEVICE — NEPTUNE E-SEP SMOKE EVACUATION PENCIL, COATED, 70MM BLADE, PUSH BUTTON SWITCH: Brand: NEPTUNE E-SEP

## (undated) DEVICE — TISSUE RETRIEVAL SYSTEM: Brand: INZII RETRIEVAL SYSTEM

## (undated) DEVICE — CUFF TOURNIQUET DISP 34 X 4

## (undated) DEVICE — ELECTRODE LAP L WIRE E-Z CLEAN 33CM -0100

## (undated) DEVICE — Device

## (undated) DEVICE — SUT MONOCRYL 4-0 PS-2 27 IN Y426H

## (undated) DEVICE — LIGAMAX 5 MM ENDOSCOPIC MULTIPLE CLIP APPLIER: Brand: LIGAMAX

## (undated) DEVICE — TOWEL SURG XR DETECT GREEN STRL RFD

## (undated) DEVICE — TUBING SMOKE EVAC W/FILTRATION DEVICE PLUMEPORT ACTIV

## (undated) DEVICE — APPLICATOR CHLORAPREP 26ML ORANGE TINT

## (undated) DEVICE — INSUFFLATION TUBING PRIMFLO

## (undated) DEVICE — BLUE HEAT SCOPE WARMER

## (undated) DEVICE — PAD GROUNDING DUAL ADULT

## (undated) DEVICE — SOLN IRRIG .9%SOD 3L

## (undated) DEVICE — MANIFOLD FOUR PORT NEPTUNE

## (undated) DEVICE — GLOVE SRG BIOGEL 6.5

## (undated) DEVICE — SUTURE ETHILON  3-0   663H

## (undated) DEVICE — NEEDLE HYPO 23G X 1-1/2 IN

## (undated) DEVICE — PACK MLHS KNEE ARTHROSCOPY

## (undated) DEVICE — METZENBAUM ADTEC SINGLE USE DISSECTING SCISSORS, SHAFT ONLY, MONOPOLAR, CURVED TO LEFT, WORKING LENGTH: 12 1/4", (310 MM), DIAM. 5 MM, INSULATED, DOUBLE ACTION, STERILE, DISPOSABLE, PACKAGE OF 10 PIECES: Brand: AESCULAP

## (undated) DEVICE — DRAPE EQUIPMENT RF WAND

## (undated) DEVICE — GLOVE INDICATOR PI UNDERGLOVE SZ 8 BLUE

## (undated) DEVICE — ELECTRODE PREMIER 50

## (undated) DEVICE — PACK LMC KNEE ARTHROSCOPY SUPP

## (undated) DEVICE — DECANTER: Brand: UNBRANDED